# Patient Record
Sex: FEMALE | Race: WHITE | NOT HISPANIC OR LATINO | Employment: OTHER | URBAN - METROPOLITAN AREA
[De-identification: names, ages, dates, MRNs, and addresses within clinical notes are randomized per-mention and may not be internally consistent; named-entity substitution may affect disease eponyms.]

---

## 2017-01-03 ENCOUNTER — TRANSFERRED RECORDS (OUTPATIENT)
Dept: HEALTH INFORMATION MANAGEMENT | Facility: CLINIC | Age: 67
End: 2017-01-03

## 2017-01-03 LAB
ALBUMIN SERPL-MCNC: 4 G/DL
ALP SERPL-CCNC: 170 U/L
ALT SERPL-CCNC: 41 U/L
ANA DIRECT: NEGATIVE
ANION GAP SERPL CALCULATED.3IONS-SCNC: NORMAL MMOL/L
AST SERPL-CCNC: 43 U/L
BASOPHILS # BLD AUTO: 0 10*3/UL
BASOPHILS NFR BLD AUTO: 1 %
BILIRUB SERPL-MCNC: <0.2 MG/DL
BUN SERPL-MCNC: 9 MG/DL
CALCIUM SERPL-MCNC: 9.5 MG/DL
CCP ANTIBODIES: 11 U/ML
CHLORIDE SERPLBLD-SCNC: 92 MMOL/L
CO2 SERPL-SCNC: 27 MMOL/L
CREAT SERPL-MCNC: 0.63 MG/DL
CRP SERPL-MCNC: 27.3 MG/L
EOSINOPHIL # BLD AUTO: 0.2 10*3/UL
EOSINOPHIL NFR BLD AUTO: 3 %
ERYTHROCYTE [DISTWIDTH] IN BLOOD BY AUTOMATED COUNT: 13.3 %
GFR SERPL CREATININE-BSD FRML MDRD: 93 ML/MIN/1.73M2
GLUCOSE SERPL-MCNC: 87 MG/DL (ref 70–99)
HCT VFR BLD AUTO: 34.3 %
HEMOGLOBIN: 10.7 G/DL (ref 11.7–15.7)
LYMPHOCYTES # BLD AUTO: 1.4 10*3/UL
LYMPHOCYTES NFR BLD AUTO: 18 %
Lab: 100
MCH RBC QN AUTO: 27.4 PG
MCHC RBC AUTO-ENTMCNC: 31.2 G/DL
MCV RBC AUTO: 88 FL
MONOCYTES # BLD AUTO: 0.7 10*3/UL
MONOCYTES NFR BLD AUTO: 9 %
NEUTROPHILS # BLD AUTO: 5.3 10*3/UL
NEUTROPHILS NFR BLD AUTO: 69 %
PLATELET COUNT - QUEST: 514 10^9/L (ref 150–450)
POTASSIUM SERPL-SCNC: 4.4 MMOL/L
PROT SERPL-MCNC: 6.8 G/DL
RA LATEX TURBID.: 10.5 IU/ML
RBC # BLD AUTO: 3.91 10^12/L
SODIUM SERPL-SCNC: 135 MMOL/L
WBC # BLD AUTO: 7.6 10^9/L

## 2017-01-05 ENCOUNTER — ONCOLOGY VISIT (OUTPATIENT)
Dept: ONCOLOGY | Facility: CLINIC | Age: 67
End: 2017-01-05
Attending: INTERNAL MEDICINE
Payer: COMMERCIAL

## 2017-01-05 VITALS
RESPIRATION RATE: 16 BRPM | BODY MASS INDEX: 19.87 KG/M2 | HEIGHT: 67 IN | HEART RATE: 82 BPM | OXYGEN SATURATION: 99 % | TEMPERATURE: 95.8 F | SYSTOLIC BLOOD PRESSURE: 110 MMHG | DIASTOLIC BLOOD PRESSURE: 69 MMHG | WEIGHT: 126.6 LBS

## 2017-01-05 DIAGNOSIS — C82.99 NODULAR LYMPHOMA OF EXTRANODAL AND/OR SOLID ORGAN SITE (H): Primary | ICD-10-CM

## 2017-01-05 DIAGNOSIS — M87.00 AVASCULAR NECROSIS OF BONE (H): ICD-10-CM

## 2017-01-05 DIAGNOSIS — M25.552 HIP PAIN, LEFT: ICD-10-CM

## 2017-01-05 DIAGNOSIS — M17.0 OSTEOARTHRITIS OF BOTH KNEES, UNSPECIFIED OSTEOARTHRITIS TYPE: ICD-10-CM

## 2017-01-05 PROCEDURE — 99212 OFFICE O/P EST SF 10 MIN: CPT | Mod: ZF

## 2017-01-05 PROCEDURE — 99214 OFFICE O/P EST MOD 30 MIN: CPT | Mod: GC | Performed by: INTERNAL MEDICINE

## 2017-01-05 RX ORDER — OMEPRAZOLE 20 MG/1
20 TABLET, DELAYED RELEASE ORAL DAILY
COMMUNITY
End: 2017-07-06

## 2017-01-05 RX ORDER — PREDNISONE 5 MG/1
30 TABLET ORAL DAILY
COMMUNITY
End: 2017-07-06

## 2017-01-05 ASSESSMENT — PAIN SCALES - GENERAL: PAINLEVEL: MODERATE PAIN (4)

## 2017-01-05 NOTE — NURSING NOTE
"Lilly Babcock is a 66 year old female who presents for:  Chief Complaint   Patient presents with     Oncology Clinic Visit     NHL        Initial Vitals:  /69 mmHg  Pulse 82  Temp(Src) 95.8  F (35.4  C) (Tympanic)  Resp 16  Ht 1.712 m (5' 7.4\")  Wt 57.425 kg (126 lb 9.6 oz)  BMI 19.59 kg/m2  SpO2 99%  Breastfeeding? No Estimated body mass index is 19.59 kg/(m^2) as calculated from the following:    Height as of this encounter: 1.712 m (5' 7.4\").    Weight as of this encounter: 57.425 kg (126 lb 9.6 oz).. Body surface area is 1.65 meters squared. BP completed using cuff size: regular  Moderate Pain (4) No LMP recorded. Patient is postmenopausal. Allergies and medications reviewed.     Medications: Medication refills not needed today.  Pharmacy name entered into TopTenREVIEWS:    [facility] #[id number] - [place], [place] - [address] [facility] - [place], [place] - [address] [facility]Y #[id number] - [place], [place] - [address]    Comments:     7 minutes for nursing intake (face to face time)   Yamile Bolton CMA          "

## 2017-01-05 NOTE — Clinical Note
1/5/2017       RE: Lilly Babcock  [address]  [place] [address]     Dear Colleague,    Thank you for referring your patient, Lilly Babcock, to the [facility]. Please see a copy of my visit note below.    No notes on file    Again, thank you for allowing me to participate in the care of your patient.      Sincerely,    Jean-Claude Blanton MD

## 2017-01-05 NOTE — Clinical Note
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 Biopsy was grade 2-3A and stage YOUSUF. Initial w/u with involvement of multiple skeletal sites and bone marrow. Over time, she was followed without requiring treatment, but with evidence of waxing and waning disease on serial scans.  The only lymphoma treatment she received was for pain in the left hip with evidence of progressive skeletal disease at that site on radiographic evaluation.  She received a total of 4140 cGy in 20 fractions to the left hip/proximal femur between 08/20/2012 and 09/20/2012 at [facility].  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 In July 2016 developed nausea of unknown etiology, workup negative for CNS involvement with MR and CSF analysis. PET scan on 8/18/16 with indication of progressive skeletal disease.      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 She previously developed avascular necrosis and for that she underwent a left total hip arthroplasty on 10/19/16. She is in for an interval follow up because she was recently started on steroids for possible PMR and wanted to know if this would affect her lymphoma follow up or treatment plan.    She states that during December she developed worsening pain, stiffness and weakness in her hips, legs, and knees. She was to the point that she was needing to use a walker and was seen by Dr. Godoy, Rheumatologist, earlier this week and he opted for a trial of prednisone as she may have polymyalgia rheumatica.    She started 30 mg of prednisone daily and notes that she has had a remarkable improvement in her symptoms to the point that she is able to get around without any  "major issues in the past day.  She has noticed some insomnia with the steroid but is otherwise tolerating this well.     She denies any fever, night sweats, weight loss.     ROS: complete 10-pt ROS reviewed and negative unless noted in HPI    Meds:  Current Outpatient Prescriptions   Medication     predniSONE (DELTASONE) 5 MG tablet     NAPROXEN SODIUM PO     omeprazole (PRILOSEC OTC) 20 MG tablet     mometasone (ELOCON) 0.1 % ointment     fluticasone-salmeterol (ADVAIR DISKUS) 250-50 MCG/DOSE diskus inhaler     valACYclovir (VALTREX) 1000 mg tablet     levalbuterol (XOPENEX HFA) 45 MCG/ACT Inhaler     ipratropium (ATROVENT) 0.06 % spray     acetaminophen (TYLENOL) 500 MG tablet     clobetasol (TEMOVATE) 0.05 % ointment     levalbuterol (XOPENEX) 1.25 MG/3ML nebulizer solution     lamoTRIgine (LAMICTAL) 25 MG TBDP     methylPREDNISolone (MEDROL) 32 MG tablet     fexofenadine (ALLEGRA) 180 MG tablet     albuterol (PROAIR HFA) 108 (90 BASE) MCG/ACT inhaler     triamcinolone (KENALOG) 0.1 % cream     Cholecalciferol (VITAMIN D) 1000 UNIT capsule     No current facility-administered medications for this visit.       Allergies: IV contrast, Diatrizoate, Liquid adhesives.    Physical Examination:    /69 mmHg  Pulse 82  Temp(Src) 95.8  F (35.4  C) (Tympanic)  Resp 16  Ht 1.712 m (5' 7.4\")  Wt 57.425 kg (126 lb 9.6 oz)  BMI 19.59 kg/m2  SpO2 99%  Breastfeeding? No  Gen: alert, NAD, thin   HEENT: PERRL, sclera anicteric, MMM, no oral lesions  Lymph: she has as pea sized lymph node in the left posterior cervical chain. No other cervical, supraclavicular, axillary, or inguinal adenopathy  CV: RRR without murmurs  Lungs: CTAB  Abd: soft, nt/nd, normal BS, non-palpable liver and spleen, no masses  MSK: no LE edema  Neuro: moving all extremities equally  Skin: left shin ecchymosis   Psych: normal affect    No new labs/imaging here - she reports ESR of 47 this week.    Impression and Plan    Stage YOUSUF follicular " lymphoma: Dr. 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 Over the years her disease has waxed and waned, mainly in skeletal locations, and she has not yet required systemic treatment. She had some weight loss around that time but it seems that it was related to some depression due to death in the family/friends. Because of the nausea and vomiting, an evaluation for CNS involvement with MRI brain and CSF analysis was done and was negative. Currently, the nausea has resolved. The back and hip pains are likely attributed to avascular necrosis and arthritis. She underwent a left total hip arthroplasty on 10/19/16 and she is still recovering.  In the fall of 2016 lymphoma directed therapy, probably with rituximab alone, was contemplated but since  her weight loss plateaued, her nausea and vomiting has stopped, and some of her pains are due to osteoarthritis and avascular necrosis it was decided to continue monitoring.    She comes in today after being started on prednisone for possible PMR.  She is feeling symptomatically much better.  She will continue to manage the prednisone with Dr. Gould. We are pleased with the improvement in her symptoms and favor having her on the lowest effective dose.  We will continue with her planned follow up in March. At that time we will evaluate need for additional imaging or treatment - accepting that prednisone, which is helping, may be affecting the lymphoma.    The patient was seen and staffed with Dr. Harvinder Manzano MD  Heme/Onc Fellow    Oncology Attending    Patient seen and examined with the Oncology Fellow, Dr. Manzano. Agree with his findings, assessment and plan.    Jean-Claude Blanton MD  Professor of Medicine  Oncology  [facility]  Office: [phone]  Clinic Fax: [phone]      CC:    MD Anant Banda MD Edward Cheng, MD Erskine Caperton, MD Bruce A. Peterson, MD

## 2017-01-05 NOTE — PROGRESS NOTES
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 Biopsy was grade 2-3A and stage YOUSUF. Initial w/u with involvement of multiple skeletal sites and bone marrow. Over time, she was followed without requiring treatment, but with evidence of waxing and waning disease on serial scans.  The only lymphoma treatment she received was for pain in the left hip with evidence of progressive skeletal disease at that site on radiographic evaluation.  She received a total of 4140 cGy in 20 fractions to the left hip/proximal femur between 08/20/2012 and 09/20/2012 at [facility].  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 In July 2016 developed nausea of unknown etiology, workup negative for CNS involvement with MR and CSF analysis. PET scan on 8/18/16 with indication of progressive skeletal disease.      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 She previously developed avascular necrosis and for that she underwent a left total hip arthroplasty on 10/19/16. She is in for an interval follow up because she was recently started on steroids for possible PMR and wanted to know if this would affect her lymphoma follow up or treatment plan.    She states that during December she developed worsening pain, stiffness and weakness in her hips, legs, and knees. She was to the point that she was needing to use a walker and was seen by Dr. Godoy, Rheumatologist, earlier this week and he opted for a trial of prednisone as she may have polymyalgia rheumatica.    She started 30 mg of prednisone daily and notes that she has had a remarkable improvement in her symptoms to the point that she is able to get around without any major issues in the past day.  She has noticed some insomnia with the steroid but  "is otherwise tolerating this well.     She denies any fever, night sweats, weight loss.     ROS: complete 10-pt ROS reviewed and negative unless noted in HPI    Meds:  Current Outpatient Prescriptions   Medication     predniSONE (DELTASONE) 5 MG tablet     NAPROXEN SODIUM PO     omeprazole (PRILOSEC OTC) 20 MG tablet     mometasone (ELOCON) 0.1 % ointment     fluticasone-salmeterol (ADVAIR DISKUS) 250-50 MCG/DOSE diskus inhaler     valACYclovir (VALTREX) 1000 mg tablet     levalbuterol (XOPENEX HFA) 45 MCG/ACT Inhaler     ipratropium (ATROVENT) 0.06 % spray     acetaminophen (TYLENOL) 500 MG tablet     clobetasol (TEMOVATE) 0.05 % ointment     levalbuterol (XOPENEX) 1.25 MG/3ML nebulizer solution     lamoTRIgine (LAMICTAL) 25 MG TBDP     methylPREDNISolone (MEDROL) 32 MG tablet     fexofenadine (ALLEGRA) 180 MG tablet     albuterol (PROAIR HFA) 108 (90 BASE) MCG/ACT inhaler     triamcinolone (KENALOG) 0.1 % cream     Cholecalciferol (VITAMIN D) 1000 UNIT capsule     No current facility-administered medications for this visit.       Allergies: IV contrast, Diatrizoate, Liquid adhesives.    Physical Examination:    /69 mmHg  Pulse 82  Temp(Src) 95.8  F (35.4  C) (Tympanic)  Resp 16  Ht 1.712 m (5' 7.4\")  Wt 57.425 kg (126 lb 9.6 oz)  BMI 19.59 kg/m2  SpO2 99%  Breastfeeding? No  Gen: alert, NAD, thin   HEENT: PERRL, sclera anicteric, MMM, no oral lesions  Lymph: she has as pea sized lymph node in the left posterior cervical chain. No other cervical, supraclavicular, axillary, or inguinal adenopathy  CV: RRR without murmurs  Lungs: CTAB  Abd: soft, nt/nd, normal BS, non-palpable liver and spleen, no masses  MSK: no LE edema  Neuro: moving all extremities equally  Skin: left shin ecchymosis   Psych: normal affect    No new labs/imaging here - she reports ESR of 47 this week.    Impression and Plan    Stage YOUSUF follicular lymphoma: Dr. 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 " Over the years her disease has waxed and waned, mainly in skeletal locations, and she has not yet required systemic treatment. She had some weight loss around that time but it seems that it was related to some depression due to death in the family/friends. Because of the nausea and vomiting, an evaluation for CNS involvement with MRI brain and CSF analysis was done and was negative. Currently, the nausea has resolved. The back and hip pains are likely attributed to avascular necrosis and arthritis. She underwent a left total hip arthroplasty on 10/19/16 and she is still recovering.  In the fall of 2016 lymphoma directed therapy, probably with rituximab alone, was contemplated but since  her weight loss plateaued, her nausea and vomiting has stopped, and some of her pains are due to osteoarthritis and avascular necrosis it was decided to continue monitoring.    She comes in today after being started on prednisone for possible PMR.  She is feeling symptomatically much better.  She will continue to manage the prednisone with Dr. Gould. We are pleased with the improvement in her symptoms and favor having her on the lowest effective dose.  We will continue with her planned follow up in March. At that time we will evaluate need for additional imaging or treatment - accepting that prednisone, which is helping, may be affecting the lymphoma.    The patient was seen and staffed with Dr. Harvinder Manzano MD  Heme/Onc Fellow    Oncology Attending    Patient seen and examined with the Oncology Fellow, Dr. Manzano. Agree with his findings, assessment and plan.    Jean-Claude Blanton MD  Professor of Medicine  Oncology  [facility]  Office: [phone]  Clinic Fax: [phone]      CC:    MD Anant Banda MD Edward Cheng, MD Erskine Caperton, MD

## 2017-01-06 ENCOUNTER — DOCUMENTATION ONLY (OUTPATIENT)
Dept: ORTHOPEDICS | Facility: CLINIC | Age: 67
End: 2017-01-06

## 2017-01-11 ENCOUNTER — MEDICAL CORRESPONDENCE (OUTPATIENT)
Dept: HEALTH INFORMATION MANAGEMENT | Facility: CLINIC | Age: 67
End: 2017-01-11

## 2017-03-09 ENCOUNTER — ONCOLOGY VISIT (OUTPATIENT)
Dept: ONCOLOGY | Facility: CLINIC | Age: 67
End: 2017-03-09
Attending: INTERNAL MEDICINE
Payer: COMMERCIAL

## 2017-03-09 ENCOUNTER — APPOINTMENT (OUTPATIENT)
Dept: LAB | Facility: CLINIC | Age: 67
End: 2017-03-09
Attending: INTERNAL MEDICINE
Payer: COMMERCIAL

## 2017-03-09 VITALS
BODY MASS INDEX: 20.17 KG/M2 | SYSTOLIC BLOOD PRESSURE: 103 MMHG | WEIGHT: 130.3 LBS | TEMPERATURE: 97.9 F | HEART RATE: 89 BPM | DIASTOLIC BLOOD PRESSURE: 55 MMHG | OXYGEN SATURATION: 96 %

## 2017-03-09 DIAGNOSIS — C82.90 FOLLICULAR NON-HODGKIN'S LYMPHOMA (H): ICD-10-CM

## 2017-03-09 DIAGNOSIS — C82.99 NODULAR LYMPHOMA OF EXTRANODAL AND/OR SOLID ORGAN SITE (H): Primary | ICD-10-CM

## 2017-03-09 LAB
ALBUMIN SERPL-MCNC: 3.6 G/DL (ref 3.4–5)
ALP SERPL-CCNC: 84 U/L (ref 40–150)
ALT SERPL W P-5'-P-CCNC: 30 U/L (ref 0–50)
ANION GAP SERPL CALCULATED.3IONS-SCNC: 9 MMOL/L (ref 3–14)
AST SERPL W P-5'-P-CCNC: 26 U/L (ref 0–45)
BASOPHILS # BLD AUTO: 0 10E9/L (ref 0–0.2)
BASOPHILS NFR BLD AUTO: 0.4 %
BILIRUB SERPL-MCNC: 0.4 MG/DL (ref 0.2–1.3)
BUN SERPL-MCNC: 10 MG/DL (ref 7–30)
CALCIUM SERPL-MCNC: 9.3 MG/DL (ref 8.5–10.1)
CHLORIDE SERPL-SCNC: 101 MMOL/L (ref 94–109)
CO2 SERPL-SCNC: 28 MMOL/L (ref 20–32)
CREAT SERPL-MCNC: 0.69 MG/DL (ref 0.52–1.04)
DIFFERENTIAL METHOD BLD: ABNORMAL
EOSINOPHIL # BLD AUTO: 0 10E9/L (ref 0–0.7)
EOSINOPHIL NFR BLD AUTO: 0.2 %
ERYTHROCYTE [DISTWIDTH] IN BLOOD BY AUTOMATED COUNT: 16.8 % (ref 10–15)
GFR SERPL CREATININE-BSD FRML MDRD: 85 ML/MIN/1.7M2
GLUCOSE SERPL-MCNC: 151 MG/DL (ref 70–99)
HCT VFR BLD AUTO: 37.2 % (ref 35–47)
HGB BLD-MCNC: 12 G/DL (ref 11.7–15.7)
IMM GRANULOCYTES # BLD: 0.1 10E9/L (ref 0–0.4)
IMM GRANULOCYTES NFR BLD: 0.6 %
LDH SERPL L TO P-CCNC: 216 U/L (ref 81–234)
LYMPHOCYTES # BLD AUTO: 0.8 10E9/L (ref 0.8–5.3)
LYMPHOCYTES NFR BLD AUTO: 9.3 %
MCH RBC QN AUTO: 29.1 PG (ref 26.5–33)
MCHC RBC AUTO-ENTMCNC: 32.3 G/DL (ref 31.5–36.5)
MCV RBC AUTO: 90 FL (ref 78–100)
MONOCYTES # BLD AUTO: 0.4 10E9/L (ref 0–1.3)
MONOCYTES NFR BLD AUTO: 3.9 %
NEUTROPHILS # BLD AUTO: 7.7 10E9/L (ref 1.6–8.3)
NEUTROPHILS NFR BLD AUTO: 85.6 %
NRBC # BLD AUTO: 0 10*3/UL
NRBC BLD AUTO-RTO: 0 /100
PLATELET # BLD AUTO: 307 10E9/L (ref 150–450)
POTASSIUM SERPL-SCNC: 4 MMOL/L (ref 3.4–5.3)
PROT SERPL-MCNC: 6.9 G/DL (ref 6.8–8.8)
RBC # BLD AUTO: 4.13 10E12/L (ref 3.8–5.2)
SODIUM SERPL-SCNC: 138 MMOL/L (ref 133–144)
WBC # BLD AUTO: 9 10E9/L (ref 4–11)

## 2017-03-09 PROCEDURE — 36415 COLL VENOUS BLD VENIPUNCTURE: CPT

## 2017-03-09 PROCEDURE — 99212 OFFICE O/P EST SF 10 MIN: CPT

## 2017-03-09 PROCEDURE — 83615 LACTATE (LD) (LDH) ENZYME: CPT | Performed by: HOSPITALIST

## 2017-03-09 PROCEDURE — 80053 COMPREHEN METABOLIC PANEL: CPT | Performed by: HOSPITALIST

## 2017-03-09 PROCEDURE — 85025 COMPLETE CBC W/AUTO DIFF WBC: CPT | Performed by: HOSPITALIST

## 2017-03-09 PROCEDURE — 99214 OFFICE O/P EST MOD 30 MIN: CPT | Mod: GC | Performed by: INTERNAL MEDICINE

## 2017-03-09 NOTE — MR AVS SNAPSHOT
After Visit Summary   3/9/2017    Lilly Babcock    MRN: [id number]           Patient Information     Date Of Birth          1950        Visit Information        Provider Department      3/9/2017 2:00 PM Jean-Claude Blanton MD [facility]        Today's Diagnoses     Nodular lymphoma of extranodal and/or solid organ site (H)    -  1    Follicular non-Hodgkin's lymphoma (H)           Follow-ups after your visit        Follow-up notes from your care team     Return in about 3 months (around 6/9/2017) for Physical Exam.      Who to contact     If you have questions or need follow up information about today's clinic visit or your schedule please contact [facility] directly at [phone].  Normal or non-critical lab and imaging results will be communicated to you by MindMixerhart, letter or phone within 4 business days after the clinic has received the results. If you do not hear from us within 7 days, please contact the clinic through MindMixerhart or phone. If you have a critical or abnormal lab result, we will notify you by phone as soon as possible.  Submit refill requests through [employer]1 or call your pharmacy and they will forward the refill request to us. Please allow 3 business days for your refill to be completed.          Additional Information About Your Visit        MyChart Information     [employer] gives you secure access to your electronic health record. If you see a primary care provider, you can also send messages to your care team and make appointments. If you have questions, please call your primary care clinic.  If you do not have a primary care provider, please call [phone] and they will assist you.        Care EveryWhere ID     This is your Care EveryWhere ID. This could be used by other organizations to access your [facility] medical records  [id number]        Your Vitals Were     Pulse Temperature Pulse Oximetry BMI (Body Mass Index)          89  97.9  F (36.6  C) (Oral) 96% 20.17 kg/m2         Blood Pressure from Last 3 Encounters:   03/09/17 103/55   01/05/17 110/69   12/07/16 104/58    Weight from Last 3 Encounters:   03/09/17 59.1 kg (130 lb 4.8 oz)   01/05/17 57.4 kg (126 lb 9.6 oz)   12/07/16 55 kg (121 lb 4.8 oz)              We Performed the Following     CBC with platelets differential     Comprehensive metabolic panel     Lactate Dehydrogenase        Primary Care Provider Office Phone # Fax #    Wade LANDEROS MD [place] [phone] [address] [address]        Thank you!     Thank you for choosing [facility]  for your care. Our goal is always to provide you with excellent care. Hearing back from our patients is one way we can continue to improve our services. Please take a few minutes to complete the written survey that you may receive in the mail after your visit with us. Thank you!             Your Updated Medication List - Protect others around you: Learn how to safely use, store and throw away your medicines at www.disposemymeds.org.          This list is accurate as of: 3/9/17 11:59 PM.  Always use your most recent med list.                   Brand Name Dispense Instructions for use    acetaminophen 500 MG tablet    TYLENOL     Take 500-1,000 mg by mouth       ADVAIR DISKUS 250-50 MCG/DOSE diskus inhaler   Generic drug:  fluticasone-salmeterol      Reported on 3/9/2017       albuterol 108 (90 BASE) MCG/ACT Inhaler   Generic drug:  albuterol      Inhale 2 puffs into the lungs every 4 hours as needed       ALLEGRA 180 MG tablet   Generic drug:  fexofenadine      Take  by mouth daily.       clobetasol 0.05 % ointment    TEMOVATE     Reported on 3/9/2017       ipratropium 0.06 % spray    ATROVENT     Spray 2 sprays in nostril       lamoTRIgine 25 MG Tbdp ODT tab    LaMICtal     100 mg daily       levalbuterol 1.25 MG/3ML neb solution    XOPENEX     Take 1 ampule by nebulization as  needed       methylPREDNISolone 32 MG tablet    MEDROL    2 tablet    Take one tablet (32mg) by mouth 12 hours prior to scheduled CT scan.  Repeat above dose 2 hours prior to CT scan       mometasone 0.1 % ointment    ELOCON     Reported on 3/9/2017       NAPROXEN SODIUM PO      Take 220 mg by mouth 2 times daily (with meals) Reported on 3/9/2017       omeprazole 20 MG tablet    priLOSEC OTC     Take 20 mg by mouth daily       predniSONE 5 MG tablet    DELTASONE     Take 30 mg by mouth daily       triamcinolone 0.1 % cream    KENALOG     Apply 0.5 inches topically 2 times daily Reported on 3/9/2017       valACYclovir 1000 mg tablet    VALTREX     One tab bid for 3 days prn outbreaks  Indications: PN:       vitamin D 1000 UNITS capsule      Take 1 capsule by mouth daily.       XOPENEX HFA 45 MCG/ACT Inhaler   Generic drug:  levalbuterol      Inhale 1-2 puffs into the lungs

## 2017-03-09 NOTE — PROGRESS NOTES
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 Biopsy was grade 2-3A and stage YOUSUF. Initial w/u with involvement of multiple skeletal sites and bone marrow. Over time, she was followed without requiring treatment, but with evidence of waxing and waning disease on serial scans.  The only lymphoma treatment she received was for pain in the left hip with evidence of progressive skeletal disease at that site on radiographic evaluation.  She received a total of 4140 cGy in 20 fractions to the left hip/proximal femur between 08/20/2012 and 09/20/2012 at [facility].  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 In July 2016 developed nausea of unknown etiology, workup negative for CNS involvement with MR and CSF analysis. PET scan on 8/18/16 with indication of progressive skeletal disease.  She previously developed avascular necrosis and for that she underwent a left total hip arthroplasty on 10/19/16.    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  She is taking prednisone 15 mg for PMR, and has had improvement in the PMR symptoms. She has stiffness and pain related to this. Her worst pain is in the right hip and right knee.She is yet to do further follow up with orthopedics.    Denies chest pain, SOB, cough, phlegmn, nausea, vomiting, abdominal pain, diarrhoea, constipation, fatigue, loss of weight, loss of appetite, urinary problems, headache, dizziness, blurry vision, fevers, chills, night sweats.     Her sister was recently diagnosed with liver cirrhosis and also noted to homozygous for alpha 1 antitrypsin.  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    ROS: complete 10-pt ROS reviewed and negative unless noted in HPI    Meds:  Current Outpatient  Prescriptions   Medication     predniSONE (DELTASONE) 5 MG tablet     omeprazole (PRILOSEC OTC) 20 MG tablet     valACYclovir (VALTREX) 1000 mg tablet     levalbuterol (XOPENEX HFA) 45 MCG/ACT Inhaler     ipratropium (ATROVENT) 0.06 % spray     acetaminophen (TYLENOL) 500 MG tablet     levalbuterol (XOPENEX) 1.25 MG/3ML nebulizer solution     lamoTRIgine (LAMICTAL) 25 MG TBDP     methylPREDNISolone (MEDROL) 32 MG tablet     fexofenadine (ALLEGRA) 180 MG tablet     albuterol (PROAIR HFA) 108 (90 BASE) MCG/ACT inhaler     Cholecalciferol (VITAMIN D) 1000 UNIT capsule     NAPROXEN SODIUM PO     mometasone (ELOCON) 0.1 % ointment     fluticasone-salmeterol (ADVAIR DISKUS) 250-50 MCG/DOSE diskus inhaler     clobetasol (TEMOVATE) 0.05 % ointment     triamcinolone (KENALOG) 0.1 % cream     No current facility-administered medications for this visit.        Allergies: IV contrast, Diatrizoate, Liquid adhesives.    Physical Examination:    /55  Pulse 89  Temp 97.9  F (36.6  C) (Oral)  Wt 59.1 kg (130 lb 4.8 oz)  SpO2 96%  BMI 20.17 kg/m2  Gen: alert, NAD, thin   HEENT: PERRL, sclera anicteric, MMM, no oral lesions  Lymph: No cervical, supraclavicular, axillary, or inguinal adenopathy  CV: RRR without murmurs  Lungs: CTAB  Abd: soft, nt/nd, normal BS, non-palpable liver and spleen, no masses  MSK: no LE edema  Neuro: moving all extremities equally  Skin: dry  Psych: normal affect    Labs:Hb: 12; WBC:9.0  Creatinine 0.69, LDH: 216.      Impression and Plan    Stage YOUSUF follicular lymphoma: Dr. 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 Over the years her disease has waxed and waned, mainly in skeletal locations, and she has not yet required systemic treatment. She had some weight loss around that time but it seems that it was related to some depression due to death in the family/friends. Because of the nausea and vomiting, an evaluation for CNS involvement with MRI brain and CSF analysis was  done and was negative. Currently, the nausea remains resolved. The back and hip pains are likely attributed to avascular necrosis and arthritis. She underwent a left total hip arthroplasty on 10/19/16 and she is still recovering.  In the fall of 2016 lymphoma directed therapy, probably with rituximab alone, was contemplated but since  her weight loss plateaued, her nausea and vomiting has stopped, and some of her pains are due to osteoarthritis and avascular necrosis it was decided to continue monitoring.    She has no new complaints today.She is feeling symptomatically much better after being on prednisone for PMR.  She will continue to manage the prednisone with Dr. Gould. Advised follow up with orthopedics for hip pain. We will continue observation now. Follow up in 3-4 months with blood work; CBc with diff, CMP, LDH.    The patient was seen and staffed with Dr. Blanton.    PAOLA Sahu, MS.  Hematology/Oncology Fellow  [facility]  Pager [phone]    Oncology Attending    Patient seen and examined with the Oncology Fellow, Dr. Bennett. Agree with her findings, assessment and plan.    Jean-Claude Blanton MD  Professor of Medicine  Oncology  [facility]  Office: [phone]  Clinic Fax: [phone]      CC:    MD Wade Banda MD

## 2017-03-09 NOTE — NURSING NOTE
"Lilly Babcock is a 66 year old female who presents for:  Chief Complaint   Patient presents with     Blood Draw     vs/labs by [facility]        Initial Vitals:  /55  Pulse 89  Temp 97.9  F (36.6  C) (Oral)  Wt 59.1 kg (130 lb 4.8 oz)  SpO2 96%  BMI 20.17 kg/m2 Estimated body mass index is 20.17 kg/(m^2) as calculated from the following:    Height as of 1/5/17: 1.712 m (5' 7.4\").    Weight as of this encounter: 59.1 kg (130 lb 4.8 oz).. Body surface area is 1.68 meters squared. BP completed using cuff size: NA (Not Taken)  Data Unavailable No LMP recorded. Patient is postmenopausal. Allergies and medications reviewed.     Medications: Medication refills not needed today.  Pharmacy name entered into [employer]:    [facility] #[id number] - [place], [place] - [address] [facility] - [place], [place] - [address] [facility]ARMACY #[id number] - [place], [place] - [address]    Comments:     7 minutes for nursing intake (face to face time)   Leila Andrade MA        "

## 2017-03-09 NOTE — NURSING NOTE
Chief Complaint   Patient presents with     Blood Draw     vs/labs by cma   See doc flowsheets for details.  VENKATA Saavedra, NAMITA

## 2017-03-09 NOTE — LETTER
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 Biopsy was grade 2-3A and stage YOUSUF. Initial w/u with involvement of multiple skeletal sites and bone marrow. Over time, she was followed without requiring treatment, but with evidence of waxing and waning disease on serial scans.  The only lymphoma treatment she received was for pain in the left hip with evidence of progressive skeletal disease at that site on radiographic evaluation.  She received a total of 4140 cGy in 20 fractions to the left hip/proximal femur between 08/20/2012 and 09/20/2012 at [facility].  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 In July 2016 developed nausea of unknown etiology, workup negative for CNS involvement with MR and CSF analysis. PET scan on 8/18/16 with indication of progressive skeletal disease.  She previously developed avascular necrosis and for that she underwent a left total hip arthroplasty on 10/19/16.    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  She is taking prednisone 15 mg for PMR, and has had improvement in the PMR symptoms. She has stiffness and pain related to this. Her worst pain is in the right hip and right knee.She is yet to do further follow up with orthopedics.    Denies chest pain, SOB, cough, phlegmn, nausea, vomiting, abdominal pain, diarrhoea, constipation, fatigue, loss of weight, loss of appetite, urinary problems, headache, dizziness, blurry vision, fevers, chills, night sweats.     Her sister was recently diagnosed with liver cirrhosis and also noted to homozygous for alpha 1 antitrypsin.  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    ROS: complete 10-pt ROS  reviewed and negative unless noted in HPI    Meds:  Current Outpatient Prescriptions   Medication     predniSONE (DELTASONE) 5 MG tablet     omeprazole (PRILOSEC OTC) 20 MG tablet     valACYclovir (VALTREX) 1000 mg tablet     levalbuterol (XOPENEX HFA) 45 MCG/ACT Inhaler     ipratropium (ATROVENT) 0.06 % spray     acetaminophen (TYLENOL) 500 MG tablet     levalbuterol (XOPENEX) 1.25 MG/3ML nebulizer solution     lamoTRIgine (LAMICTAL) 25 MG TBDP     methylPREDNISolone (MEDROL) 32 MG tablet     fexofenadine (ALLEGRA) 180 MG tablet     albuterol (PROAIR HFA) 108 (90 BASE) MCG/ACT inhaler     Cholecalciferol (VITAMIN D) 1000 UNIT capsule     NAPROXEN SODIUM PO     mometasone (ELOCON) 0.1 % ointment     fluticasone-salmeterol (ADVAIR DISKUS) 250-50 MCG/DOSE diskus inhaler     clobetasol (TEMOVATE) 0.05 % ointment     triamcinolone (KENALOG) 0.1 % cream     No current facility-administered medications for this visit.        Allergies: IV contrast, Diatrizoate, Liquid adhesives.    Physical Examination:    /55  Pulse 89  Temp 97.9  F (36.6  C) (Oral)  Wt 59.1 kg (130 lb 4.8 oz)  SpO2 96%  BMI 20.17 kg/m2  Gen: alert, NAD, thin   HEENT: PERRL, sclera anicteric, MMM, no oral lesions  Lymph: No cervical, supraclavicular, axillary, or inguinal adenopathy  CV: RRR without murmurs  Lungs: CTAB  Abd: soft, nt/nd, normal BS, non-palpable liver and spleen, no masses  MSK: no LE edema  Neuro: moving all extremities equally  Skin: dry  Psych: normal affect    Labs:Hb: 12; WBC:9.0  Creatinine 0.69, LDH: 216.      Impression and Plan    Stage YOUSUF follicular lymphoma: Dr. 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 Over the years her disease has waxed and waned, mainly in skeletal locations, and she has not yet required systemic treatment. She had some weight loss around that time but it seems that it was related to some depression due to death in the family/friends. Because of the nausea and vomiting,  an evaluation for CNS involvement with MRI brain and CSF analysis was done and was negative. Currently, the nausea remains resolved. The back and hip pains are likely attributed to avascular necrosis and arthritis. She underwent a left total hip arthroplasty on 10/19/16 and she is still recovering.  In the fall of 2016 lymphoma directed therapy, probably with rituximab alone, was contemplated but since  her weight loss plateaued, her nausea and vomiting has stopped, and some of her pains are due to osteoarthritis and avascular necrosis it was decided to continue monitoring.    She has no new complaints today.She is feeling symptomatically much better after being on prednisone for PMR.  She will continue to manage the prednisone with Dr. Gould. Advised follow up with orthopedics for hip pain. We will continue observation now. Follow up in 3-4 months with blood work; CBc with diff, CMP, LDH.    The patient was seen and staffed with Dr. Blanton.    PAOLA Sahu, MS.  Hematology/Oncology Fellow  [facility]  Pager [phone]    Oncology Attending    Patient seen and examined with the Oncology Fellow, Dr. Bennett. Agree with her findings, assessment and plan.    Jean-Claude Blanton MD  Professor of Medicine  Oncology  [facility]  Office: [phone]  Clinic Fax: [phone]      CC:    MD Wade Banda MD

## 2017-07-06 ENCOUNTER — APPOINTMENT (OUTPATIENT)
Dept: LAB | Facility: CLINIC | Age: 67
End: 2017-07-06
Attending: INTERNAL MEDICINE
Payer: COMMERCIAL

## 2017-07-06 ENCOUNTER — ONCOLOGY VISIT (OUTPATIENT)
Dept: ONCOLOGY | Facility: CLINIC | Age: 67
End: 2017-07-06
Attending: INTERNAL MEDICINE
Payer: COMMERCIAL

## 2017-07-06 VITALS
HEART RATE: 89 BPM | BODY MASS INDEX: 20.55 KG/M2 | TEMPERATURE: 98.4 F | SYSTOLIC BLOOD PRESSURE: 104 MMHG | WEIGHT: 132.8 LBS | OXYGEN SATURATION: 100 % | RESPIRATION RATE: 16 BRPM | DIASTOLIC BLOOD PRESSURE: 64 MMHG

## 2017-07-06 DIAGNOSIS — C82.99 NODULAR LYMPHOMA OF EXTRANODAL AND/OR SOLID ORGAN SITE (H): ICD-10-CM

## 2017-07-06 DIAGNOSIS — C82.90 FOLLICULAR NON-HODGKIN'S LYMPHOMA (H): ICD-10-CM

## 2017-07-06 LAB
ALBUMIN SERPL-MCNC: 3.6 G/DL (ref 3.4–5)
ALP SERPL-CCNC: 85 U/L (ref 40–150)
ALT SERPL W P-5'-P-CCNC: 28 U/L (ref 0–50)
ANION GAP SERPL CALCULATED.3IONS-SCNC: 5 MMOL/L (ref 3–14)
AST SERPL W P-5'-P-CCNC: 26 U/L (ref 0–45)
BASOPHILS # BLD AUTO: 0 10E9/L (ref 0–0.2)
BASOPHILS NFR BLD AUTO: 0.4 %
BILIRUB SERPL-MCNC: 0.2 MG/DL (ref 0.2–1.3)
BUN SERPL-MCNC: 12 MG/DL (ref 7–30)
CALCIUM SERPL-MCNC: 9.2 MG/DL (ref 8.5–10.1)
CHLORIDE SERPL-SCNC: 101 MMOL/L (ref 94–109)
CO2 SERPL-SCNC: 31 MMOL/L (ref 20–32)
CREAT SERPL-MCNC: 0.79 MG/DL (ref 0.52–1.04)
DIFFERENTIAL METHOD BLD: ABNORMAL
EOSINOPHIL # BLD AUTO: 0.2 10E9/L (ref 0–0.7)
EOSINOPHIL NFR BLD AUTO: 3.1 %
ERYTHROCYTE [DISTWIDTH] IN BLOOD BY AUTOMATED COUNT: 13.9 % (ref 10–15)
GFR SERPL CREATININE-BSD FRML MDRD: 72 ML/MIN/1.7M2
GLUCOSE SERPL-MCNC: 103 MG/DL (ref 70–99)
HCT VFR BLD AUTO: 36.8 % (ref 35–47)
HGB BLD-MCNC: 11.2 G/DL (ref 11.7–15.7)
IMM GRANULOCYTES # BLD: 0 10E9/L (ref 0–0.4)
IMM GRANULOCYTES NFR BLD: 0.4 %
LDH SERPL L TO P-CCNC: 223 U/L (ref 81–234)
LYMPHOCYTES # BLD AUTO: 1.8 10E9/L (ref 0.8–5.3)
LYMPHOCYTES NFR BLD AUTO: 23.1 %
MCH RBC QN AUTO: 28.1 PG (ref 26.5–33)
MCHC RBC AUTO-ENTMCNC: 30.4 G/DL (ref 31.5–36.5)
MCV RBC AUTO: 93 FL (ref 78–100)
MONOCYTES # BLD AUTO: 0.7 10E9/L (ref 0–1.3)
MONOCYTES NFR BLD AUTO: 8.7 %
NEUTROPHILS # BLD AUTO: 5.1 10E9/L (ref 1.6–8.3)
NEUTROPHILS NFR BLD AUTO: 64.3 %
NRBC # BLD AUTO: 0 10*3/UL
NRBC BLD AUTO-RTO: 0 /100
PLATELET # BLD AUTO: 310 10E9/L (ref 150–450)
POTASSIUM SERPL-SCNC: 4.1 MMOL/L (ref 3.4–5.3)
PROT SERPL-MCNC: 6.8 G/DL (ref 6.8–8.8)
RBC # BLD AUTO: 3.98 10E12/L (ref 3.8–5.2)
SODIUM SERPL-SCNC: 136 MMOL/L (ref 133–144)
WBC # BLD AUTO: 7.9 10E9/L (ref 4–11)

## 2017-07-06 PROCEDURE — 83615 LACTATE (LD) (LDH) ENZYME: CPT | Performed by: INTERNAL MEDICINE

## 2017-07-06 PROCEDURE — 36415 COLL VENOUS BLD VENIPUNCTURE: CPT | Performed by: INTERNAL MEDICINE

## 2017-07-06 PROCEDURE — 80053 COMPREHEN METABOLIC PANEL: CPT | Performed by: INTERNAL MEDICINE

## 2017-07-06 PROCEDURE — 99213 OFFICE O/P EST LOW 20 MIN: CPT | Mod: ZP | Performed by: INTERNAL MEDICINE

## 2017-07-06 PROCEDURE — 99212 OFFICE O/P EST SF 10 MIN: CPT | Mod: ZF

## 2017-07-06 PROCEDURE — 85025 COMPLETE CBC W/AUTO DIFF WBC: CPT | Performed by: INTERNAL MEDICINE

## 2017-07-06 RX ORDER — MAGNESIUM 200 MG
TABLET ORAL
COMMUNITY
Start: 2017-05-04 | End: 2019-10-03

## 2017-07-06 RX ORDER — OXYCODONE HYDROCHLORIDE 5 MG/1
5-10 TABLET ORAL
COMMUNITY
Start: 2017-05-12 | End: 2017-07-06

## 2017-07-06 RX ORDER — AMOXICILLIN 250 MG
1-4 CAPSULE ORAL
COMMUNITY
Start: 2017-05-10 | End: 2017-07-06

## 2017-07-06 RX ORDER — LAMOTRIGINE 25 MG/1
200 TABLET, ORALLY DISINTEGRATING ORAL DAILY
Qty: 30 TABLET | Refills: 0 | COMMUNITY
Start: 2017-07-06 | End: 2019-10-03

## 2017-07-06 RX ORDER — OMEPRAZOLE 10 MG/1
20 CAPSULE, DELAYED RELEASE ORAL
COMMUNITY
Start: 2017-05-08 | End: 2019-10-03

## 2017-07-06 RX ORDER — PREDNISONE 5 MG/1
7 TABLET ORAL DAILY
Refills: 0 | COMMUNITY
Start: 2017-07-06 | End: 2019-10-03

## 2017-07-06 ASSESSMENT — PAIN SCALES - GENERAL: PAINLEVEL: NO PAIN (1)

## 2017-07-06 NOTE — MR AVS SNAPSHOT
After Visit Summary   7/6/2017    Lilly Babcock    MRN: [id number]           Patient Information     Date Of Birth          1950        Visit Information        Provider Department      7/6/2017 2:30 PM Jean-Claude Blanton MD [facility]        Today's Diagnoses     Nodular lymphoma of extranodal and/or solid organ site (H)        Follicular non-Hodgkin's lymphoma (H)           Follow-ups after your visit        Follow-up notes from your care team     Return in about 4 months (around 11/6/2017) for Physical Exam, Lab Work.      Who to contact     If you have questions or need follow up information about today's clinic visit or your schedule please contact [facility] directly at [phone].  Normal or non-critical lab and imaging results will be communicated to you by MyChart, letter or phone within 4 business days after the clinic has received the results. If you do not hear from us within 7 days, please contact the clinic through [employer] or phone. If you have a critical or abnormal lab result, we will notify you by phone as soon as possible.  Submit refill requests through [employer] or call your pharmacy and they will forward the refill request to us. Please allow 3 business days for your refill to be completed.          Additional Information About Your Visit        MyChart Information     [employer] gives you secure access to your electronic health record. If you see a primary care provider, you can also send messages to your care team and make appointments. If you have questions, please call your primary care clinic.  If you do not have a primary care provider, please call [phone] and they will assist you.        Care EveryWhere ID     This is your Care EveryWhere ID. This could be used by other organizations to access your [facility] medical records  [id number]        Your Vitals Were     Pulse Temperature Respirations Pulse Oximetry BMI (Body Mass  Index)       89 98.4  F (36.9  C) (Oral) 16 100% 20.55 kg/m2        Blood Pressure from Last 3 Encounters:   07/06/17 104/64   03/09/17 103/55   01/05/17 110/69    Weight from Last 3 Encounters:   07/06/17 60.2 kg (132 lb 12.8 oz)   03/09/17 59.1 kg (130 lb 4.8 oz)   01/05/17 57.4 kg (126 lb 9.6 oz)              We Performed the Following     CBC with platelets differential     Comprehensive metabolic panel     Lactate Dehydrogenase          Today's Medication Changes          These changes are accurate as of: 7/6/17 11:59 PM.  If you have any questions, ask your nurse or doctor.               These medicines have changed or have updated prescriptions.        Dose/Directions    lamoTRIgine 25 MG Tbdp ODT tab   Commonly known as:  LaMICtal   This may have changed:    - how much to take  - how to take this   Changed by:  Jean-Claude Blanton MD        Dose:  150 mg   Take 6 tablets (150 mg) by mouth daily   Quantity:  30 tablet   Refills:  0       predniSONE 5 MG tablet   Commonly known as:  DELTASONE   This may have changed:  how much to take   Changed by:  Jean-Claude Blanton MD        Dose:  14 mg   Take 3 tablets (15 mg) by mouth daily   Refills:  0                Primary Care Provider Office Phone # Fax #    Wade LANDEROS MD Bryanna [phone] [address] [place] [id number]        Equal Access to Services     [facility] AH: Hadii aad ku hadasho Soomaali, waaxda luqadaha, qaybta kaalmada adeegyada, waxay edgardo kaufman . So [facility] [phone].    ATENCIÓN: Si habla español, tiene a hernandez disposición servicios gratuitos de asistencia lingüística. Llame al [phone].    We comply with applicable federal civil rights laws and [place] laws. We do not discriminate on the basis of race, color, national origin, age, disability sex, sexual orientation or gender identity.            Thank you!     Thank you for choosing [facility]  for your care. Our  goal is always to provide you with excellent care. Hearing back from our patients is one way we can continue to improve our services. Please take a few minutes to complete the written survey that you may receive in the mail after your visit with us. Thank you!             Your Updated Medication List - Protect others around you: Learn how to safely use, store and throw away your medicines at www.disposemymeds.org.          This list is accurate as of: 7/6/17 11:59 PM.  Always use your most recent med list.                   Brand Name Dispense Instructions for use Diagnosis    acetaminophen 500 MG tablet    TYLENOL     Take 500-1,000 mg by mouth        ALLEGRA 180 MG tablet   Generic drug:  fexofenadine      Take  by mouth daily.    Nodular lymphoma, unspecified site, extranodal and solid organ sites       clobetasol 0.05 % ointment    TEMOVATE     Reported on 3/9/2017        cyanocobalamin 1000 MCG Subl sublingual tablet           ipratropium 0.06 % spray    ATROVENT     Spray 2 sprays in nostril        lamoTRIgine 25 MG Tbdp ODT tab    LaMICtal    30 tablet    Take 6 tablets (150 mg) by mouth daily        methylPREDNISolone 32 MG tablet    MEDROL    2 tablet    Take one tablet (32mg) by mouth 12 hours prior to scheduled CT scan.  Repeat above dose 2 hours prior to CT scan    Contrast media allergy       mometasone 0.1 % ointment    ELOCON     Reported on 3/9/2017        omeprazole 10 MG CR capsule    priLOSEC     Take 20 mg by mouth        predniSONE 5 MG tablet    DELTASONE     Take 3 tablets (15 mg) by mouth daily        triamcinolone 0.1 % cream    KENALOG     Apply 0.5 inches topically 2 times daily Reported on 3/9/2017        valACYclovir 1000 mg tablet    VALTREX     One tab bid for 3 days prn outbreaks  Indications: PN:        vitamin D 1000 UNITS capsule      Take 1 capsule by mouth daily.    Nodular lymphoma of lymph nodes of multiple sites (H)       Walker Auto Glides Misc      Rolling Walker for home  use.        XOPENEX HFA 45 MCG/ACT Inhaler   Generic drug:  levalbuterol      Inhale 1-2 puffs into the lungs

## 2017-07-06 NOTE — LETTER
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 Biopsy was grade 2-3A and stage YOUSUF. Initial w/u with involvement of multiple skeletal sites and bone marrow. Over time, she was followed without requiring treatment, but with evidence of waxing and waning disease on serial scans.  The only lymphoma treatment she received was for pain in the left hip with evidence of progressive skeletal disease at that site on radiographic evaluation.  She received a total of 4140 cGy in 20 fractions to the left hip/proximal femur between 08/20/2012 and 09/20/2012 at [facility].  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 In July 2016 developed nausea of unknown etiology, workup negative for CNS involvement with MR and CSF analysis. PET scan on 8/18/16 with indication of progressive skeletal disease.  She previously developed avascular necrosis and for that she underwent a left total hip arthroplasty on 10/19/16.    Subjective:  Sadiq is here for follow-up. She has now also had a right hip replacement on May 10 and recovered well. She complains of mild right hip pain and knee pain. States her her PMR symptoms have improved and her CRP decreased to 0.9 from 27. She is tapering the prednisone slowly.    Denies fever, chills, chest pain, SOB, cough, nausea, vomiting, abdominal pain, diarrhoea, constipation, fatigue, loss of appetite, urinary problems, headache, dizziness, blurry vision, or night sweats.    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    ROS: Complete 10-pt ROS reviewed and negative unless noted in HPI    Meds:  Current Outpatient Prescriptions   Medication      cyanocobalamin 1000 MCG SUBL sublingual tablet     omeprazole (PRILOSEC) 10 MG CR capsule     Misc. Devices (WALKER AUTO GLIDES) MISC     predniSONE (DELTASONE) 5 MG tablet     mometasone (ELOCON) 0.1 % ointment     valACYclovir (VALTREX) 1000 mg tablet     levalbuterol (XOPENEX HFA) 45 MCG/ACT Inhaler     ipratropium (ATROVENT) 0.06 % spray     acetaminophen (TYLENOL) 500 MG tablet     clobetasol (TEMOVATE) 0.05 % ointment     lamoTRIgine (LAMICTAL) 25 MG TBDP     methylPREDNISolone (MEDROL) 32 MG tablet     fexofenadine (ALLEGRA) 180 MG tablet     triamcinolone (KENALOG) 0.1 % cream     Cholecalciferol (VITAMIN D) 1000 UNIT capsule     No current facility-administered medications for this visit.      Allergies: IV contrast, Diatrizoate, Liquid adhesives.    Physical Examination:    /64 (BP Location: Right arm, Patient Position: Chair, Cuff Size: Adult Regular)  Pulse 89  Temp 98.4  F (36.9  C) (Oral)  Resp 16  Wt 60.2 kg (132 lb 12.8 oz)  SpO2 100%  BMI 20.55 kg/m2  Gen: alert, NAD, thin   HEENT: PERRL, sclera anicteric, MMM, no oral lesions  Lymph: No cervical, supraclavicular, axillary, or inguinal adenopathy  CV: RRR without murmurs  Lungs: CTAB  Abd: soft, nt/nd, normal BS, non-palpable liver and spleen, no masses  MSK: no LE edema, no spinal tenderness  Neuro: moving all extremities equally  Skin: dry  Psych: normal affect    Labs:Hb: 11.2; WBC: 7900 (normal differential)  Creatinine 0.79, Liver enzymes normal with LDH: 233.    Impression and Plan:    Stage YOUSUF follicular lymphoma: Over the years her disease has waxed and waned, mainly in skeletal locations, and she has not yet required systemic treatment. Dr. 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 She had some weight loss around that time but it seems that it was related to some depression due to death in the family/friends. Because of the nausea and vomiting, an evaluation for CNS involvement with MRI brain and  CSF analysis was done and was negative. Nausea remains resolved. She underwent a left and right total hip arthroplasty on 10/19/2016 and 5/10/2017, respectively.  In the fall of 2016 lymphoma directed therapy, probably with rituximab alone, was contemplated. However since her weight loss plateaued, her nausea and vomiting stopped, and some of her pains were due to osteoarthritis and avascular necrosis, it was decided to continue monitoring.    She has no new complaints today. We will continue observation now. Her last PET scan was in August 2016. Since she's still taking prednisone 14mg daily (tapering slowly), we will hold off on repeating PET scan now. We will follow up in 3-4 months with labs.    Patient seen and examined with Dr. Blanton.  Anila Flower, PGY-4  Hem/Onc Fellow    Oncology Attending    Patient seen and examined with the Oncology Fellow, Dr. Han. Agree with his findings, assessment and plan.    Jean-Claude Blanton MD  Professor of Medicine  Oncology  [facility]  Office: [phone]  Clinic Fax: [phone]      CC:     MD Anant Banda MD Edward Cheng, MD Erskine Caperton, MD Bruce A. Peterson, MD

## 2017-07-06 NOTE — NURSING NOTE
"Oncology Rooming Note    July 6, 2017 2:54 PM   Lilly Babcock is a 67 year old female who presents for:    Chief Complaint   Patient presents with     Oncology Clinic Visit     Patient is seeing provider relating to 3 mo f/u     Blood Draw     labs drawn by vpt by rn.  vs taken.     Initial Vitals: /64 (BP Location: Right arm, Patient Position: Chair, Cuff Size: Adult Regular)  Pulse 89  Temp 98.4  F (36.9  C) (Oral)  Resp 16  Wt 60.2 kg (132 lb 12.8 oz)  SpO2 100%  BMI 20.55 kg/m2 Estimated body mass index is 20.55 kg/(m^2) as calculated from the following:    Height as of 1/5/17: 1.712 m (5' 7.4\").    Weight as of this encounter: 60.2 kg (132 lb 12.8 oz). Body surface area is 1.69 meters squared.  No Pain (1) Comment: Data Unavailable   No LMP recorded. Patient is postmenopausal.  Allergies reviewed: Yes  Medications reviewed: Yes    Medications: Medication refills not needed today.  Pharmacy name entered into [employer]:    [facility] #[id number] - [place], [place] - [address] - [place], [place] - [address] [facility]Y #[id number] - [place] [facility] [address]    Clinical concerns: Patient complained of right hip and knee pain 1/10. Vitals taken in lab.     6 minutes for nursing intake (face to face time)     Ronda Arce LPN            "

## 2017-07-06 NOTE — NURSING NOTE
Chief Complaint   Patient presents with     Oncology Clinic Visit     Patient is seeing provider relating to 3 mo f/u     Blood Draw     labs drawn by vpt by rn.  vs taken.     Labs drawn by vpt by rn.  vs taken.  Pt checked in to next appointment.  Ksenia Voss RN

## 2017-07-06 NOTE — PROGRESS NOTES
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 Biopsy was grade 2-3A and stage YOUSUF. Initial w/u with involvement of multiple skeletal sites and bone marrow. Over time, she was followed without requiring treatment, but with evidence of waxing and waning disease on serial scans.  The only lymphoma treatment she received was for pain in the left hip with evidence of progressive skeletal disease at that site on radiographic evaluation.  She received a total of 4140 cGy in 20 fractions to the left hip/proximal femur between 08/20/2012 and 09/20/2012 at [facility].  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 In July 2016 developed nausea of unknown etiology, workup negative for CNS involvement with MR and CSF analysis. PET scan on 8/18/16 with indication of progressive skeletal disease.  She previously developed avascular necrosis and for that she underwent a left total hip arthroplasty on 10/19/16.    Subjective:  Sadiq is here for follow-up. She has now also had a right hip replacement on May 10 and recovered well. She complains of mild right hip pain and knee pain. States her her PMR symptoms have improved and her CRP decreased to 0.9 from 27. She is tapering the prednisone slowly.    Denies fever, chills, chest pain, SOB, cough, nausea, vomiting, abdominal pain, diarrhoea, constipation, fatigue, loss of appetite, urinary problems, headache, dizziness, blurry vision, or night sweats.    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    ROS: Complete 10-pt ROS reviewed and negative unless noted in HPI    Meds:  Current Outpatient Prescriptions   Medication     cyanocobalamin 1000 MCG SUBL sublingual tablet     omeprazole (PRILOSEC) 10  MG CR capsule     Misc. Devices (WALKER AUTO GLIDES) MISC     predniSONE (DELTASONE) 5 MG tablet     mometasone (ELOCON) 0.1 % ointment     valACYclovir (VALTREX) 1000 mg tablet     levalbuterol (XOPENEX HFA) 45 MCG/ACT Inhaler     ipratropium (ATROVENT) 0.06 % spray     acetaminophen (TYLENOL) 500 MG tablet     clobetasol (TEMOVATE) 0.05 % ointment     lamoTRIgine (LAMICTAL) 25 MG TBDP     methylPREDNISolone (MEDROL) 32 MG tablet     fexofenadine (ALLEGRA) 180 MG tablet     triamcinolone (KENALOG) 0.1 % cream     Cholecalciferol (VITAMIN D) 1000 UNIT capsule     No current facility-administered medications for this visit.      Allergies: IV contrast, Diatrizoate, Liquid adhesives.    Physical Examination:    /64 (BP Location: Right arm, Patient Position: Chair, Cuff Size: Adult Regular)  Pulse 89  Temp 98.4  F (36.9  C) (Oral)  Resp 16  Wt 60.2 kg (132 lb 12.8 oz)  SpO2 100%  BMI 20.55 kg/m2  Gen: alert, NAD, thin   HEENT: PERRL, sclera anicteric, MMM, no oral lesions  Lymph: No cervical, supraclavicular, axillary, or inguinal adenopathy  CV: RRR without murmurs  Lungs: CTAB  Abd: soft, nt/nd, normal BS, non-palpable liver and spleen, no masses  MSK: no LE edema, no spinal tenderness  Neuro: moving all extremities equally  Skin: dry  Psych: normal affect    Labs:Hb: 11.2; WBC: 7900 (normal differential)  Creatinine 0.79, Liver enzymes normal with LDH: 233.    Impression and Plan:    Stage YOUSUF follicular lymphoma: Over the years her disease has waxed and waned, mainly in skeletal locations, and she has not yet required systemic treatment. Dr. 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 She had some weight loss around that time but it seems that it was related to some depression due to death in the family/friends. Because of the nausea and vomiting, an evaluation for CNS involvement with MRI brain and CSF analysis was done and was negative. Nausea remains resolved. She underwent  a left and right total hip arthroplasty on 10/19/2016 and 5/10/2017, respectively.  In the fall of 2016 lymphoma directed therapy, probably with rituximab alone, was contemplated. However since her weight loss plateaued, her nausea and vomiting stopped, and some of her pains were due to osteoarthritis and avascular necrosis, it was decided to continue monitoring.    She has no new complaints today. We will continue observation now. Her last PET scan was in August 2016. Since she's still taking prednisone 14mg daily (tapering slowly), we will hold off on repeating PET scan now. We will follow up in 3-4 months with labs.    Patient seen and examined with Dr. Blanton.  Anila Folwer, PGY-4  Hem/Onc Fellow    Oncology Attending    Patient seen and examined with the Oncology Fellow, Dr. Han. Agree with his findings, assessment and plan.    Jean-Claude Blanton MD  Professor of Medicine  Oncology  [facility]  Office: [phone]  Clinic Fax: [phone]      CC:     MD Anant Banda MD Edward Cheng, MD Erskine Caperton, MD

## 2017-11-16 ENCOUNTER — APPOINTMENT (OUTPATIENT)
Dept: LAB | Facility: CLINIC | Age: 67
End: 2017-11-16
Attending: INTERNAL MEDICINE
Payer: COMMERCIAL

## 2017-11-16 ENCOUNTER — ONCOLOGY VISIT (OUTPATIENT)
Dept: ONCOLOGY | Facility: CLINIC | Age: 67
End: 2017-11-16
Attending: INTERNAL MEDICINE
Payer: COMMERCIAL

## 2017-11-16 VITALS
BODY MASS INDEX: 21.79 KG/M2 | HEART RATE: 100 BPM | OXYGEN SATURATION: 98 % | SYSTOLIC BLOOD PRESSURE: 121 MMHG | TEMPERATURE: 98 F | WEIGHT: 140.8 LBS | RESPIRATION RATE: 16 BRPM | DIASTOLIC BLOOD PRESSURE: 76 MMHG

## 2017-11-16 DIAGNOSIS — C82.90 FOLLICULAR NON-HODGKIN'S LYMPHOMA (H): ICD-10-CM

## 2017-11-16 LAB
ALBUMIN SERPL-MCNC: 3.8 G/DL (ref 3.4–5)
ALP SERPL-CCNC: 73 U/L (ref 40–150)
ALT SERPL W P-5'-P-CCNC: 32 U/L (ref 0–50)
ANION GAP SERPL CALCULATED.3IONS-SCNC: 6 MMOL/L (ref 3–14)
AST SERPL W P-5'-P-CCNC: 26 U/L (ref 0–45)
BASOPHILS # BLD AUTO: 0 10E9/L (ref 0–0.2)
BASOPHILS NFR BLD AUTO: 0.3 %
BILIRUB SERPL-MCNC: 0.3 MG/DL (ref 0.2–1.3)
BUN SERPL-MCNC: 12 MG/DL (ref 7–30)
CALCIUM SERPL-MCNC: 9.3 MG/DL (ref 8.5–10.1)
CHLORIDE SERPL-SCNC: 101 MMOL/L (ref 94–109)
CO2 SERPL-SCNC: 29 MMOL/L (ref 20–32)
CREAT SERPL-MCNC: 0.87 MG/DL (ref 0.52–1.04)
DIFFERENTIAL METHOD BLD: ABNORMAL
EOSINOPHIL # BLD AUTO: 0 10E9/L (ref 0–0.7)
EOSINOPHIL NFR BLD AUTO: 0.2 %
ERYTHROCYTE [DISTWIDTH] IN BLOOD BY AUTOMATED COUNT: 15.3 % (ref 10–15)
GFR SERPL CREATININE-BSD FRML MDRD: 65 ML/MIN/1.7M2
GLUCOSE SERPL-MCNC: 90 MG/DL (ref 70–99)
HCT VFR BLD AUTO: 38.1 % (ref 35–47)
HGB BLD-MCNC: 11.9 G/DL (ref 11.7–15.7)
IMM GRANULOCYTES # BLD: 0.1 10E9/L (ref 0–0.4)
IMM GRANULOCYTES NFR BLD: 0.8 %
LDH SERPL L TO P-CCNC: 235 U/L (ref 81–234)
LYMPHOCYTES # BLD AUTO: 0.9 10E9/L (ref 0.8–5.3)
LYMPHOCYTES NFR BLD AUTO: 9.2 %
MCH RBC QN AUTO: 28.3 PG (ref 26.5–33)
MCHC RBC AUTO-ENTMCNC: 31.2 G/DL (ref 31.5–36.5)
MCV RBC AUTO: 91 FL (ref 78–100)
MONOCYTES # BLD AUTO: 0.4 10E9/L (ref 0–1.3)
MONOCYTES NFR BLD AUTO: 3.6 %
NEUTROPHILS # BLD AUTO: 8.4 10E9/L (ref 1.6–8.3)
NEUTROPHILS NFR BLD AUTO: 85.9 %
NRBC # BLD AUTO: 0 10*3/UL
NRBC BLD AUTO-RTO: 0 /100
PLATELET # BLD AUTO: 289 10E9/L (ref 150–450)
POTASSIUM SERPL-SCNC: 4.3 MMOL/L (ref 3.4–5.3)
PROT SERPL-MCNC: 7 G/DL (ref 6.8–8.8)
RBC # BLD AUTO: 4.2 10E12/L (ref 3.8–5.2)
SODIUM SERPL-SCNC: 136 MMOL/L (ref 133–144)
WBC # BLD AUTO: 9.8 10E9/L (ref 4–11)

## 2017-11-16 PROCEDURE — 99213 OFFICE O/P EST LOW 20 MIN: CPT | Mod: ZP | Performed by: INTERNAL MEDICINE

## 2017-11-16 PROCEDURE — 36415 COLL VENOUS BLD VENIPUNCTURE: CPT

## 2017-11-16 PROCEDURE — 83615 LACTATE (LD) (LDH) ENZYME: CPT | Performed by: STUDENT IN AN ORGANIZED HEALTH CARE EDUCATION/TRAINING PROGRAM

## 2017-11-16 PROCEDURE — 99213 OFFICE O/P EST LOW 20 MIN: CPT | Mod: ZF

## 2017-11-16 PROCEDURE — 85025 COMPLETE CBC W/AUTO DIFF WBC: CPT | Performed by: STUDENT IN AN ORGANIZED HEALTH CARE EDUCATION/TRAINING PROGRAM

## 2017-11-16 PROCEDURE — 80053 COMPREHEN METABOLIC PANEL: CPT | Performed by: STUDENT IN AN ORGANIZED HEALTH CARE EDUCATION/TRAINING PROGRAM

## 2017-11-16 ASSESSMENT — PAIN SCALES - GENERAL: PAINLEVEL: NO PAIN (0)

## 2017-11-16 NOTE — MR AVS SNAPSHOT
After Visit Summary   11/16/2017    Lilly Babcock    MRN: [id number]           Patient Information     Date Of Birth          1950        Visit Information        Provider Department      11/16/2017 2:00 PM Jean-Claude Blanton MD [facility]        Today's Diagnoses     Follicular non-Hodgkin's lymphoma (H)           Follow-ups after your visit        Follow-up notes from your care team     Return in about 4 months (around 3/16/2018) for Physical Exam, Lab Work.      Who to contact     If you have questions or need follow up information about today's clinic visit or your schedule please contact [facility] directly at [phone].  Normal or non-critical lab and imaging results will be communicated to you by MyChart, letter or phone within 4 business days after the clinic has received the results. If you do not hear from us within 7 days, please contact the clinic through [employer] or phone. If you have a critical or abnormal lab result, we will notify you by phone as soon as possible.  Submit refill requests through [employer] or call your pharmacy and they will forward the refill request to us. Please allow 3 business days for your refill to be completed.          Additional Information About Your Visit        MyChart Information     [employer] gives you secure access to your electronic health record. If you see a primary care provider, you can also send messages to your care team and make appointments. If you have questions, please call your primary care clinic.  If you do not have a primary care provider, please call [phone] and they will assist you.        Care EveryWhere ID     This is your Care EveryWhere ID. This could be used by other organizations to access your [facility] medical records  [id number]        Your Vitals Were     Pulse Temperature Respirations Pulse Oximetry BMI (Body Mass Index)       100 98  F (36.7  C) (Oral) 16 98% 21.79 kg/m2         Blood Pressure from Last 3 Encounters:   11/16/17 121/76   07/06/17 104/64   03/09/17 103/55    Weight from Last 3 Encounters:   11/16/17 63.9 kg (140 lb 12.8 oz)   07/06/17 60.2 kg (132 lb 12.8 oz)   03/09/17 59.1 kg (130 lb 4.8 oz)              We Performed the Following     *CBC with platelets differential     Comprehensive metabolic panel     Lactate Dehydrogenase        Primary Care Provider Office Phone # Fax #    Wade LANDEROS MD Bryanna [phone] [id number]  [facility] [address]        Equal Access to Services     [facility]: Hadii aad ku hadasho Soida, waaxda luqadaha, qaybta kaalmada adedickson, dustin kaufman . So [facility] [phone].    ATENCIÓN: Si habla español, tiene a hernandez disposición servicios gratuitos de asistencia lingüística. Ll[facility] [phone].    We comply with applicable federal civil rights laws and [place] laws. We do not discriminate on the basis of race, color, national origin, age, disability, sex, sexual orientation, or gender identity.            Thank you!     Thank you for choosing [facility]  for your care. Our goal is always to provide you with excellent care. Hearing back from our patients is one way we can continue to improve our services. Please take a few minutes to complete the written survey that you may receive in the mail after your visit with us. Thank you!             Your Updated Medication List - Protect others around you: Learn how to safely use, store and throw away your medicines at www.disposemymeds.org.          This list is accurate as of: 11/16/17 11:59 PM.  Always use your most recent med list.                   Brand Name Dispense Instructions for use Diagnosis    acetaminophen 500 MG tablet    TYLENOL     Take 500-1,000 mg by mouth        ALLEGRA 180 MG tablet   Generic drug:  fexofenadine      Take  by mouth daily.    Nodular lymphoma, unspecified site, extranodal and  solid organ sites       clobetasol 0.05 % ointment    TEMOVATE     Reported on 3/9/2017        cyanocobalamin 1000 MCG Subl sublingual tablet           ipratropium 0.06 % spray    ATROVENT     Spray 2 sprays in nostril        lamoTRIgine 25 MG Tbdp ODT tab    LaMICtal    30 tablet    Take 200 mg by mouth daily        methylPREDNISolone 32 MG tablet    MEDROL    2 tablet    Take one tablet (32mg) by mouth 12 hours prior to scheduled CT scan.  Repeat above dose 2 hours prior to CT scan    Contrast media allergy       mometasone 0.1 % ointment    ELOCON     Reported on 3/9/2017        omeprazole 10 MG CR capsule    priLOSEC     Take 20 mg by mouth        predniSONE 5 MG tablet    DELTASONE     Take 3 tablets (15 mg) by mouth daily        triamcinolone 0.1 % cream    KENALOG     Apply 0.5 inches topically 2 times daily Reported on 3/9/2017        valACYclovir 1000 mg tablet    VALTREX     One tab bid for 3 days prn outbreaks  Indications: PN:        vitamin D 1000 UNITS capsule      Take 1 capsule by mouth daily.    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        XOPENEX HFA 45 MCG/ACT Inhaler   Generic drug:  levalbuterol      Inhale 1-2 puffs into the lungs

## 2017-11-16 NOTE — PROGRESS NOTES
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 Biopsy was grade 2-3A and stage YOUSUF. Initial w/u with involvement of multiple skeletal sites and bone marrow. Over time, she was followed without requiring treatment, but with evidence of waxing and waning disease on serial scans.  The only lymphoma treatment she received was for pain in the left hip with evidence of progressive skeletal disease at that site on radiographic evaluation.  She received a total of 4140 cGy in 20 fractions to the left hip/proximal femur between 08/20/2012 and 09/20/2012 at [facility].  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 In July 2016 developed nausea of unknown etiology, workup negative for CNS involvement with MR and CSF analysis. PET scan on 8/18/16 with indication of progressive skeletal disease.      She previously developed avascular necrosis and underwent a left total hip arthroplasty on 10/19/16. 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 This resulted in major improvement in the PMR symptoms and her CRP decreased substantially.     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  Since that visit, she indicates that she has been doing very well.  Most recent CRP was 0.7 and ESR 10 at her outside clinic.  She remains on prednisone, currently at a daily dose of 13.5, and continues to very slowly taper.  She enjoys the relief from symptoms and improvement in overall sense of well-being that the medication has provided.       Relative to the lymphoma, she has had no fevers, night sweats or weight loss.  She is not aware of any adenopathy or other symptoms that she would attribute to the lymphoma.  She will be receiving the influenza vaccine at work.         REVIEW OF SYSTEMS:  A 10-point review of systems is otherwise negative.      She has benefited from the hip replacement and is quite active, including a return to hiking.        MEDICATIONS:   1.  Cyanocobalamin sublingual.   2.  Omeprazole.   3.  Lamictal.    4.  Prednisone 13.5 mg.    5.  Mometasone ointment.     6.  Valacyclovir p.r.n.   7.  Levalbuterol inhaler p.r.n.    8.  Atrovent spray p.r.n.   9.  Acetaminophen p.r.n.    10.  Clobetasol ointment.    11.  Fexofenadine.    12.  Triamcinolone cream.   13.  Vitamin D.      ALLERGIES:  IV CONTRAST, DIATRIZOATE LIQUID, LOXAGLATE, VORTIOXETINE.       PHYSICAL EXAMINATION:   VITAL SIGNS:  Weight 63.9 kg (compared to 60.2 kg in July), blood pressure 121/76, pulse , respirations 16, temperature 98.0.  O2 saturation 98% on room air.   HEENT:  Anicteric.  Oropharynx - no masses.  Mucosa - moist, without lesions.    NECK:  No cervical/supraclavicular nodes.   CHEST:  Clear to auscultation.   AXILLAE:  No nodes.   HEART:  Regular rhythm without murmur or gallop.   ABDOMEN:  Soft.  Bowel sounds present.  No detectable organomegaly or masses.  No inguinal nodes.   EXTREMITIES:  No lower extremity edema.      LABORATORY:     Hemoglobin 11.9 grams percent with 9,800 WBCs (86% neutrophils, 9% lymphocytes) and 289,000 platelets.   Electrolytes, calcium, creatinine (0.7) - normal.    Liver enzymes - normal.  Serum LDH is borderline elevated at 235 (normal < 234).      ASSESSMENT AND PLAN:  Stage IV follicular lymphoma - The patient is now over 9 years from diagnosis with limited intervention and no current clinical suggestion of disease progression.  Her most recent imaging for systemic lymphoma was a PET/CT scan in 08/2016.  The scan was positive, but none of the sites have been subsequently symptomatic.  At present, she is doing well clinically.      We will continue to monitor approximately every 4 months by clinical examination and obtain laboratory studies upon  return.       Jean-Claude Blanton MD  Professor of Medicine  Oncology  [facility]  Office: [phone]  Clinic Fax: [phone]         cc:    MD Anant Banda MD Edward Cheng, MD Erskine Caperton, MD

## 2017-11-16 NOTE — NURSING NOTE
Chief Complaint   Patient presents with     Blood Draw     Labs drawn from venipuncture by RN. Vs taken and pt checked in for appt     Labs collected from venipuncture by RN. Vitals taken. Checked in for appointment(s).    Franci Babb RN

## 2017-11-16 NOTE — NURSING NOTE
"Oncology Rooming Note    November 16, 2017 1:51 PM   Lilly Babcock is a 67 year old female who presents for:    Chief Complaint   Patient presents with     Blood Draw     Labs drawn from venipuncture by RN. Vs taken and pt checked in for appt     Oncology Clinic Visit     Return visit related to NHL     Initial Vitals: /76 (BP Location: Right arm, Patient Position: Sitting, Cuff Size: Adult Regular)  Pulse 100  Temp 98  F (36.7  C) (Oral)  Resp 16  Wt 63.9 kg (140 lb 12.8 oz)  SpO2 98%  BMI 21.79 kg/m2 Estimated body mass index is 21.79 kg/(m^2) as calculated from the following:    Height as of 1/5/17: 1.712 m (5' 7.4\").    Weight as of this encounter: 63.9 kg (140 lb 12.8 oz). Body surface area is 1.74 meters squared.  No Pain (0) Comment: Data Unavailable   No LMP recorded. Patient is postmenopausal.  Allergies reviewed: Yes  Medications reviewed: Yes    Medications: Medication refills not needed today.  Pharmacy name entered into [employer]:    [id number] - [place], [place] - [address] - [place], [place] - [address]/[id number] - [place] [place] [address]    Clinical concerns: No new concerns. Provider was notified.    10 minutes for nursing intake (face to face time)     Janny Brush LPN            "

## 2017-11-16 NOTE — LETTER
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 Biopsy was grade 2-3A and stage YOUSUF. Initial w/u with involvement of multiple skeletal sites and bone marrow. Over time, she was followed without requiring treatment, but with evidence of waxing and waning disease on serial scans.  The only lymphoma treatment she received was for pain in the left hip with evidence of progressive skeletal disease at that site on radiographic evaluation.  She received a total of 4140 cGy in 20 fractions to the left hip/proximal femur between 08/20/2012 and 09/20/2012 at [facility].  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 In July 2016 developed nausea of unknown etiology, workup negative for CNS involvement with MR and CSF analysis. PET scan on 8/18/16 with indication of progressive skeletal disease.      She previously developed avascular necrosis and underwent a left total hip arthroplasty on 10/19/16. 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 This resulted in major improvement in the PMR symptoms and her CRP decreased substantially.     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  Since that visit, she indicates that she has been doing very well.  Most recent CRP was 0.7 and ESR 10 at her outside clinic.  She remains on prednisone, currently at a daily dose of 13.5, and continues to very slowly taper.  She enjoys the relief from symptoms and improvement in overall sense of well-being that the medication has provided.       Relative to the lymphoma, she has had no fevers, night sweats or weight loss.  She is not aware of any adenopathy or other symptoms that she would  attribute to the lymphoma.  She will be receiving the influenza vaccine at work.        REVIEW OF SYSTEMS:  A 10-point review of systems is otherwise negative.      She has benefited from the hip replacement and is quite active, including a return to hiking.        MEDICATIONS:   1.  Cyanocobalamin sublingual.   2.  Omeprazole.   3.  Lamictal.    4.  Prednisone 13.5 mg.    5.  Mometasone ointment.     6.  Valacyclovir p.r.n.   7.  Levalbuterol inhaler p.r.n.    8.  Atrovent spray p.r.n.   9.  Acetaminophen p.r.n.    10.  Clobetasol ointment.    11.  Fexofenadine.    12.  Triamcinolone cream.   13.  Vitamin D.      ALLERGIES:  IV CONTRAST, DIATRIZOATE LIQUID, LOXAGLATE, VORTIOXETINE.       PHYSICAL EXAMINATION:   VITAL SIGNS:  Weight 63.9 kg (compared to 60.2 kg in July), blood pressure 121/76, pulse , respirations 16, temperature 98.0.  O2 saturation 98% on room air.   HEENT:  Anicteric.  Oropharynx - no masses.  Mucosa - moist, without lesions.    NECK:  No cervical/supraclavicular nodes.   CHEST:  Clear to auscultation.   AXILLAE:  No nodes.   HEART:  Regular rhythm without murmur or gallop.   ABDOMEN:  Soft.  Bowel sounds present.  No detectable organomegaly or masses.  No inguinal nodes.   EXTREMITIES:  No lower extremity edema.      LABORATORY:     Hemoglobin 11.9 grams percent with 9,800 WBCs (86% neutrophils, 9% lymphocytes) and 289,000 platelets.   Electrolytes, calcium, creatinine (0.7) - normal.    Liver enzymes - normal.  Serum LDH is borderline elevated at 235 (normal < 234).      ASSESSMENT AND PLAN:  Stage IV follicular lymphoma - The patient is now over 9 years from diagnosis with limited intervention and no current clinical suggestion of disease progression.  Her most recent imaging for systemic lymphoma was a PET/CT scan in 08/2016.  The scan was positive, but none of the sites have been subsequently symptomatic.  At present, she is doing well clinically.      We will continue to monitor  approximately every 4 months by clinical examination and obtain laboratory studies upon return.       Jean-Claude Blanton MD  Professor of Medicine  Oncology  [facility]  Office: [phone]  Clinic Fax: [phone]         cc:    MD Anant Banda MD Edward Cheng, MD Erskine Caperton, MD Bruce A. Peterson, MD

## 2018-03-15 ENCOUNTER — ONCOLOGY VISIT (OUTPATIENT)
Dept: ONCOLOGY | Facility: CLINIC | Age: 68
End: 2018-03-15
Attending: INTERNAL MEDICINE
Payer: COMMERCIAL

## 2018-03-15 ENCOUNTER — APPOINTMENT (OUTPATIENT)
Dept: LAB | Facility: CLINIC | Age: 68
End: 2018-03-15
Attending: INTERNAL MEDICINE
Payer: COMMERCIAL

## 2018-03-15 VITALS
HEART RATE: 77 BPM | SYSTOLIC BLOOD PRESSURE: 134 MMHG | WEIGHT: 140.9 LBS | BODY MASS INDEX: 21.81 KG/M2 | TEMPERATURE: 98 F | DIASTOLIC BLOOD PRESSURE: 69 MMHG | OXYGEN SATURATION: 99 %

## 2018-03-15 DIAGNOSIS — C82.90 FOLLICULAR NON-HODGKIN'S LYMPHOMA (H): ICD-10-CM

## 2018-03-15 LAB
ALBUMIN SERPL-MCNC: 3.5 G/DL (ref 3.4–5)
ALP SERPL-CCNC: 76 U/L (ref 40–150)
ALT SERPL W P-5'-P-CCNC: 29 U/L (ref 0–50)
ANION GAP SERPL CALCULATED.3IONS-SCNC: 6 MMOL/L (ref 3–14)
AST SERPL W P-5'-P-CCNC: 29 U/L (ref 0–45)
BASOPHILS # BLD AUTO: 0 10E9/L (ref 0–0.2)
BASOPHILS NFR BLD AUTO: 0.5 %
BILIRUB SERPL-MCNC: 0.3 MG/DL (ref 0.2–1.3)
BUN SERPL-MCNC: 16 MG/DL (ref 7–30)
CALCIUM SERPL-MCNC: 9.2 MG/DL (ref 8.5–10.1)
CHLORIDE SERPL-SCNC: 103 MMOL/L (ref 94–109)
CO2 SERPL-SCNC: 28 MMOL/L (ref 20–32)
CREAT SERPL-MCNC: 0.7 MG/DL (ref 0.52–1.04)
CRP SERPL-MCNC: <2.9 MG/L (ref 0–8)
DIFFERENTIAL METHOD BLD: ABNORMAL
EOSINOPHIL # BLD AUTO: 0 10E9/L (ref 0–0.7)
EOSINOPHIL NFR BLD AUTO: 0.2 %
ERYTHROCYTE [DISTWIDTH] IN BLOOD BY AUTOMATED COUNT: 15.2 % (ref 10–15)
GFR SERPL CREATININE-BSD FRML MDRD: 83 ML/MIN/1.7M2
GLUCOSE SERPL-MCNC: 97 MG/DL (ref 70–99)
HCT VFR BLD AUTO: 38.4 % (ref 35–47)
HGB BLD-MCNC: 12.5 G/DL (ref 11.7–15.7)
IMM GRANULOCYTES # BLD: 0 10E9/L (ref 0–0.4)
IMM GRANULOCYTES NFR BLD: 0.4 %
LDH SERPL L TO P-CCNC: 226 U/L (ref 81–234)
LYMPHOCYTES # BLD AUTO: 0.8 10E9/L (ref 0.8–5.3)
LYMPHOCYTES NFR BLD AUTO: 9.7 %
MCH RBC QN AUTO: 29.3 PG (ref 26.5–33)
MCHC RBC AUTO-ENTMCNC: 32.6 G/DL (ref 31.5–36.5)
MCV RBC AUTO: 90 FL (ref 78–100)
MONOCYTES # BLD AUTO: 0.4 10E9/L (ref 0–1.3)
MONOCYTES NFR BLD AUTO: 5.3 %
NEUTROPHILS # BLD AUTO: 6.9 10E9/L (ref 1.6–8.3)
NEUTROPHILS NFR BLD AUTO: 83.9 %
NRBC # BLD AUTO: 0 10*3/UL
NRBC BLD AUTO-RTO: 0 /100
PLATELET # BLD AUTO: 255 10E9/L (ref 150–450)
POTASSIUM SERPL-SCNC: 4.2 MMOL/L (ref 3.4–5.3)
PROT SERPL-MCNC: 6.9 G/DL (ref 6.8–8.8)
RBC # BLD AUTO: 4.26 10E12/L (ref 3.8–5.2)
SODIUM SERPL-SCNC: 137 MMOL/L (ref 133–144)
URATE SERPL-MCNC: 5.4 MG/DL (ref 2.6–6)
WBC # BLD AUTO: 8.2 10E9/L (ref 4–11)

## 2018-03-15 PROCEDURE — 83615 LACTATE (LD) (LDH) ENZYME: CPT | Performed by: INTERNAL MEDICINE

## 2018-03-15 PROCEDURE — 86140 C-REACTIVE PROTEIN: CPT | Performed by: INTERNAL MEDICINE

## 2018-03-15 PROCEDURE — 84550 ASSAY OF BLOOD/URIC ACID: CPT | Performed by: INTERNAL MEDICINE

## 2018-03-15 PROCEDURE — G0463 HOSPITAL OUTPT CLINIC VISIT: HCPCS | Mod: ZF

## 2018-03-15 PROCEDURE — 36415 COLL VENOUS BLD VENIPUNCTURE: CPT

## 2018-03-15 PROCEDURE — 80053 COMPREHEN METABOLIC PANEL: CPT | Performed by: INTERNAL MEDICINE

## 2018-03-15 PROCEDURE — 85025 COMPLETE CBC W/AUTO DIFF WBC: CPT | Performed by: INTERNAL MEDICINE

## 2018-03-15 PROCEDURE — 99213 OFFICE O/P EST LOW 20 MIN: CPT | Mod: ZP | Performed by: INTERNAL MEDICINE

## 2018-03-15 ASSESSMENT — PAIN SCALES - GENERAL: PAINLEVEL: MODERATE PAIN (5)

## 2018-03-15 NOTE — MR AVS SNAPSHOT
After Visit Summary   3/15/2018    Lilly Babcock    MRN: [id number]           Patient Information     Date Of Birth          1950        Visit Information        Provider Department      3/15/2018 2:00 PM Jean-Claude Blanton MD [facility]        Today's Diagnoses     Follicular non-Hodgkin's lymphoma (H)           Follow-ups after your visit        Follow-up notes from your care team     Return in about 4 months (around 7/15/2018) for Physical Exam, Lab Work.      Your next 10 appointments already scheduled     Jul 12, 2018  1:30 PM CDT   [facility] Lab Draw with  [facility] LAB DRAW   [facility] Lab Draw ([facility])    [address] [place] [address]  [address]  [facility] [address] [phone]            Jul 12, 2018  2:00 PM CDT   (Arrive by 1:45 PM)   Return Visit with Jean-Claude Blanton MD   [facility] ([facility])    [address] [place] [address]  [address]  [facility] [address] [phone]              Who to contact     If you have questions or need follow up information about today's clinic visit or your schedule please contact [facility] directly at [phone].  Normal or non-critical lab and imaging results will be communicated to you by Pombaihart, letter or phone within 4 business days after the clinic has received the results. If you do not hear from us within 7 days, please contact the clinic through Pombaihart or phone. If you have a critical or abnormal lab result, we will notify you by phone as soon as possible.  Submit refill requests through Comprimato or call your pharmacy and they will forward the refill request to us. Please allow 3 business days for your refill to be completed.          Additional Information About Your Visit        Pombaihart Information     Comprimato gives you secure access to your electronic health record. If you see a primary care provider, you can also send  messages to your care team and make appointments. If you have questions, please call your primary care clinic.  If you do not have a primary care provider, please call [phone] and they will assist you.        Care EveryWhere ID     This is your Care EveryWhere ID. This could be used by other organizations to access your [facility] medical records  [id number]        Your Vitals Were     Pulse Temperature Pulse Oximetry BMI (Body Mass Index)          77 98  F (36.7  C) (Oral) 99% 21.81 kg/m2         Blood Pressure from Last 3 Encounters:   03/15/18 134/69   11/16/17 121/76   07/06/17 104/64    Weight from Last 3 Encounters:   03/15/18 63.9 kg (140 lb 14.4 oz)   11/16/17 63.9 kg (140 lb 12.8 oz)   07/06/17 60.2 kg (132 lb 12.8 oz)              We Performed the Following     *CBC with platelets differential     Comprehensive metabolic panel     CRP inflammation     Lactate Dehydrogenase     Uric acid        Primary Care Provider Office Phone # Fax #    Wade LANDEROS MD Bryanna [phone] [address] [place] [id number]        Equal Access to Services     [facility] : Hadii aad ku hadasho Soomaali, waaxda luqadaha, qaybta kaalmada adeegyada, dustin cordonin haymarcusn linus kaufman . So [facility] [phone].    ATENCIÓN: Si habla español, tiene a hernandez disposición servicios gratuitos de asistencia lingüística. Llame al [phone].    We comply with applicable federal civil rights laws and [place] laws. We do not discriminate on the basis of race, color, national origin, age, disability, sex, sexual orientation, or gender identity.            Thank you!     Thank you for choosing [facility]  for your care. Our goal is always to provide you with excellent care. Hearing back from our patients is one way we can continue to improve our services. Please take a few minutes to complete the written survey that you may receive in the mail after your visit with us. Thank  you!             Your Updated Medication List - Protect others around you: Learn how to safely use, store and throw away your medicines at www.disposemymeds.org.          This list is accurate as of 3/15/18 11:59 PM.  Always use your most recent med list.                   Brand Name Dispense Instructions for use Diagnosis    acetaminophen 500 MG tablet    TYLENOL     Take 500-1,000 mg by mouth        ALLEGRA 180 MG tablet   Generic drug:  fexofenadine      Take  by mouth daily.    Nodular lymphoma, unspecified site, extranodal and solid organ sites       clobetasol 0.05 % ointment    TEMOVATE     Reported on 3/9/2017        cyanocobalamin 1000 MCG Subl sublingual tablet           ipratropium 0.06 % spray    ATROVENT     Spray 2 sprays in nostril        lamoTRIgine 25 MG Tbdp ODT tab    LaMICtal    30 tablet    Take 200 mg by mouth daily        methylPREDNISolone 32 MG tablet    MEDROL    2 tablet    Take one tablet (32mg) by mouth 12 hours prior to scheduled CT scan.  Repeat above dose 2 hours prior to CT scan    Contrast media allergy       mometasone 0.1 % ointment    ELOCON     Reported on 3/9/2017        omeprazole 10 MG CR capsule    priLOSEC     Take 20 mg by mouth        predniSONE 5 MG tablet    DELTASONE     Take 3 tablets (15 mg) by mouth daily        triamcinolone 0.1 % cream    KENALOG     Apply 0.5 inches topically 2 times daily Reported on 3/9/2017        valACYclovir 1000 mg tablet    VALTREX     One tab bid for 3 days prn outbreaks  Indications: PN:        vitamin D 1000 UNITS capsule      Take 1 capsule by mouth daily.    Nodular lymphoma of lymph nodes of multiple sites (H)       Walker Auto Glides Misc      Rolling Walker for home use.        XOPENEX HFA 45 MCG/ACT Inhaler   Generic drug:  levalbuterol      Inhale 1-2 puffs into the lungs

## 2018-03-15 NOTE — PROGRESS NOTES
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 Biopsy was grade 2-3A and stage YOUSUF. Initial w/u with involvement of multiple skeletal sites and bone marrow. Over time, she was followed without requiring treatment, but with evidence of waxing and waning disease on serial scans.  The only lymphoma treatment she received was for pain in the left hip with evidence of progressive skeletal disease at that site on radiographic evaluation.  She received a total of 4140 cGy in 20 fractions to the left hip/proximal femur between 08/20/2012 and 09/20/2012 at [facility].  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 In July 2016 developed nausea of unknown etiology, workup negative for CNS involvement with MR and CSF analysis. PET scan on 8/18/16 with indication of progressive skeletal disease.       She previously developed avascular necrosis and underwent a left total hip arthroplasty on 10/19/16.  Sadiq also has now had a right hip replacement on 05/10/2017.     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 This resulted in major improvement in the PMR symptoms and her CRP decreased substantially - she has been slowly tapering the drug.     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  Today, she is not feeling as well as she has in the past.  She acknowledges that she is under considerable stress at home as well as professionally, which may be playing a role.      Her major concerns/complaints are those of fatigue and generalized aches and pains from the \"waist down.\"  She feels that this is likely her polymyalgia rheumatica.  Symptoms wax and wane.  In addition, she has tapered her prednisone now to 11.5 mg daily, but is not certain she should stay at this dose.  These symptoms compromise her quality of " life.      Otherwise, no known infections.  No fevers.  Weight is stable.      REVIEW OF SYSTEMS:  A review of systems is otherwise negative.      MEDICATIONS:   1.  Cyanocobalamin.   2.  Omeprazole.   3.  Lamictal.   4.  Prednisone 11.5 mg daily.   5.  Valacyclovir p.r.n.   6.  Levalbuterol inhaler.   7.  Atrovent spray.   8.  Acetaminophen p.r.n.   9.  Fexofenadine.   10.  Vitamin D.   11.  Elocon ointment.   12.  Clobetasol ointment.   13.  Triamcinolone cream.      ALLERGIES:  IV contrast, diatrizoate, Ioxaglate, vortioxetine, aspirin, liquid adhesives.      PHYSICAL EXAMINATION:   VITAL SIGNS:  Weight 63.9 kg (stable), blood pressure 134/69, pulse 77 and regular, respirations 18, temperature 98.0.  O2 saturation 99% on room air.   HEENT:  Anicteric.  No conjunctival injection.  Oropharynx with no masses.  Mucosa moist and without lesion.   NECK:  No cervical or supraclavicular nodes.   CHEST:  Clear to auscultation.   AXILLAE:  Negative.   HEART:  Regular rhythm.  No murmur or gallop.   ABDOMEN:  Soft.  No detectable tenderness.  The liver is at the right costal margin.  Spleen is not palpable or percussible.  No masses.  Bowel sounds active.  No inguinal/femoral nodes.   EXTREMITIES:  No lower extremity edema.  No epitrochlear nodes.      LABORATORY DATA:     Hemoglobin 12.5 grams percent with 8200 WBCs (84% neutrophils, 10% lymphocytes) and 255,000 platelets.   Electrolytes, calcium, creatinine (0.70) -- normal.   Liver enzymes, including serum LDH -- normal.  CRP <2.9.     Uric acid 5.4.      ASSESSMENT AND PLAN:  Stage IV follicular lymphoma -- Dr. Babcock is approaching 10 years from the time of diagnosis with only limited intervention.  No clinical findings today suggestive of disease progression.  However, her lower body and extremity symptoms are problematic.  It is not clear they are related to the lymphoma.      The patient's last CT scan was in 08/2016.  Consider obtaining CT scan upon return in  approximately 4 months.     Jean-Claude Blanton MD  Professor of Medicine  Oncology  [facility]  Office: [phone]  Clinic Fax: [phone]       cc:   MD Anant Banda MD Edward Cheng, MD Erskine Caperton, MD

## 2018-03-15 NOTE — LETTER
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 Biopsy was grade 2-3A and stage YOUSUF. Initial w/u with involvement of multiple skeletal sites and bone marrow. Over time, she was followed without requiring treatment, but with evidence of waxing and waning disease on serial scans.  The only lymphoma treatment she received was for pain in the left hip with evidence of progressive skeletal disease at that site on radiographic evaluation.  She received a total of 4140 cGy in 20 fractions to the left hip/proximal femur between 08/20/2012 and 09/20/2012 at [facility] [place].  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 In July 2016 developed nausea of unknown etiology, workup negative for CNS involvement with MR and CSF analysis. PET scan on 8/18/16 with indication of progressive skeletal disease.       She previously developed avascular necrosis and underwent a left total hip arthroplasty on 10/19/16. 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 This resulted in major improvement in the PMR symptoms and her CRP decreased substantially - she has been slowly tapering the drug.     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  Today, she is not feeling as well as she has in the past.  She acknowledges that she is under considerable stress at home as well as professionally, which may be playing a role.      Her major concerns/complaints are those of fatigue and generalized aches and pains from the \"waist down.\"  She feels that this is likely her polymyalgia rheumatica.  Symptoms wax and wane.  In addition, she has tapered her prednisone now to 11.5 mg daily, but is not " certain she should stay at this dose.  These symptoms compromise her quality of life.      Otherwise, no known infections.  No fevers.  Weight is stable.      REVIEW OF SYSTEMS:  A review of systems is otherwise negative.      MEDICATIONS:   1.  Cyanocobalamin.   2.  Omeprazole.   3.  Lamictal.   4.  Prednisone 11.5 mg daily.   5.  Valacyclovir p.r.n.   6.  Levalbuterol inhaler.   7.  Atrovent spray.   8.  Acetaminophen p.r.n.   9.  Fexofenadine.   10.  Vitamin D.   11.  Elocon ointment.   12.  Clobetasol ointment.   13.  Triamcinolone cream.      ALLERGIES:  IV contrast, diatrizoate, Ioxaglate, vortioxetine, aspirin, liquid adhesives.      PHYSICAL EXAMINATION:   VITAL SIGNS:  Weight 63.9 kg (stable), blood pressure 134/69, pulse 77 and regular, respirations 18, temperature 98.0.  O2 saturation 99% on room air.   HEENT:  Anicteric.  No conjunctival injection.  Oropharynx with no masses.  Mucosa moist and without lesion.   NECK:  No cervical or supraclavicular nodes.   CHEST:  Clear to auscultation.   AXILLAE:  Negative.   HEART:  Regular rhythm.  No murmur or gallop.   ABDOMEN:  Soft.  No detectable tenderness.  The liver is at the right costal margin.  Spleen is not palpable or percussible.  No masses.  Bowel sounds active.  No inguinal/femoral nodes.   EXTREMITIES:  No lower extremity edema.  No epitrochlear nodes.      LABORATORY DATA:     Hemoglobin 12.5 grams percent with 8200 WBCs (84% neutrophils, 10% lymphocytes) and 255,000 platelets.   Electrolytes, calcium, creatinine (0.70) -- normal.   Liver enzymes, including serum LDH -- normal.  CRP <2.9.     Uric acid 5.4.      ASSESSMENT AND PLAN:  Stage IV follicular lymphoma -- Dr. Babcock is approaching 10 years from the time of diagnosis with only limited intervention.  No clinical findings today suggestive of disease progression.  However, her lower body and extremity symptoms are problematic.  It is not clear they are related to the lymphoma.      The  patient's last CT scan was in 08/2016.  Consider obtaining CT scan upon return in approximately 4 months.     Jean-Claude Blanton MD  Professor of Medicine  Oncology  [facility]  Office: [phone]  Clinic Fax: [phone]       cc:   MD Anant Banda MD Edward Cheng, MD Erskine Caperton, MD Bruce A. Peterson, MD

## 2018-03-15 NOTE — NURSING NOTE
Chief Complaint   Patient presents with     Blood Draw     labs drawn via venipuncture     /69 (BP Location: Right arm, Patient Position: Chair, Cuff Size: Adult Regular)  Pulse 77  Temp 98  F (36.7  C) (Oral)  Wt 63.9 kg (140 lb 14.4 oz)  SpO2 99%  BMI 21.81 kg/m2    Vitals taken.  Labs collected and sent from right antecubital venipuncture. Pt tolerated well. Pt checked in for next appointment.    Yvette Díaz RN

## 2018-03-15 NOTE — NURSING NOTE
"Oncology Rooming Note    March 15, 2018 2:04 PM   Lilly Babcock is a 67 year old female who presents for:    Chief Complaint   Patient presents with     Blood Draw     labs drawn via venipuncture     Oncology Clinic Visit     Return: Nodular lymphoma of extranodal and/or solid organ site     Initial Vitals: /69 (BP Location: Right arm, Patient Position: Chair, Cuff Size: Adult Regular)  Pulse 77  Temp 98  F (36.7  C) (Oral)  Wt 63.9 kg (140 lb 14.4 oz)  SpO2 99%  BMI 21.81 kg/m2 Estimated body mass index is 21.81 kg/(m^2) as calculated from the following:    Height as of 1/5/17: 1.712 m (5' 7.4\").    Weight as of this encounter: 63.9 kg (140 lb 14.4 oz). Body surface area is 1.74 meters squared.  Moderate Pain (5) Comment: Data Unavailable   No LMP recorded. Patient is postmenopausal.  Allergies reviewed: Yes  Medications reviewed: Yes    Medications: Medication refills not needed today.  Pharmacy name entered into [facility]:    [id number] - [place], [place] - [address] [facility] - [place], [place] - [id number] - [place], [place] - [address]    Clinical concerns: No New Concerns    5 minutes for nursing intake (face to face time)     MIKE Espinoza      "

## 2018-07-12 ENCOUNTER — ONCOLOGY VISIT (OUTPATIENT)
Dept: ONCOLOGY | Facility: CLINIC | Age: 68
End: 2018-07-12
Attending: INTERNAL MEDICINE
Payer: COMMERCIAL

## 2018-07-12 ENCOUNTER — APPOINTMENT (OUTPATIENT)
Dept: LAB | Facility: CLINIC | Age: 68
End: 2018-07-12
Attending: INTERNAL MEDICINE
Payer: COMMERCIAL

## 2018-07-12 VITALS
DIASTOLIC BLOOD PRESSURE: 72 MMHG | BODY MASS INDEX: 22.05 KG/M2 | OXYGEN SATURATION: 99 % | SYSTOLIC BLOOD PRESSURE: 131 MMHG | WEIGHT: 140.5 LBS | HEIGHT: 67 IN | RESPIRATION RATE: 16 BRPM | TEMPERATURE: 98.2 F | HEART RATE: 84 BPM

## 2018-07-12 DIAGNOSIS — C79.51 SECONDARY MALIGNANT NEOPLASM OF BONE AND BONE MARROW (H): Primary | ICD-10-CM

## 2018-07-12 DIAGNOSIS — C82.90 FOLLICULAR NON-HODGKIN'S LYMPHOMA (H): ICD-10-CM

## 2018-07-12 DIAGNOSIS — C79.52 SECONDARY MALIGNANT NEOPLASM OF BONE AND BONE MARROW (H): Primary | ICD-10-CM

## 2018-07-12 LAB
ALBUMIN SERPL-MCNC: 3.7 G/DL (ref 3.4–5)
ALP SERPL-CCNC: 66 U/L (ref 40–150)
ALT SERPL W P-5'-P-CCNC: 31 U/L (ref 0–50)
ANION GAP SERPL CALCULATED.3IONS-SCNC: 6 MMOL/L (ref 3–14)
AST SERPL W P-5'-P-CCNC: 29 U/L (ref 0–45)
BASOPHILS # BLD AUTO: 0 10E9/L (ref 0–0.2)
BASOPHILS NFR BLD AUTO: 0.3 %
BILIRUB SERPL-MCNC: 0.3 MG/DL (ref 0.2–1.3)
BUN SERPL-MCNC: 10 MG/DL (ref 7–30)
CALCIUM SERPL-MCNC: 9.4 MG/DL (ref 8.5–10.1)
CHLORIDE SERPL-SCNC: 103 MMOL/L (ref 94–109)
CO2 SERPL-SCNC: 27 MMOL/L (ref 20–32)
CREAT SERPL-MCNC: 0.74 MG/DL (ref 0.52–1.04)
CRP SERPL-MCNC: <2.9 MG/L (ref 0–8)
DIFFERENTIAL METHOD BLD: NORMAL
EOSINOPHIL # BLD AUTO: 0.1 10E9/L (ref 0–0.7)
EOSINOPHIL NFR BLD AUTO: 1 %
ERYTHROCYTE [DISTWIDTH] IN BLOOD BY AUTOMATED COUNT: 14.5 % (ref 10–15)
GFR SERPL CREATININE-BSD FRML MDRD: 78 ML/MIN/1.7M2
GLUCOSE SERPL-MCNC: 80 MG/DL (ref 70–99)
HCT VFR BLD AUTO: 38.6 % (ref 35–47)
HGB BLD-MCNC: 12.4 G/DL (ref 11.7–15.7)
IMM GRANULOCYTES # BLD: 0 10E9/L (ref 0–0.4)
IMM GRANULOCYTES NFR BLD: 0.3 %
LDH SERPL L TO P-CCNC: 235 U/L (ref 81–234)
LYMPHOCYTES # BLD AUTO: 0.9 10E9/L (ref 0.8–5.3)
LYMPHOCYTES NFR BLD AUTO: 15 %
MCH RBC QN AUTO: 29.9 PG (ref 26.5–33)
MCHC RBC AUTO-ENTMCNC: 32.1 G/DL (ref 31.5–36.5)
MCV RBC AUTO: 93 FL (ref 78–100)
MONOCYTES # BLD AUTO: 0.4 10E9/L (ref 0–1.3)
MONOCYTES NFR BLD AUTO: 6.8 %
NEUTROPHILS # BLD AUTO: 4.7 10E9/L (ref 1.6–8.3)
NEUTROPHILS NFR BLD AUTO: 76.6 %
NRBC # BLD AUTO: 0 10*3/UL
NRBC BLD AUTO-RTO: 0 /100
PLATELET # BLD AUTO: 263 10E9/L (ref 150–450)
POTASSIUM SERPL-SCNC: 4.2 MMOL/L (ref 3.4–5.3)
PROT SERPL-MCNC: 6.8 G/DL (ref 6.8–8.8)
RBC # BLD AUTO: 4.15 10E12/L (ref 3.8–5.2)
SODIUM SERPL-SCNC: 137 MMOL/L (ref 133–144)
WBC # BLD AUTO: 6.2 10E9/L (ref 4–11)

## 2018-07-12 PROCEDURE — 83615 LACTATE (LD) (LDH) ENZYME: CPT | Performed by: INTERNAL MEDICINE

## 2018-07-12 PROCEDURE — 85025 COMPLETE CBC W/AUTO DIFF WBC: CPT | Performed by: INTERNAL MEDICINE

## 2018-07-12 PROCEDURE — 86140 C-REACTIVE PROTEIN: CPT | Performed by: INTERNAL MEDICINE

## 2018-07-12 PROCEDURE — 82232 ASSAY OF BETA-2 PROTEIN: CPT | Performed by: INTERNAL MEDICINE

## 2018-07-12 PROCEDURE — 80053 COMPREHEN METABOLIC PANEL: CPT | Performed by: INTERNAL MEDICINE

## 2018-07-12 PROCEDURE — 00000402 ZZHCL STATISTIC TOTAL PROTEIN: Performed by: INTERNAL MEDICINE

## 2018-07-12 PROCEDURE — 99214 OFFICE O/P EST MOD 30 MIN: CPT | Mod: ZP | Performed by: INTERNAL MEDICINE

## 2018-07-12 PROCEDURE — 36415 COLL VENOUS BLD VENIPUNCTURE: CPT

## 2018-07-12 PROCEDURE — 84165 PROTEIN E-PHORESIS SERUM: CPT | Performed by: INTERNAL MEDICINE

## 2018-07-12 PROCEDURE — G0463 HOSPITAL OUTPT CLINIC VISIT: HCPCS | Mod: ZF

## 2018-07-12 RX ORDER — ESCITALOPRAM OXALATE 20 MG/1
20 TABLET ORAL DAILY
Refills: 5 | COMMUNITY
Start: 2018-06-16 | End: 2022-01-01

## 2018-07-12 RX ORDER — KETOCONAZOLE 20 MG/ML
SHAMPOO TOPICAL DAILY PRN
COMMUNITY
Start: 2018-06-30

## 2018-07-12 RX ORDER — AMOXICILLIN 500 MG/1
CAPSULE ORAL
Refills: 3 | COMMUNITY
Start: 2018-06-11 | End: 2022-01-01

## 2018-07-12 ASSESSMENT — PAIN SCALES - GENERAL: PAINLEVEL: MILD PAIN (2)

## 2018-07-12 NOTE — LETTER
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 Biopsy was grade 2-3A and stage YOUSUF. Initial w/u with involvement of multiple skeletal sites and bone marrow. Over time, she was followed without requiring treatment, but with evidence of waxing and waning disease on serial scans.  The only lymphoma treatment she received was for pain in the left hip with evidence of progressive skeletal disease at that site on radiographic evaluation.  She received a total of 4140 cGy in 20 fractions to the left hip/proximal femur between 08/20/2012 and 09/20/2012 at [facility].  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 In July 2016 developed nausea of unknown etiology, workup negative for CNS involvement with MR and CSF analysis. PET scan on 8/18/16 with indication of progressive skeletal disease.        She previously developed avascular necrosis and underwent a left total hip arthroplasty on 10/19/16. Magalie Babcock also has now had a right hip replacement on 05/10/2017.       Sadiq was diagnosed with PMR in 01/2017 and placed on prednisone. This resulted in major improvement in the PMR symptoms and her CRP decreased substantially - she has been slowly tapering the drug.      When seen in clinic on 11/16/2017 she was not feeling as well as she had in the past.  She  reported being under considerable stress at home as well as professionally.  Her major concerns/complaints were those of fatigue and generalized aches and pains from the \"waist down.\"  She felt this was likely her polymyalgia rheumatica.        INTERVAL HISTORY:  I last saw Dr. Babcock on 03/15/2018.  She returns at this time with new concerns.        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  It has been treated by her dermatologist with dapsone. Tacrolimus topical is just to be started.  The lesion has increased to about 6 cm in size and is uncomfortable.  Dr. Babcock provides a picture, but the lesion itself remains bandaged today.      The patient remains under considerable stress, both at home and work.  She has fatigue, but otherwise, her complaints are those of multiple musculoskeletal aches and pains.  She has reduced her dose of prednisone for the polymyalgia rheumatica to 8 mg daily.  She is slowly trying to cut down.  However, with her recurrent musculoskeletal pain, she is also reminded of some of the discomfort she has had with her lymphoma in the past.      No interval infections.  No fevers, night sweats or weight loss.      REVIEW OF SYSTEMS:  Otherwise negative.      MEDICATIONS:   1.  Acetaminophen.   2.  Vitamin D.   3.  Clobetasol ointment.     4.  Fexofenadine.     5.  Atrovent spray.   6.  Levalbuterol inhaler.   7.  Prednisone 8 mg daily.   8.  Valacyclovir p.r.n.   9.  Cyanocobalamin.     10.  Escitalopram.     11.  Lamotrigine.   12.  Omeprazole.    13.  Various topical agents.      Also, patient indicates the tacrolimus ointment is to be started and she has just picked up the prescription.      ALLERGIES:  IV contrast, diatrizoate, loxaglate, aspirin, liquid adhesive.        PHYSICAL EXAMINATION:   VITAL SIGNS:  Weight 63.7 kg (stable), blood pressure 131/72, pulse 84 and regular, respirations 16, temperature 98.2.  O2 saturation 99% on room air.   HEENT:  No icterus or conjunctival injection.  Oropharynx no masses.  Mucosa moist.  No plaque or ulceration.   NECK:  No cervical/supraclavicular nodes.   CHEST:  Clear to auscultation.   AXILLAE:  No nodes.   HEART:  Regular rhythm without murmur.   ABDOMEN:  Soft and nontender.  Bowel sounds present.  No organomegaly or mass.  No  inguinal/femoral nodes.   EXTREMITIES:  No significant lower extremity edema.   MUSCULOSKELETAL:  Some generalized tenderness.      LABORATORY DATA:     Hemoglobin 12.4 grams percent with 6200 WBCs (normal differential) and 263,000 platelets.   Electrolytes, calcium, creatinine (0.74) normal.   Liver enzymes normal.  Serum LDH elevated at 235 (normal <234).   CRP 2.9.   Glucose 80.     Serum protein electrophoresis:  Albumin 4.2, gamma fraction 0.6 (borderline low), no monoclonal peak.      ASSESSMENT AND PLAN:  Stage IV follicular lymphoma.  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  The status of her lymphoma is unclear in that much of it has been skeletal involving multiple sites, which are difficult to follow with plain xrays, CTs or even MRIs.  In view of her ongoing musculoskeletal complaints and the difficulty in distinguishing lymphomatous involvement from PMR or other causes, it is reasonable to repeat a PET/CT scan. Her last PET scan was on 08/18/2016, 2 years ago.  At that time, there were areas of new and enlarging foci of skeletal FDG avidity, some of which were associated with sclerotic bone lesions.  Thus, repeat PET/CT scan is pertinent for assessing the status of her lymphoma overall in this complex clinical situation.     Plan PET/CT scan.  Determine follow-up pending results.    Jean-Claude Blanton MD  Professor of Medicine  Oncology  [facility]  Office: [phone]  Clinic Fax: [phone]       cc:   MD Anant Banda MD Edward Cheng, MD Erskine Caperton, MD Bruce A. Peterson, MD

## 2018-07-12 NOTE — NURSING NOTE
Chief Complaint   Patient presents with     Blood Draw     Labs drawn from vpt by RN. Vs taken and pt checked in for appt     Labs collected from venipuncture by RN. Vitals taken. Checked in for appointment(s).    Franci Babb RN

## 2018-07-12 NOTE — MR AVS SNAPSHOT
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      Who to contact     If you have questions or need follow up information about today's clinic visit or your schedule please contact [facility] directly at [phone].  Normal or non-critical lab and imaging results will be communicated to you by MyChart, letter or phone within 4 business days after the clinic has received the results. If you do not hear from us within 7 days, please contact the clinic through [employer] or phone. If you have a critical or abnormal lab result, we will notify you by phone as soon as possible.  Submit refill requests through [employer] or call your pharmacy and they will forward the refill request to us. Please allow 3 business days for your refill to be completed.          Additional Information About Your Visit        MyChart Information     BetterYou gives you secure access to your electronic health record. If you see a primary care provider, you can also send messages to your care team and make appointments. If you have questions, please call your primary care clinic.  If you do not have a primary care provider, please call [phone] and they will assist you.        Care EveryWhere ID     This is your Care EveryWhere ID. This could be used by other organizations to access your [facility] medical records  [id number]        Your Vitals Were     Pulse Temperature Respirations Height Pulse Oximetry BMI (Body Mass Index)    84 98.2  F (36.8  C)  "(Oral) 16 1.712 m (5' 7.4\") 99% 21.74 kg/m2       Blood Pressure from Last 3 Encounters:   07/12/18 131/72   03/15/18 134/69   11/16/17 121/76    Weight from Last 3 Encounters:   07/12/18 63.7 kg (140 lb 8 oz)   03/15/18 63.9 kg (140 lb 14.4 oz)   11/16/17 63.9 kg (140 lb 12.8 oz)              We Performed the Following     *CBC with platelets differential     Beta 2 microglobulin     Comprehensive metabolic panel     CRP inflammation     Lactate Dehydrogenase     Protein electrophoresis        Primary Care Provider Office Phone # Fax #    Quincy M MD Bryanna [phone] [address] [place] [address]        Equal Access to Services     DERECK WILLETT : Hadii aad ku hadasho Soida, waaxda luqadaha, qaybta kaalmada adeegyada, dustin kaufman . So [facility] [phone].    ATENCIÓN: Si habla español, tiene a hernandez disposición servicios gratuitos de asistencia lingüística. Llame al [phone].    We comply with applicable federal civil rights laws and [place] laws. We do not discriminate on the basis of race, color, national origin, age, disability, sex, sexual orientation, or gender identity.            Thank you!     Thank you for choosing [facility]  for your care. Our goal is always to provide you with excellent care. Hearing back from our patients is one way we can continue to improve our services. Please take a few minutes to complete the written survey that you may receive in the mail after your visit with us. Thank you!             Your Updated Medication List - Protect others around you: Learn how to safely use, store and throw away your medicines at www.disposemymeds.org.          This list is accurate as of 7/12/18 11:59 PM.  Always use your most recent med list.                   Brand Name Dispense Instructions for use Diagnosis    acetaminophen 500 MG tablet    TYLENOL     Take 500-1,000 mg by mouth        ALLEGRA 180 MG " tablet   Generic drug:  fexofenadine      Take  by mouth daily.    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  Repeat above dose 2 hours prior to CT scan    Contrast media allergy       mometasone 0.1 % ointment    ELOCON     Reported on 3/9/2017        omeprazole 10 MG CR capsule    priLOSEC     Take 20 mg by mouth        predniSONE 5 MG tablet    DELTASONE     Take 8 mg by mouth daily        triamcinolone 0.1 % cream    KENALOG     Apply 0.5 inches topically 2 times daily Reported on 3/9/2017        valACYclovir 1000 mg tablet    VALTREX     One tab bid for 3 days prn outbreaks  Indications: PN:        vitamin D 1000 units capsule      Take 1 capsule by mouth daily.    Nodular lymphoma of lymph nodes of multiple sites (H)       Walker Auto Glides Misc      Rolling Walker for home use.        XOPENEX HFA 45 MCG/ACT Inhaler   Generic drug:  levalbuterol      Inhale 1-2 puffs into the lungs

## 2018-07-12 NOTE — PROGRESS NOTES
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 Biopsy was grade 2-3A and stage YOUSUF. Initial w/u with involvement of multiple skeletal sites and bone marrow. Over time, she was followed without requiring treatment, but with evidence of waxing and waning disease on serial scans.  The only lymphoma treatment she received was for pain in the left hip with evidence of progressive skeletal disease at that site on radiographic evaluation.  She received a total of 4140 cGy in 20 fractions to the left hip/proximal femur between 08/20/2012 and 09/20/2012 at [facility].  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 In July 2016 developed nausea of unknown etiology, workup negative for CNS involvement with MR and CSF analysis. PET scan on 8/18/16 with indication of progressive skeletal disease.        She previously developed avascular necrosis and underwent a left total hip arthroplasty on 10/19/16. 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       Sadiq was diagnosed with PMR in 01/2017 and placed on prednisone. This resulted in major improvement in the PMR symptoms and her CRP decreased substantially - she has been slowly tapering the drug.      When seen in clinic on 11/16/2017 she was not feeling as well as she had in the past.  She reported being under considerable stress at home as well as professionally.  Her major concerns/complaints were those of fatigue and generalized aches and pains from the \"waist down.\"  She felt this was likely her polymyalgia rheumatica.        INTERVAL HISTORY:  I last saw Dr. Babcock on 03/15/2018.  She returns at this time with new concerns.        On 06/11, she had a squamous cell carcinoma removed from her left lower leg.  Thereafter, she developed an ulcerative lesion that was finally diagnosed as " pyoderma gangrenosum.  It has been treated by her dermatologist with dapsone. Tacrolimus topical is just to be started.  The lesion has increased to about 6 cm in size and is uncomfortable.  Dr. Babcock provides a picture, but the lesion itself remains bandaged today.      The patient remains under considerable stress, both at home and work.  She has fatigue, but otherwise, her complaints are those of multiple musculoskeletal aches and pains.  She has reduced her dose of prednisone for the polymyalgia rheumatica to 8 mg daily.  She is slowly trying to cut down.  However, with her recurrent musculoskeletal pain, she is also reminded of some of the discomfort she has had with her lymphoma in the past.      No interval infections.  No fevers, night sweats or weight loss.      REVIEW OF SYSTEMS:  Otherwise negative.      MEDICATIONS:   1.  Acetaminophen.   2.  Vitamin D.   3.  Clobetasol ointment.     4.  Fexofenadine.     5.  Atrovent spray.   6.  Levalbuterol inhaler.   7.  Prednisone 8 mg daily.   8.  Valacyclovir p.r.n.   9.  Cyanocobalamin.     10.  Escitalopram.     11.  Lamotrigine.   12.  Omeprazole.    13.  Various topical agents.      Also, patient indicates the tacrolimus ointment is to be started and she has just picked up the prescription.      ALLERGIES:  IV contrast, diatrizoate, loxaglate, aspirin, liquid adhesive.        PHYSICAL EXAMINATION:   VITAL SIGNS:  Weight 63.7 kg (stable), blood pressure 131/72, pulse 84 and regular, respirations 16, temperature 98.2.  O2 saturation 99% on room air.   HEENT:  No icterus or conjunctival injection.  Oropharynx no masses.  Mucosa moist.  No plaque or ulceration.   NECK:  No cervical/supraclavicular nodes.   CHEST:  Clear to auscultation.   AXILLAE:  No nodes.   HEART:  Regular rhythm without murmur.   ABDOMEN:  Soft and nontender.  Bowel sounds present.  No organomegaly or mass.  No inguinal/femoral nodes.   EXTREMITIES:  No significant lower extremity edema.    MUSCULOSKELETAL:  Some generalized tenderness.      LABORATORY DATA:     Hemoglobin 12.4 grams percent with 6200 WBCs (normal differential) and 263,000 platelets.   Electrolytes, calcium, creatinine (0.74) normal.   Liver enzymes normal.  Serum LDH elevated at 235 (normal <234).   CRP 2.9.   Glucose 80.     Serum protein electrophoresis:  Albumin 4.2, gamma fraction 0.6 (borderline low), no monoclonal peak.      ASSESSMENT AND PLAN:  Stage IV follicular lymphoma.  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  The status of her lymphoma is unclear in that much of it has been skeletal involving multiple sites, which are difficult to follow with plain xrays, CTs or even MRIs.  In view of her ongoing musculoskeletal complaints and the difficulty in distinguishing lymphomatous involvement from PMR or other causes, it is reasonable to repeat a PET/CT scan. Her last PET scan was on 08/18/2016, 2 years ago.  At that time, there were areas of new and enlarging foci of skeletal FDG avidity, some of which were associated with sclerotic bone lesions.  Thus, repeat PET/CT scan is pertinent for assessing the status of her lymphoma overall in this complex clinical situation.     Plan PET/CT scan.  Determine follow-up pending results.    Jean-Claude Blanton MD  Professor of Medicine  Oncology  [facility]  Office: [phone]  Clinic Fax: [phone]       cc:   MD Anant Banda MD Edward Cheng, MD Erskine Caperton, MD

## 2018-07-12 NOTE — NURSING NOTE
"Oncology Rooming Note    July 12, 2018 12:36 PM   Lilly Babcock is a 68 year old female who presents for:    Chief Complaint   Patient presents with     Blood Draw     Labs drawn from vpt by RN. Vs taken and pt checked in for appt     RECHECK     ONC NHL 4 mo f/up     Initial Vitals: /72 (BP Location: Right arm, Cuff Size: Adult Regular)  Pulse 84  Temp 98.2  F (36.8  C) (Oral)  Resp 16  Ht 1.712 m (5' 7.4\")  Wt 63.7 kg (140 lb 8 oz)  SpO2 99%  BMI 21.74 kg/m2 Estimated body mass index is 21.74 kg/(m^2) as calculated from the following:    Height as of this encounter: 1.712 m (5' 7.4\").    Weight as of this encounter: 63.7 kg (140 lb 8 oz). Body surface area is 1.74 meters squared.  Mild Pain (2) Comment: Data Unavailable   No LMP recorded. Patient is postmenopausal.  Allergies reviewed: Yes  Medications reviewed: Yes    Medications: Medication refills not needed today.  Pharmacy name entered into [employer]:    [facility] #[id number] - [place], [place] - [address] - [place], [place] - [address] [facility]Y #[id number] - [place], [place] - [address]    Clinical concerns: wound on left side .    6 minutes for nursing intake (face to face time)     Charissa NAMITA Guzmán              "

## 2018-07-13 LAB
ALBUMIN SERPL ELPH-MCNC: 4.2 G/DL (ref 3.7–5.1)
ALPHA1 GLOB SERPL ELPH-MCNC: 0.2 G/DL (ref 0.2–0.4)
ALPHA2 GLOB SERPL ELPH-MCNC: 0.7 G/DL (ref 0.5–0.9)
B-GLOBULIN SERPL ELPH-MCNC: 0.7 G/DL (ref 0.6–1)
B2 MICROGLOB SERPL-MCNC: 1.5 MG/L
GAMMA GLOB SERPL ELPH-MCNC: 0.6 G/DL (ref 0.7–1.6)
M PROTEIN SERPL ELPH-MCNC: 0 G/DL
PROT PATTERN SERPL ELPH-IMP: ABNORMAL

## 2018-07-16 DIAGNOSIS — C83.50: Primary | ICD-10-CM

## 2018-07-16 RX ORDER — METHYLPREDNISOLONE 32 MG/1
32 TABLET ORAL DAILY
Qty: 2 TABLET | Refills: 0 | Status: SHIPPED | OUTPATIENT
Start: 2018-07-16 | End: 2018-07-18

## 2018-07-30 DIAGNOSIS — Z91.041 CONTRAST MEDIA ALLERGY: ICD-10-CM

## 2018-07-30 RX ORDER — METHYLPREDNISOLONE 32 MG/1
TABLET ORAL
Qty: 2 TABLET | Refills: 1 | Status: SHIPPED | OUTPATIENT
Start: 2018-07-30 | End: 2022-01-01

## 2018-08-04 ENCOUNTER — HOSPITAL ENCOUNTER (OUTPATIENT)
Dept: PET IMAGING | Facility: CLINIC | Age: 68
End: 2018-08-04
Attending: INTERNAL MEDICINE
Payer: COMMERCIAL

## 2018-08-04 ENCOUNTER — HOSPITAL ENCOUNTER (OUTPATIENT)
Dept: PET IMAGING | Facility: CLINIC | Age: 68
Discharge: HOME | End: 2018-08-04
Attending: INTERNAL MEDICINE | Admitting: INTERNAL MEDICINE
Payer: COMMERCIAL

## 2018-08-04 ENCOUNTER — TRANSFERRED RECORDS (OUTPATIENT)
Dept: HEALTH INFORMATION MANAGEMENT | Facility: CLINIC | Age: 68
End: 2018-08-04

## 2018-08-04 DIAGNOSIS — C79.52 SECONDARY MALIGNANT NEOPLASM OF BONE AND BONE MARROW (H): ICD-10-CM

## 2018-08-04 DIAGNOSIS — C79.51 SECONDARY MALIGNANT NEOPLASM OF BONE AND BONE MARROW (H): ICD-10-CM

## 2018-08-04 DIAGNOSIS — C82.90 FOLLICULAR NON-HODGKIN'S LYMPHOMA (H): ICD-10-CM

## 2018-08-04 LAB — GLUCOSE BLDC GLUCOMTR-MCNC: 150 MG/DL (ref 70–99)

## 2018-08-04 PROCEDURE — 25000128 H RX IP 250 OP 636: Performed by: INTERNAL MEDICINE

## 2018-08-04 PROCEDURE — 34300033 ZZH RX 343: Performed by: INTERNAL MEDICINE

## 2018-08-04 PROCEDURE — 74177 CT ABD & PELVIS W/CONTRAST: CPT

## 2018-08-04 PROCEDURE — 70491 CT SOFT TISSUE NECK W/DYE: CPT

## 2018-08-04 PROCEDURE — A9552 F18 FDG: HCPCS | Performed by: INTERNAL MEDICINE

## 2018-08-04 PROCEDURE — 82962 GLUCOSE BLOOD TEST: CPT

## 2018-08-04 RX ORDER — IOPAMIDOL 755 MG/ML
1-135 INJECTION, SOLUTION INTRAVASCULAR ONCE
Status: COMPLETED | OUTPATIENT
Start: 2018-08-04 | End: 2018-08-04

## 2018-08-04 RX ADMIN — FLUDEOXYGLUCOSE F-18 10.16 MCI.: 500 INJECTION, SOLUTION INTRAVENOUS at 09:00

## 2018-08-04 RX ADMIN — IOPAMIDOL 86 ML: 755 INJECTION, SOLUTION INTRAVENOUS at 08:59

## 2018-12-13 ENCOUNTER — ONCOLOGY VISIT (OUTPATIENT)
Dept: ONCOLOGY | Facility: CLINIC | Age: 68
End: 2018-12-13
Attending: INTERNAL MEDICINE
Payer: COMMERCIAL

## 2018-12-13 ENCOUNTER — APPOINTMENT (OUTPATIENT)
Dept: LAB | Facility: CLINIC | Age: 68
End: 2018-12-13
Attending: INTERNAL MEDICINE
Payer: COMMERCIAL

## 2018-12-13 VITALS
BODY MASS INDEX: 22.11 KG/M2 | TEMPERATURE: 98.7 F | HEIGHT: 67 IN | DIASTOLIC BLOOD PRESSURE: 54 MMHG | OXYGEN SATURATION: 97 % | SYSTOLIC BLOOD PRESSURE: 105 MMHG | WEIGHT: 140.9 LBS | HEART RATE: 81 BPM | RESPIRATION RATE: 16 BRPM

## 2018-12-13 DIAGNOSIS — C82.90 FOLLICULAR NON-HODGKIN'S LYMPHOMA (H): Primary | ICD-10-CM

## 2018-12-13 LAB
ALBUMIN SERPL-MCNC: 3.7 G/DL (ref 3.4–5)
ALP SERPL-CCNC: 69 U/L (ref 40–150)
ALT SERPL W P-5'-P-CCNC: 28 U/L (ref 0–50)
ANION GAP SERPL CALCULATED.3IONS-SCNC: 7 MMOL/L (ref 3–14)
AST SERPL W P-5'-P-CCNC: 28 U/L (ref 0–45)
BASOPHILS # BLD AUTO: 0 10E9/L (ref 0–0.2)
BASOPHILS NFR BLD AUTO: 0.5 %
BILIRUB SERPL-MCNC: 0.2 MG/DL (ref 0.2–1.3)
BUN SERPL-MCNC: 19 MG/DL (ref 7–30)
CALCIUM SERPL-MCNC: 9.4 MG/DL (ref 8.5–10.1)
CHLORIDE SERPL-SCNC: 103 MMOL/L (ref 94–109)
CO2 SERPL-SCNC: 29 MMOL/L (ref 20–32)
CREAT SERPL-MCNC: 0.76 MG/DL (ref 0.52–1.04)
DIFFERENTIAL METHOD BLD: NORMAL
EOSINOPHIL # BLD AUTO: 0 10E9/L (ref 0–0.7)
EOSINOPHIL NFR BLD AUTO: 0.3 %
ERYTHROCYTE [DISTWIDTH] IN BLOOD BY AUTOMATED COUNT: 13.7 % (ref 10–15)
GFR SERPL CREATININE-BSD FRML MDRD: 75 ML/MIN/1.7M2
GLUCOSE SERPL-MCNC: 83 MG/DL (ref 70–99)
HCT VFR BLD AUTO: 38.9 % (ref 35–47)
HGB BLD-MCNC: 12.8 G/DL (ref 11.7–15.7)
IMM GRANULOCYTES # BLD: 0 10E9/L (ref 0–0.4)
IMM GRANULOCYTES NFR BLD: 0.3 %
LDH SERPL L TO P-CCNC: 229 U/L (ref 81–234)
LYMPHOCYTES # BLD AUTO: 1.4 10E9/L (ref 0.8–5.3)
LYMPHOCYTES NFR BLD AUTO: 19.6 %
MCH RBC QN AUTO: 31 PG (ref 26.5–33)
MCHC RBC AUTO-ENTMCNC: 32.9 G/DL (ref 31.5–36.5)
MCV RBC AUTO: 94 FL (ref 78–100)
MONOCYTES # BLD AUTO: 0.6 10E9/L (ref 0–1.3)
MONOCYTES NFR BLD AUTO: 8.8 %
NEUTROPHILS # BLD AUTO: 5.2 10E9/L (ref 1.6–8.3)
NEUTROPHILS NFR BLD AUTO: 70.5 %
NRBC # BLD AUTO: 0 10*3/UL
NRBC BLD AUTO-RTO: 0 /100
PLATELET # BLD AUTO: 227 10E9/L (ref 150–450)
POTASSIUM SERPL-SCNC: 4.3 MMOL/L (ref 3.4–5.3)
PROT SERPL-MCNC: 6.5 G/DL (ref 6.8–8.8)
RBC # BLD AUTO: 4.13 10E12/L (ref 3.8–5.2)
SODIUM SERPL-SCNC: 139 MMOL/L (ref 133–144)
URATE SERPL-MCNC: 5.4 MG/DL (ref 2.6–6)
WBC # BLD AUTO: 7.3 10E9/L (ref 4–11)

## 2018-12-13 PROCEDURE — G0463 HOSPITAL OUTPT CLINIC VISIT: HCPCS | Mod: ZF

## 2018-12-13 PROCEDURE — 83615 LACTATE (LD) (LDH) ENZYME: CPT | Performed by: INTERNAL MEDICINE

## 2018-12-13 PROCEDURE — 85025 COMPLETE CBC W/AUTO DIFF WBC: CPT | Performed by: INTERNAL MEDICINE

## 2018-12-13 PROCEDURE — 84550 ASSAY OF BLOOD/URIC ACID: CPT | Performed by: INTERNAL MEDICINE

## 2018-12-13 PROCEDURE — 99214 OFFICE O/P EST MOD 30 MIN: CPT | Mod: ZP | Performed by: INTERNAL MEDICINE

## 2018-12-13 PROCEDURE — 36415 COLL VENOUS BLD VENIPUNCTURE: CPT

## 2018-12-13 PROCEDURE — 80053 COMPREHEN METABOLIC PANEL: CPT | Performed by: INTERNAL MEDICINE

## 2018-12-13 PROCEDURE — 84165 PROTEIN E-PHORESIS SERUM: CPT | Performed by: INTERNAL MEDICINE

## 2018-12-13 PROCEDURE — 00000402 ZZHCL STATISTIC TOTAL PROTEIN: Performed by: INTERNAL MEDICINE

## 2018-12-13 RX ORDER — VALACYCLOVIR HYDROCHLORIDE 500 MG/1
TABLET, FILM COATED ORAL
COMMUNITY
Start: 2018-11-17 | End: 2022-01-01 | Stop reason: ALTCHOICE

## 2018-12-13 RX ORDER — ALBUTEROL SULFATE 90 UG/1
2 AEROSOL, METERED RESPIRATORY (INHALATION) EVERY 4 HOURS PRN
COMMUNITY
Start: 2018-05-21

## 2018-12-13 RX ORDER — ALENDRONATE SODIUM 35 MG/1
TABLET ORAL
Refills: 98 | COMMUNITY
Start: 2018-12-11 | End: 2019-10-03

## 2018-12-13 ASSESSMENT — PAIN SCALES - GENERAL: PAINLEVEL: MILD PAIN (2)

## 2018-12-13 ASSESSMENT — MIFFLIN-ST. JEOR: SCORE: 1203.13

## 2018-12-13 NOTE — NURSING NOTE
Chief Complaint   Patient presents with     Blood Draw     No lab orders so vs taken by rn.       See above.  Pt checked in for provider appt.    Winter Ambriz RN

## 2018-12-13 NOTE — LETTER
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 Initial w/u identified involvement of multiple skeletal sites and bone marrow. She was initially followed without requiring treatment, but with evidence of waxing and waning disease on serial scans.  The only specific treatment she received for her lymphoma was for pain in the left hip with evidence of progressive skeletal disease on radiographic evaluation.  She received a total of 4140 cGy in 20 fractions to the left hip/proximal femur between 08/20/2012 and 09/20/2012 at [facility].  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 In July 2016 developed nausea of unknown etiology, workup negative for CNS involvement with MR and CSF analysis. PET scan on 8/18/16 with indication of \"progressive\" skeletal disease, but overall her disease has been variable.        Underwent a left total hip arthroplasty on 10/19/16 for the avascular necrosis and a right hip replacement on 05/10/2017 for degenerative disease.      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 This resulted in major improvement in her symptoms and her CRP decreased substantially - she has been very slowly tapering the drug and still remains on 7.5 - 8 mg.         On 06/11/2018, she had a squamous cell carcinoma removed from her left lower leg. Thereafter, she developed an ulcerative lesion that was diagnosed as pyoderma gangrenosum.  It  was treated with  Dapsone and topical tacrolimus topical.  The lesion was about 6 cm in size.      INTERVAL HISTORY:   Sadiq was last in clinic on 07/12/2018.   After that visit she had her most recent PET/CT scan on 08/04/2018, " which was compared to that of 08/2016.  Radiology commented on the waxing and waning course of the numerous hypermetabolic skeletal lesions.  Several lesions had diminished or disappeared and there were several new lesions along the spine, pelvis and left distal femur.  Some of these were associated with underlying sclerotic bone changes. There  were a few small hypermetabolic lymph nodes in both axillae and the right iliac chain.     The patient continues to have some pain over the greater trochanteric regions of both hips with associated tenderness. Also, osteoarthritis of right knee.  Other skeletal aches and pains are present, but none severely limiting.  No significant interval infections.  No fevers, night sweats or weight loss.  Generally doing well, but continues under considerable stress and thinking about cutting back on work.     Pyoderma granulosum has healed.      REVIEW OF SYSTEMS:  10-point review of systems is otherwise negative.  Polymyalgia rheumatica continues to be treated and she is cutting back very slowly on her dose, currently on approximately 7.5 mg daily.     MEDICATIONS:   1.  Albuterol inhaler.   2.  Vitamin D.   3.  Escitalopram.   4.  Fexofenadine.   5.  Acetaminophen p.r.n.   6.  Alendronate.     7.  Clobetasol ointment.   8.  Cyanocobalamin sublingual.     9.  Atrovent spray.   10.  Lamictal.   11.  Albuterol inhaler.   12.  Mometasone ointment.   13.  Omeprazole.   14.  Prednisone 7.5 mg daily.   15.  Triamcinolone cream.   16.  Valacyclovir p.r.n.      ALLERGIES:  IV contrast, diatrizoate, vortioxetine.      PHYSICAL EXAMINATION:     VITAL SIGNS:  Weight 63.9 kg (stable), blood pressure 105/54, pulse 81 and regular, respirations 16, temperature 98.7, O2 saturation 97% on room air.   HEENT:  Anicteric.  Oropharynx no masses.  Mucosa moist without lesion.   NECK:  No cervical or supraclavicular adenopathy.   CHEST:  Clear to auscultation.   AXILLAE:  Negative.   HEART:  Regular  rhythm.  No murmur or gallop.   ABDOMEN:  Soft.  Bowel sounds active.  No tenderness.  No organomegaly or mass.  No inguinal nodes.   EXTREMITIES:  No lower extremity edema.  No epitrochlear nodes.      LABORATORY:     Hemoglobin 12.8 grams percent with 7300 WBCs (70% neutrophils, 20% lymphocytes) and 227,000 platelets.   Electrolytes, calcium, creatinine (0.76):  Normal.   Liver enzymes, including serum LDH:  Normal.   Uric acid:  5.4.     Serum protein electrophoresis:  Albumin 4.2, gamma fraction 0.6 (borderline low), no monoclonal peak.      ASSESSMENT AND PLAN:  Stage YOUSUF follicular lymphoma.  The patient is now approaching 10-1/2 years following diagnosis.  Her lymphoma is not an apparent problem at present.  It has been very difficult to gauge the status of the lymphoma given the waxing and waning skeletal findings and limited adenopathy.  However, none of current skeletal sites of discomfort can be related directly to lymphomatous involvement as delineated by the PET scan.  Other explanations for the musculoskeletal pains have been confirmed in the past.     Polymalgia rheumatica - continues on very slow prednisone taper.    Pyoderma granulosum - resolved.     Return in 3-months with laboratory studies.  Appropriate interval for next imaging to be determined by clinical events.    Jean-Claude Blanton MD  Professor of Medicine  Oncology  [facility]  Office: [phone]  Clinic Fax: [phone]  .        cc:   MD Wade Banda MD Robert Tuttle, MD Bruce A. Peterson, MD

## 2018-12-13 NOTE — NURSING NOTE
"Oncology Rooming Note    December 13, 2018 2:44 PM   Lilly Babcock is a 68 year old female who presents for:    Chief Complaint   Patient presents with     Blood Draw     No lab orders so vs taken by rn.       Oncology Clinic Visit     F/U NHL     Initial Vitals: /54 (BP Location: Right arm, Patient Position: Sitting, Cuff Size: Adult Regular)   Pulse 81   Temp 98.7  F (37.1  C) (Oral)   Resp 16   Ht 1.712 m (5' 7.4\")   Wt 63.9 kg (140 lb 14.4 oz)   SpO2 97%   BMI 21.81 kg/m   Estimated body mass index is 21.81 kg/m  as calculated from the following:    Height as of this encounter: 1.712 m (5' 7.4\").    Weight as of this encounter: 63.9 kg (140 lb 14.4 oz). Body surface area is 1.74 meters squared.  Mild Pain (2) Comment: Data Unavailable   No LMP recorded. Patient is postmenopausal.  Allergies reviewed: Yes  Medications reviewed: Yes    Medications: Medication refills not needed today.  Pharmacy name entered into Red[employer]:    [facility] #[id number] - [place], [place] - [address] - [place], [place] - [address] [facility]Y #[id number] - [place], [place] - [address]    Clinical concerns: Yes, Patient complains of bilateral hip pain. Dr Blanton was notified.    10 minutes for nursing intake (face to face time)     KRISTIN GARCIA LPN            "

## 2018-12-13 NOTE — PROGRESS NOTES
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 Initial w/u identified involvement of multiple skeletal sites and bone marrow. She was initially followed without requiring treatment, but with evidence of waxing and waning disease on serial scans.  The only specific treatment she received for her lymphoma was for pain in the left hip with evidence of progressive skeletal disease on radiographic evaluation.  She received a total of 4140 cGy in 20 fractions to the left hip/proximal femur between 08/20/2012 and 09/20/2012 at [facility].  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 In July 2016 developed nausea of unknown etiology, workup negative for CNS involvement with MR and CSF analysis. PET scan on 8/18/16 with indication of \"progressive\" skeletal disease, but overall her disease has been variable.        Underwent a left total hip arthroplasty on 10/19/16 for the avascular necrosis and a right hip replacement on 05/10/2017 for degenerative disease.      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 This resulted in major improvement in her symptoms and her CRP decreased substantially - she has been very slowly tapering the drug and still remains on 7.5 - 8 mg.         On 06/11/2018, she had a squamous cell carcinoma removed from her left lower leg. Thereafter, she developed an ulcerative lesion that was diagnosed as pyoderma gangrenosum.  It was treated with Dapsone and topical tacrolimus topical.  The lesion was about 6 cm in size.      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   After that visit she had her most recent PET/CT scan on 08/04/2018, which was compared to that of 08/2016.  Radiology commented on the waxing and waning " course of the numerous hypermetabolic skeletal lesions.  Several lesions had diminished or disappeared and there were several new lesions along the spine, pelvis and left distal femur.  Some of these were associated with underlying sclerotic bone changes. There  were a few small hypermetabolic lymph nodes in both axillae and the right iliac chain.     The patient continues to have some pain over the greater trochanteric regions of both hips with associated tenderness. Also, osteoarthritis of right knee.  Other skeletal aches and pains are present, but none severely limiting.  No significant interval infections.  No fevers, night sweats or weight loss.  Generally doing well, but continues under considerable stress and thinking about cutting back on work.     Pyoderma granulosum has healed.      REVIEW OF SYSTEMS:  10-point review of systems is otherwise negative.  Polymyalgia rheumatica continues to be treated and she is cutting back very slowly on her dose, currently on approximately 7.5 mg daily.     MEDICATIONS:   1.  Albuterol inhaler.   2.  Vitamin D.   3.  Escitalopram.   4.  Fexofenadine.   5.  Acetaminophen p.r.n.   6.  Alendronate.     7.  Clobetasol ointment.   8.  Cyanocobalamin sublingual.     9.  Atrovent spray.   10.  Lamictal.   11.  Albuterol inhaler.   12.  Mometasone ointment.   13.  Omeprazole.   14.  Prednisone 7.5 mg daily.   15.  Triamcinolone cream.   16.  Valacyclovir p.r.n.      ALLERGIES:  IV contrast, diatrizoate, vortioxetine.      PHYSICAL EXAMINATION:     VITAL SIGNS:  Weight 63.9 kg (stable), blood pressure 105/54, pulse 81 and regular, respirations 16, temperature 98.7, O2 saturation 97% on room air.   HEENT:  Anicteric.  Oropharynx no masses.  Mucosa moist without lesion.   NECK:  No cervical or supraclavicular adenopathy.   CHEST:  Clear to auscultation.   AXILLAE:  Negative.   HEART:  Regular rhythm.  No murmur or gallop.   ABDOMEN:  Soft.  Bowel sounds active.  No tenderness.  No  organomegaly or mass.  No inguinal nodes.   EXTREMITIES:  No lower extremity edema.  No epitrochlear nodes.      LABORATORY:     Hemoglobin 12.8 grams percent with 7300 WBCs (70% neutrophils, 20% lymphocytes) and 227,000 platelets.   Electrolytes, calcium, creatinine (0.76):  Normal.   Liver enzymes, including serum LDH:  Normal.   Uric acid:  5.4.     Serum protein electrophoresis:  Albumin 4.2, gamma fraction 0.6 (borderline low), no monoclonal peak.      ASSESSMENT AND PLAN:  Stage YOUSUF follicular lymphoma.  The patient is now approaching 10-1/2 years following diagnosis.  Her lymphoma is not an apparent problem at present.  It has been very difficult to gauge the status of the lymphoma given the waxing and waning skeletal findings and limited adenopathy.  However, none of current skeletal sites of discomfort can be related directly to lymphomatous involvement as delineated by the PET scan.  Other explanations for the musculoskeletal pains have been confirmed in the past.     Polymalgia rheumatica - continues on very slow prednisone taper.    Pyoderma granulosum - resolved.     Return in 3-months with laboratory studies.  Appropriate interval for next imaging to be determined by clinical events.    Jean-Claude Blanton MD  Professor of Medicine  Oncology  [facility]  Office: [phone]  Clinic Fax: [phone]  .        cc:   MD Wade Banda MD Robert Tuttle, MD

## 2018-12-14 LAB
ALBUMIN SERPL ELPH-MCNC: 4.2 G/DL (ref 3.7–5.1)
ALPHA1 GLOB SERPL ELPH-MCNC: 0.2 G/DL (ref 0.2–0.4)
ALPHA2 GLOB SERPL ELPH-MCNC: 0.7 G/DL (ref 0.5–0.9)
B-GLOBULIN SERPL ELPH-MCNC: 0.7 G/DL (ref 0.6–1)
GAMMA GLOB SERPL ELPH-MCNC: 0.6 G/DL (ref 0.7–1.6)
M PROTEIN SERPL ELPH-MCNC: 0 G/DL
PROT PATTERN SERPL ELPH-IMP: ABNORMAL

## 2019-02-04 ENCOUNTER — TRANSFERRED RECORDS (OUTPATIENT)
Dept: HEALTH INFORMATION MANAGEMENT | Facility: CLINIC | Age: 69
End: 2019-02-04

## 2019-03-07 ENCOUNTER — ONCOLOGY VISIT (OUTPATIENT)
Dept: ONCOLOGY | Facility: CLINIC | Age: 69
End: 2019-03-07
Attending: INTERNAL MEDICINE
Payer: COMMERCIAL

## 2019-03-07 VITALS
HEART RATE: 68 BPM | OXYGEN SATURATION: 99 % | TEMPERATURE: 97.9 F | SYSTOLIC BLOOD PRESSURE: 107 MMHG | BODY MASS INDEX: 22.05 KG/M2 | WEIGHT: 142.5 LBS | DIASTOLIC BLOOD PRESSURE: 51 MMHG

## 2019-03-07 DIAGNOSIS — C82.99 NODULAR LYMPHOMA OF EXTRANODAL AND/OR SOLID ORGAN SITE (H): Primary | ICD-10-CM

## 2019-03-07 DIAGNOSIS — C82.90 FOLLICULAR NON-HODGKIN'S LYMPHOMA (H): ICD-10-CM

## 2019-03-07 LAB
ALBUMIN SERPL-MCNC: 3.6 G/DL (ref 3.4–5)
ALP SERPL-CCNC: 69 U/L (ref 40–150)
ALT SERPL W P-5'-P-CCNC: 25 U/L (ref 0–50)
ANION GAP SERPL CALCULATED.3IONS-SCNC: 4 MMOL/L (ref 3–14)
AST SERPL W P-5'-P-CCNC: 26 U/L (ref 0–45)
BASOPHILS # BLD AUTO: 0.1 10E9/L (ref 0–0.2)
BASOPHILS NFR BLD AUTO: 0.7 %
BILIRUB SERPL-MCNC: 0.5 MG/DL (ref 0.2–1.3)
BUN SERPL-MCNC: 14 MG/DL (ref 7–30)
CALCIUM SERPL-MCNC: 8.8 MG/DL (ref 8.5–10.1)
CHLORIDE SERPL-SCNC: 100 MMOL/L (ref 94–109)
CO2 SERPL-SCNC: 29 MMOL/L (ref 20–32)
CREAT SERPL-MCNC: 0.72 MG/DL (ref 0.52–1.04)
DIFFERENTIAL METHOD BLD: NORMAL
EOSINOPHIL # BLD AUTO: 0.4 10E9/L (ref 0–0.7)
EOSINOPHIL NFR BLD AUTO: 4.7 %
ERYTHROCYTE [DISTWIDTH] IN BLOOD BY AUTOMATED COUNT: 13.2 % (ref 10–15)
GFR SERPL CREATININE-BSD FRML MDRD: 85 ML/MIN/{1.73_M2}
GLUCOSE SERPL-MCNC: 87 MG/DL (ref 70–99)
HCT VFR BLD AUTO: 39.2 % (ref 35–47)
HGB BLD-MCNC: 12.8 G/DL (ref 11.7–15.7)
IMM GRANULOCYTES # BLD: 0 10E9/L (ref 0–0.4)
IMM GRANULOCYTES NFR BLD: 0.3 %
LDH SERPL L TO P-CCNC: 206 U/L (ref 81–234)
LYMPHOCYTES # BLD AUTO: 1.1 10E9/L (ref 0.8–5.3)
LYMPHOCYTES NFR BLD AUTO: 14.9 %
MCH RBC QN AUTO: 31.5 PG (ref 26.5–33)
MCHC RBC AUTO-ENTMCNC: 32.7 G/DL (ref 31.5–36.5)
MCV RBC AUTO: 97 FL (ref 78–100)
MONOCYTES # BLD AUTO: 0.6 10E9/L (ref 0–1.3)
MONOCYTES NFR BLD AUTO: 8.4 %
NEUTROPHILS # BLD AUTO: 5.4 10E9/L (ref 1.6–8.3)
NEUTROPHILS NFR BLD AUTO: 71 %
NRBC # BLD AUTO: 0 10*3/UL
NRBC BLD AUTO-RTO: 0 /100
PLATELET # BLD AUTO: 229 10E9/L (ref 150–450)
POTASSIUM SERPL-SCNC: 3.9 MMOL/L (ref 3.4–5.3)
PROT SERPL-MCNC: 6.9 G/DL (ref 6.8–8.8)
RBC # BLD AUTO: 4.06 10E12/L (ref 3.8–5.2)
SODIUM SERPL-SCNC: 134 MMOL/L (ref 133–144)
WBC # BLD AUTO: 7.6 10E9/L (ref 4–11)

## 2019-03-07 PROCEDURE — 85025 COMPLETE CBC W/AUTO DIFF WBC: CPT | Performed by: INTERNAL MEDICINE

## 2019-03-07 PROCEDURE — G0463 HOSPITAL OUTPT CLINIC VISIT: HCPCS

## 2019-03-07 PROCEDURE — 80053 COMPREHEN METABOLIC PANEL: CPT | Performed by: INTERNAL MEDICINE

## 2019-03-07 PROCEDURE — 36415 COLL VENOUS BLD VENIPUNCTURE: CPT

## 2019-03-07 PROCEDURE — 83615 LACTATE (LD) (LDH) ENZYME: CPT | Performed by: INTERNAL MEDICINE

## 2019-03-07 PROCEDURE — 99214 OFFICE O/P EST MOD 30 MIN: CPT | Mod: ZP | Performed by: INTERNAL MEDICINE

## 2019-03-07 ASSESSMENT — PAIN SCALES - GENERAL: PAINLEVEL: NO PAIN (0)

## 2019-03-07 NOTE — LETTER
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 Initial w/u identified involvement of multiple skeletal sites and the bone marrow. She was initially followed without requiring treatment, but with evidence of waxing and waning disease on serial scans.  The only treatment specifically directed at the lymphoma she received was for pain in the left hip with evidence of progressive skeletal disease on radiographic evaluation.  She received a total of 4140 cGy in 20 fractions to the left hip/proximal femur between 08/20/2012 and 09/20/2012 at [facility].  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     In July 2016 developed nausea of unknown etiology, workup negative for CNS involvement with MR and CSF analysis. PET scan on 8/18/16 suggested \"progressive\" skeletal disease, but overall her disease had been variable.        Left total hip arthroplasty on 10/19/16 for the avascular necrosis and a right hip replacement on 05/10/2017 for degenerative disease.      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 This resulted in major improvement in her symptoms and her CRP decreased substantially - she has been trying tov very slowly taper off.         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 Thereafter, she developed an ulcerative lesion that was diagnosed as pyoderma gangrenosum.  It was treated with Dapsone and topical tacrolimus topical.  The lesion, which was about 6 cm in size, responded.      Her most recent PET/CT scan was on 08/04/2018 and was compared to that of 08/2016.  Radiology commented on the waxing and waning course " of the numerous hypermetabolic skeletal lesions.  Several lesions had diminished or disappeared and there were several new lesions along the spine, pelvis and left distal femur.  Some of these were associated with underlying sclerotic bone changes. There  were a few small hypermetabolic lymph nodes in both axillae and the right iliac chain.      INTERVAL HISTORY:  I last saw Dr. Babcock on 12/13/2018.  She states that she has done very well since that visit and returns today for a scheduled follow-up.  No major concerns regarding the lymphoma.  She has no new areas of discomfort or severe issues with pain.  In general, she is doing well, but regrets she cannot ski. Major difficulty is with depression.  She is scheduled to start transcranial magnetic stimulation on 04/01, and is hopeful that this will work well for her.      Otherwise, polymyalgia appears to be under good control as does pain in general.  No interval infections.  No identifiable adenopathy or other suggestions to Dr. Babcock of progressive lymphoma.      The patient is considering cutting back on her clinical load.      REVIEW OF SYSTEMS:  Otherwise negative.      MEDICATIONS:   1.  Acetaminophen.   2.  Albuterol inhaler.   3.  Alendronate  4.  Amoxicillin (prior to dental procedures).   5.  Vitamin D.   6.  Clobetasol ointment.   7.  Cyanocobalamin sublingual.   8.  Escitalopram.    9.  Fexofenadine.     10.  Atrovent nasal spray.   11.  Ketoconazole shampoo.   12.  Lamictal.   13.  Levalbuterol inhaler.   14.  Mometasone ointment.   15.  Omeprazole p.r.n.   16.  Prednisone 7 mg daily.   17.  Triamcinolone cream.   18.  Valacyclovir p.r.n. for herpes outbreak.      ALLERGIES:  IV contrast, loxalate, vortioxetine, aspirin, liquid adhesive.      PHYSICAL EXAMINATION:     VITAL SIGNS:  Weight 64.6 kg (stable), blood pressure 107/51, pulse 68 and regular, respirations 16-18, temperature 97.9.  O2 saturation:  99% on room air.   HEENT:  No icterus or  conjunctival injection.  Oropharynx:  No masses.  No erythema.  Mucosa:  Moist without plaque or ulceration.   NECK:  No discernible cervical or supraclavicular nodes.     CHEST:  Clear to auscultation.     AXILLAE:  No nodes.   HEART:  Regular rhythm.  No murmur.   ABDOMEN:  Soft.  No detectable organomegaly.  No masses.  No tenderness.  Bowel sounds present.  No inguinal/femoral nodes.   EXTREMITIES:  No epitrochlear nodes.   MUSCULOSKELETAL: No tenderness to light precussion over spine, hips.     LABORATORY:     Hemoglobin 12.8 g% with 7600 WBCs (71% neutrophils, 15% lymphocytes) and 229,000 platelets.   Electrolytes, calcium, creatinine (0.72):  Normal.   Liver enzymes, including serum LDH:  Normal.      ASSESSMENT AND PLAN:  Stage IV follicular lymphoma:  The patient is now nearly 11 years from diagnosis and 6 1/2 years from irradiation to left hip.  Although there has been radiologic evidence for waxing and waning disease, especially skeletal, she does not appear to be symptomatic at present with none of the known skeletal sites causing direct discomfort.      Shingrix and bone health have been discussed with primary physician.     Return to clinic in 4 months with laboratory studies.  New imaging to be determined by new clinical findings as appropriate. PET scanning has been most informative.    Dr. Babcock has been aware of my planned departure from the [facility] this spring. I will remain her Oncologist through the end of March 2019. We discussed plans for the subsequent transition of her oncology care to another provider.    Jean-Claude Blanton MD  Professor of Medicine  Oncology  [facility]  Office: [phone]  Clinic Fax: [phone]     cc:   MD Wade Banda MD Robert Tuttle, MD                .    Jean-Claude Blanton MD

## 2019-03-07 NOTE — PROGRESS NOTES
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 Initial w/u identified involvement of multiple skeletal sites and the bone marrow. She was initially followed without requiring treatment, but with evidence of waxing and waning disease on serial scans.  The only treatment specifically directed at the lymphoma she received was for pain in the left hip with evidence of progressive skeletal disease on radiographic evaluation.  She received a total of 4140 cGy in 20 fractions to the left hip/proximal femur between 08/20/2012 and 09/20/2012 at [facility].  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     In July 2016 developed nausea of unknown etiology, workup negative for CNS involvement with MR and CSF analysis. PET scan on 8/18/16 suggested \"progressive\" skeletal disease, but overall her disease had been variable.        Left total hip arthroplasty on 10/19/16 for the avascular necrosis and a right hip replacement on 05/10/2017 for degenerative disease.       Sadiq was diagnosed with polymyalgia rheumatica in 01/2017 and placed on prednisone. This resulted in major improvement in her symptoms and her CRP decreased substantially - she has been trying tov very slowly taper off.         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 Thereafter, she developed an ulcerative lesion that was diagnosed as pyoderma gangrenosum.  It was treated with Dapsone and topical tacrolimus topical.  The lesion, which was about 6 cm in size, responded.      Her most recent PET/CT scan was on 08/04/2018 and was compared to that of 08/2016.  Radiology commented on the waxing and waning course of the numerous hypermetabolic skeletal lesions.  Several lesions had " diminished or disappeared and there were several new lesions along the spine, pelvis and left distal femur.  Some of these were associated with underlying sclerotic bone changes. There  were a few small hypermetabolic lymph nodes in both axillae and the right iliac chain.      INTERVAL HISTORY:  I last saw Dr. Babcock on 12/13/2018.  She states that she has done very well since that visit and returns today for a scheduled follow-up.  No major concerns regarding the lymphoma.  She has no new areas of discomfort or severe issues with pain.  In general, she is doing well, but regrets she cannot ski. Major difficulty is with depression.  She is scheduled to start transcranial magnetic stimulation on 04/01, and is hopeful that this will work well for her.      Otherwise, polymyalgia appears to be under good control as does pain in general.  No interval infections.  No identifiable adenopathy or other suggestions to Dr. Babcock of progressive lymphoma.      The patient is considering cutting back on her clinical load.      REVIEW OF SYSTEMS:  Otherwise negative.      MEDICATIONS:   1.  Acetaminophen.   2.  Albuterol inhaler.   3.  Alendronate  4.  Amoxicillin (prior to dental procedures).   5.  Vitamin D.   6.  Clobetasol ointment.   7.  Cyanocobalamin sublingual.   8.  Escitalopram.    9.  Fexofenadine.     10.  Atrovent nasal spray.   11.  Ketoconazole shampoo.   12.  Lamictal.   13.  Levalbuterol inhaler.   14.  Mometasone ointment.   15.  Omeprazole p.r.n.   16.  Prednisone 7 mg daily.   17.  Triamcinolone cream.   18.  Valacyclovir p.r.n. for herpes outbreak.      ALLERGIES:  IV contrast, loxalate, vortioxetine, aspirin, liquid adhesive.      PHYSICAL EXAMINATION:     VITAL SIGNS:  Weight 64.6 kg (stable), blood pressure 107/51, pulse 68 and regular, respirations 16-18, temperature 97.9.  O2 saturation:  99% on room air.   HEENT:  No icterus or conjunctival injection.  Oropharynx:  No masses.  No erythema.  Mucosa:   Moist without plaque or ulceration.   NECK:  No discernible cervical or supraclavicular nodes.     CHEST:  Clear to auscultation.     AXILLAE:  No nodes.   HEART:  Regular rhythm.  No murmur.   ABDOMEN:  Soft.  No detectable organomegaly.  No masses.  No tenderness.  Bowel sounds present.  No inguinal/femoral nodes.   EXTREMITIES:  No epitrochlear nodes.   MUSCULOSKELETAL: No tenderness to light precussion over spine, hips.     LABORATORY:     Hemoglobin 12.8 g% with 7600 WBCs (71% neutrophils, 15% lymphocytes) and 229,000 platelets.   Electrolytes, calcium, creatinine (0.72):  Normal.   Liver enzymes, including serum LDH:  Normal.      ASSESSMENT AND PLAN:  Stage IV follicular lymphoma:  The patient is now nearly 11 years from diagnosis and 6 1/2 years from irradiation to left hip.  Although there has been radiologic evidence for waxing and waning disease, especially skeletal, she does not appear to be symptomatic at present with none of the known skeletal sites causing direct discomfort.      Shingrix and bone health have been discussed with primary physician.     Return to clinic in 4 months with laboratory studies.  New imaging to be determined by new clinical findings as appropriate. PET scanning has been most informative.    Dr. Babcock has been aware of my planned departure from the [facility] this spring. I will remain her Oncologist through the end of March 2019. We discussed plans for the subsequent transition of her oncology care to another provider.    Jean-Claude Blanton MD  Professor of Medicine  Oncology  [facility]  Office: [phone]  Clinic Fax: [phone]     cc:   MD Wade Banda MD Robert Tuttle, MD

## 2019-03-07 NOTE — NURSING NOTE
"Oncology Rooming Note    March 7, 2019 10:26 AM   Lilly Babcock is a 68 year old female who presents for:    Chief Complaint   Patient presents with     Blood Draw     vitals and blood drawn by LPN. Pt checked into appt.      Oncology Clinic Visit     NHL , Labs      Initial Vitals: /51   Pulse 68   Temp 97.9  F (36.6  C) (Oral)   Wt 64.6 kg (142 lb 8 oz)   SpO2 99%   BMI 22.05 kg/m   Estimated body mass index is 22.05 kg/m  as calculated from the following:    Height as of 12/13/18: 1.712 m (5' 7.4\").    Weight as of this encounter: 64.6 kg (142 lb 8 oz). Body surface area is 1.75 meters squared.  No Pain (0) Comment: Data Unavailable   No LMP recorded. Patient is postmenopausal.  Allergies reviewed: Yes  Medications reviewed: Yes    Medications: Medication refills not needed today.  Pharmacy name entered into [employer]:    [id number] - [place], [place] - [address] [facility] - [place], [place] - [address] [id number] - [place], [place] - [address]    Clinical concerns: no concerns  Harvinder was notified.      Selene Siddiqui MA              "

## 2019-04-29 ENCOUNTER — OFFICE VISIT (OUTPATIENT)
Dept: PSYCHIATRY | Facility: CLINIC | Age: 69
End: 2019-04-29
Payer: MEDICARE

## 2019-04-29 VITALS
HEART RATE: 83 BPM | WEIGHT: 137.6 LBS | DIASTOLIC BLOOD PRESSURE: 53 MMHG | SYSTOLIC BLOOD PRESSURE: 106 MMHG | BODY MASS INDEX: 21.29 KG/M2

## 2019-04-29 DIAGNOSIS — Z53.9 ERRONEOUS ENCOUNTER--DISREGARD: Primary | ICD-10-CM

## 2019-04-29 RX ORDER — LITHIUM CARBONATE 300 MG/1
300 CAPSULE ORAL 2 TIMES DAILY
COMMUNITY
Start: 2019-04-18 | End: 2019-10-03

## 2019-04-29 ASSESSMENT — PAIN SCALES - GENERAL: PAINLEVEL: MILD PAIN (3)

## 2019-04-29 ASSESSMENT — PATIENT HEALTH QUESTIONNAIRE - PHQ9: SUM OF ALL RESPONSES TO PHQ QUESTIONS 1-9: 14

## 2019-04-29 NOTE — PROGRESS NOTES
"  Treatment Resistant Depression Evaluation                                     Lilly Babcock is a 69 year old female who prefers the name Joyce and pronoun she, her, hers, herself.  Therapist: Salena Roa, PhD  PCP: Wade Gould  Other Providers: Dr. Reji Landry MD  Referred by Dr. Reji Landry for evaluation of possible bipolar disorder and depression.     History was provided by patient who was a fair historian.     Chief Complaint                                                                                                        \"I'd like to be less depressed.\"     History of Present Illness                                                                                4, 4   Psych critical item history includes mutiple psychotropic trials and possible childhood trauma.     Both the patient and her present psychiatrist Dr. Landry question whether or not she has bipolar II. The only possible hypomanic episode she can think of occurred four years ago when she decided to rearrange her garage during a period of feeling well for about a week. She was somewhat inspired by a contractor that was doing work on her house at the time and she thought to herself \"I could do that\". She was diagnosed bipolar II by Dr. Landry but previously did not carry this diagnosis, predominantly having depressive episodes throughout her life.     Patient describes herself as \"very depressed.\" She is not able to get her work done or pay her bills. She describes \"piles of papers\" that have gone unattended. She nearly had her wages garnished for a $90 parking ticket she neglected to pay. She doesn't process her mail when it comes in. She feels she is getting a lot more mail than she used to. She has a box specifically for \"return to sender\" that she has not addressed. She feels like while she has been depressed for years, this impairment in concentration developed over the past 6 months to 1 year. She feels unmotivated, finds " it difficult to get up to make her food for example. She doesn't walk the dog like she should.     Patient reports she still usually agrees to go out when invited by friends and family. She will once in a while initiate activity herself. She enjoys rock concerts and is a member of the [employer]. She states that while she does these activities, they don't sustain her. 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 It has been years since she felt well. She did experience a period of wellness while taking lamotrigine 2 years ago but experienced gait/stability side effects when dose was increased from 100mg to 200mg. It was stopped due to this side effect rather than discontinued. She has never had GeneSight testing.    Patient sees a therapist that has been encouraging her to retire. She acknowledges that one of the stressors impacting her right now is that even though she is working 50%, she has to work a lot more than she ever did in the past. She has used a light box in the past until it broke; she feels there may be some element of SAD. There is no history of worsening/development of symptoms associated with menstrual symptoms or menopause. For recreation she reads the paper, takes naps, and watches Saturday Night Live. She does Pilates every week as she had for the past ten years.    Recent Symptoms:   Depression:  depressed mood, anhedonia, low energy, poor concentration /memory and overwhelmed  Elevated:  none  Psychosis:  none  Anxiety:  nervous/overwhelmed  Panic Attack:  none  Trauma Related:  none       Recent Substance Use:  Alcohol- yes, social drinker, no CD treatment history      Substance Use History     Social alcohol drinker as above, no CD treatment     Psychiatric History     Suicidal ideation- None   Suicide Attempt: None   Most Recent- N/A  SIB- None   Yuli- Possible hypomania in past    Psychosis- None    Violence/Aggression- None  "  Psych Hosp- None   ECT- None   Eating Disorder- None   Outpatient Programs [ DBT, Day Treatment, Eating Disorder Tx etc]- None        Psychiatric Medication Trials     Based on both chart review and patient interview:    Fluoxetine: First tried 1985, felt better for 1-2 years  Sertraline: helpful for depression, but felt \"flat\"  Venlafaxine: Did not like effect, was on it for a brief period  Paxil: Not effective  Citalopram \"Helpful\", used for a few years  Wellbutrin: \"Made me feel crummy\"  Lexapro: Helpful, 20mg no better than 10mg  Lithium: Recently started, notices fine tremor that doesn't impact her in a meaningful way as well as some nausea  Lamotrigine: Experienced period of wellness approximately 2 years ago  Ritalin: \"Made me drink less diet coke\"    No history of MAO Inhibitor, Trileptal     Social/ Family History               [per patient report]                                                 1ea, 1ea     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 , has two adopted adult children, 24 year old girl and 27 year old son. Her father was \"very difficult\" with her. She has a house in [place] where she lives alone.    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)     ACP (advance care planning)     Major depressive disorder, recurrent episode, moderate (H)     Occupational problem     Counseling for parent-child problem     Dysphagia     Knee osteoarthritis     Long-term (current) use of anticoagulants [Z79.01]     Aftercare following joint replacement [Z47.1]     Abnormal magnetic resonance imaging of cervical spine     Actinic keratosis     Duplay's periarthritis syndrome     Atopic " rhinitis     Exercise-induced bronchospasm     Attention deficit disorder     Avascular necrosis of bone of hip (H)     Avascular necrosis of bone (H)     Arthralgia of hip     Sensorineural hearing loss (SNHL) of both ears     Atherosclerosis of coronary artery     Cellulitis of lower extremity     Other specified health status     Dyspareunia (CODE)     Unspecified dyspareunia     Dysthymia     Follicular non-Hodgkin's lymphoma (H)     History of total hip replacement     Menopausal symptom     Hyperlipidemia     Impingement syndrome of shoulder region     Shoulder pain     Uterine leiomyoma     Long term current use of anticoagulant therapy     Diffuse non-Hodgkin's lymphoblastic lymphoma (H)     Migraine     Migraine with aura     Mild intermittent asthma     Mixed anxiety depressive disorder     Other acquired deformity of head     Osteoarthritis of hip     History of malignant neoplasm of skin     Encounter for general adult medical examination without abnormal findings     Seasonal affective disorder (H)     Trochanteric bursitis of left hip     Disorder of shoulder     Trochanteric bursitis     Synovial cyst of popliteal space       Past Surgical History:   Procedure Laterality Date     ARTHROPLASTY HIP Left 10/18/2016    Procedure: ARTHROPLASTY HIP;  Surgeon: Chepe Peterson MD;  Location: [facility]     BIOPSY BONE CLAVICLE  3/1/2012    Procedure:BIOPSY BONE CLAVICLE; Biopsy Left Clavicle, Bilateral Hip Injections; Surgeon:JENNY BELTRAN; Location:[facility] OR     BREAST BIOPSY, RT/LT      left     DILATION AND CURETTAGE       HAND SURGERY      right flexor tendon laceration repair     INJECT STEROID (LOCATION)  3/1/2012    Procedure:INJECT STEROID (LOCATION); Surgeon:JENNY BELTRAN; Location:[facility] OR     LAPAROSCOPY DIAGNOSTIC (GYN)           Medical Review of Systems                                                                                                    2, 10     A comprehensive review of  systems was performed and is negative other than noted in the HPI.     Allergy   Diagnostic x-ray materials; Diatrizoate; Dye [contrast dye]; Ioxaglate; Vortioxetine; Aspirin; and Liquid adhesive   Current Medications     Current Outpatient Medications   Medication Sig Dispense Refill     acetaminophen (TYLENOL) 500 MG tablet Take 500-1,000 mg by mouth       albuterol (PROAIR HFA/PROVENTIL HFA/VENTOLIN HFA) 108 (90 Base) MCG/ACT inhaler Inhale 2 puffs into the lungs       alendronate (FOSAMAX) 35 MG tablet TAKE 1 TABLET BY MOUTH WEEKLY  98     amoxicillin (AMOXIL) 500 MG capsule TAKE 4 CAPS BY MOUTH ONE HOUR PRIOR TO DENTAL PROCEDURES/CLEANING  3     Cholecalciferol (VITAMIN D) 1000 UNIT capsule Take 1 capsule by mouth daily.       clobetasol (TEMOVATE) 0.05 % ointment Reported on 3/9/2017       cyanocobalamin 1000 MCG SUBL sublingual tablet        escitalopram (LEXAPRO) 20 MG tablet Take 20 mg by mouth daily  5     fexofenadine (ALLEGRA) 180 MG tablet Take  by mouth daily.       ipratropium (ATROVENT) 0.06 % spray Spray 2 sprays in nostril       ketoconazole (NIZORAL) 2 % shampoo        lamoTRIgine (LAMICTAL) 25 MG TBDP ODT tab Take 200 mg by mouth daily  30 tablet 0     levalbuterol (XOPENEX HFA) 45 MCG/ACT Inhaler Inhale 1-2 puffs into the lungs       methylPREDNISolone (MEDROL) 32 MG tablet Take one tablet (32mg) by mouth 12 hours prior to scheduled CT scan.  Repeat above dose 2 hours prior to CT scan (Patient not taking: Reported on 12/13/2018) 2 tablet 1     Misc. Devices (WALKER AUTO GLIDES) MISC Rolling Walker for home use.       mometasone (ELOCON) 0.1 % ointment Reported on 3/9/2017       omeprazole (PRILOSEC) 10 MG CR capsule Take 20 mg by mouth       predniSONE (DELTASONE) 5 MG tablet Take 7 mg by mouth daily .  0     triamcinolone (KENALOG) 0.1 % cream Apply 0.5 inches topically 2 times daily Reported on 3/9/2017       valACYclovir (VALTREX) 1000 mg tablet One tab bid for 3 days prn outbreaks   Indications: PN:       valACYclovir (VALTREX) 500 MG tablet         Vitals                                                                                                                        3, 3     /53   Pulse 83   Wt 62.4 kg (137 lb 9.6 oz)   Breastfeeding? No   BMI 21.29 kg/m        Mental Status Exam                                                                                   9, 14 cog gs     Alertness: alert  and oriented  Appearance: well groomed  Behavior/Demeanor: cooperative, pleasant and calm, with good  eye contact   Speech: normal and regular rate and rhythm  Language: intact and no problems  Psychomotor: normal or unremarkable  Mood: depressed  Affect: full range; was not congruent to mood; was not congruent to content  Thought Process/Associations: unremarkable  Thought Content:  Reports none;  Denies suicidal ideation, violent ideation and obsessions   Perception:  Reports none;  Denies auditory hallucinations and visual hallucinations  Insight: good  Judgment: good  Cognition: (6) does  appear grossly intact; formal cognitive testing was not done  Gait and Station: unremarkable     Labs and Data     PHQ9 Today:  14  PHQ-9 SCORE 6/16/2016 4/29/2019   PHQ-9 Total Score MyChart 0 -   PHQ-9 Total Score - 14   Some encounter information is confidential and restricted. Go to Review Flowsheets activity to see all data.     Recent Labs   Lab Test 03/07/19  1014 12/13/18  1535 07/12/18  1222   CR 0.72 0.76 0.74   GFRESTIMATED 85 75 78     Recent Labs   Lab Test 03/07/19  1014 12/13/18  1535   AST 26 28   ALT 25 28   ALKPHOS 69 69       Diagnosis and Assessment                                                                             m2, h3     Bipolar II Disorder    This is a 69 year old woman with history of bipolar II disorder who presents for evaluation of treatment resistant depression. Psychosocial stressors include increased work load at internal medicine clinic, ever-growing  backlog of unattended mail/bills, mild social withdrawal. No significant substance use history, self injurious behavior, or specific trauma. Mental status exam is notable for a rather pleasant woman who did not appear to have significant affective blunting or gross cognitive deficits. Symptomatically, her prominent concern appears to be related to concentration/attention with specific regard to her mail and bills, though it is interesting that she continues to work as a physician without clear impairment there. She has also had multiple neuropsychological evaluations that show no pathologic process impacting her cognition. While she reports loss of interest and motivation, she is still exercising regularly, accepting invitations from friends for social engagements. Given her family history of a sister with Bipolar I disorder and her own response to lamotrigine in the past as well as her report of an apparent hypomanic episode, bipolar II diagnosis appears to fit. At present her depression appears to be more mild in severity than it has been previously. She would likely benefit from behavioral activation therapy in addition to current medication regimen, which includes lithium.    Plan                                                                                                                     m2, h3     1)  Therapy- Referral for Behavioral Activation Therapy  Medications- Continue current medications  Labs- Lithium Level, BMP, TSH    CRISIS NUMBERS:   Provided routinely in AVS.    Treatment Risk Statement:  The patient understands the risks, benefits, adverse effects and alternatives. Agrees to treatment with the capacity to do so. No medical contraindications to treatment. Agrees to call clinic for any problems. The patient understands to call 911 or go to the nearest ED if life threatening or urgent symptoms occur.      PROVIDER:      I served as scribe for attending physician Dr. Abundio Ware.    Davin  MD Mukund PGY-2

## 2019-05-02 ENCOUNTER — TELEPHONE (OUTPATIENT)
Dept: PSYCHIATRY | Facility: CLINIC | Age: 69
End: 2019-05-02

## 2019-05-02 NOTE — TELEPHONE ENCOUNTER
-Writer called and let Lilly know that note from visit is not signed, so writer will follow up with Dr. Ware to have labs entered.      -Let her know that writer will follow up her once labs are entered.

## 2019-05-02 NOTE — TELEPHONE ENCOUNTER
----- Message from Sahnnan Ulrich sent at 5/2/2019  9:55 AM CDT -----  Regarding: soco  Caller: Lilly     Relationship to Patient: pt    Call Back Number: [phone]    Reason for Call: Pt states that Dr. Ware wanted her to get labs done as soon as she started a new med. Could you enter the orders?

## 2019-05-14 NOTE — TELEPHONE ENCOUNTER
-Writer called to update Lilly that she is still waiting to hear from Dr. Ware on lab orders.  She thanked writer for calling.

## 2019-05-21 NOTE — TELEPHONE ENCOUNTER
-Writer called and spoke with Lilly.  Passed along Dr. Ware's instructions.  She states she will look into TMS cost for her.  Transferred to her scheduling to make MTM appointment.

## 2019-05-21 NOTE — TELEPHONE ENCOUNTER
-Writer spoke with Dr. Ware regarding patient.  Per Dr. Waer patient is interested in TMS therefore will need to make an appointment with the pharmacologist.  Dr. Ware reports that he will review past records and enter labs for patient, states that she could just have these done when she comes in for the pharmacologist appointment.  States that the labs are not emergent.

## 2019-05-21 NOTE — TELEPHONE ENCOUNTER
-Writer called Lilly to discuss Dr. Ware's recommendations.  She reported that she was at work and asked that writer call back during the lunch hour.

## 2019-06-12 ENCOUNTER — OFFICE VISIT (OUTPATIENT)
Dept: PSYCHIATRY | Facility: CLINIC | Age: 69
End: 2019-06-12
Payer: MEDICARE

## 2019-06-12 VITALS
DIASTOLIC BLOOD PRESSURE: 70 MMHG | BODY MASS INDEX: 20.92 KG/M2 | WEIGHT: 135.2 LBS | HEART RATE: 71 BPM | SYSTOLIC BLOOD PRESSURE: 117 MMHG

## 2019-06-12 DIAGNOSIS — F33.1 MAJOR DEPRESSIVE DISORDER, RECURRENT EPISODE, MODERATE (H): Primary | ICD-10-CM

## 2019-06-12 ASSESSMENT — PAIN SCALES - GENERAL: PAINLEVEL: SEVERE PAIN (6)

## 2019-06-13 NOTE — PROGRESS NOTES
Service Date: 2019       PHYSICIAN TRD PROGRAM MEDICATION THERAPY MANAGEMENT       DICTATION IDENTIFIER:     NAME: Lilly Babcock   : 1950   DATE: 2019      Identifying Information and Introduction:    Joyce is a 69-year-old woman seen in clinic today for an  Physician TRD MT consultation.    She lives in [place], [place].    The patient is a new adult to the  Physician TRD program.    The patient's psychiatrist is Dr. Abundio Ware.        Chief Complaint:  Depression.          Reason for Consultation:  Rating medication trials for antidepressant failure and assessment of antidepressant drugs and their history.       Informants include the patient and her sister.        Time spent was 1 hour without the patient and 1 hour with the patient.      MEDICATION INFORMATION:   1.  Current Antidepressant Medications:      The patient is only on escitalopram/Lexapro 20 mg a.m.        Lithium 300 mg b.i.d. was discontinued on 2019.  The patient had a level of 0.9.  The patient ended up in the hospital because she was encephalopathic, which was shown on the EEG.  Also, the patient had an MRI to rule out stroke.        2.  Concomitant Medications:   a.  Alendronate/Fosamax 35 mg once a week, which was currently also discontinued.   b.  Fexofenadine 180 mg daily.     c.  Prednisone 6 mg per day.   d.  Naprosyn 500 mg at bedtime.  Since the EEG and hospitalization, the patient has some neck pain.   e.  Flexeril 5 mg at bedtime.   f.  Valacyclovir 1000 mg b.i.d. for 3 days p.r.n. whenever she has a herpes outbreak.     g.  Albuterol inhaler.      For more, see chart.        3.  Herbal Remedies:     a.  Vitamin D 2000 units per day.     b.  Cyanocobalamin 1000 mcg sublingual.  The patient currently is not taking.     c.  Vitamin B1.        4.  Currently Reported Side Effects:  Yes. According to patient , she was admitted on 19 due to slurred speech, and r/o TIA and was  diagnosed as being  encephalopathic in the hospital; therefore, lithium was discontinued.  (The patient was also drinking 3-4 beers a day at the same time.)        5.  Allergies:     a.  Vortioxetine, not a real allergy.  The patient was gagging and vomiting.     b.  Aspirin.   c.  Contrast dye   d.  Liquid adhesive.   e.  Ioxaglate.   f.  Diatrizoate.      PAST MEDICAL HISTORY   1.  Dysthymia for several years , developed into depression.     2.  History of lymphoma, chronic, now stable.   3.  Multiple orthopedic surgeries.      See chart for more.      DEPRESSION HISTORY:       1.  First time the patient experienced mood issues was in 1981 during her first divorce.  During that time, she was given Valium for approximately 2 weeks.        2.  Last episode started over the last 6 months to a year.  She experienced impairment in concentration.  The patient had mood issues since 2007 after her last divorce, where she was left with 2 ADHD kids, cancer, not a lot of money, and the circumstances caused her to be depressed.      SOCIAL and ENVIRONMENTAL ASPECTS:    Since her admit to the hospital for rule out TIA on 05/28/19, the patient's sister from [place] came, and patient  is currently not working.  Her sister stays until Tuesday, therefore her mood today is much better.  The patient also stopped consuming any beer or any alcohol and appears very frail today, but very coherent.  Otherwise, the patient lives alone.  Her adopted daughter lives in [facility], and her adopted son lives in [place].        PHARMACOTHERAPY INDICATORS:   A.  Pharmaceutical Aspects:  The patient has Medicare A-B since yesterday and still has her employer insurance.  Prescription drug co-payment depends on the drug.  Dr. Landry would always give her a lot of samples when he had them available.  Otherwise, cost of obtaining prescribed medication does not interfere with compliance as long as the expenses are not horrendous.        B.  The patient's pharmacy is [facility].   "Her psychiatrist is Dr. Landry, who calls in her medications.      C.  Compliance assessment shows that the patient is independent.  She is a fair historian.  She does use a pillbox, and she fills the pillbox for 2 weeks.  She misses medication rarely, but forgets often the evening dose because mostly she takes everything in the morning.  If her sister is not here, her daughter, Franci Arce, might be available to help her.      B.  Pharmacokinetic Aspects.     1.  Habits and Chemical Use   a.  Alcohol:  Yes.  Until 2 weeks ago, the patient would drink 2-3 beers a day until she was admitted on 05/28/2019 for aphasia and lithium was discontinued.    b.  Tobacco:  Never.   c.  Caffeine:  Yes.  The patient drinks 2 cups of coffee in the morning, and she will have 12 ounce Diet Cokes 4-5 a day, so she has probably approximately 600 mg of caffeine daily.   d)  Recreational drugs:  Never.      RATING OF ANTIDEPRESSANT DRUGS:     1.  Brintillex:  Max dose 20 mg per day.  Was used between 07/2015-11/2016.  Side effects were gagging and vomiting.  Would rate it a 4.       2.  Lexapro:  Max dose 20 mg per day from 11/2017 to present.  According to the patient, no change, if she was on 10 or 20 mg per day.  Would rate it a 4.       3.  Fluoxetine:  Max dose 40 mg per day.  Felt better for 1-2 years and was used on and off several times over the years.  It was started in 1987 and used frequent times.  Would rate it a 4.       4.  Sertraline:  Max dose 100 mg per day was helpful for depression, but the patient felt \"flat\" on it.  Was used for more than a year.  Would rate it a 3.     5.  Venlafaxine was used only for a short period, and the patient did not like how she was feeling.  She said, \"I felt flat.\"     6.  Paxil:  Max dose 30 mg per day.  No change.  Was used for maximum 6 months.  Would rate it a 4.     7.  Celexa:  Max dose 40 mg per day.  Was used for a few years.  Was helpful, but when the patient got older, it was " "changed to Lexapro in the hope to be able to decrease the dose.  Would rate it a 4.       8.  Wellbutrin was tried several times in the past.  It was chosen because her psychiatrist thought she might have some ADD.  The patient felt confused on it, and it made her feel crummy.  It appears her max dose might have been 300 mg per day.  Would rate it a 3.        AUGMENTATION THERAPY:       1.  Abilify:  04/2015-08/2015, 5 mg a day.  The patient was unable to tolerate it.  She was even more depressed.       2.  Latuda:  08/2015-03/2016, 40 mg a day.  The patient had confusion with it.       3.  Rexulti:  11/2018-02/2019, 2 mg per day.  Not helpful.     4.  Lamotrigine:  04/2016-12/2017.  Was actually helpful until the patient got to 150-200 mg per day.  On a higher dose, she got cognitive dulling, hand tremor and gait issues.  It does not appear that they decreased the lamotrigine down again to 100 mg per day, but discontinued it completely.       5.  Lithium was started 02/04/2019 and increased to 300 b.i.d.  According to the patient, she forgot sometimes the evening dose.  On 05/28/2019, she developed slurred speech, nausea and was brought to the hospital to [facility] to rule out a TIA.  The patient had an MRI and EEG, which showed she was encephalopathic, and lithium was discontinued.  The patient was also asked to stop her 3-4 beers, and she is off her beer, too.      BENZODIAZEPINES:  The patient used lorazepam in 2016, 0.5 mg p.r.n., and she used diazepam in early 1980s during her first divorce.        STIMULANTS:       1.  Ritalin:  According to the patient, \"It made me drink less Diet Coke.\"  Otherwise, it did not do anything for her.     2.  Strattera:  2018, 20 mg.  Was not helpful.        The patient tried gabapentin for pain after her orthopedic surgeries.      Patient might be interested in behavioral activation therapy with Estrella Blanton ,PhD.     The patient will contact Dr. Ware for further recommendation " for this patient.      Thank you for the opportunity to participate in the care of this patient.      Humera Pierre, Evin   Pharmaceutical Care Coordinator         HUMERA PIERRE, EVIN             D: 2019   T: 2019   MT: holly      Name:     RUBY ANG   MRN:      [id number]        Account:      [id number]   :      1950           Service Date: 2019      Document: [id number]

## 2019-07-10 ENCOUNTER — TELEPHONE (OUTPATIENT)
Dept: PSYCHIATRY | Facility: CLINIC | Age: 69
End: 2019-07-10

## 2019-07-10 NOTE — TELEPHONE ENCOUNTER
[facility] Call Center    Phone Message    May a detailed message be left on voicemail: yes    Reason for Call: Patient is wanting to know who is Dr. Ware referring her to see for Cognitive Behavorial Psychotherapy.     Action Taken: Other: Lilly Aguilar

## 2019-07-11 NOTE — TELEPHONE ENCOUNTER
-Writer returned call to patient.  Received voicemail.  Left message asking for a return call.  When patient calls back she can schedule an appointment with Estrella Blanton for BAT

## 2019-07-18 ENCOUNTER — OFFICE VISIT (OUTPATIENT)
Dept: PSYCHIATRY | Facility: CLINIC | Age: 69
End: 2019-07-18
Payer: MEDICARE

## 2019-07-18 DIAGNOSIS — F33.1 MAJOR DEPRESSIVE DISORDER, RECURRENT EPISODE, MODERATE (H): Primary | ICD-10-CM
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
## 2019-07-26 ENCOUNTER — OFFICE VISIT (OUTPATIENT)
Dept: PSYCHIATRY | Facility: CLINIC | Age: 69
End: 2019-07-26
Payer: MEDICARE

## 2019-07-26 DIAGNOSIS — F33.2 SEVERE EPISODE OF RECURRENT MAJOR DEPRESSIVE DISORDER, WITHOUT PSYCHOTIC FEATURES (H): Primary | ICD-10-CM

## 2019-07-26 NOTE — PROGRESS NOTES
"Progress Note  Behavioral Activation Psychotherapy           [facility]  [address], [address]  [place] [address]                       Name: Lilly Babcock  : 1950  MRN: [id number]  Age: 69 year old  Date: 2019  Duration: 45 minutes (start = 1:15 PM--patient arrived late; end = 2:00 PM)      Psychotherapy Session Content  Reviewed behavioral activation homework which she completed successfully including social activities, time with her dog, and completing her documentation at work. She also resumed patient care which has gone smoothly and bought a new house which reduces financial burden. We reviewed rationale and content of behavioral activation. Discussed behavioral activation plans for the upcoming month including work, social activities, and travel.    Current Symptoms  Current symptoms include intermittent self-criticism, guilt, concentration impairment, anxiety, sleep disturbance, and ruminative thoughts. Dr. Babcock describes her mood as \"excited\" and denies suicidal ideation.      Mental Status Exam  Appearance: appropriate  Attitude: cooperative  Behavior: normal  Eye Contact: normal  Speech: normal  Orientation: oriented  Mood: euthymic  Affect: mood congruient  Thought Process: clear  Suicidal Ideation: denies  Psychosis: not present      Current Diagnosis  Major depression, recurrent, severe, without psychotic features, in partial remission (F33.2)   R/O PTSD      Treatment Plan  Continue behavioral activation sessions. Follow-up sessions in August.     Estrella Blanton, Ph.D., L.P.    Department of Psychiatry      "

## 2019-08-23 ENCOUNTER — OFFICE VISIT (OUTPATIENT)
Dept: PSYCHIATRY | Facility: CLINIC | Age: 69
End: 2019-08-23
Payer: MEDICARE

## 2019-08-23 DIAGNOSIS — F33.1 MAJOR DEPRESSIVE DISORDER, RECURRENT EPISODE, MODERATE (H): Primary | ICD-10-CM

## 2019-08-30 ENCOUNTER — OFFICE VISIT (OUTPATIENT)
Dept: PSYCHIATRY | Facility: CLINIC | Age: 69
End: 2019-08-30
Payer: MEDICARE

## 2019-08-30 DIAGNOSIS — F33.1 MAJOR DEPRESSIVE DISORDER, RECURRENT EPISODE, MODERATE (H): Primary | ICD-10-CM

## 2019-09-05 NOTE — PROGRESS NOTES
"Progress Note  Behavioral Activation/ CBT Psychotherapy      Name: Lilly Babcock  : 1950  MRN: [id number]  Age: 69 year old  Date: 2019  Duration: 55 minutes (start = 1:05 PM; end = 2:00 PM)      Psychotherapy Session Content  Reviewed behavioral activation and her continued increase in activities and social engagements. Focused on challenges at work and the request that she resume her leave given some errors that she made in documentation. She has an assessment with an occupational psychologist through her employer in September. Dr. Babcock reports feeling \"relieved\" to be on leave from work although struggles with questions about her identity in the context of [facility]. We focused on behavioral and emotion-regulation strategies including planned activities.    Current Symptoms  Current symptoms include intermittent self-criticism, guilt, concentration impairment, anxiety, sleep disturbance, and ruminative thoughts .Denies suicidal ideation.      Mental Status Exam  Appearance: appropriate  Attitude: cooperative  Behavior: normal  Eye Contact: normal  Speech: normal  Orientation: oriented  Mood: anxious  Affect: mood congruient  Thought Process: clear  Suicidal Ideation: denies  Psychosis: not present      Current Diagnosis  Major depression, recurrent, severe, without psychotic features, in partial remission (F33.2)   R/O PTSD      Treatment Plan  Continue CBT/behavioral activation sessions. Follow-up session in two weeks.     Estrella Blanton, Ph.D., L.P.    Department of Psychiatry      "

## 2019-09-13 ENCOUNTER — OFFICE VISIT (OUTPATIENT)
Dept: PSYCHIATRY | Facility: CLINIC | Age: 69
End: 2019-09-13
Payer: MEDICARE

## 2019-09-13 DIAGNOSIS — F33.1 MAJOR DEPRESSIVE DISORDER, RECURRENT EPISODE, MODERATE (H): Primary | ICD-10-CM

## 2019-09-20 ENCOUNTER — BEH TREATMENT PLAN (OUTPATIENT)
Dept: PSYCHIATRY | Facility: CLINIC | Age: 69
End: 2019-09-20

## 2019-09-20 ENCOUNTER — OFFICE VISIT (OUTPATIENT)
Dept: PSYCHIATRY | Facility: CLINIC | Age: 69
End: 2019-09-20
Payer: MEDICARE

## 2019-09-20 DIAGNOSIS — F33.1 MAJOR DEPRESSIVE DISORDER, RECURRENT EPISODE, MODERATE (H): Primary | ICD-10-CM

## 2019-09-20 NOTE — PROGRESS NOTES
Progress Note  Behavioral Activation/ CBT Psychotherapy      Name: Lilly Babcock  : 1950  MRN: [id number]  Age: 69 year old  Date: 2019  Duration: 55 minutes (start = 1:05 PM; end = 2:00 PM)      Psychotherapy Session Content  Reviewed behavioral activation and her continued engagement in daily activities and social interactions. Focused on her recent cognitive testing in the context of her occupational assessment, which was normal. Discussed her decision to pursue skilled nursing as well as emotions, grief, and identity. Discussed precipitants of depression including feeling overwhelmed by household tasks and distinguishing between rest/self-care and behavioral avoidance. Reviewed CBT strategies to address avoidance as well as facilitating interpersonal support in completing tasks related to her upcoming move. Discussed parenting challenges and potential impact on depression.    Current Symptoms  Current symptoms include intermittent dysphoria, low energy, low motivation, self-criticism, guilt, concentration impairment, anxiety, sleep disturbance, and ruminative thoughts .Denies suicidal ideation.      Mental Status Exam  Appearance: appropriate  Attitude: cooperative  Behavior: normal  Eye Contact: normal  Speech: normal  Orientation: oriented  Mood: anxious  Affect: mood congruient  Thought Process: clear  Suicidal Ideation: denies  Psychosis: not present      Current Diagnosis  Major depression, recurrent, severe, without psychotic features, in partial remission (F33.2)   R/O PTSD      Treatment Plan  Continue CBT/behavioral activation sessions.      Estrella Blanton, Ph.D., L.P.    Department of Psychiatry

## 2019-09-20 NOTE — PROGRESS NOTES
Progress Note  Behavioral Activation/ CBT Psychotherapy      Name: Lilly Babcock  : 1950  MRN: [id number]  Age: 69 year old  Date: 2019  Duration: 55 minutes (start = 1:05 PM; end = 2:00 PM)      Psychotherapy Session Content  Reviewed behavioral activation and her continued success in daily social activities and social engagements. Discussed ambivalence about continuing work as well as upcoming occupational assessment. Reviewed psychiatric history and developmental origins of depression, including childhood and adolescent trauma. Discussed CBT strategies for avoidance behaviors, particularly household tasks.    Current Symptoms  Current symptoms include intermittent dysphoria, self-criticism, guilt, concentration impairment, anxiety, sleep disturbance, and ruminative thoughts. Denies suicidal ideation.      Mental Status Exam  Appearance: appropriate  Attitude: cooperative  Behavior: normal  Eye Contact: normal  Speech: normal  Orientation: oriented  Mood: anxious  Affect: mood congruient  Thought Process: clear  Suicidal Ideation: denies  Psychosis: not present      Current Diagnosis  Major depression, recurrent, severe, without psychotic features, in partial remission (F33.2)   R/O PTSD      Treatment Plan  Continue CBT/behavioral activation sessions.      Estrella Blanton, Ph.D., L.P.    Department of Psychiatry

## 2019-09-20 NOTE — PROGRESS NOTES
OUTPATIENT TREATMENT PLAN SUMMARY    Date of Treatment Plan: September 20, 2019 90-Day Review Date: December 20, 2019  Date of Initial Service: 2019       1. DSM-5 Diagnosis (include numeric code)  Major depression, recurrent, moderate, 296.32  2. Current symptoms and circumstances that substantiate the diagnosis:  Fatigue, low motivation, avoidance of household tasks, low energy, dysphoric, concentration impairment,     3. How symptoms and/or behaviors are affecting level of function:   Completion of household tasks    4. Risk Assessment:  Suicide:  Assessed Level of Immediate Risk: None  Ideation: No  Plan:  No  Means: No  Intent: No    Homicide/Violence:  Assessed Level of Immediate Risk: None  Ideation: No  Plan: No  Means: No  Intent: No    If on a medication, please include name and dosage:    Current Outpatient Medications   Medication     acetaminophen (TYLENOL) 500 MG tablet     albuterol (PROAIR HFA/PROVENTIL HFA/VENTOLIN HFA) 108 (90 Base) MCG/ACT inhaler     alendronate (FOSAMAX) 35 MG tablet     amoxicillin (AMOXIL) 500 MG capsule     Cholecalciferol (VITAMIN D) 1000 UNIT capsule     clobetasol (TEMOVATE) 0.05 % ointment     cyanocobalamin 1000 MCG SUBL sublingual tablet     escitalopram (LEXAPRO) 20 MG tablet     fexofenadine (ALLEGRA) 180 MG tablet     ipratropium (ATROVENT) 0.06 % spray     ketoconazole (NIZORAL) 2 % shampoo     lamoTRIgine (LAMICTAL) 25 MG TBDP ODT tab     levalbuterol (XOPENEX HFA) 45 MCG/ACT Inhaler     lithium 300 MG capsule     methylPREDNISolone (MEDROL) 32 MG tablet     Misc. Devices (WALKER [employer] GLIDES) MISC     mometasone (ELOCON) 0.1 % ointment     omeprazole (PRILOSEC) 10 MG CR capsule     predniSONE (DELTASONE) 5 MG tablet     triamcinolone (KENALOG) 0.1 % cream     valACYclovir (VALTREX) 1000 mg tablet     valACYclovir (VALTREX) 500 MG tablet     No current facility-administered medications for this visit.          Symptom/Problem Measurable Goals Interventions Gains  Made   1.MDD 1. 75% or more decrease in frequency and severity of depressive symptoms, as measured by BDI. 1.CBT 1. Activity level improved   2.Alcohol 2. Relapse prevention 2.CBT, harm reduction 2. Infrequent drinking but wants to prevent recurrence of evening drinking pattern             5. Frequency of Sessions: Biweekly    6. Discharge and Aftercare Goals: Remission from depression    7. Expected duration of treatment:  6 months    8. Participants in therapy plan (family, friends, support network): Self      See scanned document for Acknowledgement of Current Treatment Plan      Regulatory Guidelines for Updating Treatment Plan  [place] Medical Assistance: Reviewed & signed at least every 90days  Medicare:  Update per policy

## 2019-09-26 NOTE — PROGRESS NOTES
Progress Note  Behavioral Activation/ CBT Psychotherapy      Name: Lilly Babcock  : 1950  MRN: [id number]  Age: 69 year old  Date: 2019  Duration: 55 minutes (start = 1:05 PM; end = 2:00 PM)      Psychotherapy Session Content  Reviewed behavioral activation and continued engagement in daily activities and social interactions. Discussed strategies for addressing avoidance behaviors, particularly challenges with household responsibilities with her upcoming move, and incremental goals as well as interpersonal support. Discussed her decision to retire and her announcement at work as well as the transition in schedule and identity. Reviewed precipitants of depression as well as CBT and emotion regulation strategies. Focused on alcohol use and strategies to prevent excessive consumption.    Current Symptoms  Current symptoms include intermittent dysphoria, low energy, low motivation, behavioral avoidance, self-criticism, guilt, concentration impairment, anxiety, sleep disturbance, and ruminative thoughts. Denies suicidal ideation.      Mental Status Exam  Appearance: appropriate  Attitude: cooperative  Behavior: normal  Eye Contact: normal  Speech: normal  Orientation: oriented  Mood: anxious  Affect: mood congruient  Thought Process: clear  Suicidal Ideation: denies  Psychosis: not present      Current Diagnosis  Major depression, recurrent, severe, without psychotic features, in partial remission (F33.2)   R/O PTSD      Treatment Plan  Continue CBT/behavioral activation sessions.      Estrella Blanton, Ph.D., L.P.    Department of Psychiatry

## 2019-10-03 ENCOUNTER — APPOINTMENT (OUTPATIENT)
Dept: LAB | Facility: CLINIC | Age: 69
End: 2019-10-03
Attending: INTERNAL MEDICINE
Payer: MEDICARE

## 2019-10-03 ENCOUNTER — OFFICE VISIT (OUTPATIENT)
Dept: TRANSPLANT | Facility: CLINIC | Age: 69
End: 2019-10-03
Attending: INTERNAL MEDICINE
Payer: MEDICARE

## 2019-10-03 VITALS
DIASTOLIC BLOOD PRESSURE: 64 MMHG | SYSTOLIC BLOOD PRESSURE: 118 MMHG | OXYGEN SATURATION: 98 % | BODY MASS INDEX: 20.3 KG/M2 | TEMPERATURE: 98.3 F | WEIGHT: 131.2 LBS | HEART RATE: 73 BPM | RESPIRATION RATE: 16 BRPM

## 2019-10-03 DIAGNOSIS — C82.99 NODULAR LYMPHOMA OF EXTRANODAL AND/OR SOLID ORGAN SITE (H): ICD-10-CM

## 2019-10-03 DIAGNOSIS — M35.3 POLYMYALGIA RHEUMATICA (H): Primary | ICD-10-CM

## 2019-10-03 PROBLEM — R26.9 GAIT DISTURBANCE: Status: ACTIVE | Noted: 2019-05-28

## 2019-10-03 PROBLEM — M67.90 DISORDER OF TENDON: Status: ACTIVE | Noted: 2019-02-14

## 2019-10-03 PROBLEM — H26.9 CATARACT OF BOTH EYES: Status: ACTIVE | Noted: 2017-07-17

## 2019-10-03 PROBLEM — R60.0 LOWER EXTREMITY EDEMA: Status: ACTIVE | Noted: 2017-10-10

## 2019-10-03 PROBLEM — N94.10 DYSPAREUNIA, FEMALE: Status: ACTIVE | Noted: 2019-10-03

## 2019-10-03 PROBLEM — E53.8 VITAMIN B12 DEFICIENCY: Status: ACTIVE | Noted: 2017-01-30

## 2019-10-03 PROBLEM — Z92.3 HISTORY OF RADIATION THERAPY: Status: ACTIVE | Noted: 2019-02-06

## 2019-10-03 PROBLEM — F34.1 DYSTHYMIC DISORDER: Status: ACTIVE | Noted: 2019-10-03

## 2019-10-03 PROBLEM — I25.10 CORONARY ATHEROSCLEROSIS: Status: ACTIVE | Noted: 2019-05-28

## 2019-10-03 PROBLEM — Z96.641 PRESENCE OF RIGHT ARTIFICIAL HIP JOINT: Status: ACTIVE | Noted: 2017-05-10

## 2019-10-03 PROBLEM — Z85.828 HISTORY OF SQUAMOUS CELL CARCINOMA OF SKIN: Status: ACTIVE | Noted: 2017-07-17

## 2019-10-03 PROBLEM — R47.01 EXPRESSIVE APHASIA: Status: ACTIVE | Noted: 2019-05-28

## 2019-10-03 PROBLEM — S61.219A LACERATION OF FINGER: Status: ACTIVE | Noted: 2017-07-17

## 2019-10-03 PROBLEM — H53.9 VISUAL DISTURBANCE: Status: ACTIVE | Noted: 2019-05-28

## 2019-10-03 LAB
ALBUMIN SERPL-MCNC: 3.8 G/DL (ref 3.4–5)
ALP SERPL-CCNC: 64 U/L (ref 40–150)
ALT SERPL W P-5'-P-CCNC: 27 U/L (ref 0–50)
ANION GAP SERPL CALCULATED.3IONS-SCNC: 5 MMOL/L (ref 3–14)
AST SERPL W P-5'-P-CCNC: 27 U/L (ref 0–45)
BASOPHILS # BLD AUTO: 0 10E9/L (ref 0–0.2)
BASOPHILS NFR BLD AUTO: 0.4 %
BILIRUB SERPL-MCNC: 0.2 MG/DL (ref 0.2–1.3)
BUN SERPL-MCNC: 12 MG/DL (ref 7–30)
CALCIUM SERPL-MCNC: 9.1 MG/DL (ref 8.5–10.1)
CHLORIDE SERPL-SCNC: 102 MMOL/L (ref 94–109)
CO2 SERPL-SCNC: 28 MMOL/L (ref 20–32)
CREAT SERPL-MCNC: 0.67 MG/DL (ref 0.52–1.04)
DIFFERENTIAL METHOD BLD: NORMAL
EOSINOPHIL # BLD AUTO: 0 10E9/L (ref 0–0.7)
EOSINOPHIL NFR BLD AUTO: 0.2 %
ERYTHROCYTE [DISTWIDTH] IN BLOOD BY AUTOMATED COUNT: 13.9 % (ref 10–15)
GFR SERPL CREATININE-BSD FRML MDRD: 89 ML/MIN/{1.73_M2}
GLUCOSE SERPL-MCNC: 100 MG/DL (ref 70–99)
HCT VFR BLD AUTO: 37.4 % (ref 35–47)
HGB BLD-MCNC: 12.3 G/DL (ref 11.7–15.7)
IMM GRANULOCYTES # BLD: 0 10E9/L (ref 0–0.4)
IMM GRANULOCYTES NFR BLD: 0.4 %
LDH SERPL L TO P-CCNC: 240 U/L (ref 81–234)
LYMPHOCYTES # BLD AUTO: 1.2 10E9/L (ref 0.8–5.3)
LYMPHOCYTES NFR BLD AUTO: 14.4 %
MCH RBC QN AUTO: 30.9 PG (ref 26.5–33)
MCHC RBC AUTO-ENTMCNC: 32.9 G/DL (ref 31.5–36.5)
MCV RBC AUTO: 94 FL (ref 78–100)
MONOCYTES # BLD AUTO: 0.5 10E9/L (ref 0–1.3)
MONOCYTES NFR BLD AUTO: 6.2 %
NEUTROPHILS # BLD AUTO: 6.4 10E9/L (ref 1.6–8.3)
NEUTROPHILS NFR BLD AUTO: 78.4 %
NRBC # BLD AUTO: 0 10*3/UL
NRBC BLD AUTO-RTO: 0 /100
PLATELET # BLD AUTO: 231 10E9/L (ref 150–450)
POTASSIUM SERPL-SCNC: 4.4 MMOL/L (ref 3.4–5.3)
PROT SERPL-MCNC: 6.8 G/DL (ref 6.8–8.8)
RBC # BLD AUTO: 3.98 10E12/L (ref 3.8–5.2)
SODIUM SERPL-SCNC: 135 MMOL/L (ref 133–144)
WBC # BLD AUTO: 8.2 10E9/L (ref 4–11)

## 2019-10-03 PROCEDURE — 85025 COMPLETE CBC W/AUTO DIFF WBC: CPT | Performed by: INTERNAL MEDICINE

## 2019-10-03 PROCEDURE — 83615 LACTATE (LD) (LDH) ENZYME: CPT | Performed by: INTERNAL MEDICINE

## 2019-10-03 PROCEDURE — 80053 COMPREHEN METABOLIC PANEL: CPT | Performed by: INTERNAL MEDICINE

## 2019-10-03 PROCEDURE — G0463 HOSPITAL OUTPT CLINIC VISIT: HCPCS | Mod: ZF

## 2019-10-03 PROCEDURE — 36415 COLL VENOUS BLD VENIPUNCTURE: CPT

## 2019-10-03 RX ORDER — PREDNISONE 1 MG/1
7 TABLET ORAL DAILY
Status: ON HOLD | COMMUNITY
Start: 2019-10-03 | End: 2022-01-01

## 2019-10-03 ASSESSMENT — PAIN SCALES - GENERAL: PAINLEVEL: NO PAIN (0)

## 2019-10-03 NOTE — LETTER
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 Initial w/u identified involvement of multiple skeletal sites and the bone marrow. She was initially followed without requiring treatment, but with evidence of waxing and waning disease on serial scans.  The only treatment specifically directed at the lymphoma she received was for pain in the left hip with evidence of progressive skeletal disease on radiographic evaluation.  She received a total of 4140 cGy in 20 fractions to the left hip/proximal femur between 08/20/2012 and 09/20/2012 at [facility].  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      In July 2016 developed nausea of unknown etiology, workup negative for CNS involvement with MR and CSF analysis. PET scan on 8/18/16 suggested \"progressive\" skeletal disease, but overall her disease had been variable.        Left total hip arthroplasty on 10/19/16 for the avascular necrosis and a right hip replacement on 05/10/2017 for degenerative disease.      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 This resulted in major improvement in her symptoms and her CRP decreased substantially - she has been trying tov very slowly taper off.         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 Thereafter, she developed an ulcerative " lesion that was diagnosed as pyoderma gangrenosum.  It was treated with Dapsone and topical tacrolimus topical.  The lesion, which was about 6 cm in size, responded.      Her most recent PET/CT scan was on 08/04/2018 and was compared to that of 08/2016.  Radiology commented on the waxing and waning course of the numerous hypermetabolic skeletal lesions.  Several lesions had diminished or disappeared and there were several new lesions along the spine, pelvis and left distal femur.  Some of these were associated with underlying sclerotic bone changes. There  were a few small hypermetabolic lymph nodes in both axillae and the right iliac chain.     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 Blanton for a stage Adrianna follicular lymphoma presenting in 2008 treated only with radiation to her left hip and proximal femur and observation otherwise.  In the last 6 months since she was seen here she has had a variety of therapies for her chronic depression, recognized to be probably lithium toxic and discontinued that therapy.  She continues on Lexapro.  Decision to end her medical practice has been either the cause of or coincidental to substantial improvements in her mood and additionally she is moving her home, downsizing to a smaller house over the next week.  She has had no recent fever chills rash or new areas of bone discomfort.  She has chronic low back pain and very easy bruising.  Her polymyalgia rheumatica she is controlling with very slow tapering of prednisone, going to 6 mg daily tomorrow and its not caused other systemic symptoms and no new areas of pain or bone discomfort and no systemic symptoms.  She has had no night sweats fevers or chills.  Her weight was higher 6 months ago and she is down 5 kg over the last year but she is been working at controlling her weight.  She has no specific food intolerance but tries  to avoid meat and dairy products.    She has had bone density studies that were acceptable and continues on calcium vitamin D supplementation.  She has had no skin rash other than some various hyperpigmented patches which are chronic but has had some modest injuries in preparation for removed where she dropped things on her legs etc. but has had no bone fractures or other specific problems.  She has no headaches or visual change and though she thinks she has occasional word finding problems she said no other specific neurologic symptomatology.  The rest of her review of systems is unrevealing.    Her exam showed acceptable vital signs and her weight was down a bit over the last 6 to 12 months as noted above.  She has no cervical supraclavicular axillary or inguinal lymphadenopathy.  Her oropharynx is clear without mucosal changes.  Her lungs are clear as well.  Her heart tones are regular there is no gallop rhythm or murmur.  She has no carotid bruits.  There is no abdominal tenderness masses or hepatosplenomegaly.  She has no peripheral edema.  She has a large laceration perhaps 2 inches long on her right shin where she dropped something on it but it is crusted and healing well.  A limited neurologic exam showed intact cranial nerves and no tremor.    Laboratory testing showed good blood counts and chemistries.  Her LDH is tiny bit above normal but needs been in the same range for the last 5 years.  Review of outside notes showed normal ESR and CRP recently and no other notable abnormalities.    She has clinically no signs of recurrent or progressive lymphoma and the waxing waning skeletal lesion number of years have not been associated with focal or specific bony symptoms and therefore have not required intervention.  There is no indication for further imaging or interventions relative to her lymphoma at this point and will continue to follow her at perhaps 6-month intervals with exam and laboratory testing.  She  knows to call in the interim if additional issues arise.    Cornell Malagon MD     Professor of Medicine    Results for RUBY ANG (MRN [id number]) as of 10/3/2019 17:12   Ref. Range 3/7/2019 10:14 10/3/2019 16:10   Sodium Latest Ref Range: 133 - 144 mmol/L 134 135   Potassium Latest Ref Range: 3.4 - 5.3 mmol/L 3.9 4.4   Chloride Latest Ref Range: 94 - 109 mmol/L 100 102   Carbon Dioxide Latest Ref Range: 20 - 32 mmol/L 29 28   Urea Nitrogen Latest Ref Range: 7 - 30 mg/dL 14 12   Creatinine Latest Ref Range: 0.52 - 1.04 mg/dL 0.72 0.67   GFR Estimate Latest Ref Range: >60 mL/min/1.73_m2 85 89   GFR Estimate If Black Latest Ref Range: >60 mL/min/1.73_m2 >90 >90   Calcium Latest Ref Range: 8.5 - 10.1 mg/dL 8.8 9.1   Anion Gap Latest Ref Range: 3 - 14 mmol/L 4 5   Albumin Latest Ref Range: 3.4 - 5.0 g/dL 3.6 3.8   Protein Total Latest Ref Range: 6.8 - 8.8 g/dL 6.9 6.8   Bilirubin Total Latest Ref Range: 0.2 - 1.3 mg/dL 0.5 0.2   Alkaline Phosphatase Latest Ref Range: 40 - 150 U/L 69 64   ALT Latest Ref Range: 0 - 50 U/L 25 27   AST Latest Ref Range: 0 - 45 U/L 26 27   Lactate Dehydrogenase Latest Ref Range: 81 - 234 U/L 206 240 (H)   Glucose Latest Ref Range: 70 - 99 mg/dL 87 100 (H)   WBC Latest Ref Range: 4.0 - 11.0 10e9/L 7.6 8.2   Hemoglobin Latest Ref Range: 11.7 - 15.7 g/dL 12.8 12.3   Hematocrit Latest Ref Range: 35.0 - 47.0 % 39.2 37.4   Platelet Count Latest Ref Range: 150 - 450 10e9/L 229 231   RBC Count Latest Ref Range: 3.8 - 5.2 10e12/L 4.06 3.98   MCV Latest Ref Range: 78 - 100 fl 97 94   MCH Latest Ref Range: 26.5 - 33.0 pg 31.5 30.9   MCHC Latest Ref Range: 31.5 - 36.5 g/dL 32.7 32.9   RDW Latest Ref Range: 10.0 - 15.0 % 13.2 13.9   Diff Method Unknown Automated Method Automated Method   % Neutrophils Latest Units: % 71.0 78.4   % Lymphocytes Latest Units: % 14.9 14.4   % Monocytes Latest Units: % 8.4 6.2   % Eosinophils Latest Units: % 4.7 0.2   % Basophils Latest Units: % 0.7 0.4   % Immature  Granulocytes Latest Units: % 0.3 0.4   Nucleated RBCs Latest Ref Range: 0 /100 0 0   Absolute Neutrophil Latest Ref Range: 1.6 - 8.3 10e9/L 5.4 6.4   Absolute Lymphocytes Latest Ref Range: 0.8 - 5.3 10e9/L 1.1 1.2   Absolute Monocytes Latest Ref Range: 0.0 - 1.3 10e9/L 0.6 0.5   Absolute Eosinophils Latest Ref Range: 0.0 - 0.7 10e9/L 0.4 0.0   Absolute Basophils Latest Ref Range: 0.0 - 0.2 10e9/L 0.1 0.0   Abs Immature Granulocytes Latest Ref Range: 0 - 0.4 10e9/L 0.0 0.0   Absolute Nucleated RBC Unknown 0.0 0.0       Cornell Malagon MD

## 2019-10-03 NOTE — NURSING NOTE
"Oncology Rooming Note    October 3, 2019 4:28 PM   Lilly Babcock is a 69 year old female who presents for:    Chief Complaint   Patient presents with     Blood Draw     Labs drawn via  by RN in lab. VS taken.      Oncology Clinic Visit     Return; Nodular lymphoma of extranodal and/or solid organ site (H)      Initial Vitals: /64   Pulse 73   Temp 98.3  F (36.8  C) (Oral)   Resp 16   Wt 59.5 kg (131 lb 3.2 oz)   SpO2 98%   BMI 20.30 kg/m   Estimated body mass index is 20.3 kg/m  as calculated from the following:    Height as of 12/13/18: 1.712 m (5' 7.4\").    Weight as of this encounter: 59.5 kg (131 lb 3.2 oz). Body surface area is 1.68 meters squared.  No Pain (0) Comment: Data Unavailable   No LMP recorded. Patient is postmenopausal.  Allergies reviewed: Yes  Medications reviewed: Yes    Medications: Medication refills not needed today.  Pharmacy name entered into [facility]:    [id number] - [place], [place] - [address] [facility] - [place], [place] - [address] [facility]Y #[id number] - [place], [place] - [address]    Clinical concerns:  Question      Kemi Flores CMA              "

## 2019-10-03 NOTE — NURSING NOTE
Chief Complaint   Patient presents with     Blood Draw     Labs drawn via  by RN in lab. VS taken.      Labs drawn via venipuncture. Vital signs taken. Checked into next appointment.   Kelsy Bajwa RN

## 2019-10-03 NOTE — PROGRESS NOTES
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 Initial w/u identified involvement of multiple skeletal sites and the bone marrow. She was initially followed without requiring treatment, but with evidence of waxing and waning disease on serial scans.  The only treatment specifically directed at the lymphoma she received was for pain in the left hip with evidence of progressive skeletal disease on radiographic evaluation.  She received a total of 4140 cGy in 20 fractions to the left hip/proximal femur between 08/20/2012 and 09/20/2012 at [facility].  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      In July 2016 developed nausea of unknown etiology, workup negative for CNS involvement with MR and CSF analysis. PET scan on 8/18/16 suggested \"progressive\" skeletal disease, but overall her disease had been variable.        Left total hip arthroplasty on 10/19/16 for the avascular necrosis and a right hip replacement on 05/10/2017 for degenerative disease.      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 This resulted in major improvement in her symptoms and her CRP decreased substantially - she has been trying tov very slowly taper off.         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 Thereafter, she developed an ulcerative lesion that was diagnosed as pyoderma gangrenosum.  It was treated with Dapsone and " topical tacrolimus topical.  The lesion, which was about 6 cm in size, responded.      Her most recent PET/CT scan was on 08/04/2018 and was compared to that of 08/2016.  Radiology commented on the waxing and waning course of the numerous hypermetabolic skeletal lesions.  Several lesions had diminished or disappeared and there were several new lesions along the spine, pelvis and left distal femur.  Some of these were associated with underlying sclerotic bone changes. There  were a few small hypermetabolic lymph nodes in both axillae and the right iliac chain.     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 Blanton for a stage Adrianna follicular lymphoma presenting in 2008 treated only with radiation to her left hip and proximal femur and observation otherwise.  In the last 6 months since she was seen here she has had a variety of therapies for her chronic depression, recognized to be probably lithium toxic and discontinued that therapy.  She continues on Lexapro.  Decision to end her medical practice has been either the cause of or coincidental to substantial improvements in her mood and additionally she is moving her home, downsizing to a smaller house over the next week.  She has had no recent fever chills rash or new areas of bone discomfort.  She has chronic low back pain and very easy bruising.  Her polymyalgia rheumatica she is controlling with very slow tapering of prednisone, going to 6 mg daily tomorrow and its not caused other systemic symptoms and no new areas of pain or bone discomfort and no systemic symptoms.  She has had no night sweats fevers or chills.  Her weight was higher 6 months ago and she is down 5 kg over the last year but she is been working at controlling her weight.  She has no specific food intolerance but tries to avoid meat and dairy products.    She has had bone density studies that were  acceptable and continues on calcium vitamin D supplementation.  She has had no skin rash other than some various hyperpigmented patches which are chronic but has had some modest injuries in preparation for removed where she dropped things on her legs etc. but has had no bone fractures or other specific problems.  She has no headaches or visual change and though she thinks she has occasional word finding problems she said no other specific neurologic symptomatology.  The rest of her review of systems is unrevealing.    Her exam showed acceptable vital signs and her weight was down a bit over the last 6 to 12 months as noted above.  She has no cervical supraclavicular axillary or inguinal lymphadenopathy.  Her oropharynx is clear without mucosal changes.  Her lungs are clear as well.  Her heart tones are regular there is no gallop rhythm or murmur.  She has no carotid bruits.  There is no abdominal tenderness masses or hepatosplenomegaly.  She has no peripheral edema.  She has a large laceration perhaps 2 inches long on her right shin where she dropped something on it but it is crusted and healing well.  A limited neurologic exam showed intact cranial nerves and no tremor.    Laboratory testing showed good blood counts and chemistries.  Her LDH is tiny bit above normal but needs been in the same range for the last 5 years.  Review of outside notes showed normal ESR and CRP recently and no other notable abnormalities.    She has clinically no signs of recurrent or progressive lymphoma and the waxing waning skeletal lesion number of years have not been associated with focal or specific bony symptoms and therefore have not required intervention.  There is no indication for further imaging or interventions relative to her lymphoma at this point and will continue to follow her at perhaps 6-month intervals with exam and laboratory testing.  She knows to call in the interim if additional issues arise.    Cornell Malagon MD      Professor of Medicine    Results for RUBY ANG (MRN [id number]) as of 10/3/2019 17:12   Ref. Range 3/7/2019 10:14 10/3/2019 16:10   Sodium Latest Ref Range: 133 - 144 mmol/L 134 135   Potassium Latest Ref Range: 3.4 - 5.3 mmol/L 3.9 4.4   Chloride Latest Ref Range: 94 - 109 mmol/L 100 102   Carbon Dioxide Latest Ref Range: 20 - 32 mmol/L 29 28   Urea Nitrogen Latest Ref Range: 7 - 30 mg/dL 14 12   Creatinine Latest Ref Range: 0.52 - 1.04 mg/dL 0.72 0.67   GFR Estimate Latest Ref Range: >60 mL/min/1.73_m2 85 89   GFR Estimate If Black Latest Ref Range: >60 mL/min/1.73_m2 >90 >90   Calcium Latest Ref Range: 8.5 - 10.1 mg/dL 8.8 9.1   Anion Gap Latest Ref Range: 3 - 14 mmol/L 4 5   Albumin Latest Ref Range: 3.4 - 5.0 g/dL 3.6 3.8   Protein Total Latest Ref Range: 6.8 - 8.8 g/dL 6.9 6.8   Bilirubin Total Latest Ref Range: 0.2 - 1.3 mg/dL 0.5 0.2   Alkaline Phosphatase Latest Ref Range: 40 - 150 U/L 69 64   ALT Latest Ref Range: 0 - 50 U/L 25 27   AST Latest Ref Range: 0 - 45 U/L 26 27   Lactate Dehydrogenase Latest Ref Range: 81 - 234 U/L 206 240 (H)   Glucose Latest Ref Range: 70 - 99 mg/dL 87 100 (H)   WBC Latest Ref Range: 4.0 - 11.0 10e9/L 7.6 8.2   Hemoglobin Latest Ref Range: 11.7 - 15.7 g/dL 12.8 12.3   Hematocrit Latest Ref Range: 35.0 - 47.0 % 39.2 37.4   Platelet Count Latest Ref Range: 150 - 450 10e9/L 229 231   RBC Count Latest Ref Range: 3.8 - 5.2 10e12/L 4.06 3.98   MCV Latest Ref Range: 78 - 100 fl 97 94   MCH Latest Ref Range: 26.5 - 33.0 pg 31.5 30.9   MCHC Latest Ref Range: 31.5 - 36.5 g/dL 32.7 32.9   RDW Latest Ref Range: 10.0 - 15.0 % 13.2 13.9   Diff Method Unknown Automated Method Automated Method   % Neutrophils Latest Units: % 71.0 78.4   % Lymphocytes Latest Units: % 14.9 14.4   % Monocytes Latest Units: % 8.4 6.2   % Eosinophils Latest Units: % 4.7 0.2   % Basophils Latest Units: % 0.7 0.4   % Immature Granulocytes Latest Units: % 0.3 0.4   Nucleated RBCs Latest Ref Range: 0 /100 0 0    Absolute Neutrophil Latest Ref Range: 1.6 - 8.3 10e9/L 5.4 6.4   Absolute Lymphocytes Latest Ref Range: 0.8 - 5.3 10e9/L 1.1 1.2   Absolute Monocytes Latest Ref Range: 0.0 - 1.3 10e9/L 0.6 0.5   Absolute Eosinophils Latest Ref Range: 0.0 - 0.7 10e9/L 0.4 0.0   Absolute Basophils Latest Ref Range: 0.0 - 0.2 10e9/L 0.1 0.0   Abs Immature Granulocytes Latest Ref Range: 0 - 0.4 10e9/L 0.0 0.0   Absolute Nucleated RBC Unknown 0.0 0.0

## 2019-10-18 ENCOUNTER — TELEPHONE (OUTPATIENT)
Dept: PSYCHIATRY | Facility: CLINIC | Age: 69
End: 2019-10-18

## 2019-11-09 ENCOUNTER — HEALTH MAINTENANCE LETTER (OUTPATIENT)
Age: 69
End: 2019-11-09

## 2019-11-15 ENCOUNTER — OFFICE VISIT (OUTPATIENT)
Dept: PSYCHIATRY | Facility: CLINIC | Age: 69
End: 2019-11-15
Payer: MEDICARE

## 2019-11-15 DIAGNOSIS — F33.1 MAJOR DEPRESSIVE DISORDER, RECURRENT EPISODE, MODERATE (H): Primary | ICD-10-CM

## 2019-11-21 NOTE — PROGRESS NOTES
Progress Note  Behavioral Activation/ CBT Psychotherapy      Name: Lilly Babcock  : 1950  MRN: [id number]  Age: 69 year old  Date: November 15, 2019  Duration: 45 minutes (start = 1:05 PM; end = 1:50 PM)      Psychotherapy Session Content  Reviewed behavioral activation and continued engagement in daily activities and social interactions. Discussed strategies for addressing avoidance behaviors, particularly challenges related to her recent move (unpacking boxes) and the effectiveness of interpersonal support. Reviewed daily schedule and the potential value of morning leisure time, as well as strategies to transition to household tasks. Discussed feelings of relief resulting from snf. Focused on parenting challenges with her son, as well as the emotional impact on her depression and anxiety. Reviewed precipitants of depression as well as CBT and emotion regulation strategies.     Current Symptoms  Current symptoms include intermittent dysphoria, low energy, low motivation, behavioral avoidance, self-criticism, guilt, concentration impairment, anxiety, sleep disturbance, and ruminative thoughts. Denies suicidal ideation.      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      Estrella Blanton, Ph.D., L.P.    Department of Psychiatry

## 2020-01-03 ENCOUNTER — OFFICE VISIT (OUTPATIENT)
Dept: PSYCHIATRY | Facility: CLINIC | Age: 70
End: 2020-01-03
Payer: MEDICARE

## 2020-01-03 DIAGNOSIS — F32.4 MAJOR DEPRESSION IN PARTIAL REMISSION (H): Primary | ICD-10-CM

## 2020-01-09 NOTE — PROGRESS NOTES
Progress Note  Behavioral Activation/ CBT Psychotherapy      Name: Lilly Babcock  : 1950  MRN: [id number]  Age: 69 year old  Date: January 3, 2020  Duration: 55 minutes (start = 1:05 PM; end = 2:00 PM)      Psychotherapy Session Content  Reviewed events since our previous appointment, including her sister's medical emergency and recent liver transplant, which resulted in Ms. Babcock's temporary relocation to [place] to care for her sister. She plans to return there on Monday to provide ongoing post-surgical care for her sister. Discussed positive impact of collaborating with her sisters in the context of this crisis. Reviewed parenting concerns. Focused on progress and improvements in anxiety and depression as well as behavioral activation/self-care strategies she plans to utilize during her stay in [place].    Current Symptoms  Current symptoms include intermittent dysphoria, self-criticism, guilt, concentration impairment, anxiety, sleep disturbance, and ruminative thoughts. Denies suicidal ideation. Ms. Babcock reports that her anxiety and depression have improved.      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     Estrella Blanton, Ph.D., L.P.    Department of Psychiatry

## 2020-01-31 ENCOUNTER — OFFICE VISIT (OUTPATIENT)
Dept: PSYCHIATRY | Facility: CLINIC | Age: 70
End: 2020-01-31
Payer: MEDICARE

## 2020-01-31 DIAGNOSIS — F33.41 RECURRENT MAJOR DEPRESSIVE DISORDER, IN PARTIAL REMISSION (H): Primary | ICD-10-CM

## 2020-02-21 ENCOUNTER — OFFICE VISIT (OUTPATIENT)
Dept: PSYCHIATRY | Facility: CLINIC | Age: 70
End: 2020-02-21
Payer: MEDICARE

## 2020-02-21 DIAGNOSIS — F33.41 RECURRENT MAJOR DEPRESSIVE DISORDER, IN PARTIAL REMISSION (H): Primary | ICD-10-CM

## 2020-02-23 ENCOUNTER — HEALTH MAINTENANCE LETTER (OUTPATIENT)
Age: 70
End: 2020-02-23

## 2020-02-27 NOTE — PROGRESS NOTES
Progress Note  Behavioral Activation/ CBT Psychotherapy      Name: Lilly Bacbock  : 1950  MRN: [id number]  Age: 69 year old  Date: 2020  Duration: 50 minutes (start = 12:10 PM--patient arrived late; end = 1:00 PM)      Psychotherapy Session Content  Discussed family conflicts during her continued residence in [place] caring for her sister, particularly the pattern of her sister and niece excluding her daughter. Focused on assertive communication strategies and setting consequences (including the threat of leaving) in response to these behaviors, as well as developmental origins and challenges in assertiveness given developmental history/trauma. Focused on emotion regulation strategies as well as in vivo exposure. Reviewed current symptoms of depression as well as relapse prevention strategies. Focused on progress and improvements in anxiety and depression as well as behavioral activation strategies (e.g., daily walks) that she plans to utilize during her stay in [place].    Current Symptoms  Current symptoms include intermittent dysphoria, self-criticism, guilt, concentration impairment, anxiety, sleep disturbance, and ruminative thoughts. Denies suicidal ideation.       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     Estrella Blanton, Ph.D., L.P.    Department of Psychiatry

## 2020-04-03 ENCOUNTER — VIRTUAL VISIT (OUTPATIENT)
Dept: PSYCHIATRY | Facility: CLINIC | Age: 70
End: 2020-04-03
Payer: MEDICARE

## 2020-04-03 DIAGNOSIS — F33.41 RECURRENT MAJOR DEPRESSIVE DISORDER, IN PARTIAL REMISSION (H): Primary | ICD-10-CM

## 2020-04-10 NOTE — PROGRESS NOTES
"Progress Note  Behavioral Activation/ CBT Psychotherapy      Name: Lilly Babcock  : 1950  MRN: [id number]  Age: 69 year old  Date: April 3, 2020  Duration: 50 minutes (start = 11:10 AM--technology-related delay; end = 12:00 PM)    Lilly Babcock is a 70 year old female who is being evaluated via a billable video visit.      The patient has been notified of following:     \"This video visit will be conducted via a call between you and your physician/provider. We have found that certain health care needs can be provided without the need for an in-person physical exam.  This service lets us provide the care you need with a video conversation.  If a prescription is necessary we can send it directly to your pharmacy.  If lab work is needed we can place an order for that and you can then stop by our lab to have the test done at a later time.    Video visits are billed at different rates depending on your insurance coverage.  Please reach out to your insurance provider with any questions.    If during the course of the call the physician/provider feels a video visit is not appropriate, you will not be charged for this service.\"    Patient has given verbal consent for Video visit? Yes    How would you like to obtain your AVS? [facility]    Patient would like the video invitation sent by: Other e-mail: .    Will anyone else be joining your video visit? No        Video-Visit Details    Type of service:  Video Visit    Video Start Time: 11:10 AM  Video End Time: 12:00 PM    Originating Location (pt. Location): Home    Distant Location (provider location):  [facility] PSYCHIAT[facility]     Platform used for Video Visit: Maykel Blanton, PhD      DUE TO THE COVID-19 PANDEMIC, THIS SESSION WAS CONDUCTED BY [employer]. THE PATIENT PROVIDED VERBAL CONSENT TO CONDUCT A TELEHEALTH THERAPY SESSION.      Psychotherapy Session Content  Discussed the impact of the pandemic on her depression. She remains in [place] caring for her " sister post-transplant and plans to remain in [place] but eventually return to [place]. Focused on family conflicts related to quarantine safety as well as challenges and successes with assertive communication. Reviewed behavioral activation strategies that have been helpful including walks, reading, interactive with pets, and social interactions (including Zoom book group discussion).  Discussed parenting concerns. Reviewed current symptoms of depression as well as CBT relapse prevention strategies.     Current Symptoms  Current symptoms include intermittent dysphoria, self-criticism, guilt, concentration impairment, anxiety, and ruminative thoughts. Denies suicidal ideation.       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     Estrella Blanton, Ph.D., L.P.    Department of Psychiatry

## 2020-04-23 ENCOUNTER — VIRTUAL VISIT (OUTPATIENT)
Dept: TRANSPLANT | Facility: CLINIC | Age: 70
End: 2020-04-23
Attending: INTERNAL MEDICINE
Payer: MEDICARE

## 2020-04-23 DIAGNOSIS — M35.3 POLYMYALGIA RHEUMATICA (H): ICD-10-CM

## 2020-04-23 DIAGNOSIS — C82.99 NODULAR LYMPHOMA OF EXTRANODAL AND/OR SOLID ORGAN SITE (H): Primary | ICD-10-CM

## 2020-04-23 RX ORDER — VILAZODONE HYDROCHLORIDE 20 MG/1
20 TABLET ORAL DAILY
COMMUNITY
Start: 2020-03-23

## 2020-04-23 RX ORDER — ESCITALOPRAM OXALATE 10 MG/1
TABLET ORAL
COMMUNITY
Start: 2020-04-18 | End: 2022-01-01

## 2020-04-23 NOTE — PROGRESS NOTES
"Lilly Babcock is a 69 year old female who is being evaluated via a billable video visit.      The patient has been notified of following:     \"This video visit will be conducted via a call between you and your physician/provider. We have found that certain health care needs can be provided without the need for an in-person physical exam.  This service lets us provide the care you need with a video conversation.  If a prescription is necessary we can send it directly to your pharmacy.  If lab work is needed we can place an order for that and you can then stop by our lab to have the test done at a later time.    Video visits are billed at different rates depending on your insurance coverage.  Please reach out to your insurance provider with any questions.    If during the course of the call the physician/provider feels a video visit is not appropriate, you will not be charged for this service.\"    Patient has given verbal consent for Video visit? Yes    How would you like to obtain your AVS? [facility]    Patient would like the video invitation sent by: Send to e-mail at: [email or web address]    Will anyone else be joining your video visit? No        Video-Visit Details    Type of service:  Video Visit    Video visit 25 minutes total time  Originating Location (pt. 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  She had a mild respiratory infection in March, tested and COVID  negative and she recovered uneventfully.  She has been active and energetic walking 7-10,000 steps per day.  For the last 3 to 4 months she has had some soreness at the left ischial tuberosity.  An orthopedic evaluation showed no abnormalities and thought that it was a tendinosis.  I reviewed her imaging from October 2018 here " at the [facility] and there were no hypermetabolic lesions in that site, the left ischium.    She has had no GI  respiratory or ENT symptomatology.  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    She is in [place] because her sister who has alpha-1 antitrypsin deficiency homozygous received an urgent liver transplant there and is recovering well.  She is staying with her sister.  She is being cautious about exposure to other individuals; both for her own and her sister's health.    She has not had recentother new symptoms and had labs done in early April out in [place] which were all unrevealing.  Her electrolytes calcium albumin and liver functions were normal.  A CBC was normal as well.  Her hepatitis C antibody was negative; CRP was negative and she was tested and found to be alpha-1 antitrypsin heterozygous.    Overall her general health is good.  Her weight has been steady at 128 pounds, blood pressure 94/72, heart rate 85 as she did her own vitals and those are similar to her last visit here 6 months ago.  She thinks she has no notable lymphadenopathy.    We concluded that there are no new issues in her general health, no evidence of recurrence or reactivation of her non-Hodgkin's lymphoma and no interventions or further work-up are needed at this time.    She knows to call if issues arise but otherwise, if she is back in [place], I will see her in 6 months time    Cornell Malagon MD    Professor of medicine

## 2020-06-22 ENCOUNTER — VIRTUAL VISIT (OUTPATIENT)
Dept: PSYCHIATRY | Facility: CLINIC | Age: 70
End: 2020-06-22
Payer: MEDICARE

## 2020-06-22 DIAGNOSIS — F33.41 RECURRENT MAJOR DEPRESSIVE DISORDER, IN PARTIAL REMISSION (H): Primary | ICD-10-CM

## 2020-06-25 NOTE — PROGRESS NOTES
"Progress Note  Behavioral Activation/ CBT Psychotherapy    Video Visit      Name: Lilly Babcock  : 1950  MRN: [id number]  Age: 69 year old  Date: 2020  Duration: 40 minutes (start = 4:10 PM--technology-related delay; end = 4:50 PM)    Lilly Babcock is a 70 year old female who is being evaluated via a billable video visit.      The patient has been notified of following:     \"This video visit will be conducted via a call between you and your physician/provider. We have found that certain health care needs can be provided without the need for an in-person physical exam.  This service lets us provide the care you need with a video conversation.  If a prescription is necessary we can send it directly to your pharmacy.  If lab work is needed we can place an order for that and you can then stop by our lab to have the test done at a later time.    Video visits are billed at different rates depending on your insurance coverage.  Please reach out to your insurance provider with any questions.    If during the course of the call the physician/provider feels a video visit is not appropriate, you will not be charged for this service.\"    Patient has given verbal consent for Video visit? Yes    How would you like to obtain your AVS? [employer]    Patient would like the video invitation sent by: Other e-mail: .    Will anyone else be joining your video visit? No        Video-Visit Details    Type of service:  Video Visit    Video Start Time: 4:10 PM  Video End Time:  4:50 PM    Originating Location (pt. Location): Home    Distant Location (provider location):  [facility]    Platform used for Video Visit: Maykel Blanton, PhD      DUE TO THE COVID-19 PANDEMIC, THIS SESSION WAS CONDUCTED BY [employer]. THE PATIENT PROVIDED VERBAL CONSENT TO CONDUCT A TELEHEALTH THERAPY SESSION.      Psychotherapy Session Content  Discussed the impact of moving back from [place] to [place]. Reports that mood " "has been good and that depression remains in remission although she is \"overwhelmed\" by mail. Reviewed strategies she has used effectively in the past including goal setting and support from others. Discussed behavioral activation including socializing, household projects, walks, and the adoption of her new puppy. Focused on the challenges of parenting, particularly her son, and his willingness now to seek therapy (I provided referral information). Reviewed current symptoms of depression as well as CBT relapse prevention strategies.     Current Symptoms  Current symptoms include intermittent dysphoria, self-criticism, guilt, feeling overwhelmed, concentration impairment, anxiety. Denies suicidal ideation.       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     Estrella Blanton, Ph.D., L.P.    Department of Psychiatry      "

## 2020-09-01 ENCOUNTER — VIRTUAL VISIT (OUTPATIENT)
Dept: PSYCHIATRY | Facility: CLINIC | Age: 70
End: 2020-09-01
Payer: MEDICARE

## 2020-09-01 DIAGNOSIS — F33.1 MAJOR DEPRESSIVE DISORDER, RECURRENT EPISODE, MODERATE (H): Primary | ICD-10-CM

## 2020-09-03 NOTE — PROGRESS NOTES
"Progress Note  Behavioral Activation/ CBT Psychotherapy    Video Visit      Name: Lilly Babcock  : 1950  MRN: [id number]  Age: 70 year old  Date: 2020  Duration: 55 minutes (start = 8:05 AM; end = 9:00 AM)    Lilly Babcock is a 70 year old female who is being evaluated via a billable video visit.      The patient has been notified of following:     \"This video visit will be conducted via a call between you and your physician/provider. We have found that certain health care needs can be provided without the need for an in-person physical exam.  This service lets us provide the care you need with a video conversation.  If a prescription is necessary we can send it directly to your pharmacy.  If lab work is needed we can place an order for that and you can then stop by our lab to have the test done at a later time.    Video visits are billed at different rates depending on your insurance coverage.  Please reach out to your insurance provider with any questions.    If during the course of the call the physician/provider feels a video visit is not appropriate, you will not be charged for this service.\"    Patient has given verbal consent for Video visit? Yes    How would you like to obtain your AVS? [facility]    Patient would like the video invitation sent by: Other e-mail: .    Will anyone else be joining your video visit? No        Video-Visit Details    Type of service:  Video Visit    Video Start Time: 8:05 AM  Video End Time:  9:00 AM    Originating Location (pt. Location): Home    Distant Location (provider location):  Other (home)    Platform used for Video Visit: [email or web address]      DUE TO THE COVID-19 PANDEMIC, THIS SESSION WAS CONDUCTED BY TELEVIDEO. THE PATIENT PROVIDED VERBAL CONSENT TO CONDUCT A TELEHEALTH THERAPY SESSION.      Psychotherapy Session Content  Discussed increased symptoms of depression, including dysphoria several hours a day, trouble \"getting going\" in the morning, and " "challenges \"with mail piling up.\" Reviewed potential precipitants including change of season and the possibility of using a mood light. Focused on behavioral activation strategies including activity scheduling, breaking down tasks into small steps, and getting support/help from her . Discussed continued progress in behavioral activation including participating in her book group, socializing, and caring for/walking her puppy. Discussed parenting concerns and continued improvement in her son's health.    Current Symptoms  Current symptoms include dysphoria, self-criticism, guilt, feeling overwhelmed, avoidance behavior, low energy, low motivation, concentration impairment, anxiety. Denies suicidal ideation.       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     Estrella Blanton, Ph.D., L.P.    Department of Psychiatry    "

## 2020-10-01 ENCOUNTER — VIRTUAL VISIT (OUTPATIENT)
Dept: PSYCHIATRY | Facility: CLINIC | Age: 70
End: 2020-10-01
Payer: MEDICARE

## 2020-10-01 DIAGNOSIS — F33.41 RECURRENT MAJOR DEPRESSIVE DISORDER, IN PARTIAL REMISSION (H): Primary | ICD-10-CM

## 2020-10-01 NOTE — PROGRESS NOTES
"Progress Note  Behavioral Activation/ CBT Psychotherapy    Video Visit      Name: Lilly Babcock  : 1950  MRN: [id number]  Age: 70 year old  Date: 2020  Duration: 40 minutes (start = 11:15 AM--patient arrived late then we had technology problems; end = 11:55 AM)    Lilly Babcock is a 70 year old female who is being evaluated via a billable video visit.      The patient has been notified of following:     \"This video visit will be conducted via a call between you and your physician/provider. We have found that certain health care needs can be provided without the need for an in-person physical exam.  This service lets us provide the care you need with a video conversation.  If a prescription is necessary we can send it directly to your pharmacy.  If lab work is needed we can place an order for that and you can then stop by our lab to have the test done at a later time.    Video visits are billed at different rates depending on your insurance coverage.  Please reach out to your insurance provider with any questions.    If during the course of the call the physician/provider feels a video visit is not appropriate, you will not be charged for this service.\"    Patient has given verbal consent for Video visit? Yes    How would you like to obtain your AVS? [facility]    Patient would like the video invitation sent by: Other e-mail: .    Will anyone else be joining your video visit? No        Video-Visit Details    Type of service:  Video Visit    Video Start Time: 11:15 AM  Video End Time:  11:55 AM    Originating Location (pt. Location): Home    Distant Location (provider location):  Other (home)    Platform used for Video Visit: WebEvents      DUE TO THE COVID-19 PANDEMIC, THIS SESSION WAS CONDUCTED BY [employer]. THE PATIENT PROVIDED VERBAL CONSENT TO CONDUCT A TELEHEALTH THERAPY SESSION.      Psychotherapy Session Content  Discussed improvement in sleep as a result of giving up alcohol for the past week " "(watched a JOYCE talk on the link between sleep loss and dementia; stopped drinking alcohol to improve sleep and reduce dementia risk). Reviewed symptoms of depression and is generally doing well except of \"procrastinating.\" Focused on strategies to prevent \"mail piling up,\" especially related to indecisiveness about responding to ludin solicitations. Agreed to recycle instead of piling and devised a sorting system. Reviewed positive reinforcement associated with mail receipt and potential strategies to increase \"rewards\" (including letters from friends) rather than seeking rewards through less important mail. Discussed hiring an organizer, which she has found helpful in the past. Reviewed behavioral activation strategies that she has sustained including book groups, socializing, and caring for/walking her puppy and their importance for relapse prevention, especially during the pandemic. Discussed psychiatry referrals.    Current Symptoms  Current symptoms include self-criticism, guilt, inconsistent motivation, avoidance behavior, concentration impairment, anxiety. Denies suicidal ideation.       Mental Status Exam  Appearance: appropriate  Attitude: engaged  Behavior: normal  Eye Contact: normal  Speech: normal  Orientation: oriented  Mood: euthymic  Affect: mood congruient  Thought Process: clear  Suicidal Ideation: denies  Psychosis: not present      Current Diagnosis  Major depression, recurrent, severe, without psychotic features, in partial remission (F33.2)   R/O PTSD      Treatment Plan  Continue CBT/behavioral activation (monthly sessions).     Estrella Blanton, Ph.D., L.P.      "

## 2020-11-20 ENCOUNTER — VIRTUAL VISIT (OUTPATIENT)
Dept: PSYCHIATRY | Facility: CLINIC | Age: 70
End: 2020-11-20
Payer: MEDICARE

## 2020-11-20 DIAGNOSIS — F33.41 RECURRENT MAJOR DEPRESSIVE DISORDER, IN PARTIAL REMISSION (H): Primary | ICD-10-CM

## 2020-11-20 NOTE — PROGRESS NOTES
"Progress Note  Behavioral Activation/ CBT Psychotherapy    Video Visit      Name: Lilly Babcock  : 1950  MRN: [id number]  Age: 70 year old  Date: 2020  Duration: 45 minutes (start = 12:15 PM--patient arrived late because of technology problems; end = 1:00 PM)    Lilly Babcock is a 70 year old female who is being evaluated via a billable video visit.      The patient has been notified of following:     \"This video visit will be conducted via a call between you and your physician/provider. We have found that certain health care needs can be provided without the need for an in-person physical exam.  This service lets us provide the care you need with a video conversation.  If a prescription is necessary we can send it directly to your pharmacy.  If lab work is needed we can place an order for that and you can then stop by our lab to have the test done at a later time.    Video visits are billed at different rates depending on your insurance coverage.  Please reach out to your insurance provider with any questions.    If during the course of the call the physician/provider feels a video visit is not appropriate, you will not be charged for this service.\"    Patient has given verbal consent for Video visit? Yes    How would you like to obtain your AVS? [facility]    Patient would like the video invitation sent by: Other e-mail: .    Will anyone else be joining your video visit? No      Video-Visit Details    Type of service:  Video Visit    Video Start Time: 12:15 AM  Video End Time:  1:00 PM    Originating Location (pt. Location): Home    Distant Location (provider location):  Other (home)    Platform used for Video Visit: EyesBot      DUE TO THE COVID-19 PANDEMIC, THIS SESSION WAS CONDUCTED BY [employer]. THE PATIENT PROVIDED VERBAL CONSENT TO CONDUCT A TELEHEALTH THERAPY SESSION.      Psychotherapy Session Content  Discussed continued improvements in symptoms of depression including mood, sleep, " and motivation. Reviewed improvement and continued challenges in going through her mail, including successes in recycling some pieces immediately. Reviewed behavioral activation in the context of relapse prevention and the importance of activities linked with values, which she identified as relationships and social justice. Discussed strategies to increase weekend outings with friends. Discussed behavioral activation strategies that she has sustained including book groups, socializing, and caring for/walking her puppy and their importance for relapse prevention, especially during the pandemic. Reviewed suicidality and safety. Discussed upcoming holiday plans and agreed to meet again in January.    Current Symptoms  Current symptoms include self-criticism, guilt, inconsistent motivation, avoidance behavior, concentration impairment, anxiety. Denies suicidal ideation.       Mental Status Exam  Appearance: appropriate  Attitude: engaged  Behavior: normal  Eye Contact: normal  Speech: normal  Orientation: oriented  Mood: euthymic  Affect: mood congruient  Thought Process: clear  Suicidal Ideation: denies  Psychosis: not present      Current Diagnosis  Major depression, recurrent, severe, without psychotic features, in partial remission (F33.2)   R/O PTSD      Treatment Plan  Continue CBT/behavioral activation (monthly sessions).     Estrella Blanton, Ph.D., L.P.

## 2021-01-01 ENCOUNTER — HEALTH MAINTENANCE LETTER (OUTPATIENT)
Age: 71
End: 2021-01-01

## 2021-01-01 ENCOUNTER — ONCOLOGY VISIT (OUTPATIENT)
Dept: ONCOLOGY | Facility: CLINIC | Age: 71
End: 2021-01-01
Attending: INTERNAL MEDICINE
Payer: MEDICARE

## 2021-01-01 VITALS
DIASTOLIC BLOOD PRESSURE: 72 MMHG | WEIGHT: 133.9 LBS | TEMPERATURE: 98.2 F | OXYGEN SATURATION: 97 % | BODY MASS INDEX: 20.72 KG/M2 | RESPIRATION RATE: 16 BRPM | HEART RATE: 96 BPM | SYSTOLIC BLOOD PRESSURE: 117 MMHG

## 2021-01-01 DIAGNOSIS — C82.90 FOLLICULAR NON-HODGKIN'S LYMPHOMA (H): Primary | ICD-10-CM

## 2021-01-01 DIAGNOSIS — C82.29: ICD-10-CM

## 2021-01-01 LAB
ALBUMIN SERPL ELPH-MCNC: 4.5 G/DL (ref 3.7–5.1)
ALBUMIN SERPL-MCNC: 3.8 G/DL (ref 3.4–5)
ALP SERPL-CCNC: 65 U/L (ref 40–150)
ALPHA1 GLOB SERPL ELPH-MCNC: 0.3 G/DL (ref 0.2–0.4)
ALPHA2 GLOB SERPL ELPH-MCNC: 0.7 G/DL (ref 0.5–0.9)
ALT SERPL W P-5'-P-CCNC: 30 U/L (ref 0–50)
ANION GAP SERPL CALCULATED.3IONS-SCNC: 9 MMOL/L (ref 3–14)
AST SERPL W P-5'-P-CCNC: 30 U/L (ref 0–45)
B-GLOBULIN SERPL ELPH-MCNC: 0.6 G/DL (ref 0.6–1)
BASOPHILS # BLD AUTO: 0 10E3/UL (ref 0–0.2)
BASOPHILS NFR BLD AUTO: 0 %
BILIRUB SERPL-MCNC: 0.3 MG/DL (ref 0.2–1.3)
BUN SERPL-MCNC: 14 MG/DL (ref 7–30)
CALCIUM SERPL-MCNC: 9.2 MG/DL (ref 8.5–10.1)
CHLORIDE BLD-SCNC: 103 MMOL/L (ref 94–109)
CO2 SERPL-SCNC: 30 MMOL/L (ref 20–32)
CREAT SERPL-MCNC: 0.8 MG/DL (ref 0.52–1.04)
CRP SERPL-MCNC: <2.9 MG/L (ref 0–8)
EOSINOPHIL # BLD AUTO: 0 10E3/UL (ref 0–0.7)
EOSINOPHIL NFR BLD AUTO: 0 %
ERYTHROCYTE [DISTWIDTH] IN BLOOD BY AUTOMATED COUNT: 13.3 % (ref 10–15)
ERYTHROCYTE [SEDIMENTATION RATE] IN BLOOD BY WESTERGREN METHOD: 7 MM/HR (ref 0–30)
GAMMA GLOB SERPL ELPH-MCNC: 0.5 G/DL (ref 0.7–1.6)
GFR SERPL CREATININE-BSD FRML MDRD: 78 ML/MIN/1.73M2
GLUCOSE BLD-MCNC: 98 MG/DL (ref 70–99)
HCT VFR BLD AUTO: 40.4 % (ref 35–47)
HGB BLD-MCNC: 13.3 G/DL (ref 11.7–15.7)
IGA SERPL-MCNC: 99 MG/DL (ref 84–499)
IGG SERPL-MCNC: 578 MG/DL (ref 610–1616)
IGM SERPL-MCNC: 49 MG/DL (ref 35–242)
IMM GRANULOCYTES # BLD: 0 10E3/UL
IMM GRANULOCYTES NFR BLD: 0 %
KAPPA LC FREE SER-MCNC: 111.53 MG/DL (ref 0.33–1.94)
KAPPA LC FREE/LAMBDA FREE SER NEPH: 117.4 {RATIO} (ref 0.26–1.65)
LAMBDA LC FREE SERPL-MCNC: 0.95 MG/DL (ref 0.57–2.63)
LDH SERPL L TO P-CCNC: 248 U/L (ref 81–234)
LYMPHOCYTES # BLD AUTO: 0.8 10E3/UL (ref 0.8–5.3)
LYMPHOCYTES NFR BLD AUTO: 11 %
M PROTEIN SERPL ELPH-MCNC: 0 G/DL
MCH RBC QN AUTO: 31.1 PG (ref 26.5–33)
MCHC RBC AUTO-ENTMCNC: 32.9 G/DL (ref 31.5–36.5)
MCV RBC AUTO: 94 FL (ref 78–100)
MONOCYTES # BLD AUTO: 0.3 10E3/UL (ref 0–1.3)
MONOCYTES NFR BLD AUTO: 4 %
NEUTROPHILS # BLD AUTO: 6 10E3/UL (ref 1.6–8.3)
NEUTROPHILS NFR BLD AUTO: 85 %
NRBC # BLD AUTO: 0 10E3/UL
NRBC BLD AUTO-RTO: 0 /100
PLATELET # BLD AUTO: 260 10E3/UL (ref 150–450)
POTASSIUM BLD-SCNC: 4.4 MMOL/L (ref 3.4–5.3)
PROT PATTERN SERPL ELPH-IMP: ABNORMAL
PROT SERPL-MCNC: 6.9 G/DL (ref 6.8–8.8)
RBC # BLD AUTO: 4.28 10E6/UL (ref 3.8–5.2)
SODIUM SERPL-SCNC: 142 MMOL/L (ref 133–144)
TOTAL PROTEIN SERUM FOR ELP: 6.6 G/DL (ref 6.8–8.8)
WBC # BLD AUTO: 7.1 10E3/UL (ref 4–11)

## 2021-01-01 PROCEDURE — 85652 RBC SED RATE AUTOMATED: CPT | Performed by: INTERNAL MEDICINE

## 2021-01-01 PROCEDURE — 84165 PROTEIN E-PHORESIS SERUM: CPT | Mod: 26 | Performed by: PATHOLOGY

## 2021-01-01 PROCEDURE — 84165 PROTEIN E-PHORESIS SERUM: CPT | Mod: TC | Performed by: PATHOLOGY

## 2021-01-01 PROCEDURE — 36415 COLL VENOUS BLD VENIPUNCTURE: CPT | Performed by: INTERNAL MEDICINE

## 2021-01-01 PROCEDURE — 250N000011 HC RX IP 250 OP 636: Performed by: INTERNAL MEDICINE

## 2021-01-01 PROCEDURE — 0011A HC ADMIN COVID VAC MODERNA, 1ST DOSE: CPT | Performed by: INTERNAL MEDICINE

## 2021-01-01 PROCEDURE — 91301 HC RX IP 250 OP 636: CPT | Performed by: INTERNAL MEDICINE

## 2021-01-01 PROCEDURE — 83883 ASSAY NEPHELOMETRY NOT SPEC: CPT | Performed by: INTERNAL MEDICINE

## 2021-01-01 PROCEDURE — 84155 ASSAY OF PROTEIN SERUM: CPT | Performed by: INTERNAL MEDICINE

## 2021-01-01 PROCEDURE — 86140 C-REACTIVE PROTEIN: CPT | Performed by: INTERNAL MEDICINE

## 2021-01-01 PROCEDURE — 99215 OFFICE O/P EST HI 40 MIN: CPT | Performed by: INTERNAL MEDICINE

## 2021-01-01 PROCEDURE — 85004 AUTOMATED DIFF WBC COUNT: CPT | Performed by: INTERNAL MEDICINE

## 2021-01-01 PROCEDURE — G0463 HOSPITAL OUTPT CLINIC VISIT: HCPCS

## 2021-01-01 PROCEDURE — 83615 LACTATE (LD) (LDH) ENZYME: CPT | Performed by: INTERNAL MEDICINE

## 2021-01-01 PROCEDURE — 82784 ASSAY IGA/IGD/IGG/IGM EACH: CPT | Performed by: INTERNAL MEDICINE

## 2021-01-01 PROCEDURE — 82040 ASSAY OF SERUM ALBUMIN: CPT | Performed by: INTERNAL MEDICINE

## 2021-01-01 RX ORDER — ACYCLOVIR 400 MG/1
1 TABLET ORAL PRN
COMMUNITY
Start: 2021-01-01

## 2021-01-01 RX ADMIN — CX-024414 0.5 ML: 0.2 INJECTION, SUSPENSION INTRAMUSCULAR at 17:35

## 2021-01-01 ASSESSMENT — PAIN SCALES - GENERAL: PAINLEVEL: MODERATE PAIN (5)

## 2021-04-11 ENCOUNTER — HEALTH MAINTENANCE LETTER (OUTPATIENT)
Age: 71
End: 2021-04-11

## 2021-08-12 ENCOUNTER — PATIENT OUTREACH (OUTPATIENT)
Dept: ONCOLOGY | Facility: CLINIC | Age: 71
End: 2021-08-12

## 2021-08-12 NOTE — PROGRESS NOTES
Called Pt to discuss VM Re: wants to transfer to Dr Rj Gottlieb when he starts in the fall. No answer, no VM. Sent Pt mychart.

## 2021-12-23 NOTE — NURSING NOTE
"Oncology Rooming Note    December 23, 2021 3:52 PM   Lilly Babcock is a 71 year old female who presents for:    Chief Complaint   Patient presents with     Oncology Clinic Visit     NHL      Initial Vitals: /72   Pulse 96   Temp 98.2  F (36.8  C) (Oral)   Resp 16   Wt 60.7 kg (133 lb 14.4 oz)   SpO2 97%   BMI 20.72 kg/m   Estimated body mass index is 20.72 kg/m  as calculated from the following:    Height as of 12/13/18: 1.712 m (5' 7.4\").    Weight as of this encounter: 60.7 kg (133 lb 14.4 oz). Body surface area is 1.7 meters squared.  Moderate Pain (5) Comment: Data Unavailable   No LMP recorded. Patient is postmenopausal.  Allergies reviewed: Yes  Medications reviewed: Yes    Medications: Medication refills not needed today.  Pharmacy name entered into [employer]:    [id number] - [place], [place] - [address] - [place], [place] - [address] [facility]ACY #[id number] - [place], [place] - [address] [facility]Y #[id number] - [place], [place] - [address] [facility]    Clinical concerns: Patient is concerned about her left hip bone pain. She states that it is getting worse. She states that when she bends down to pick something up and comes back up she gets dizzy and short of breathe. This has been happening for about 6 months.     She is also wondering if she can get a Moderna booster. She has received three doses of Pfizer vaccines. She is travelling to [facility] in January so she wants to make sure she is extra covered.     Torri Silva, [facility] December 23, 2021  3:53 PM             "

## 2021-12-23 NOTE — PROGRESS NOTES
[facility] Medical Oncology Note               12/23/2021    Outpatient Progress Note      Assessment:     1. 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 These lymphomas usually are in the more aggressive category and it is pretty amazing that Joyce has really never needed to have systemic therapy for this. She has received focal radiotherapy almost 9 years ago and that has been it for her lymphoma. I suspect that she has had significant treatment for her lymphoma with steroids which have been given to her to treat her polymyalgia rheumatica. But at some point, with this histology, I do expect the need for systemic treatment.  2. I think we may be there now. Joyce presents with significant widespread pain, fatigue, and worsening symptoms in areas of her known lymphoma. The left hip is major concern while she could have issues from her orthopedic hardware, I really think we are at the point where we need to rule out progression of her lymphoma. Therefore we will get labs today to do this as well as a CT/PET scan.  3. Given her lymphoma history as well as other autoimmune disease and chronic steroids, she does meet the criteria of being immunocompromised. I would like to give her another Covid vaccine today. 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 I will order this for her. It certainly will not be harmful and hopefully will help.  4. Once we acquire more data, I will have her come back to clinic and we will discuss the results and where we go from here regarding management.        Plan:     1. Labs today to include a CBC, CMP, LDH, quantitative immunoglobulins, serum protein electrophoresis, and free plasma light chains.  2. CT/PET scan ASAP. That said it will likely take a few weeks to complete.  3. Moderna Covid vaccine today      Refugio HOLM  "MD Chantal, MSc  Associate Professor of Medicine  [employer]  [facility]  [address] [place] [address]  [phone]    __________________________________________________________________    Diagnosis     1. Stage YOUSUF follicular lymphoma, diagnosed at August 2008 after biopsy of a left breast mass (grade 2-3A). Initial w/u identified involvement of multiple skeletal sites and the bone marrow.   2. Polymyalgia rheumatica for which she is on chronic steroids, with current dose being 7 mg daily  3. Osteoarthritis status post bilateral hip replacement (please see below and therapy to date).      History of Present Illness/Therapy to Date:     1. Observation from [id number]  2. Radiotherapy (4140 cGy in 20 fractions to the left hip/proximal femur) 08/20 thru 09/20/2012 at [facility].  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   3. In July 2016 developed nausea of unknown etiology, workup negative for CNS involvement with MR and CSF analysis. PET scan on 8/18/16 suggested \"progressive\" skeletal disease, but overall her disease had been variable.    4. Left total hip arthroplasty on 10/19/16 for the avascular necrosis and a right hip replacement on 05/10/2017 for degenerative disease.  5. 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 This resulted in major improvement in her symptoms and her CRP decreased substantially - she has been trying tov very slowly taper off.      6. 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 Thereafter, she developed an ulcerative lesion that was diagnosed as pyoderma gangrenosum.  It was treated with Dapsone and topical tacrolimus topical.  The lesion, which was about 6 cm in size, responded.   7. Her most recent PET/CT scan was on 08/04/2018 and was compared to that of 08/2016.  Radiology " "commented on the waxing and waning course of the numerous hypermetabolic skeletal lesions.  Several lesions had diminished or disappeared and there were several new lesions along the spine, pelvis and left distal femur.  Some of these were associated with underlying sclerotic bone changes. There  were a few small hypermetabolic lymph nodes in both axillae and the right iliac chain.   8. Last seen by Dr. Cornell Malagon 2019. \" There is no indication for further imaging or interventions relative to her lymphoma at this point and will continue to follow her at perhaps 6-month intervals with exam and laboratory testing.  She knows to call in the interim if additional issues arise.\"          Interval history:     Saw rheumatology 12/1/2021:\"This is the three-month follow-up for the patient. We have been trying to reduce the dose of prednisone by half a mg every few weeks. However, she still takes 7 mg a day. She has been helping her sister in [place] and every time she tries to cut the dose of prednisone, she feels more aching. She has been back to [place] since last night. She hopes to cut down the dose of prednisone further whenever she feels better. I am not certain whether ongoing aching is secondary to PMR or the results of overuse.\" It was suggested to her to have blood tests for sed rate and CRP for an objective evaluation, but these labs were not drawn.    Joyce is seeing me today and is really not feeling well. She has significant increase in ache really everywhere in her body. She is worried about the left proximal femur the most. But she just really feels unwell. She is fatigued. She has significant loose stools that have not improved with multiple evaluations. She is seen a gastroenterologist but has not discussed the results of her recent colonoscopy, that was reportedly negative. She is really worried about a lymphomatous relapse and needing treatment    She is very worried about her compromised immune " system and is requesting a Moderna vaccine today against Covid.    She denies fevers or cough. She does get short of breath with activity however. She has been tested multiple times for Covid and found to be negative.    She denies any adenopathy, but has never had this as part of her lymphoma.    She is requesting a breast exam today because it has been a while since she has had one.        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 She is . She has 2 children. She lives in a house by [place]. She used to love to ski but this has been difficult for her to do since her hip replacements.          Family History:     Family History   Problem Relation Age of Onset     Asthma Sister      Diabetes Father      Depression Sister      Depression Mother      Mental Illness Mother      Mental Illness Sister      Asthma Sister      Asthma Sister      Cancer Mother         breast     Cancer Father         lung ca, was a smoker     Depression Sister      Other Cancer Mother      Other Cancer Father      Other Cancer Sister             Medications:     Current Outpatient Medications   Medication Sig Dispense Refill     acetaminophen (TYLENOL) 500 MG tablet Take 500-1,000 mg by mouth       albuterol (PROAIR HFA/PROVENTIL HFA/VENTOLIN HFA) 108 (90 Base) MCG/ACT inhaler Inhale 2 puffs into the lungs       amoxicillin (AMOXIL) 500 MG capsule TAKE 4 CAPS BY MOUTH ONE HOUR PRIOR TO DENTAL  PROCEDURES/CLEANING  3     Cholecalciferol (VITAMIN D) 1000 UNIT capsule Take 1 capsule by mouth daily.       clobetasol (TEMOVATE) 0.05 % ointment Reported on 3/9/2017       escitalopram (LEXAPRO) 10 MG tablet        escitalopram (LEXAPRO) 20 MG tablet Take 20 mg by mouth daily  5     fexofenadine (ALLEGRA) 180 MG tablet Take  by mouth daily.       ipratropium (ATROVENT) 0.06 % spray Spray 2 sprays in nostril       ketoconazole (NIZORAL) 2 % shampoo        levalbuterol (XOPENEX HFA) 45 MCG/ACT Inhaler Inhale 1-2 puffs into the lungs       methylPREDNISolone (MEDROL) 32 MG tablet Take one tablet (32mg) by mouth 12 hours prior to scheduled CT scan.  Repeat above dose 2 hours prior to CT scan 2 tablet 1     mometasone (ELOCON) 0.1 % ointment Reported on 3/9/2017       predniSONE (DELTASONE) 1 MG tablet Take 6 tablets (6 mg) by mouth daily       triamcinolone (KENALOG) 0.1 % cream Apply 0.5 inches topically 2 times daily Reported on 3/9/2017       valACYclovir (VALTREX) 500 MG tablet        VIIBRYD 20 MG TABS tablet                 Physical Exam:   not currently breastfeeding.    ECOG PS: 1  Constitutional: WDWN female in NAD, pleasant and appropriate. She does appear minimally uncomfortable.  HEENT:  NC/AT, minimal conjunctival pallor.  Skin: No jaundice nor ecchymoses. She has a lot of evidence of prior sun damage but no acutely worsening or concerning lesion  Lungs: CTAB, no w/r/r, nonlabored breathing  Cardiovascular: RRR, S1, S2, no m/r/g  Abdomen: +BS, soft, nontender, nondistended, no organomegaly nor masses  MSK/Extremities: Warm, well perfused. No edema  LN: no cervical, supraclavicular, axillary, nor inguinal lymphadenopathy  Neurologic: alert, answering questions appropriately, moving all extremities spontaneously. CN 2-12 grossly intact.  Psych: appropriate affect  The bilateral breasts are examined. They are glandular for age. There is no discrete abnormality although the exam is minimally heterogeneous.  There is no nipple retraction. The bilateral axillae are negative.  Data:     Recent Labs   Lab Test 10/03/19  1610 03/07/19  1014 12/13/18  1535 07/12/18  1222 03/15/18  1349   WBC 8.2 7.6 7.3 6.2 8.2   NEUTROPHIL 78.4 71.0 70.5 76.6 83.9   HGB 12.3 12.8 12.8 12.4 12.5    229 227 263 255     Recent Labs   Lab Test 10/03/19  1610 03/07/19  1014 12/13/18  1535 07/12/18  1222 03/15/18  1349    134 139 137 137   POTASSIUM 4.4 3.9 4.3 4.2 4.2   CHLORIDE 102 100 103 103 103   CO2 28 29 29 27 28   ANIONGAP 5 4 7 6 6   BUN 12 14 19 10 16   CR 0.67 0.72 0.76 0.74 0.70   FLORENCIA 9.1 8.8 9.4 9.4 9.2     Recent Labs   Lab Test 10/03/19  1610 03/07/19  1014 12/13/18  1535 07/12/18  1222 03/15/18  1349 12/07/16  1109 10/20/16  0742 10/19/16  0624 08/04/16  1354   MAG  --   --   --   --   --   --  1.8 1.6 2.1   PHOS  --   --   --   --   --   --   --   --  3.4   * 206 229   < > 226   < >  --   --  210   URIC  --   --  5.4  --  5.4  --   --   --  4.2    < > = values in this interval not displayed.     Recent Labs   Lab Test 10/03/19  1610 03/07/19  1014 12/13/18  1535   BILITOTAL 0.2 0.5 0.2   ALKPHOS 64 69 69   ALT 27 25 28   AST 27 26 28   ALBUMIN 3.8 3.6 3.7   * 206 229       No results found for this or any previous visit (from the past 24 hour(s)).    Other data           Labs, imaging and treatment plan reviewed with patient. All questions answered.        45 minutes spent on the date of the encounter doing chart review, review of outside records, review of test results, interpretation of tests, patient visit and documentation

## 2021-12-23 NOTE — LETTER
Date:January 5, 2022      Provider requested that no letter be sent. Do not send.       [facility]

## 2021-12-23 NOTE — LETTER
12/23/2021         RE: Lilly Babcock  [address]  [facility] [place] [address]        Dear Colleague,    Thank you for referring your patient, Lilly Babcock, to the [facility]. Please see a copy of my visit note below.      [facility] Medical Oncology Note               12/23/2021    Outpatient Progress Note      Assessment:     1. 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 These lymphomas usually are in the more aggressive category and it is pretty amazing that Joyce has really never needed to have systemic therapy for this. She has received focal radiotherapy almost 9 years ago and that has been it for her lymphoma. I suspect that she has had significant treatment for her lymphoma with steroids which have been given to her to treat her polymyalgia rheumatica. But at some point, with this histology, I do expect the need for systemic treatment.  2. I think we may be there now. Joyce presents with significant widespread pain, fatigue, and worsening symptoms in areas of her known lymphoma. The left hip is major concern while she could have issues from her orthopedic hardware, I really think we are at the point where we need to rule out progression of her lymphoma. Therefore we will get labs today to do this as well as a CT/PET scan.  3. Given her lymphoma history as well as other autoimmune disease and chronic steroids, she does meet the criteria of being immunocompromised. I would like to give her another Covid vaccine today. 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 I will order this for her. It certainly will not be harmful and hopefully will help.  4. Once we acquire more data, I will have her come back to clinic and we will discuss the results and where we go from here regarding management.        Plan:  "    1. Labs today to include a CBC, CMP, LDH, quantitative immunoglobulins, serum protein electrophoresis, and free plasma light chains.  2. CT/PET scan ASAP. That said it will likely take a few weeks to complete.  3. Moderna Covid vaccine today      Refugio Cornejo MD, MSc  Associate Professor of Medicine  [employer]  [facility]  [address] [place] [address]  [phone]    __________________________________________________________________    Diagnosis     1. Stage YOUSUF follicular lymphoma, diagnosed at August 2008 after biopsy of a left breast mass (grade 2-3A). Initial w/u identified involvement of multiple skeletal sites and the bone marrow.   2. Polymyalgia rheumatica for which she is on chronic steroids, with current dose being 7 mg daily  3. Osteoarthritis status post bilateral hip replacement (please see below and therapy to date).      History of Present Illness/Therapy to Date:     1. Observation from 1925-1283  2. Radiotherapy (4140 cGy in 20 fractions to the left hip/proximal femur) 08/20 thru 09/20/2012 at [facility].  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   3. In July 2016 developed nausea of unknown etiology, workup negative for CNS involvement with MR and CSF analysis. PET scan on 8/18/16 suggested \"progressive\" skeletal disease, but overall her disease had been variable.    4. Left total hip arthroplasty on 10/19/16 for the avascular necrosis and a right hip replacement on 05/10/2017 for degenerative disease.  5. 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 This resulted in major improvement in her symptoms and her CRP decreased substantially - she has been trying tov very slowly taper off.      6. 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 Thereafter, she developed an ulcerative " "lesion that was diagnosed as pyoderma gangrenosum.  It was treated with Dapsone and topical tacrolimus topical.  The lesion, which was about 6 cm in size, responded.   7. Her most recent PET/CT scan was on 08/04/2018 and was compared to that of 08/2016.  Radiology commented on the waxing and waning course of the numerous hypermetabolic skeletal lesions.  Several lesions had diminished or disappeared and there were several new lesions along the spine, pelvis and left distal femur.  Some of these were associated with underlying sclerotic bone changes. There  were a few small hypermetabolic lymph nodes in both axillae and the right iliac chain.   8. Last seen by Dr. Cornell Malagon 2019. \" There is no indication for further imaging or interventions relative to her lymphoma at this point and will continue to follow her at perhaps 6-month intervals with exam and laboratory testing.  She knows to call in the interim if additional issues arise.\"          Interval history:     Saw rheumatology 12/1/2021:\"This is the three-month follow-up for the patient. We have been trying to reduce the dose of prednisone by half a mg every few weeks. However, she still takes 7 mg a day. She has been helping her sister in [place] and every time she tries to cut the dose of prednisone, she feels more aching. She has been back to [place] since last night. She hopes to cut down the dose of prednisone further whenever she feels better. I am not certain whether ongoing aching is secondary to PMR or the results of overuse.\" It was suggested to her to have blood tests for sed rate and CRP for an objective evaluation, but these labs were not drawn.    Joyce is seeing me today and is really not feeling well. She has significant increase in ache really everywhere in her body. She is worried about the left proximal femur the most. But she just really feels unwell. She is fatigued. She has significant loose stools that have not improved with " multiple evaluations. She is seen a gastroenterologist but has not discussed the results of her recent colonoscopy, that was reportedly negative. She is really worried about a lymphomatous relapse and needing treatment    She is very worried about her compromised immune system and is requesting a Moderna vaccine today against Covid.    She denies fevers or cough. She does get short of breath with activity however. She has been tested multiple times for Covid and found to be negative.    She denies any adenopathy, but has never had this as part of her lymphoma.    She is requesting a breast exam today because it has been a while since she has had one.        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 She is . She has 2 children. She lives in a house by [place]. She used to love to ski but this has been difficult for her to do since her hip replacements.          Family History:     Family History   Problem Relation Age of Onset     Asthma Sister      Diabetes Father      Depression Sister      Depression Mother      Mental Illness Mother      Mental Illness Sister      Asthma Sister      Asthma Sister      Cancer Mother         breast     Cancer Father         lung ca, was a smoker     Depression Sister      Other Cancer Mother      Other Cancer Father      Other Cancer Sister             Medications:     Current Outpatient Medications   Medication Sig Dispense Refill      acetaminophen (TYLENOL) 500 MG tablet Take 500-1,000 mg by mouth       albuterol (PROAIR HFA/PROVENTIL HFA/VENTOLIN HFA) 108 (90 Base) MCG/ACT inhaler Inhale 2 puffs into the lungs       amoxicillin (AMOXIL) 500 MG capsule TAKE 4 CAPS BY MOUTH ONE HOUR PRIOR TO DENTAL PROCEDURES/CLEANING  3     Cholecalciferol (VITAMIN D) 1000 UNIT capsule Take 1 capsule by mouth daily.       clobetasol (TEMOVATE) 0.05 % ointment Reported on 3/9/2017       escitalopram (LEXAPRO) 10 MG tablet        escitalopram (LEXAPRO) 20 MG tablet Take 20 mg by mouth daily  5     fexofenadine (ALLEGRA) 180 MG tablet Take  by mouth daily.       ipratropium (ATROVENT) 0.06 % spray Spray 2 sprays in nostril       ketoconazole (NIZORAL) 2 % shampoo        levalbuterol (XOPENEX HFA) 45 MCG/ACT Inhaler Inhale 1-2 puffs into the lungs       methylPREDNISolone (MEDROL) 32 MG tablet Take one tablet (32mg) by mouth 12 hours prior to scheduled CT scan.  Repeat above dose 2 hours prior to CT scan 2 tablet 1     mometasone (ELOCON) 0.1 % ointment Reported on 3/9/2017       predniSONE (DELTASONE) 1 MG tablet Take 6 tablets (6 mg) by mouth daily       triamcinolone (KENALOG) 0.1 % cream Apply 0.5 inches topically 2 times daily Reported on 3/9/2017       valACYclovir (VALTREX) 500 MG tablet        VIIBRYD 20 MG TABS tablet                 Physical Exam:   not currently breastfeeding.    ECOG PS: 1  Constitutional: WDWN female in NAD, pleasant and appropriate. She does appear minimally uncomfortable.  HEENT:  NC/AT, minimal conjunctival pallor.  Skin: No jaundice nor ecchymoses. She has a lot of evidence of prior sun damage but no acutely worsening or concerning lesion  Lungs: CTAB, no w/r/r, nonlabored breathing  Cardiovascular: RRR, S1, S2, no m/r/g  Abdomen: +BS, soft, nontender, nondistended, no organomegaly nor masses  MSK/Extremities: Warm, well perfused. No edema  LN: no cervical, supraclavicular, axillary, nor inguinal lymphadenopathy  Neurologic: alert,  answering questions appropriately, moving all extremities spontaneously. CN 2-12 grossly intact.  Psych: appropriate affect  The bilateral breasts are examined. They are glandular for age. There is no discrete abnormality although the exam is minimally heterogeneous. There is no nipple retraction. The bilateral axillae are negative.  Data:     Recent Labs   Lab Test 10/03/19  1610 03/07/19  1014 12/13/18  1535 07/12/18  1222 03/15/18  1349   WBC 8.2 7.6 7.3 6.2 8.2   NEUTROPHIL 78.4 71.0 70.5 76.6 83.9   HGB 12.3 12.8 12.8 12.4 12.5    229 227 263 255     Recent Labs   Lab Test 10/03/19  1610 03/07/19  1014 12/13/18  1535 07/12/18  1222 03/15/18  1349    134 139 137 137   POTASSIUM 4.4 3.9 4.3 4.2 4.2   CHLORIDE 102 100 103 103 103   CO2 28 29 29 27 28   ANIONGAP 5 4 7 6 6   BUN 12 14 19 10 16   CR 0.67 0.72 0.76 0.74 0.70   FLORENCIA 9.1 8.8 9.4 9.4 9.2     Recent Labs   Lab Test 10/03/19  1610 03/07/19  1014 12/13/18  1535 07/12/18  1222 03/15/18  1349 12/07/16  1109 10/20/16  0742 10/19/16  0624 08/04/16  1354   MAG  --   --   --   --   --   --  1.8 1.6 2.1   PHOS  --   --   --   --   --   --   --   --  3.4   * 206 229   < > 226   < >  --   --  210   URIC  --   --  5.4  --  5.4  --   --   --  4.2    < > = values in this interval not displayed.     Recent Labs   Lab Test 10/03/19  1610 03/07/19  1014 12/13/18  1535   BILITOTAL 0.2 0.5 0.2   ALKPHOS 64 69 69   ALT 27 25 28   AST 27 26 28   ALBUMIN 3.8 3.6 3.7   * 206 229       No results found for this or any previous visit (from the past 24 hour(s)).    Other data           Labs, imaging and treatment plan reviewed with patient. All questions answered.        45 minutes spent on the date of the encounter doing chart review, review of outside records, review of test results, interpretation of tests, patient visit and documentation               Again, thank you for allowing me to participate in the care of your patient.         Sincerely,        Refugio Cornejo MD

## 2021-12-23 NOTE — NURSING NOTE
Chief Complaint   Patient presents with     Oncology Clinic Visit     NHL      Labs drawn with vpt by rn.  Pt tolerated well.      Administered Moderna vaccine for pt. Pt tolerated well. Pt was given information sheet and signed consent form.    Orlando Fairbanks RN

## 2022-01-01 ENCOUNTER — TELEPHONE (OUTPATIENT)
Dept: ONCOLOGY | Facility: CLINIC | Age: 72
End: 2022-01-01
Payer: MEDICARE

## 2022-01-01 ENCOUNTER — APPOINTMENT (OUTPATIENT)
Dept: CT IMAGING | Facility: CLINIC | Age: 72
DRG: 025 | End: 2022-01-01
Attending: INTERNAL MEDICINE
Payer: MEDICARE

## 2022-01-01 ENCOUNTER — DOCUMENTATION ONLY (OUTPATIENT)
Dept: NEUROSURGERY | Facility: CLINIC | Age: 72
End: 2022-01-01
Payer: MEDICARE

## 2022-01-01 ENCOUNTER — HOSPITAL ENCOUNTER (OUTPATIENT)
Dept: PET IMAGING | Facility: CLINIC | Age: 72
Setting detail: NUCLEAR MEDICINE
Discharge: HOME | End: 2022-02-08
Attending: INTERNAL MEDICINE | Admitting: INTERNAL MEDICINE
Payer: MEDICARE

## 2022-01-01 ENCOUNTER — OFFICE VISIT (OUTPATIENT)
Dept: PSYCHIATRY | Facility: CLINIC | Age: 72
End: 2022-01-01
Payer: MEDICARE

## 2022-01-01 ENCOUNTER — APPOINTMENT (OUTPATIENT)
Dept: CT IMAGING | Facility: CLINIC | Age: 72
End: 2022-01-01
Attending: INTERNAL MEDICINE
Payer: MEDICARE

## 2022-01-01 ENCOUNTER — APPOINTMENT (OUTPATIENT)
Dept: CT IMAGING | Facility: CLINIC | Age: 72
DRG: 085 | End: 2022-01-01
Attending: STUDENT IN AN ORGANIZED HEALTH CARE EDUCATION/TRAINING PROGRAM
Payer: MEDICARE

## 2022-01-01 ENCOUNTER — APPOINTMENT (OUTPATIENT)
Dept: OCCUPATIONAL THERAPY | Facility: CLINIC | Age: 72
DRG: 025 | End: 2022-01-01
Attending: STUDENT IN AN ORGANIZED HEALTH CARE EDUCATION/TRAINING PROGRAM
Payer: MEDICARE

## 2022-01-01 ENCOUNTER — APPOINTMENT (OUTPATIENT)
Dept: LAB | Facility: CLINIC | Age: 72
End: 2022-01-01
Attending: STUDENT IN AN ORGANIZED HEALTH CARE EDUCATION/TRAINING PROGRAM
Payer: MEDICARE

## 2022-01-01 ENCOUNTER — APPOINTMENT (OUTPATIENT)
Dept: MRI IMAGING | Facility: CLINIC | Age: 72
DRG: 085 | End: 2022-01-01
Payer: MEDICARE

## 2022-01-01 ENCOUNTER — HOSPITAL ENCOUNTER (INPATIENT)
Facility: CLINIC | Age: 72
LOS: 2 days | Discharge: NURSING, REHAB OR LONG-TERM CARE | DRG: 085 | End: 2022-06-09
Attending: EMERGENCY MEDICINE | Admitting: EMERGENCY MEDICINE
Payer: MEDICARE

## 2022-01-01 ENCOUNTER — DOCUMENTATION ONLY (OUTPATIENT)
Dept: ONCOLOGY | Facility: CLINIC | Age: 72
End: 2022-01-01
Payer: MEDICARE

## 2022-01-01 ENCOUNTER — ANESTHESIA (OUTPATIENT)
Dept: SURGERY | Facility: CLINIC | Age: 72
DRG: 025 | End: 2022-01-01
Payer: MEDICARE

## 2022-01-01 ENCOUNTER — HOSPITAL ENCOUNTER (INPATIENT)
Facility: SKILLED NURSING FACILITY | Age: 72
LOS: 7 days | Discharge: ANOTHER HOSPITAL | DRG: 949 | End: 2022-06-16
Attending: INTERNAL MEDICINE | Admitting: INTERNAL MEDICINE
Payer: MEDICARE

## 2022-01-01 ENCOUNTER — LAB (OUTPATIENT)
Dept: LAB | Facility: CLINIC | Age: 72
End: 2022-01-01
Payer: MEDICARE

## 2022-01-01 ENCOUNTER — INFUSION THERAPY VISIT (OUTPATIENT)
Dept: ONCOLOGY | Facility: CLINIC | Age: 72
End: 2022-01-01
Attending: STUDENT IN AN ORGANIZED HEALTH CARE EDUCATION/TRAINING PROGRAM
Payer: MEDICARE

## 2022-01-01 ENCOUNTER — APPOINTMENT (OUTPATIENT)
Dept: GENERAL RADIOLOGY | Facility: CLINIC | Age: 72
DRG: 085 | End: 2022-01-01
Attending: PSYCHIATRY & NEUROLOGY
Payer: MEDICARE

## 2022-01-01 ENCOUNTER — APPOINTMENT (OUTPATIENT)
Dept: SPEECH THERAPY | Facility: SKILLED NURSING FACILITY | Age: 72
DRG: 949 | End: 2022-01-01
Attending: INTERNAL MEDICINE
Payer: MEDICARE

## 2022-01-01 ENCOUNTER — APPOINTMENT (OUTPATIENT)
Dept: CT IMAGING | Facility: CLINIC | Age: 72
DRG: 085 | End: 2022-01-01
Payer: MEDICARE

## 2022-01-01 ENCOUNTER — ONCOLOGY VISIT (OUTPATIENT)
Dept: ONCOLOGY | Facility: CLINIC | Age: 72
End: 2022-01-01
Attending: PHYSICIAN ASSISTANT
Payer: MEDICARE

## 2022-01-01 ENCOUNTER — APPOINTMENT (OUTPATIENT)
Dept: PHYSICAL THERAPY | Facility: CLINIC | Age: 72
DRG: 025 | End: 2022-01-01
Attending: STUDENT IN AN ORGANIZED HEALTH CARE EDUCATION/TRAINING PROGRAM
Payer: MEDICARE

## 2022-01-01 ENCOUNTER — TELEPHONE (OUTPATIENT)
Dept: ONCOLOGY | Facility: CLINIC | Age: 72
End: 2022-01-01

## 2022-01-01 ENCOUNTER — DOCUMENTATION ONLY (OUTPATIENT)
Dept: ONCOLOGY | Facility: CLINIC | Age: 72
End: 2022-01-01

## 2022-01-01 ENCOUNTER — APPOINTMENT (OUTPATIENT)
Dept: CT IMAGING | Facility: CLINIC | Age: 72
DRG: 025 | End: 2022-01-01
Attending: STUDENT IN AN ORGANIZED HEALTH CARE EDUCATION/TRAINING PROGRAM
Payer: MEDICARE

## 2022-01-01 ENCOUNTER — APPOINTMENT (OUTPATIENT)
Dept: OCCUPATIONAL THERAPY | Facility: SKILLED NURSING FACILITY | Age: 72
DRG: 949 | End: 2022-01-01
Attending: INTERNAL MEDICINE
Payer: MEDICARE

## 2022-01-01 ENCOUNTER — TELEPHONE (OUTPATIENT)
Dept: NEUROLOGY | Facility: CLINIC | Age: 72
End: 2022-01-01

## 2022-01-01 ENCOUNTER — APPOINTMENT (OUTPATIENT)
Dept: PHYSICAL THERAPY | Facility: SKILLED NURSING FACILITY | Age: 72
DRG: 949 | End: 2022-01-01
Attending: INTERNAL MEDICINE
Payer: MEDICARE

## 2022-01-01 ENCOUNTER — HOSPITAL ENCOUNTER (OUTPATIENT)
Dept: PET IMAGING | Facility: CLINIC | Age: 72
Setting detail: NUCLEAR MEDICINE
Discharge: HOME | End: 2022-05-24
Attending: PHYSICIAN ASSISTANT | Admitting: PHYSICIAN ASSISTANT
Payer: MEDICARE

## 2022-01-01 ENCOUNTER — APPOINTMENT (OUTPATIENT)
Dept: EDUCATION SERVICES | Facility: CLINIC | Age: 72
DRG: 085 | End: 2022-01-01
Payer: MEDICARE

## 2022-01-01 ENCOUNTER — PATIENT OUTREACH (OUTPATIENT)
Dept: ONCOLOGY | Facility: CLINIC | Age: 72
End: 2022-01-01

## 2022-01-01 ENCOUNTER — APPOINTMENT (OUTPATIENT)
Dept: PHYSICAL THERAPY | Facility: CLINIC | Age: 72
DRG: 085 | End: 2022-01-01
Payer: MEDICARE

## 2022-01-01 ENCOUNTER — MYC MEDICAL ADVICE (OUTPATIENT)
Dept: ONCOLOGY | Facility: CLINIC | Age: 72
End: 2022-01-01
Payer: MEDICARE

## 2022-01-01 ENCOUNTER — PATIENT OUTREACH (OUTPATIENT)
Dept: CARE COORDINATION | Facility: CLINIC | Age: 72
End: 2022-01-01

## 2022-01-01 ENCOUNTER — PATIENT OUTREACH (OUTPATIENT)
Dept: ONCOLOGY | Facility: CLINIC | Age: 72
End: 2022-01-01
Payer: MEDICARE

## 2022-01-01 ENCOUNTER — VIRTUAL VISIT (OUTPATIENT)
Dept: PSYCHIATRY | Facility: CLINIC | Age: 72
End: 2022-01-01
Attending: PSYCHOLOGIST
Payer: MEDICARE

## 2022-01-01 ENCOUNTER — APPOINTMENT (OUTPATIENT)
Dept: LAB | Facility: CLINIC | Age: 72
End: 2022-01-01
Attending: INTERNAL MEDICINE
Payer: MEDICARE

## 2022-01-01 ENCOUNTER — HOSPITAL ENCOUNTER (INPATIENT)
Facility: CLINIC | Age: 72
LOS: 1 days | Discharge: HOME | DRG: 025 | End: 2022-06-16
Attending: EMERGENCY MEDICINE | Admitting: STUDENT IN AN ORGANIZED HEALTH CARE EDUCATION/TRAINING PROGRAM
Payer: MEDICARE

## 2022-01-01 ENCOUNTER — LAB (OUTPATIENT)
Dept: LAB | Facility: CLINIC | Age: 72
End: 2022-01-01
Attending: STUDENT IN AN ORGANIZED HEALTH CARE EDUCATION/TRAINING PROGRAM

## 2022-01-01 ENCOUNTER — APPOINTMENT (OUTPATIENT)
Dept: CT IMAGING | Facility: CLINIC | Age: 72
DRG: 085 | End: 2022-01-01
Attending: EMERGENCY MEDICINE
Payer: MEDICARE

## 2022-01-01 ENCOUNTER — HEALTH MAINTENANCE LETTER (OUTPATIENT)
Age: 72
End: 2022-01-01

## 2022-01-01 ENCOUNTER — TELEPHONE (OUTPATIENT)
Dept: NEUROSURGERY | Facility: CLINIC | Age: 72
End: 2022-01-01

## 2022-01-01 ENCOUNTER — APPOINTMENT (OUTPATIENT)
Dept: GENERAL RADIOLOGY | Facility: CLINIC | Age: 72
DRG: 025 | End: 2022-01-01
Attending: STUDENT IN AN ORGANIZED HEALTH CARE EDUCATION/TRAINING PROGRAM
Payer: MEDICARE

## 2022-01-01 ENCOUNTER — APPOINTMENT (OUTPATIENT)
Dept: PHYSICAL THERAPY | Facility: CLINIC | Age: 72
DRG: 085 | End: 2022-01-01
Attending: NURSE PRACTITIONER
Payer: MEDICARE

## 2022-01-01 ENCOUNTER — APPOINTMENT (OUTPATIENT)
Dept: OCCUPATIONAL THERAPY | Facility: CLINIC | Age: 72
DRG: 085 | End: 2022-01-01
Payer: MEDICARE

## 2022-01-01 ENCOUNTER — TELEPHONE (OUTPATIENT)
Dept: NEUROSURGERY | Facility: CLINIC | Age: 72
End: 2022-01-01
Payer: MEDICARE

## 2022-01-01 ENCOUNTER — ANESTHESIA EVENT (OUTPATIENT)
Dept: SURGERY | Facility: CLINIC | Age: 72
DRG: 025 | End: 2022-01-01
Payer: MEDICARE

## 2022-01-01 ENCOUNTER — ONCOLOGY VISIT (OUTPATIENT)
Dept: ONCOLOGY | Facility: CLINIC | Age: 72
End: 2022-01-01
Attending: INTERNAL MEDICINE
Payer: MEDICARE

## 2022-01-01 ENCOUNTER — HOSPITAL ENCOUNTER (INPATIENT)
Facility: CLINIC | Age: 72
LOS: 7 days | Discharge: HOME | DRG: 025 | End: 2022-06-23
Attending: STUDENT IN AN ORGANIZED HEALTH CARE EDUCATION/TRAINING PROGRAM | Admitting: STUDENT IN AN ORGANIZED HEALTH CARE EDUCATION/TRAINING PROGRAM
Payer: MEDICARE

## 2022-01-01 ENCOUNTER — ANCILLARY PROCEDURE (OUTPATIENT)
Dept: NEUROLOGY | Facility: CLINIC | Age: 72
DRG: 025 | End: 2022-01-01
Attending: PSYCHIATRY & NEUROLOGY
Payer: MEDICARE

## 2022-01-01 ENCOUNTER — APPOINTMENT (OUTPATIENT)
Dept: CARDIOLOGY | Facility: CLINIC | Age: 72
DRG: 085 | End: 2022-01-01
Payer: MEDICARE

## 2022-01-01 ENCOUNTER — VIRTUAL VISIT (OUTPATIENT)
Dept: ONCOLOGY | Facility: CLINIC | Age: 72
End: 2022-01-01
Attending: REGISTERED NURSE
Payer: MEDICARE

## 2022-01-01 ENCOUNTER — APPOINTMENT (OUTPATIENT)
Dept: CT IMAGING | Facility: CLINIC | Age: 72
DRG: 025 | End: 2022-01-01
Attending: EMERGENCY MEDICINE
Payer: MEDICARE

## 2022-01-01 ENCOUNTER — APPOINTMENT (OUTPATIENT)
Dept: CT IMAGING | Facility: CLINIC | Age: 72
DRG: 085 | End: 2022-01-01
Attending: NURSE PRACTITIONER
Payer: MEDICARE

## 2022-01-01 ENCOUNTER — NURSE TRIAGE (OUTPATIENT)
Dept: ONCOLOGY | Facility: CLINIC | Age: 72
End: 2022-01-01
Payer: MEDICARE

## 2022-01-01 ENCOUNTER — HOSPITAL ENCOUNTER (OUTPATIENT)
Facility: CLINIC | Age: 72
Setting detail: OBSERVATION
Discharge: HOME | DRG: 085 | End: 2022-06-05
Attending: EMERGENCY MEDICINE | Admitting: STUDENT IN AN ORGANIZED HEALTH CARE EDUCATION/TRAINING PROGRAM
Payer: MEDICARE

## 2022-01-01 ENCOUNTER — INFUSION THERAPY VISIT (OUTPATIENT)
Dept: ONCOLOGY | Facility: CLINIC | Age: 72
End: 2022-01-01
Payer: MEDICARE

## 2022-01-01 ENCOUNTER — ANCILLARY PROCEDURE (OUTPATIENT)
Dept: GENERAL RADIOLOGY | Facility: CLINIC | Age: 72
End: 2022-01-01
Attending: STUDENT IN AN ORGANIZED HEALTH CARE EDUCATION/TRAINING PROGRAM
Payer: MEDICARE

## 2022-01-01 ENCOUNTER — NURSE TRIAGE (OUTPATIENT)
Dept: NURSING | Facility: CLINIC | Age: 72
End: 2022-01-01
Payer: MEDICARE

## 2022-01-01 VITALS
WEIGHT: 135.2 LBS | SYSTOLIC BLOOD PRESSURE: 116 MMHG | RESPIRATION RATE: 18 BRPM | BODY MASS INDEX: 21.18 KG/M2 | OXYGEN SATURATION: 96 % | TEMPERATURE: 98 F | DIASTOLIC BLOOD PRESSURE: 74 MMHG | HEART RATE: 85 BPM

## 2022-01-01 VITALS
OXYGEN SATURATION: 100 % | SYSTOLIC BLOOD PRESSURE: 99 MMHG | DIASTOLIC BLOOD PRESSURE: 58 MMHG | HEART RATE: 83 BPM | RESPIRATION RATE: 20 BRPM

## 2022-01-01 VITALS
RESPIRATION RATE: 14 BRPM | TEMPERATURE: 97.8 F | OXYGEN SATURATION: 98 % | DIASTOLIC BLOOD PRESSURE: 70 MMHG | SYSTOLIC BLOOD PRESSURE: 127 MMHG | HEART RATE: 78 BPM

## 2022-01-01 VITALS
HEIGHT: 67 IN | SYSTOLIC BLOOD PRESSURE: 108 MMHG | OXYGEN SATURATION: 100 % | DIASTOLIC BLOOD PRESSURE: 58 MMHG | BODY MASS INDEX: 19.78 KG/M2 | TEMPERATURE: 98.1 F | RESPIRATION RATE: 16 BRPM | WEIGHT: 126 LBS | HEART RATE: 75 BPM

## 2022-01-01 VITALS
WEIGHT: 133 LBS | BODY MASS INDEX: 20.58 KG/M2 | OXYGEN SATURATION: 97 % | DIASTOLIC BLOOD PRESSURE: 74 MMHG | HEART RATE: 79 BPM | SYSTOLIC BLOOD PRESSURE: 117 MMHG

## 2022-01-01 VITALS
RESPIRATION RATE: 16 BRPM | BODY MASS INDEX: 18.09 KG/M2 | WEIGHT: 115.52 LBS | SYSTOLIC BLOOD PRESSURE: 122 MMHG | OXYGEN SATURATION: 94 % | DIASTOLIC BLOOD PRESSURE: 54 MMHG | HEART RATE: 112 BPM | TEMPERATURE: 98 F

## 2022-01-01 VITALS
DIASTOLIC BLOOD PRESSURE: 64 MMHG | HEIGHT: 67 IN | OXYGEN SATURATION: 98 % | TEMPERATURE: 97.9 F | HEART RATE: 94 BPM | SYSTOLIC BLOOD PRESSURE: 110 MMHG | RESPIRATION RATE: 15 BRPM | WEIGHT: 125 LBS | BODY MASS INDEX: 19.62 KG/M2

## 2022-01-01 VITALS
WEIGHT: 133.1 LBS | SYSTOLIC BLOOD PRESSURE: 116 MMHG | HEART RATE: 78 BPM | BODY MASS INDEX: 20.85 KG/M2 | TEMPERATURE: 97.8 F | RESPIRATION RATE: 16 BRPM | OXYGEN SATURATION: 98 % | DIASTOLIC BLOOD PRESSURE: 69 MMHG

## 2022-01-01 VITALS
WEIGHT: 131 LBS | RESPIRATION RATE: 14 BRPM | DIASTOLIC BLOOD PRESSURE: 47 MMHG | OXYGEN SATURATION: 98 % | BODY MASS INDEX: 20.52 KG/M2 | TEMPERATURE: 98.2 F | HEART RATE: 73 BPM | SYSTOLIC BLOOD PRESSURE: 92 MMHG

## 2022-01-01 VITALS
OXYGEN SATURATION: 98 % | RESPIRATION RATE: 16 BRPM | DIASTOLIC BLOOD PRESSURE: 64 MMHG | TEMPERATURE: 98 F | SYSTOLIC BLOOD PRESSURE: 105 MMHG | BODY MASS INDEX: 19.75 KG/M2 | HEART RATE: 62 BPM | WEIGHT: 126.1 LBS

## 2022-01-01 VITALS
RESPIRATION RATE: 18 BRPM | DIASTOLIC BLOOD PRESSURE: 79 MMHG | SYSTOLIC BLOOD PRESSURE: 129 MMHG | HEART RATE: 76 BPM | OXYGEN SATURATION: 99 % | WEIGHT: 129.5 LBS | BODY MASS INDEX: 20.28 KG/M2 | TEMPERATURE: 98.1 F

## 2022-01-01 VITALS
SYSTOLIC BLOOD PRESSURE: 100 MMHG | TEMPERATURE: 97.6 F | DIASTOLIC BLOOD PRESSURE: 61 MMHG | OXYGEN SATURATION: 99 % | BODY MASS INDEX: 20.56 KG/M2 | HEART RATE: 74 BPM | WEIGHT: 131.3 LBS | RESPIRATION RATE: 18 BRPM

## 2022-01-01 VITALS
HEART RATE: 81 BPM | SYSTOLIC BLOOD PRESSURE: 102 MMHG | RESPIRATION RATE: 16 BRPM | TEMPERATURE: 97.7 F | OXYGEN SATURATION: 100 % | WEIGHT: 134.7 LBS | BODY MASS INDEX: 20.85 KG/M2 | DIASTOLIC BLOOD PRESSURE: 59 MMHG

## 2022-01-01 VITALS
WEIGHT: 129.6 LBS | DIASTOLIC BLOOD PRESSURE: 56 MMHG | OXYGEN SATURATION: 100 % | HEART RATE: 76 BPM | TEMPERATURE: 97.7 F | SYSTOLIC BLOOD PRESSURE: 101 MMHG | RESPIRATION RATE: 18 BRPM | BODY MASS INDEX: 20.3 KG/M2

## 2022-01-01 VITALS
BODY MASS INDEX: 21.28 KG/M2 | WEIGHT: 135.9 LBS | DIASTOLIC BLOOD PRESSURE: 53 MMHG | OXYGEN SATURATION: 99 % | SYSTOLIC BLOOD PRESSURE: 93 MMHG | TEMPERATURE: 98 F | HEART RATE: 79 BPM

## 2022-01-01 VITALS
DIASTOLIC BLOOD PRESSURE: 57 MMHG | SYSTOLIC BLOOD PRESSURE: 131 MMHG | BODY MASS INDEX: 20.61 KG/M2 | TEMPERATURE: 97.8 F | HEART RATE: 68 BPM | WEIGHT: 133.2 LBS | OXYGEN SATURATION: 100 %

## 2022-01-01 VITALS
TEMPERATURE: 97.8 F | SYSTOLIC BLOOD PRESSURE: 104 MMHG | RESPIRATION RATE: 20 BRPM | OXYGEN SATURATION: 100 % | HEART RATE: 84 BPM | HEIGHT: 67 IN | DIASTOLIC BLOOD PRESSURE: 53 MMHG | BODY MASS INDEX: 19.58 KG/M2

## 2022-01-01 VITALS
OXYGEN SATURATION: 98 % | SYSTOLIC BLOOD PRESSURE: 107 MMHG | BODY MASS INDEX: 21.1 KG/M2 | DIASTOLIC BLOOD PRESSURE: 67 MMHG | HEIGHT: 67 IN | HEART RATE: 83 BPM

## 2022-01-01 DIAGNOSIS — R05.1 ACUTE COUGH: ICD-10-CM

## 2022-01-01 DIAGNOSIS — F33.0 MAJOR DEPRESSIVE DISORDER, RECURRENT EPISODE, MILD (H): Primary | ICD-10-CM

## 2022-01-01 DIAGNOSIS — F33.1 MAJOR DEPRESSIVE DISORDER, RECURRENT EPISODE, MODERATE (H): Primary | ICD-10-CM

## 2022-01-01 DIAGNOSIS — C82.90 FOLLICULAR NON-HODGKIN'S LYMPHOMA (H): Primary | ICD-10-CM

## 2022-01-01 DIAGNOSIS — S06.5XAA SUBDURAL HEMATOMA (H): ICD-10-CM

## 2022-01-01 DIAGNOSIS — C82.99 NODULAR LYMPHOMA OF EXTRANODAL AND/OR SOLID ORGAN SITE (H): Primary | ICD-10-CM

## 2022-01-01 DIAGNOSIS — S06.5XAA SUBDURAL HEMATOMA (H): Primary | ICD-10-CM

## 2022-01-01 DIAGNOSIS — C82.99 NODULAR LYMPHOMA OF EXTRANODAL AND/OR SOLID ORGAN SITE (H): ICD-10-CM

## 2022-01-01 DIAGNOSIS — Z29.89 SEIZURE PROPHYLAXIS: ICD-10-CM

## 2022-01-01 DIAGNOSIS — C82.00 FOLLICULAR LYMPHOMA GRADE I, UNSPECIFIED BODY REGION (H): ICD-10-CM

## 2022-01-01 DIAGNOSIS — S22.31XA CLOSED FRACTURE OF ONE RIB OF RIGHT SIDE, INITIAL ENCOUNTER: ICD-10-CM

## 2022-01-01 DIAGNOSIS — Z20.822 COVID-19 RULED OUT BY LABORATORY TESTING: ICD-10-CM

## 2022-01-01 DIAGNOSIS — C82.90 FOLLICULAR NON-HODGKIN'S LYMPHOMA (H): ICD-10-CM

## 2022-01-01 DIAGNOSIS — F33.41 RECURRENT MAJOR DEPRESSIVE DISORDER, IN PARTIAL REMISSION (H): Primary | ICD-10-CM

## 2022-01-01 DIAGNOSIS — R05.9 COUGH: Primary | ICD-10-CM

## 2022-01-01 DIAGNOSIS — R56.9 SEIZURES (H): ICD-10-CM

## 2022-01-01 DIAGNOSIS — R53.1 WEAKNESS GENERALIZED: ICD-10-CM

## 2022-01-01 DIAGNOSIS — Z11.59 ENCOUNTER FOR SCREENING FOR OTHER VIRAL DISEASES: ICD-10-CM

## 2022-01-01 DIAGNOSIS — C82.29: ICD-10-CM

## 2022-01-01 DIAGNOSIS — C82.00 FOLLICULAR LYMPHOMA GRADE I (H): Primary | ICD-10-CM

## 2022-01-01 DIAGNOSIS — S06.5XAA SDH (SUBDURAL HEMATOMA) (H): ICD-10-CM

## 2022-01-01 DIAGNOSIS — S32.010A COMPRESSION FRACTURE OF L1 LUMBAR VERTEBRA, CLOSED, INITIAL ENCOUNTER (H): ICD-10-CM

## 2022-01-01 DIAGNOSIS — C82.00 FOLLICULAR LYMPHOMA GRADE I, UNSPECIFIED BODY REGION (H): Primary | ICD-10-CM

## 2022-01-01 DIAGNOSIS — G43.109 MIGRAINE WITH AURA AND WITHOUT STATUS MIGRAINOSUS, NOT INTRACTABLE: ICD-10-CM

## 2022-01-01 DIAGNOSIS — C82.99 FOLLICULAR LYMPHOMA OF EXTRANODAL AND SOLID ORGAN SITES (H): Primary | ICD-10-CM

## 2022-01-01 DIAGNOSIS — Z71.89 OTHER SPECIFIED COUNSELING: ICD-10-CM

## 2022-01-01 DIAGNOSIS — Z91.041 CONTRAST MEDIA ALLERGY: ICD-10-CM

## 2022-01-01 DIAGNOSIS — R26.9 GAIT DISTURBANCE: ICD-10-CM

## 2022-01-01 DIAGNOSIS — R05.9 COUGH: ICD-10-CM

## 2022-01-01 DIAGNOSIS — R21 RASH: ICD-10-CM

## 2022-01-01 DIAGNOSIS — I63.9 CEREBROVASCULAR ACCIDENT (CVA), UNSPECIFIED MECHANISM (H): Primary | ICD-10-CM

## 2022-01-01 DIAGNOSIS — M54.12 CERVICAL RADICULAR PAIN: ICD-10-CM

## 2022-01-01 LAB
ABO/RH(D): NORMAL
ABO/RH(D): NORMAL
ALBUMIN SERPL BCG-MCNC: 3.7 G/DL (ref 3.5–5.2)
ALBUMIN SERPL-MCNC: 3.3 G/DL (ref 3.4–5)
ALBUMIN SERPL-MCNC: 3.4 G/DL (ref 3.4–5)
ALBUMIN SERPL-MCNC: 3.5 G/DL (ref 3.4–5)
ALBUMIN SERPL-MCNC: 3.6 G/DL (ref 3.4–5)
ALBUMIN SERPL-MCNC: 3.7 G/DL (ref 3.4–5)
ALBUMIN SERPL-MCNC: 3.9 G/DL (ref 3.4–5)
ALBUMIN UR-MCNC: NEGATIVE MG/DL
ALBUMIN UR-MCNC: NEGATIVE MG/DL
ALP SERPL-CCNC: 102 U/L (ref 35–104)
ALP SERPL-CCNC: 58 U/L (ref 40–150)
ALP SERPL-CCNC: 58 U/L (ref 40–150)
ALP SERPL-CCNC: 63 U/L (ref 40–150)
ALP SERPL-CCNC: 64 U/L (ref 40–150)
ALP SERPL-CCNC: 64 U/L (ref 40–150)
ALP SERPL-CCNC: 65 U/L (ref 40–150)
ALP SERPL-CCNC: 66 U/L (ref 40–150)
ALP SERPL-CCNC: 69 U/L (ref 40–150)
ALP SERPL-CCNC: 70 U/L (ref 40–150)
ALP SERPL-CCNC: 72 U/L (ref 40–150)
ALP SERPL-CCNC: 76 U/L (ref 40–150)
ALP SERPL-CCNC: 79 U/L (ref 40–150)
ALT SERPL W P-5'-P-CCNC: 24 U/L (ref 0–50)
ALT SERPL W P-5'-P-CCNC: 24 U/L (ref 0–50)
ALT SERPL W P-5'-P-CCNC: 25 U/L (ref 10–35)
ALT SERPL W P-5'-P-CCNC: 28 U/L (ref 0–50)
ALT SERPL W P-5'-P-CCNC: 29 U/L (ref 0–50)
ALT SERPL W P-5'-P-CCNC: 32 U/L (ref 0–50)
ALT SERPL W P-5'-P-CCNC: 32 U/L (ref 0–50)
ALT SERPL W P-5'-P-CCNC: 34 U/L (ref 0–50)
ALT SERPL W P-5'-P-CCNC: 35 U/L (ref 0–50)
ALT SERPL W P-5'-P-CCNC: 37 U/L (ref 0–50)
ALT SERPL W P-5'-P-CCNC: 39 U/L (ref 0–50)
ALT SERPL W P-5'-P-CCNC: 40 U/L (ref 0–50)
ALT SERPL W P-5'-P-CCNC: 44 U/L (ref 0–50)
AMMONIA PLAS-SCNC: 30 UMOL/L (ref 11–51)
ANION GAP SERPL CALCULATED.3IONS-SCNC: 13 MMOL/L (ref 7–15)
ANION GAP SERPL CALCULATED.3IONS-SCNC: 3 MMOL/L (ref 3–14)
ANION GAP SERPL CALCULATED.3IONS-SCNC: 4 MMOL/L (ref 3–14)
ANION GAP SERPL CALCULATED.3IONS-SCNC: 5 MMOL/L (ref 3–14)
ANION GAP SERPL CALCULATED.3IONS-SCNC: 6 MMOL/L (ref 3–14)
ANION GAP SERPL CALCULATED.3IONS-SCNC: 7 MMOL/L (ref 3–14)
ANION GAP SERPL CALCULATED.3IONS-SCNC: 7 MMOL/L (ref 3–14)
ANION GAP SERPL CALCULATED.3IONS-SCNC: 8 MMOL/L (ref 3–14)
ANION GAP SERPL CALCULATED.3IONS-SCNC: 8 MMOL/L (ref 7–15)
ANION GAP SERPL CALCULATED.3IONS-SCNC: 9 MMOL/L (ref 3–14)
ANION GAP SERPL CALCULATED.3IONS-SCNC: 9 MMOL/L (ref 3–14)
ANTIBODY SCREEN: NEGATIVE
ANTIBODY SCREEN: NEGATIVE
APPEARANCE UR: CLEAR
APPEARANCE UR: CLEAR
APTT PPP: 26 SECONDS (ref 22–38)
APTT PPP: 26 SECONDS (ref 22–38)
APTT PPP: 27 SECONDS (ref 22–38)
AST SERPL W P-5'-P-CCNC: 17 U/L (ref 0–45)
AST SERPL W P-5'-P-CCNC: 18 U/L (ref 0–45)
AST SERPL W P-5'-P-CCNC: 22 U/L (ref 0–45)
AST SERPL W P-5'-P-CCNC: 24 U/L (ref 0–45)
AST SERPL W P-5'-P-CCNC: 25 U/L (ref 0–45)
AST SERPL W P-5'-P-CCNC: 25 U/L (ref 0–45)
AST SERPL W P-5'-P-CCNC: 27 U/L (ref 10–35)
AST SERPL W P-5'-P-CCNC: 29 U/L (ref 0–45)
AST SERPL W P-5'-P-CCNC: 30 U/L (ref 0–45)
AST SERPL W P-5'-P-CCNC: 30 U/L (ref 0–45)
AST SERPL W P-5'-P-CCNC: 31 U/L (ref 0–45)
AST SERPL W P-5'-P-CCNC: 31 U/L (ref 0–45)
AST SERPL W P-5'-P-CCNC: 32 U/L (ref 0–45)
ATRIAL RATE - MUSE: 78 BPM
ATRIAL RATE - MUSE: 84 BPM
B2 MICROGLOB TUMOR MARKER SER-MCNC: 1.6 MG/L
BASOPHILS # BLD AUTO: 0 10E3/UL (ref 0–0.2)
BASOPHILS # BLD AUTO: 0.1 10E3/UL (ref 0–0.2)
BASOPHILS # BLD AUTO: 0.2 10E3/UL (ref 0–0.2)
BASOPHILS NFR BLD AUTO: 1 %
BASOPHILS NFR BLD AUTO: 2 %
BASOPHILS NFR BLD AUTO: 3 %
BILIRUB DIRECT SERPL-MCNC: <0.2 MG/DL (ref 0–0.3)
BILIRUB SERPL-MCNC: 0.2 MG/DL (ref 0.2–1.3)
BILIRUB SERPL-MCNC: 0.3 MG/DL
BILIRUB SERPL-MCNC: 0.3 MG/DL (ref 0.2–1.3)
BILIRUB SERPL-MCNC: 0.4 MG/DL (ref 0.2–1.3)
BILIRUB SERPL-MCNC: 0.5 MG/DL (ref 0.2–1.3)
BILIRUB UR QL STRIP: NEGATIVE
BILIRUB UR QL STRIP: NEGATIVE
BUN SERPL-MCNC: 10 MG/DL (ref 7–30)
BUN SERPL-MCNC: 10 MG/DL (ref 7–30)
BUN SERPL-MCNC: 11 MG/DL (ref 7–30)
BUN SERPL-MCNC: 12 MG/DL (ref 7–30)
BUN SERPL-MCNC: 13 MG/DL (ref 7–30)
BUN SERPL-MCNC: 13 MG/DL (ref 7–30)
BUN SERPL-MCNC: 14 MG/DL (ref 7–30)
BUN SERPL-MCNC: 15 MG/DL (ref 7–30)
BUN SERPL-MCNC: 18 MG/DL (ref 7–30)
BUN SERPL-MCNC: 7 MG/DL (ref 7–30)
BUN SERPL-MCNC: 7.6 MG/DL (ref 8–23)
BUN SERPL-MCNC: 8 MG/DL (ref 7–30)
BUN SERPL-MCNC: 9 MG/DL (ref 7–30)
BUN SERPL-MCNC: 9.2 MG/DL (ref 8–23)
CALCIUM SERPL-MCNC: 8.8 MG/DL (ref 8.5–10.1)
CALCIUM SERPL-MCNC: 8.9 MG/DL (ref 8.5–10.1)
CALCIUM SERPL-MCNC: 8.9 MG/DL (ref 8.5–10.1)
CALCIUM SERPL-MCNC: 9 MG/DL (ref 8.5–10.1)
CALCIUM SERPL-MCNC: 9.1 MG/DL (ref 8.5–10.1)
CALCIUM SERPL-MCNC: 9.2 MG/DL (ref 8.5–10.1)
CALCIUM SERPL-MCNC: 9.3 MG/DL (ref 8.5–10.1)
CALCIUM SERPL-MCNC: 9.4 MG/DL (ref 8.5–10.1)
CALCIUM SERPL-MCNC: 9.7 MG/DL (ref 8.8–10.2)
CALCIUM SERPL-MCNC: 9.7 MG/DL (ref 8.8–10.2)
CHLORIDE BLD-SCNC: 102 MMOL/L (ref 94–109)
CHLORIDE BLD-SCNC: 103 MMOL/L (ref 94–109)
CHLORIDE BLD-SCNC: 104 MMOL/L (ref 94–109)
CHLORIDE BLD-SCNC: 105 MMOL/L (ref 94–109)
CHLORIDE BLD-SCNC: 105 MMOL/L (ref 94–109)
CHLORIDE BLD-SCNC: 106 MMOL/L (ref 94–109)
CHLORIDE BLD-SCNC: 107 MMOL/L (ref 94–109)
CHLORIDE BLD-SCNC: 108 MMOL/L (ref 94–109)
CHLORIDE BLD-SCNC: 109 MMOL/L (ref 94–109)
CHLORIDE SERPL-SCNC: 97 MMOL/L (ref 98–107)
CHLORIDE SERPL-SCNC: 98 MMOL/L (ref 98–107)
CHOLEST SERPL-MCNC: 219 MG/DL
CO2 SERPL-SCNC: 21 MMOL/L (ref 20–32)
CO2 SERPL-SCNC: 25 MMOL/L (ref 20–32)
CO2 SERPL-SCNC: 26 MMOL/L (ref 20–32)
CO2 SERPL-SCNC: 26 MMOL/L (ref 20–32)
CO2 SERPL-SCNC: 27 MMOL/L (ref 20–32)
CO2 SERPL-SCNC: 27 MMOL/L (ref 20–32)
CO2 SERPL-SCNC: 28 MMOL/L (ref 20–32)
CO2 SERPL-SCNC: 29 MMOL/L (ref 20–32)
CO2 SERPL-SCNC: 30 MMOL/L (ref 20–32)
CO2 SERPL-SCNC: 31 MMOL/L (ref 20–32)
CO2 SERPL-SCNC: 32 MMOL/L (ref 20–32)
CO2 SERPL-SCNC: 32 MMOL/L (ref 20–32)
COLOR UR AUTO: NORMAL
COLOR UR AUTO: NORMAL
CREAT BLD-MCNC: 0.8 MG/DL (ref 0.5–1)
CREAT SERPL-MCNC: 0.49 MG/DL (ref 0.52–1.04)
CREAT SERPL-MCNC: 0.5 MG/DL (ref 0.52–1.04)
CREAT SERPL-MCNC: 0.51 MG/DL (ref 0.51–0.95)
CREAT SERPL-MCNC: 0.52 MG/DL (ref 0.52–1.04)
CREAT SERPL-MCNC: 0.53 MG/DL (ref 0.52–1.04)
CREAT SERPL-MCNC: 0.58 MG/DL (ref 0.51–0.95)
CREAT SERPL-MCNC: 0.59 MG/DL (ref 0.52–1.04)
CREAT SERPL-MCNC: 0.62 MG/DL (ref 0.52–1.04)
CREAT SERPL-MCNC: 0.66 MG/DL (ref 0.52–1.04)
CREAT SERPL-MCNC: 0.66 MG/DL (ref 0.52–1.04)
CREAT SERPL-MCNC: 0.69 MG/DL (ref 0.52–1.04)
CREAT SERPL-MCNC: 0.7 MG/DL (ref 0.52–1.04)
CREAT SERPL-MCNC: 0.73 MG/DL (ref 0.52–1.04)
CREAT SERPL-MCNC: 0.74 MG/DL (ref 0.52–1.04)
CREAT SERPL-MCNC: 0.74 MG/DL (ref 0.52–1.04)
CREAT SERPL-MCNC: 0.75 MG/DL (ref 0.52–1.04)
CREAT SERPL-MCNC: 0.77 MG/DL (ref 0.52–1.04)
CREAT SERPL-MCNC: 0.78 MG/DL (ref 0.52–1.04)
CREAT SERPL-MCNC: 0.79 MG/DL (ref 0.52–1.04)
CREAT SERPL-MCNC: 0.79 MG/DL (ref 0.52–1.04)
CREAT SERPL-MCNC: 0.8 MG/DL (ref 0.52–1.04)
CREAT SERPL-MCNC: 0.8 MG/DL (ref 0.52–1.04)
CREAT SERPL-MCNC: 0.82 MG/DL (ref 0.52–1.04)
CREAT SERPL-MCNC: 0.88 MG/DL (ref 0.52–1.04)
DEPRECATED HCO3 PLAS-SCNC: 23 MMOL/L (ref 22–29)
DEPRECATED HCO3 PLAS-SCNC: 28 MMOL/L (ref 22–29)
DIASTOLIC BLOOD PRESSURE - MUSE: NORMAL MMHG
DIASTOLIC BLOOD PRESSURE - MUSE: NORMAL MMHG
EOSINOPHIL # BLD AUTO: 0 10E3/UL (ref 0–0.7)
EOSINOPHIL # BLD AUTO: 0 10E3/UL (ref 0–0.7)
EOSINOPHIL # BLD AUTO: 0.1 10E3/UL (ref 0–0.7)
EOSINOPHIL # BLD AUTO: 0.3 10E3/UL (ref 0–0.7)
EOSINOPHIL # BLD AUTO: 0.3 10E3/UL (ref 0–0.7)
EOSINOPHIL # BLD AUTO: 0.5 10E3/UL (ref 0–0.7)
EOSINOPHIL # BLD AUTO: 0.6 10E3/UL (ref 0–0.7)
EOSINOPHIL NFR BLD AUTO: 0 %
EOSINOPHIL NFR BLD AUTO: 1 %
EOSINOPHIL NFR BLD AUTO: 12 %
EOSINOPHIL NFR BLD AUTO: 2 %
EOSINOPHIL NFR BLD AUTO: 2 %
EOSINOPHIL NFR BLD AUTO: 3 %
EOSINOPHIL NFR BLD AUTO: 5 %
EOSINOPHIL NFR BLD AUTO: 6 %
EOSINOPHIL NFR BLD AUTO: 9 %
ERYTHROCYTE [DISTWIDTH] IN BLOOD BY AUTOMATED COUNT: 12.1 % (ref 10–15)
ERYTHROCYTE [DISTWIDTH] IN BLOOD BY AUTOMATED COUNT: 12.2 % (ref 10–15)
ERYTHROCYTE [DISTWIDTH] IN BLOOD BY AUTOMATED COUNT: 12.2 % (ref 10–15)
ERYTHROCYTE [DISTWIDTH] IN BLOOD BY AUTOMATED COUNT: 12.6 % (ref 10–15)
ERYTHROCYTE [DISTWIDTH] IN BLOOD BY AUTOMATED COUNT: 13.1 % (ref 10–15)
ERYTHROCYTE [DISTWIDTH] IN BLOOD BY AUTOMATED COUNT: 14.2 % (ref 10–15)
ERYTHROCYTE [DISTWIDTH] IN BLOOD BY AUTOMATED COUNT: 14.3 % (ref 10–15)
ERYTHROCYTE [DISTWIDTH] IN BLOOD BY AUTOMATED COUNT: 14.4 % (ref 10–15)
ERYTHROCYTE [DISTWIDTH] IN BLOOD BY AUTOMATED COUNT: 14.4 % (ref 10–15)
ERYTHROCYTE [DISTWIDTH] IN BLOOD BY AUTOMATED COUNT: 14.5 % (ref 10–15)
ERYTHROCYTE [DISTWIDTH] IN BLOOD BY AUTOMATED COUNT: 14.6 % (ref 10–15)
ERYTHROCYTE [DISTWIDTH] IN BLOOD BY AUTOMATED COUNT: 14.6 % (ref 10–15)
ERYTHROCYTE [DISTWIDTH] IN BLOOD BY AUTOMATED COUNT: 14.8 % (ref 10–15)
ERYTHROCYTE [DISTWIDTH] IN BLOOD BY AUTOMATED COUNT: 15.8 % (ref 10–15)
ERYTHROCYTE [DISTWIDTH] IN BLOOD BY AUTOMATED COUNT: 15.9 % (ref 10–15)
ERYTHROCYTE [DISTWIDTH] IN BLOOD BY AUTOMATED COUNT: 15.9 % (ref 10–15)
ERYTHROCYTE [DISTWIDTH] IN BLOOD BY AUTOMATED COUNT: 16 % (ref 10–15)
ERYTHROCYTE [DISTWIDTH] IN BLOOD BY AUTOMATED COUNT: 16.2 % (ref 10–15)
ERYTHROCYTE [DISTWIDTH] IN BLOOD BY AUTOMATED COUNT: 16.3 % (ref 10–15)
FACT XIII AG ACT/NOR PPP IA: 102 % (ref 75–155)
FLUAV RNA SPEC QL NAA+PROBE: NEGATIVE
FLUBV RNA RESP QL NAA+PROBE: NEGATIVE
GFR SERPL CREATININE-BSD FRML MDRD: 69 ML/MIN/1.73M2
GFR SERPL CREATININE-BSD FRML MDRD: 76 ML/MIN/1.73M2
GFR SERPL CREATININE-BSD FRML MDRD: 77 ML/MIN/1.73M2
GFR SERPL CREATININE-BSD FRML MDRD: 77 ML/MIN/1.73M2
GFR SERPL CREATININE-BSD FRML MDRD: 79 ML/MIN/1.73M2
GFR SERPL CREATININE-BSD FRML MDRD: 79 ML/MIN/1.73M2
GFR SERPL CREATININE-BSD FRML MDRD: 80 ML/MIN/1.73M2
GFR SERPL CREATININE-BSD FRML MDRD: 81 ML/MIN/1.73M2
GFR SERPL CREATININE-BSD FRML MDRD: 84 ML/MIN/1.73M2
GFR SERPL CREATININE-BSD FRML MDRD: 85 ML/MIN/1.73M2
GFR SERPL CREATININE-BSD FRML MDRD: 85 ML/MIN/1.73M2
GFR SERPL CREATININE-BSD FRML MDRD: 87 ML/MIN/1.73M2
GFR SERPL CREATININE-BSD FRML MDRD: >60 ML/MIN/1.73M2
GFR SERPL CREATININE-BSD FRML MDRD: >90 ML/MIN/1.73M2
GLUCOSE BLD-MCNC: 100 MG/DL (ref 70–99)
GLUCOSE BLD-MCNC: 103 MG/DL (ref 70–99)
GLUCOSE BLD-MCNC: 105 MG/DL (ref 70–99)
GLUCOSE BLD-MCNC: 107 MG/DL (ref 70–99)
GLUCOSE BLD-MCNC: 119 MG/DL (ref 70–99)
GLUCOSE BLD-MCNC: 64 MG/DL (ref 70–99)
GLUCOSE BLD-MCNC: 81 MG/DL (ref 70–99)
GLUCOSE BLD-MCNC: 84 MG/DL (ref 70–99)
GLUCOSE BLD-MCNC: 86 MG/DL (ref 70–99)
GLUCOSE BLD-MCNC: 88 MG/DL (ref 70–99)
GLUCOSE BLD-MCNC: 90 MG/DL (ref 70–99)
GLUCOSE BLD-MCNC: 90 MG/DL (ref 70–99)
GLUCOSE BLD-MCNC: 91 MG/DL (ref 70–99)
GLUCOSE BLD-MCNC: 92 MG/DL (ref 70–99)
GLUCOSE BLD-MCNC: 92 MG/DL (ref 70–99)
GLUCOSE BLD-MCNC: 93 MG/DL (ref 70–99)
GLUCOSE BLD-MCNC: 93 MG/DL (ref 70–99)
GLUCOSE BLD-MCNC: 94 MG/DL (ref 70–99)
GLUCOSE BLD-MCNC: 94 MG/DL (ref 70–99)
GLUCOSE BLD-MCNC: 95 MG/DL (ref 70–99)
GLUCOSE BLD-MCNC: 96 MG/DL (ref 70–99)
GLUCOSE BLD-MCNC: 96 MG/DL (ref 70–99)
GLUCOSE BLDC GLUCOMTR-MCNC: 101 MG/DL (ref 70–99)
GLUCOSE BLDC GLUCOMTR-MCNC: 104 MG/DL (ref 70–99)
GLUCOSE BLDC GLUCOMTR-MCNC: 108 MG/DL (ref 70–99)
GLUCOSE BLDC GLUCOMTR-MCNC: 109 MG/DL (ref 70–99)
GLUCOSE BLDC GLUCOMTR-MCNC: 117 MG/DL (ref 70–99)
GLUCOSE BLDC GLUCOMTR-MCNC: 130 MG/DL (ref 70–99)
GLUCOSE BLDC GLUCOMTR-MCNC: 137 MG/DL (ref 70–99)
GLUCOSE BLDC GLUCOMTR-MCNC: 175 MG/DL (ref 70–99)
GLUCOSE BLDC GLUCOMTR-MCNC: 243 MG/DL (ref 70–99)
GLUCOSE BLDC GLUCOMTR-MCNC: 80 MG/DL (ref 70–99)
GLUCOSE BLDC GLUCOMTR-MCNC: 84 MG/DL (ref 70–99)
GLUCOSE BLDC GLUCOMTR-MCNC: 87 MG/DL (ref 70–99)
GLUCOSE BLDC GLUCOMTR-MCNC: 89 MG/DL (ref 70–99)
GLUCOSE BLDC GLUCOMTR-MCNC: 96 MG/DL (ref 70–99)
GLUCOSE BLDC GLUCOMTR-MCNC: 97 MG/DL (ref 70–99)
GLUCOSE SERPL-MCNC: 104 MG/DL (ref 70–99)
GLUCOSE SERPL-MCNC: 98 MG/DL (ref 70–99)
GLUCOSE UR STRIP-MCNC: NEGATIVE MG/DL
GLUCOSE UR STRIP-MCNC: NEGATIVE MG/DL
HBA1C MFR BLD: 5.5 % (ref 0–5.6)
HBV CORE AB SERPL QL IA: NONREACTIVE
HBV CORE AB SERPL QL IA: NONREACTIVE
HBV SURFACE AB SERPL IA-ACNC: 33.23 M[IU]/ML
HBV SURFACE AG SERPL QL IA: NONREACTIVE
HBV SURFACE AG SERPL QL IA: NONREACTIVE
HCT VFR BLD AUTO: 31.5 % (ref 35–47)
HCT VFR BLD AUTO: 33.4 % (ref 35–47)
HCT VFR BLD AUTO: 33.8 % (ref 35–47)
HCT VFR BLD AUTO: 34 % (ref 35–47)
HCT VFR BLD AUTO: 34.1 % (ref 35–47)
HCT VFR BLD AUTO: 34.2 % (ref 35–47)
HCT VFR BLD AUTO: 34.4 % (ref 35–47)
HCT VFR BLD AUTO: 34.7 % (ref 35–47)
HCT VFR BLD AUTO: 34.8 % (ref 35–47)
HCT VFR BLD AUTO: 34.9 % (ref 35–47)
HCT VFR BLD AUTO: 35.1 % (ref 35–47)
HCT VFR BLD AUTO: 35.4 % (ref 35–47)
HCT VFR BLD AUTO: 36 % (ref 35–47)
HCT VFR BLD AUTO: 36 % (ref 35–47)
HCT VFR BLD AUTO: 36.1 % (ref 35–47)
HCT VFR BLD AUTO: 36.4 % (ref 35–47)
HCT VFR BLD AUTO: 36.5 % (ref 35–47)
HCT VFR BLD AUTO: 37.4 % (ref 35–47)
HCT VFR BLD AUTO: 37.4 % (ref 35–47)
HCV AB SERPL QL IA: NONREACTIVE
HDLC SERPL-MCNC: 77 MG/DL
HGB BLD-MCNC: 10.2 G/DL (ref 11.7–15.7)
HGB BLD-MCNC: 10.8 G/DL (ref 11.7–15.7)
HGB BLD-MCNC: 10.9 G/DL (ref 11.7–15.7)
HGB BLD-MCNC: 11 G/DL (ref 11.7–15.7)
HGB BLD-MCNC: 11.1 G/DL (ref 11.7–15.7)
HGB BLD-MCNC: 11.3 G/DL (ref 11.7–15.7)
HGB BLD-MCNC: 11.3 G/DL (ref 11.7–15.7)
HGB BLD-MCNC: 11.4 G/DL (ref 11.7–15.7)
HGB BLD-MCNC: 11.5 G/DL (ref 11.7–15.7)
HGB BLD-MCNC: 11.5 G/DL (ref 11.7–15.7)
HGB BLD-MCNC: 11.6 G/DL (ref 11.7–15.7)
HGB BLD-MCNC: 11.7 G/DL (ref 11.7–15.7)
HGB BLD-MCNC: 11.8 G/DL (ref 11.7–15.7)
HGB BLD-MCNC: 11.9 G/DL (ref 11.7–15.7)
HGB BLD-MCNC: 12 G/DL (ref 11.7–15.7)
HGB BLD-MCNC: 12 G/DL (ref 11.7–15.7)
HGB BLD-MCNC: 12.1 G/DL (ref 11.7–15.7)
HGB BLD-MCNC: 12.3 G/DL (ref 11.7–15.7)
HGB BLD-MCNC: 12.3 G/DL (ref 11.7–15.7)
HGB UR QL STRIP: NEGATIVE
HGB UR QL STRIP: NEGATIVE
HIV 1+2 AB+HIV1 P24 AG SERPL QL IA: NONREACTIVE
HOLD SPECIMEN: NORMAL
IMM GRANULOCYTES # BLD: 0 10E3/UL
IMM GRANULOCYTES # BLD: 0.1 10E3/UL
IMM GRANULOCYTES NFR BLD: 0 %
IMM GRANULOCYTES NFR BLD: 1 %
IMM GRANULOCYTES NFR BLD: 1 %
INR PPP: 0.94 (ref 0.85–1.15)
INR PPP: 0.97 (ref 0.85–1.15)
INR PPP: 1 (ref 0.85–1.15)
INR PPP: 1.01 (ref 0.85–1.15)
INTERPRETATION ECG - MUSE: NORMAL
INTERPRETATION ECG - MUSE: NORMAL
KETONES UR STRIP-MCNC: NEGATIVE MG/DL
KETONES UR STRIP-MCNC: NEGATIVE MG/DL
LACTATE SERPL-SCNC: 0.6 MMOL/L (ref 0.7–2)
LDH SERPL L TO P-CCNC: 220 U/L (ref 81–234)
LDLC SERPL CALC-MCNC: 127 MG/DL
LEUKOCYTE ESTERASE UR QL STRIP: NEGATIVE
LEUKOCYTE ESTERASE UR QL STRIP: NEGATIVE
LVEF ECHO: NORMAL
LYMPHOCYTES # BLD AUTO: 1 10E3/UL (ref 0.8–5.3)
LYMPHOCYTES # BLD AUTO: 1.3 10E3/UL (ref 0.8–5.3)
LYMPHOCYTES # BLD AUTO: 1.5 10E3/UL (ref 0.8–5.3)
LYMPHOCYTES # BLD AUTO: 1.7 10E3/UL (ref 0.8–5.3)
LYMPHOCYTES # BLD AUTO: 1.7 10E3/UL (ref 0.8–5.3)
LYMPHOCYTES # BLD AUTO: 1.9 10E3/UL (ref 0.8–5.3)
LYMPHOCYTES # BLD AUTO: 2 10E3/UL (ref 0.8–5.3)
LYMPHOCYTES # BLD AUTO: 2.2 10E3/UL (ref 0.8–5.3)
LYMPHOCYTES NFR BLD AUTO: 16 %
LYMPHOCYTES NFR BLD AUTO: 24 %
LYMPHOCYTES NFR BLD AUTO: 25 %
LYMPHOCYTES NFR BLD AUTO: 26 %
LYMPHOCYTES NFR BLD AUTO: 28 %
LYMPHOCYTES NFR BLD AUTO: 30 %
LYMPHOCYTES NFR BLD AUTO: 30 %
LYMPHOCYTES NFR BLD AUTO: 31 %
LYMPHOCYTES NFR BLD AUTO: 32 %
LYMPHOCYTES NFR BLD AUTO: 34 %
LYMPHOCYTES NFR BLD AUTO: 42 %
MAGNESIUM SERPL-MCNC: 1.7 MG/DL (ref 1.6–2.3)
MAGNESIUM SERPL-MCNC: 1.8 MG/DL (ref 1.6–2.3)
MAGNESIUM SERPL-MCNC: 1.8 MG/DL (ref 1.7–2.3)
MAGNESIUM SERPL-MCNC: 1.9 MG/DL (ref 1.6–2.3)
MAGNESIUM SERPL-MCNC: 1.9 MG/DL (ref 1.6–2.3)
MAGNESIUM SERPL-MCNC: 1.9 MG/DL (ref 1.7–2.3)
MAGNESIUM SERPL-MCNC: 2 MG/DL (ref 1.6–2.3)
MAGNESIUM SERPL-MCNC: 2.1 MG/DL (ref 1.6–2.3)
MAGNESIUM SERPL-MCNC: 2.1 MG/DL (ref 1.6–2.3)
MCH RBC QN AUTO: 30 PG (ref 26.5–33)
MCH RBC QN AUTO: 30.4 PG (ref 26.5–33)
MCH RBC QN AUTO: 30.4 PG (ref 26.5–33)
MCH RBC QN AUTO: 30.5 PG (ref 26.5–33)
MCH RBC QN AUTO: 30.6 PG (ref 26.5–33)
MCH RBC QN AUTO: 30.6 PG (ref 26.5–33)
MCH RBC QN AUTO: 30.8 PG (ref 26.5–33)
MCH RBC QN AUTO: 30.9 PG (ref 26.5–33)
MCH RBC QN AUTO: 30.9 PG (ref 26.5–33)
MCH RBC QN AUTO: 31 PG (ref 26.5–33)
MCH RBC QN AUTO: 31.1 PG (ref 26.5–33)
MCH RBC QN AUTO: 31.1 PG (ref 26.5–33)
MCH RBC QN AUTO: 31.3 PG (ref 26.5–33)
MCH RBC QN AUTO: 31.4 PG (ref 26.5–33)
MCH RBC QN AUTO: 31.5 PG (ref 26.5–33)
MCH RBC QN AUTO: 31.5 PG (ref 26.5–33)
MCH RBC QN AUTO: 31.7 PG (ref 26.5–33)
MCH RBC QN AUTO: 31.8 PG (ref 26.5–33)
MCH RBC QN AUTO: 31.9 PG (ref 26.5–33)
MCHC RBC AUTO-ENTMCNC: 31.3 G/DL (ref 31.5–36.5)
MCHC RBC AUTO-ENTMCNC: 31.8 G/DL (ref 31.5–36.5)
MCHC RBC AUTO-ENTMCNC: 31.9 G/DL (ref 31.5–36.5)
MCHC RBC AUTO-ENTMCNC: 31.9 G/DL (ref 31.5–36.5)
MCHC RBC AUTO-ENTMCNC: 32.1 G/DL (ref 31.5–36.5)
MCHC RBC AUTO-ENTMCNC: 32.3 G/DL (ref 31.5–36.5)
MCHC RBC AUTO-ENTMCNC: 32.4 G/DL (ref 31.5–36.5)
MCHC RBC AUTO-ENTMCNC: 32.5 G/DL (ref 31.5–36.5)
MCHC RBC AUTO-ENTMCNC: 32.5 G/DL (ref 31.5–36.5)
MCHC RBC AUTO-ENTMCNC: 32.6 G/DL (ref 31.5–36.5)
MCHC RBC AUTO-ENTMCNC: 32.6 G/DL (ref 31.5–36.5)
MCHC RBC AUTO-ENTMCNC: 32.7 G/DL (ref 31.5–36.5)
MCHC RBC AUTO-ENTMCNC: 32.8 G/DL (ref 31.5–36.5)
MCHC RBC AUTO-ENTMCNC: 32.9 G/DL (ref 31.5–36.5)
MCHC RBC AUTO-ENTMCNC: 32.9 G/DL (ref 31.5–36.5)
MCHC RBC AUTO-ENTMCNC: 33 G/DL (ref 31.5–36.5)
MCHC RBC AUTO-ENTMCNC: 33.1 G/DL (ref 31.5–36.5)
MCHC RBC AUTO-ENTMCNC: 33.3 G/DL (ref 31.5–36.5)
MCHC RBC AUTO-ENTMCNC: 33.5 G/DL (ref 31.5–36.5)
MCHC RBC AUTO-ENTMCNC: 33.8 G/DL (ref 31.5–36.5)
MCHC RBC AUTO-ENTMCNC: 34 G/DL (ref 31.5–36.5)
MCV RBC AUTO: 92 FL (ref 78–100)
MCV RBC AUTO: 93 FL (ref 78–100)
MCV RBC AUTO: 94 FL (ref 78–100)
MCV RBC AUTO: 95 FL (ref 78–100)
MCV RBC AUTO: 96 FL (ref 78–100)
MCV RBC AUTO: 97 FL (ref 78–100)
MCV RBC AUTO: 97 FL (ref 78–100)
MCV RBC AUTO: 98 FL (ref 78–100)
MCV RBC AUTO: 98 FL (ref 78–100)
MCV RBC AUTO: 99 FL (ref 78–100)
MONOCYTES # BLD AUTO: 0.4 10E3/UL (ref 0–1.3)
MONOCYTES # BLD AUTO: 0.5 10E3/UL (ref 0–1.3)
MONOCYTES # BLD AUTO: 0.5 10E3/UL (ref 0–1.3)
MONOCYTES # BLD AUTO: 0.6 10E3/UL (ref 0–1.3)
MONOCYTES # BLD AUTO: 1 10E3/UL (ref 0–1.3)
MONOCYTES # BLD AUTO: 1.1 10E3/UL (ref 0–1.3)
MONOCYTES NFR BLD AUTO: 10 %
MONOCYTES NFR BLD AUTO: 10 %
MONOCYTES NFR BLD AUTO: 11 %
MONOCYTES NFR BLD AUTO: 17 %
MONOCYTES NFR BLD AUTO: 19 %
MONOCYTES NFR BLD AUTO: 7 %
MONOCYTES NFR BLD AUTO: 8 %
MONOCYTES NFR BLD AUTO: 9 %
MONOCYTES NFR BLD AUTO: 9 %
NEUTROPHILS # BLD AUTO: 1.9 10E3/UL (ref 1.6–8.3)
NEUTROPHILS # BLD AUTO: 2.2 10E3/UL (ref 1.6–8.3)
NEUTROPHILS # BLD AUTO: 2.4 10E3/UL (ref 1.6–8.3)
NEUTROPHILS # BLD AUTO: 2.4 10E3/UL (ref 1.6–8.3)
NEUTROPHILS # BLD AUTO: 2.5 10E3/UL (ref 1.6–8.3)
NEUTROPHILS # BLD AUTO: 2.9 10E3/UL (ref 1.6–8.3)
NEUTROPHILS # BLD AUTO: 3.1 10E3/UL (ref 1.6–8.3)
NEUTROPHILS # BLD AUTO: 3.2 10E3/UL (ref 1.6–8.3)
NEUTROPHILS # BLD AUTO: 3.9 10E3/UL (ref 1.6–8.3)
NEUTROPHILS # BLD AUTO: 4 10E3/UL (ref 1.6–8.3)
NEUTROPHILS # BLD AUTO: 4.6 10E3/UL (ref 1.6–8.3)
NEUTROPHILS NFR BLD AUTO: 37 %
NEUTROPHILS NFR BLD AUTO: 44 %
NEUTROPHILS NFR BLD AUTO: 49 %
NEUTROPHILS NFR BLD AUTO: 51 %
NEUTROPHILS NFR BLD AUTO: 53 %
NEUTROPHILS NFR BLD AUTO: 53 %
NEUTROPHILS NFR BLD AUTO: 55 %
NEUTROPHILS NFR BLD AUTO: 60 %
NEUTROPHILS NFR BLD AUTO: 61 %
NEUTROPHILS NFR BLD AUTO: 63 %
NEUTROPHILS NFR BLD AUTO: 72 %
NITRATE UR QL: NEGATIVE
NITRATE UR QL: NEGATIVE
NONHDLC SERPL-MCNC: 142 MG/DL
NRBC # BLD AUTO: 0 10E3/UL
NRBC BLD AUTO-RTO: 0 /100
P AXIS - MUSE: -5 DEGREES
P AXIS - MUSE: 47 DEGREES
PA AA BLD-ACNC: 611 ARU
PATH REPORT.COMMENTS IMP SPEC: NORMAL
PATH REPORT.FINAL DX SPEC: NORMAL
PATH REPORT.GROSS SPEC: NORMAL
PATH REPORT.MICROSCOPIC SPEC OTHER STN: NORMAL
PATH REPORT.MICROSCOPIC SPEC OTHER STN: NORMAL
PATH REPORT.RELEVANT HX SPEC: NORMAL
PATH REPORT.RELEVANT HX SPEC: NORMAL
PATH REPORT.SITE OF ORIGIN SPEC: NORMAL
PH UR STRIP: 5.5 [PH] (ref 5–7)
PH UR STRIP: 7 [PH] (ref 5–7)
PHOSPHATE SERPL-MCNC: 3 MG/DL (ref 2.5–4.5)
PHOSPHATE SERPL-MCNC: 3.1 MG/DL (ref 2.5–4.5)
PHOSPHATE SERPL-MCNC: 3.2 MG/DL (ref 2.5–4.5)
PHOSPHATE SERPL-MCNC: 3.4 MG/DL (ref 2.5–4.5)
PHOSPHATE SERPL-MCNC: 3.6 MG/DL (ref 2.5–4.5)
PHOSPHATE SERPL-MCNC: 4.1 MG/DL (ref 2.5–4.5)
PLATELET # BLD AUTO: 177 10E3/UL (ref 150–450)
PLATELET # BLD AUTO: 184 10E3/UL (ref 150–450)
PLATELET # BLD AUTO: 194 10E3/UL (ref 150–450)
PLATELET # BLD AUTO: 198 10E3/UL (ref 150–450)
PLATELET # BLD AUTO: 201 10E3/UL (ref 150–450)
PLATELET # BLD AUTO: 205 10E3/UL (ref 150–450)
PLATELET # BLD AUTO: 210 10E3/UL (ref 150–450)
PLATELET # BLD AUTO: 213 10E3/UL (ref 150–450)
PLATELET # BLD AUTO: 215 10E3/UL (ref 150–450)
PLATELET # BLD AUTO: 219 10E3/UL (ref 150–450)
PLATELET # BLD AUTO: 236 10E3/UL (ref 150–450)
PLATELET # BLD AUTO: 238 10E3/UL (ref 150–450)
PLATELET # BLD AUTO: 243 10E3/UL (ref 150–450)
PLATELET # BLD AUTO: 253 10E3/UL (ref 150–450)
PLATELET # BLD AUTO: 255 10E3/UL (ref 150–450)
PLATELET # BLD AUTO: 255 10E3/UL (ref 150–450)
PLATELET # BLD AUTO: 256 10E3/UL (ref 150–450)
PLATELET # BLD AUTO: 278 10E3/UL (ref 150–450)
PLATELET # BLD AUTO: 284 10E3/UL (ref 150–450)
PLATELET # BLD AUTO: 287 10E3/UL (ref 150–450)
PLATELET # BLD AUTO: 290 10E3/UL (ref 150–450)
PLATELET # BLD AUTO: 295 10E3/UL (ref 150–450)
PLATELET # BLD AUTO: 303 10E3/UL (ref 150–450)
POTASSIUM BLD-SCNC: 3.3 MMOL/L (ref 3.4–5.3)
POTASSIUM BLD-SCNC: 3.4 MMOL/L (ref 3.4–5.3)
POTASSIUM BLD-SCNC: 3.4 MMOL/L (ref 3.4–5.3)
POTASSIUM BLD-SCNC: 3.6 MMOL/L (ref 3.4–5.3)
POTASSIUM BLD-SCNC: 3.7 MMOL/L (ref 3.4–5.3)
POTASSIUM BLD-SCNC: 3.8 MMOL/L (ref 3.4–5.3)
POTASSIUM BLD-SCNC: 3.9 MMOL/L (ref 3.4–5.3)
POTASSIUM BLD-SCNC: 4 MMOL/L (ref 3.4–5.3)
POTASSIUM BLD-SCNC: 4.1 MMOL/L (ref 3.4–5.3)
POTASSIUM BLD-SCNC: 4.2 MMOL/L (ref 3.4–5.3)
POTASSIUM BLD-SCNC: 4.3 MMOL/L (ref 3.4–5.3)
POTASSIUM BLD-SCNC: 5 MMOL/L (ref 3.4–5.3)
POTASSIUM SERPL-SCNC: 4 MMOL/L (ref 3.4–4.5)
POTASSIUM SERPL-SCNC: 4.1 MMOL/L (ref 3.4–4.5)
PR INTERVAL - MUSE: 130 MS
PR INTERVAL - MUSE: 144 MS
PROT SERPL-MCNC: 5.9 G/DL (ref 6.4–8.3)
PROT SERPL-MCNC: 6 G/DL (ref 6.8–8.8)
PROT SERPL-MCNC: 6.2 G/DL (ref 6.8–8.8)
PROT SERPL-MCNC: 6.5 G/DL (ref 6.8–8.8)
PROT SERPL-MCNC: 6.6 G/DL (ref 6.8–8.8)
PROT SERPL-MCNC: 6.8 G/DL (ref 6.8–8.8)
PROT SERPL-MCNC: 6.9 G/DL (ref 6.8–8.8)
QRS DURATION - MUSE: 106 MS
QRS DURATION - MUSE: 96 MS
QT - MUSE: 396 MS
QT - MUSE: 422 MS
QTC - MUSE: 467 MS
QTC - MUSE: 477 MS
R AXIS - MUSE: -25 DEGREES
R AXIS - MUSE: -26 DEGREES
RADIOLOGIST FLAGS: ABNORMAL
RBC # BLD AUTO: 3.28 10E6/UL (ref 3.8–5.2)
RBC # BLD AUTO: 3.5 10E6/UL (ref 3.8–5.2)
RBC # BLD AUTO: 3.54 10E6/UL (ref 3.8–5.2)
RBC # BLD AUTO: 3.57 10E6/UL (ref 3.8–5.2)
RBC # BLD AUTO: 3.59 10E6/UL (ref 3.8–5.2)
RBC # BLD AUTO: 3.59 10E6/UL (ref 3.8–5.2)
RBC # BLD AUTO: 3.6 10E6/UL (ref 3.8–5.2)
RBC # BLD AUTO: 3.63 10E6/UL (ref 3.8–5.2)
RBC # BLD AUTO: 3.63 10E6/UL (ref 3.8–5.2)
RBC # BLD AUTO: 3.64 10E6/UL (ref 3.8–5.2)
RBC # BLD AUTO: 3.67 10E6/UL (ref 3.8–5.2)
RBC # BLD AUTO: 3.68 10E6/UL (ref 3.8–5.2)
RBC # BLD AUTO: 3.71 10E6/UL (ref 3.8–5.2)
RBC # BLD AUTO: 3.73 10E6/UL (ref 3.8–5.2)
RBC # BLD AUTO: 3.81 10E6/UL (ref 3.8–5.2)
RBC # BLD AUTO: 3.82 10E6/UL (ref 3.8–5.2)
RBC # BLD AUTO: 3.83 10E6/UL (ref 3.8–5.2)
RBC # BLD AUTO: 3.87 10E6/UL (ref 3.8–5.2)
RBC # BLD AUTO: 3.89 10E6/UL (ref 3.8–5.2)
RBC # BLD AUTO: 3.96 10E6/UL (ref 3.8–5.2)
RBC # BLD AUTO: 4.05 10E6/UL (ref 3.8–5.2)
RBC URINE: <1 /HPF
RSV RNA SPEC NAA+PROBE: NEGATIVE
SARS-COV-2 RNA RESP QL NAA+PROBE: NEGATIVE
SODIUM SERPL-SCNC: 133 MMOL/L (ref 136–145)
SODIUM SERPL-SCNC: 134 MMOL/L (ref 136–145)
SODIUM SERPL-SCNC: 135 MMOL/L (ref 133–144)
SODIUM SERPL-SCNC: 136 MMOL/L (ref 133–144)
SODIUM SERPL-SCNC: 136 MMOL/L (ref 133–144)
SODIUM SERPL-SCNC: 137 MMOL/L (ref 133–144)
SODIUM SERPL-SCNC: 138 MMOL/L (ref 133–144)
SODIUM SERPL-SCNC: 139 MMOL/L (ref 133–144)
SODIUM SERPL-SCNC: 140 MMOL/L (ref 133–144)
SODIUM SERPL-SCNC: 141 MMOL/L (ref 133–144)
SODIUM SERPL-SCNC: 142 MMOL/L (ref 133–144)
SODIUM SERPL-SCNC: 144 MMOL/L (ref 133–144)
SP GR UR STRIP: 1 (ref 1–1.03)
SP GR UR STRIP: 1.01 (ref 1–1.03)
SPECIMEN EXPIRATION DATE: NORMAL
SPECIMEN EXPIRATION DATE: NORMAL
SYSTOLIC BLOOD PRESSURE - MUSE: NORMAL MMHG
SYSTOLIC BLOOD PRESSURE - MUSE: NORMAL MMHG
T AXIS - MUSE: 65 DEGREES
T AXIS - MUSE: 68 DEGREES
THROMBIN TIME: 15.8 SECONDS (ref 13–19)
TRANSITIONAL EPI: <1 /HPF
TRIGL SERPL-MCNC: 75 MG/DL
TROPONIN I SERPL HS-MCNC: 10 NG/L
TROPONIN I SERPL HS-MCNC: 11 NG/L
TROPONIN I SERPL HS-MCNC: 6 NG/L
TROPONIN I SERPL HS-MCNC: 6 NG/L
URATE SERPL-MCNC: 5.4 MG/DL (ref 2.6–6)
UROBILINOGEN UR STRIP-MCNC: NORMAL MG/DL
UROBILINOGEN UR STRIP-MCNC: NORMAL MG/DL
VENTRICULAR RATE- MUSE: 77 BPM
VENTRICULAR RATE- MUSE: 84 BPM
VIT B12 SERPL-MCNC: 1006 PG/ML (ref 232–1245)
VIT B12 SERPL-MCNC: 781 PG/ML (ref 193–986)
VWF AG ACT/NOR PPP IA: 236 % (ref 50–200)
VWF:AC ACT/NOR PPP IA: 194 % (ref 50–180)
WBC # BLD AUTO: 4.1 10E3/UL (ref 4–11)
WBC # BLD AUTO: 4.4 10E3/UL (ref 4–11)
WBC # BLD AUTO: 4.8 10E3/UL (ref 4–11)
WBC # BLD AUTO: 5 10E3/UL (ref 4–11)
WBC # BLD AUTO: 5.1 10E3/UL (ref 4–11)
WBC # BLD AUTO: 5.2 10E3/UL (ref 4–11)
WBC # BLD AUTO: 5.2 10E3/UL (ref 4–11)
WBC # BLD AUTO: 5.5 10E3/UL (ref 4–11)
WBC # BLD AUTO: 5.6 10E3/UL (ref 4–11)
WBC # BLD AUTO: 5.7 10E3/UL (ref 4–11)
WBC # BLD AUTO: 6.1 10E3/UL (ref 4–11)
WBC # BLD AUTO: 6.3 10E3/UL (ref 4–11)
WBC # BLD AUTO: 6.4 10E3/UL (ref 4–11)
WBC # BLD AUTO: 6.6 10E3/UL (ref 4–11)
WBC # BLD AUTO: 7.2 10E3/UL (ref 4–11)
WBC # BLD AUTO: 7.4 10E3/UL (ref 4–11)
WBC # BLD AUTO: 7.5 10E3/UL (ref 4–11)
WBC # BLD AUTO: 7.9 10E3/UL (ref 4–11)
WBC # BLD AUTO: 8.1 10E3/UL (ref 4–11)
WBC # BLD AUTO: 8.6 10E3/UL (ref 4–11)
WBC # BLD AUTO: 8.8 10E3/UL (ref 4–11)
WBC URINE: 1 /HPF

## 2022-01-01 PROCEDURE — 70496 CT ANGIOGRAPHY HEAD: CPT | Mod: 26 | Performed by: RADIOLOGY

## 2022-01-01 PROCEDURE — 250N000013 HC RX MED GY IP 250 OP 250 PS 637: Performed by: STUDENT IN AN ORGANIZED HEALTH CARE EDUCATION/TRAINING PROGRAM

## 2022-01-01 PROCEDURE — 250N000012 HC RX MED GY IP 250 OP 636 PS 637: Performed by: INTERNAL MEDICINE

## 2022-01-01 PROCEDURE — 70450 CT HEAD/BRAIN W/O DYE: CPT

## 2022-01-01 PROCEDURE — 36415 COLL VENOUS BLD VENIPUNCTURE: CPT | Performed by: NURSE PRACTITIONER

## 2022-01-01 PROCEDURE — 85025 COMPLETE CBC W/AUTO DIFF WBC: CPT | Performed by: PHYSICIAN ASSISTANT

## 2022-01-01 PROCEDURE — 80053 COMPREHEN METABOLIC PANEL: CPT | Performed by: STUDENT IN AN ORGANIZED HEALTH CARE EDUCATION/TRAINING PROGRAM

## 2022-01-01 PROCEDURE — 85004 AUTOMATED DIFF WBC COUNT: CPT | Performed by: STUDENT IN AN ORGANIZED HEALTH CARE EDUCATION/TRAINING PROGRAM

## 2022-01-01 PROCEDURE — 70450 CT HEAD/BRAIN W/O DYE: CPT | Mod: 77

## 2022-01-01 PROCEDURE — 36415 COLL VENOUS BLD VENIPUNCTURE: CPT | Performed by: STUDENT IN AN ORGANIZED HEALTH CARE EDUCATION/TRAINING PROGRAM

## 2022-01-01 PROCEDURE — 96413 CHEMO IV INFUSION 1 HR: CPT

## 2022-01-01 PROCEDURE — 71260 CT THORAX DX C+: CPT | Mod: 26 | Performed by: STUDENT IN AN ORGANIZED HEALTH CARE EDUCATION/TRAINING PROGRAM

## 2022-01-01 PROCEDURE — 82310 ASSAY OF CALCIUM: CPT | Performed by: STUDENT IN AN ORGANIZED HEALTH CARE EDUCATION/TRAINING PROGRAM

## 2022-01-01 PROCEDURE — 250N000013 HC RX MED GY IP 250 OP 250 PS 637: Performed by: PSYCHIATRY & NEUROLOGY

## 2022-01-01 PROCEDURE — 82040 ASSAY OF SERUM ALBUMIN: CPT | Performed by: STUDENT IN AN ORGANIZED HEALTH CARE EDUCATION/TRAINING PROGRAM

## 2022-01-01 PROCEDURE — 82248 BILIRUBIN DIRECT: CPT | Performed by: STUDENT IN AN ORGANIZED HEALTH CARE EDUCATION/TRAINING PROGRAM

## 2022-01-01 PROCEDURE — G0378 HOSPITAL OBSERVATION PER HR: HCPCS

## 2022-01-01 PROCEDURE — 85027 COMPLETE CBC AUTOMATED: CPT | Performed by: STUDENT IN AN ORGANIZED HEALTH CARE EDUCATION/TRAINING PROGRAM

## 2022-01-01 PROCEDURE — 83735 ASSAY OF MAGNESIUM: CPT | Performed by: STUDENT IN AN ORGANIZED HEALTH CARE EDUCATION/TRAINING PROGRAM

## 2022-01-01 PROCEDURE — A9585 GADOBUTROL INJECTION: HCPCS | Performed by: EMERGENCY MEDICINE

## 2022-01-01 PROCEDURE — 97535 SELF CARE MNGMENT TRAINING: CPT | Mod: GO | Performed by: OCCUPATIONAL THERAPIST

## 2022-01-01 PROCEDURE — 250N000013 HC RX MED GY IP 250 OP 250 PS 637: Performed by: INTERNAL MEDICINE

## 2022-01-01 PROCEDURE — 97530 THERAPEUTIC ACTIVITIES: CPT | Mod: GP

## 2022-01-01 PROCEDURE — 80053 COMPREHEN METABOLIC PANEL: CPT | Performed by: NURSE PRACTITIONER

## 2022-01-01 PROCEDURE — 96374 THER/PROPH/DIAG INJ IV PUSH: CPT

## 2022-01-01 PROCEDURE — 99309 SBSQ NF CARE MODERATE MDM 30: CPT | Performed by: INTERNAL MEDICINE

## 2022-01-01 PROCEDURE — C9803 HOPD COVID-19 SPEC COLLECT: HCPCS | Performed by: EMERGENCY MEDICINE

## 2022-01-01 PROCEDURE — 97161 PT EVAL LOW COMPLEX 20 MIN: CPT | Mod: GP

## 2022-01-01 PROCEDURE — 70450 CT HEAD/BRAIN W/O DYE: CPT | Mod: 26 | Performed by: RADIOLOGY

## 2022-01-01 PROCEDURE — 85730 THROMBOPLASTIN TIME PARTIAL: CPT | Performed by: NURSE PRACTITIONER

## 2022-01-01 PROCEDURE — 97167 OT EVAL HIGH COMPLEX 60 MIN: CPT | Mod: GO

## 2022-01-01 PROCEDURE — 93005 ELECTROCARDIOGRAM TRACING: CPT

## 2022-01-01 PROCEDURE — 85670 THROMBIN TIME PLASMA: CPT | Performed by: STUDENT IN AN ORGANIZED HEALTH CARE EDUCATION/TRAINING PROGRAM

## 2022-01-01 PROCEDURE — 99285 EMERGENCY DEPT VISIT HI MDM: CPT | Mod: 25 | Performed by: EMERGENCY MEDICINE

## 2022-01-01 PROCEDURE — 84100 ASSAY OF PHOSPHORUS: CPT | Performed by: STUDENT IN AN ORGANIZED HEALTH CARE EDUCATION/TRAINING PROGRAM

## 2022-01-01 PROCEDURE — G1004 CDSM NDSC: HCPCS

## 2022-01-01 PROCEDURE — 88321 CONSLTJ&REPRT SLD PREP ELSWR: CPT | Performed by: PATHOLOGY

## 2022-01-01 PROCEDURE — 250N000009 HC RX 250: Performed by: INTERNAL MEDICINE

## 2022-01-01 PROCEDURE — 80048 BASIC METABOLIC PNL TOTAL CA: CPT | Performed by: STUDENT IN AN ORGANIZED HEALTH CARE EDUCATION/TRAINING PROGRAM

## 2022-01-01 PROCEDURE — 85025 COMPLETE CBC W/AUTO DIFF WBC: CPT | Performed by: STUDENT IN AN ORGANIZED HEALTH CARE EDUCATION/TRAINING PROGRAM

## 2022-01-01 PROCEDURE — 250N000011 HC RX IP 250 OP 636: Performed by: STUDENT IN AN ORGANIZED HEALTH CARE EDUCATION/TRAINING PROGRAM

## 2022-01-01 PROCEDURE — 36415 COLL VENOUS BLD VENIPUNCTURE: CPT | Performed by: INTERNAL MEDICINE

## 2022-01-01 PROCEDURE — 86706 HEP B SURFACE ANTIBODY: CPT | Performed by: STUDENT IN AN ORGANIZED HEALTH CARE EDUCATION/TRAINING PROGRAM

## 2022-01-01 PROCEDURE — 999N000065 XR LUMBAR SPINE 2-3 VIEWS

## 2022-01-01 PROCEDURE — 82040 ASSAY OF SERUM ALBUMIN: CPT

## 2022-01-01 PROCEDURE — 97110 THERAPEUTIC EXERCISES: CPT | Mod: GO

## 2022-01-01 PROCEDURE — 85730 THROMBOPLASTIN TIME PARTIAL: CPT

## 2022-01-01 PROCEDURE — G1004 CDSM NDSC: HCPCS | Mod: GC | Performed by: STUDENT IN AN ORGANIZED HEALTH CARE EDUCATION/TRAINING PROGRAM

## 2022-01-01 PROCEDURE — 360N000078 HC SURGERY LEVEL 5, PER MIN: Performed by: STUDENT IN AN ORGANIZED HEALTH CARE EDUCATION/TRAINING PROGRAM

## 2022-01-01 PROCEDURE — 99316 NF DSCHRG MGMT 30 MIN+: CPT | Performed by: INTERNAL MEDICINE

## 2022-01-01 PROCEDURE — G0463 HOSPITAL OUTPT CLINIC VISIT: HCPCS

## 2022-01-01 PROCEDURE — 74177 CT ABD & PELVIS W/CONTRAST: CPT | Mod: 26 | Performed by: STUDENT IN AN ORGANIZED HEALTH CARE EDUCATION/TRAINING PROGRAM

## 2022-01-01 PROCEDURE — 71046 X-RAY EXAM CHEST 2 VIEWS: CPT | Mod: 26 | Performed by: RADIOLOGY

## 2022-01-01 PROCEDURE — 120N000002 HC R&B MED SURG/OB UMMC

## 2022-01-01 PROCEDURE — 82232 ASSAY OF BETA-2 PROTEIN: CPT | Performed by: STUDENT IN AN ORGANIZED HEALTH CARE EDUCATION/TRAINING PROGRAM

## 2022-01-01 PROCEDURE — 85014 HEMATOCRIT: CPT | Performed by: INTERNAL MEDICINE

## 2022-01-01 PROCEDURE — 86850 RBC ANTIBODY SCREEN: CPT

## 2022-01-01 PROCEDURE — 99223 1ST HOSP IP/OBS HIGH 75: CPT | Mod: GC | Performed by: NEUROLOGICAL SURGERY

## 2022-01-01 PROCEDURE — 81001 URINALYSIS AUTO W/SCOPE: CPT | Performed by: NURSE PRACTITIONER

## 2022-01-01 PROCEDURE — 80061 LIPID PANEL: CPT | Performed by: STUDENT IN AN ORGANIZED HEALTH CARE EDUCATION/TRAINING PROGRAM

## 2022-01-01 PROCEDURE — 250N000013 HC RX MED GY IP 250 OP 250 PS 637: Performed by: NURSE PRACTITIONER

## 2022-01-01 PROCEDURE — 97112 NEUROMUSCULAR REEDUCATION: CPT | Mod: GP | Performed by: PHYSICAL THERAPIST

## 2022-01-01 PROCEDURE — 70498 CT ANGIOGRAPHY NECK: CPT | Mod: 26 | Performed by: RADIOLOGY

## 2022-01-01 PROCEDURE — 70553 MRI BRAIN STEM W/O & W/DYE: CPT | Mod: 26 | Performed by: RADIOLOGY

## 2022-01-01 PROCEDURE — 97116 GAIT TRAINING THERAPY: CPT | Mod: GP

## 2022-01-01 PROCEDURE — 99214 OFFICE O/P EST MOD 30 MIN: CPT | Performed by: STUDENT IN AN ORGANIZED HEALTH CARE EDUCATION/TRAINING PROGRAM

## 2022-01-01 PROCEDURE — 36415 COLL VENOUS BLD VENIPUNCTURE: CPT | Performed by: EMERGENCY MEDICINE

## 2022-01-01 PROCEDURE — 250N000011 HC RX IP 250 OP 636: Performed by: PHYSICIAN ASSISTANT

## 2022-01-01 PROCEDURE — 36415 COLL VENOUS BLD VENIPUNCTURE: CPT

## 2022-01-01 PROCEDURE — 80053 COMPREHEN METABOLIC PANEL: CPT | Performed by: PATHOLOGY

## 2022-01-01 PROCEDURE — 97112 NEUROMUSCULAR REEDUCATION: CPT | Mod: GP | Performed by: STUDENT IN AN ORGANIZED HEALTH CARE EDUCATION/TRAINING PROGRAM

## 2022-01-01 PROCEDURE — 83615 LACTATE (LD) (LDH) ENZYME: CPT | Performed by: PATHOLOGY

## 2022-01-01 PROCEDURE — 99285 EMERGENCY DEPT VISIT HI MDM: CPT | Performed by: EMERGENCY MEDICINE

## 2022-01-01 PROCEDURE — 120N000009 HC R&B SNF

## 2022-01-01 PROCEDURE — G1004 CDSM NDSC: HCPCS | Mod: PS

## 2022-01-01 PROCEDURE — 72131 CT LUMBAR SPINE W/O DYE: CPT | Mod: 26 | Performed by: RADIOLOGY

## 2022-01-01 PROCEDURE — 999N000128 HC STATISTIC PERIPHERAL IV START W/O US GUIDANCE

## 2022-01-01 PROCEDURE — 96415 CHEMO IV INFUSION ADDL HR: CPT

## 2022-01-01 PROCEDURE — 85025 COMPLETE CBC W/AUTO DIFF WBC: CPT | Performed by: EMERGENCY MEDICINE

## 2022-01-01 PROCEDURE — 71110 X-RAY EXAM RIBS BIL 3 VIEWS: CPT | Mod: 52 | Performed by: RADIOLOGY

## 2022-01-01 PROCEDURE — 258N000003 HC RX IP 258 OP 636: Performed by: NURSE ANESTHETIST, CERTIFIED REGISTERED

## 2022-01-01 PROCEDURE — 250N000009 HC RX 250: Performed by: STUDENT IN AN ORGANIZED HEALTH CARE EDUCATION/TRAINING PROGRAM

## 2022-01-01 PROCEDURE — 36415 COLL VENOUS BLD VENIPUNCTURE: CPT | Mod: GT | Performed by: PHYSICIAN ASSISTANT

## 2022-01-01 PROCEDURE — 85610 PROTHROMBIN TIME: CPT | Performed by: NURSE PRACTITIONER

## 2022-01-01 PROCEDURE — 97750 PHYSICAL PERFORMANCE TEST: CPT | Mod: GP

## 2022-01-01 PROCEDURE — 99222 1ST HOSP IP/OBS MODERATE 55: CPT | Performed by: NURSE PRACTITIONER

## 2022-01-01 PROCEDURE — 93306 TTE W/DOPPLER COMPLETE: CPT

## 2022-01-01 PROCEDURE — 250N000011 HC RX IP 250 OP 636: Performed by: PSYCHIATRY & NEUROLOGY

## 2022-01-01 PROCEDURE — 70498 CT ANGIOGRAPHY NECK: CPT

## 2022-01-01 PROCEDURE — 343N000001 HC RX 343: Performed by: PHYSICIAN ASSISTANT

## 2022-01-01 PROCEDURE — 999N000127 HC STATISTIC PERIPHERAL IV START W US GUIDANCE

## 2022-01-01 PROCEDURE — 97110 THERAPEUTIC EXERCISES: CPT | Mod: GP | Performed by: PHYSICAL THERAPIST

## 2022-01-01 PROCEDURE — 97530 THERAPEUTIC ACTIVITIES: CPT | Mod: GP | Performed by: PHYSICAL THERAPIST

## 2022-01-01 PROCEDURE — 250N000009 HC RX 250: Performed by: NURSE ANESTHETIST, CERTIFIED REGISTERED

## 2022-01-01 PROCEDURE — 258N000003 HC RX IP 258 OP 636: Performed by: STUDENT IN AN ORGANIZED HEALTH CARE EDUCATION/TRAINING PROGRAM

## 2022-01-01 PROCEDURE — 97110 THERAPEUTIC EXERCISES: CPT | Mod: GP

## 2022-01-01 PROCEDURE — 99219 PR INITIAL OBSERVATION CARE,LEVEL II: CPT | Performed by: NURSE PRACTITIONER

## 2022-01-01 PROCEDURE — 96372 THER/PROPH/DIAG INJ SC/IM: CPT | Performed by: PHYSICIAN ASSISTANT

## 2022-01-01 PROCEDURE — 95816 EEG AWAKE AND DROWSY: CPT

## 2022-01-01 PROCEDURE — 72100 X-RAY EXAM L-S SPINE 2/3 VWS: CPT | Mod: 26 | Performed by: RADIOLOGY

## 2022-01-01 PROCEDURE — 82607 VITAMIN B-12: CPT | Performed by: STUDENT IN AN ORGANIZED HEALTH CARE EDUCATION/TRAINING PROGRAM

## 2022-01-01 PROCEDURE — 97112 NEUROMUSCULAR REEDUCATION: CPT | Mod: GP

## 2022-01-01 PROCEDURE — 999N000043 HC STATISTIC CTO2 CONT OXYGEN TECH TIME EA 90 MIN

## 2022-01-01 PROCEDURE — 200N000002 HC R&B ICU UMMC

## 2022-01-01 PROCEDURE — 99306 1ST NF CARE HIGH MDM 50: CPT | Performed by: INTERNAL MEDICINE

## 2022-01-01 PROCEDURE — 250N000011 HC RX IP 250 OP 636: Performed by: NURSE ANESTHETIST, CERTIFIED REGISTERED

## 2022-01-01 PROCEDURE — 85730 THROMBOPLASTIN TIME PARTIAL: CPT | Performed by: EMERGENCY MEDICINE

## 2022-01-01 PROCEDURE — 86901 BLOOD TYPING SEROLOGIC RH(D): CPT | Performed by: EMERGENCY MEDICINE

## 2022-01-01 PROCEDURE — 272N000001 HC OR GENERAL SUPPLY STERILE: Performed by: STUDENT IN AN ORGANIZED HEALTH CARE EDUCATION/TRAINING PROGRAM

## 2022-01-01 PROCEDURE — 78816 PET IMAGE W/CT FULL BODY: CPT | Mod: 26 | Performed by: STUDENT IN AN ORGANIZED HEALTH CARE EDUCATION/TRAINING PROGRAM

## 2022-01-01 PROCEDURE — 99214 OFFICE O/P EST MOD 30 MIN: CPT | Performed by: PHYSICIAN ASSISTANT

## 2022-01-01 PROCEDURE — 258N000003 HC RX IP 258 OP 636: Performed by: PHYSICIAN ASSISTANT

## 2022-01-01 PROCEDURE — 87637 SARSCOV2&INF A&B&RSV AMP PRB: CPT | Performed by: STUDENT IN AN ORGANIZED HEALTH CARE EDUCATION/TRAINING PROGRAM

## 2022-01-01 PROCEDURE — 86704 HEP B CORE ANTIBODY TOTAL: CPT | Performed by: STUDENT IN AN ORGANIZED HEALTH CARE EDUCATION/TRAINING PROGRAM

## 2022-01-01 PROCEDURE — 82040 ASSAY OF SERUM ALBUMIN: CPT | Performed by: EMERGENCY MEDICINE

## 2022-01-01 PROCEDURE — 83605 ASSAY OF LACTIC ACID: CPT | Performed by: PHYSICIAN ASSISTANT

## 2022-01-01 PROCEDURE — 85245 CLOT FACTOR VIII VW RISTOCTN: CPT | Performed by: STUDENT IN AN ORGANIZED HEALTH CARE EDUCATION/TRAINING PROGRAM

## 2022-01-01 PROCEDURE — 70553 MRI BRAIN STEM W/O & W/DYE: CPT | Mod: MG

## 2022-01-01 PROCEDURE — 84132 ASSAY OF SERUM POTASSIUM: CPT | Performed by: STUDENT IN AN ORGANIZED HEALTH CARE EDUCATION/TRAINING PROGRAM

## 2022-01-01 PROCEDURE — L1499 SPINAL ORTHOSIS NOS: HCPCS

## 2022-01-01 PROCEDURE — 99233 SBSQ HOSP IP/OBS HIGH 50: CPT | Mod: GC | Performed by: PSYCHIATRY & NEUROLOGY

## 2022-01-01 PROCEDURE — 250N000012 HC RX MED GY IP 250 OP 636 PS 637: Performed by: STUDENT IN AN ORGANIZED HEALTH CARE EDUCATION/TRAINING PROGRAM

## 2022-01-01 PROCEDURE — 97530 THERAPEUTIC ACTIVITIES: CPT | Mod: GP | Performed by: STUDENT IN AN ORGANIZED HEALTH CARE EDUCATION/TRAINING PROGRAM

## 2022-01-01 PROCEDURE — 97530 THERAPEUTIC ACTIVITIES: CPT | Mod: GO | Performed by: OCCUPATIONAL THERAPIST

## 2022-01-01 PROCEDURE — 97530 THERAPEUTIC ACTIVITIES: CPT | Mod: GO

## 2022-01-01 PROCEDURE — 80053 COMPREHEN METABOLIC PANEL: CPT

## 2022-01-01 PROCEDURE — 99232 SBSQ HOSP IP/OBS MODERATE 35: CPT | Performed by: NURSE PRACTITIONER

## 2022-01-01 PROCEDURE — 87340 HEPATITIS B SURFACE AG IA: CPT | Performed by: STUDENT IN AN ORGANIZED HEALTH CARE EDUCATION/TRAINING PROGRAM

## 2022-01-01 PROCEDURE — 999N000157 HC STATISTIC RCP TIME EA 10 MIN

## 2022-01-01 PROCEDURE — 86803 HEPATITIS C AB TEST: CPT | Performed by: STUDENT IN AN ORGANIZED HEALTH CARE EDUCATION/TRAINING PROGRAM

## 2022-01-01 PROCEDURE — 92523 SPEECH SOUND LANG COMPREHEN: CPT | Mod: GN

## 2022-01-01 PROCEDURE — 81003 URINALYSIS AUTO W/O SCOPE: CPT | Performed by: INTERNAL MEDICINE

## 2022-01-01 PROCEDURE — 99213 OFFICE O/P EST LOW 20 MIN: CPT | Mod: 95 | Performed by: PHYSICIAN ASSISTANT

## 2022-01-01 PROCEDURE — 95816 EEG AWAKE AND DROWSY: CPT | Mod: 26 | Performed by: PSYCHIATRY & NEUROLOGY

## 2022-01-01 PROCEDURE — 99308 SBSQ NF CARE LOW MDM 20: CPT | Performed by: INTERNAL MEDICINE

## 2022-01-01 PROCEDURE — 86901 BLOOD TYPING SEROLOGIC RH(D): CPT

## 2022-01-01 PROCEDURE — 343N000001 HC RX 343: Performed by: INTERNAL MEDICINE

## 2022-01-01 PROCEDURE — 85014 HEMATOCRIT: CPT | Performed by: NURSE PRACTITIONER

## 2022-01-01 PROCEDURE — 85610 PROTHROMBIN TIME: CPT | Performed by: EMERGENCY MEDICINE

## 2022-01-01 PROCEDURE — U0003 INFECTIOUS AGENT DETECTION BY NUCLEIC ACID (DNA OR RNA); SEVERE ACUTE RESPIRATORY SYNDROME CORONAVIRUS 2 (SARS-COV-2) (CORONAVIRUS DISEASE [COVID-19]), AMPLIFIED PROBE TECHNIQUE, MAKING USE OF HIGH THROUGHPUT TECHNOLOGIES AS DESCRIBED BY CMS-2020-01-R: HCPCS | Performed by: NURSE PRACTITIONER

## 2022-01-01 PROCEDURE — 82140 ASSAY OF AMMONIA: CPT | Performed by: STUDENT IN AN ORGANIZED HEALTH CARE EDUCATION/TRAINING PROGRAM

## 2022-01-01 PROCEDURE — 710N000010 HC RECOVERY PHASE 1, LEVEL 2, PER MIN: Performed by: STUDENT IN AN ORGANIZED HEALTH CARE EDUCATION/TRAINING PROGRAM

## 2022-01-01 PROCEDURE — 82310 ASSAY OF CALCIUM: CPT | Performed by: INTERNAL MEDICINE

## 2022-01-01 PROCEDURE — 250N000011 HC RX IP 250 OP 636

## 2022-01-01 PROCEDURE — 99222 1ST HOSP IP/OBS MODERATE 55: CPT | Mod: GC | Performed by: PSYCHIATRY & NEUROLOGY

## 2022-01-01 PROCEDURE — 61154 BURR HOLE W/EVAC&/DRG HMTMA: CPT | Mod: RT | Performed by: STUDENT IN AN ORGANIZED HEALTH CARE EDUCATION/TRAINING PROGRAM

## 2022-01-01 PROCEDURE — 99215 OFFICE O/P EST HI 40 MIN: CPT | Mod: 95 | Performed by: PHYSICIAN ASSISTANT

## 2022-01-01 PROCEDURE — 93306 TTE W/DOPPLER COMPLETE: CPT | Mod: 26 | Performed by: STUDENT IN AN ORGANIZED HEALTH CARE EDUCATION/TRAINING PROGRAM

## 2022-01-01 PROCEDURE — 70496 CT ANGIOGRAPHY HEAD: CPT

## 2022-01-01 PROCEDURE — A9552 F18 FDG: HCPCS | Performed by: INTERNAL MEDICINE

## 2022-01-01 PROCEDURE — 999N000141 HC STATISTIC PRE-PROCEDURE NURSING ASSESSMENT: Performed by: STUDENT IN AN ORGANIZED HEALTH CARE EDUCATION/TRAINING PROGRAM

## 2022-01-01 PROCEDURE — 36415 COLL VENOUS BLD VENIPUNCTURE: CPT | Performed by: REGISTERED NURSE

## 2022-01-01 PROCEDURE — 97116 GAIT TRAINING THERAPY: CPT | Mod: GP | Performed by: PHYSICAL THERAPIST

## 2022-01-01 PROCEDURE — 71250 CT THORAX DX C-: CPT

## 2022-01-01 PROCEDURE — 72125 CT NECK SPINE W/O DYE: CPT

## 2022-01-01 PROCEDURE — U0003 INFECTIOUS AGENT DETECTION BY NUCLEIC ACID (DNA OR RNA); SEVERE ACUTE RESPIRATORY SYNDROME CORONAVIRUS 2 (SARS-COV-2) (CORONAVIRUS DISEASE [COVID-19]), AMPLIFIED PROBE TECHNIQUE, MAKING USE OF HIGH THROUGHPUT TECHNOLOGIES AS DESCRIBED BY CMS-2020-01-R: HCPCS | Performed by: STUDENT IN AN ORGANIZED HEALTH CARE EDUCATION/TRAINING PROGRAM

## 2022-01-01 PROCEDURE — 97161 PT EVAL LOW COMPLEX 20 MIN: CPT | Mod: GP | Performed by: STUDENT IN AN ORGANIZED HEALTH CARE EDUCATION/TRAINING PROGRAM

## 2022-01-01 PROCEDURE — 71046 X-RAY EXAM CHEST 2 VIEWS: CPT

## 2022-01-01 PROCEDURE — 85025 COMPLETE CBC W/AUTO DIFF WBC: CPT | Mod: GT | Performed by: PHYSICIAN ASSISTANT

## 2022-01-01 PROCEDURE — G1004 CDSM NDSC: HCPCS | Performed by: RADIOLOGY

## 2022-01-01 PROCEDURE — U0005 INFEC AGEN DETEC AMPLI PROBE: HCPCS | Performed by: INTERNAL MEDICINE

## 2022-01-01 PROCEDURE — 84484 ASSAY OF TROPONIN QUANT: CPT | Performed by: STUDENT IN AN ORGANIZED HEALTH CARE EDUCATION/TRAINING PROGRAM

## 2022-01-01 PROCEDURE — 99233 SBSQ HOSP IP/OBS HIGH 50: CPT | Performed by: NURSE PRACTITIONER

## 2022-01-01 PROCEDURE — 84550 ASSAY OF BLOOD/URIC ACID: CPT

## 2022-01-01 PROCEDURE — 022N000001 HC SNF RUG CODE OPNP

## 2022-01-01 PROCEDURE — 00943ZZ DRAINAGE OF INTRACRANIAL SUBDURAL SPACE, PERCUTANEOUS APPROACH: ICD-10-PCS | Performed by: STUDENT IN AN ORGANIZED HEALTH CARE EDUCATION/TRAINING PROGRAM

## 2022-01-01 PROCEDURE — 82565 ASSAY OF CREATININE: CPT

## 2022-01-01 PROCEDURE — 80053 COMPREHEN METABOLIC PANEL: CPT | Performed by: PHYSICIAN ASSISTANT

## 2022-01-01 PROCEDURE — 84100 ASSAY OF PHOSPHORUS: CPT | Performed by: PHYSICIAN ASSISTANT

## 2022-01-01 PROCEDURE — 250N000024 HC ISOFLURANE, PER MIN: Performed by: STUDENT IN AN ORGANIZED HEALTH CARE EDUCATION/TRAINING PROGRAM

## 2022-01-01 PROCEDURE — 80053 COMPREHEN METABOLIC PANEL: CPT | Performed by: EMERGENCY MEDICINE

## 2022-01-01 PROCEDURE — 97161 PT EVAL LOW COMPLEX 20 MIN: CPT | Mod: GP | Performed by: PHYSICAL THERAPIST

## 2022-01-01 PROCEDURE — M0220 HC INJECTION TIXAGEVIMAB & CILGAVIMAB (EVUSHELD): HCPCS

## 2022-01-01 PROCEDURE — 99215 OFFICE O/P EST HI 40 MIN: CPT | Performed by: INTERNAL MEDICINE

## 2022-01-01 PROCEDURE — 99305 1ST NF CARE MODERATE MDM 35: CPT | Performed by: PSYCHIATRY & NEUROLOGY

## 2022-01-01 PROCEDURE — 85290 CLOT FACTOR XIII FIBRIN STAB: CPT | Performed by: STUDENT IN AN ORGANIZED HEALTH CARE EDUCATION/TRAINING PROGRAM

## 2022-01-01 PROCEDURE — 999N000111 HC STATISTIC OT IP EVAL DEFER

## 2022-01-01 PROCEDURE — 97164 PT RE-EVAL EST PLAN CARE: CPT | Mod: GP

## 2022-01-01 PROCEDURE — 86850 RBC ANTIBODY SCREEN: CPT | Performed by: EMERGENCY MEDICINE

## 2022-01-01 PROCEDURE — 97165 OT EVAL LOW COMPLEX 30 MIN: CPT | Mod: GO | Performed by: OCCUPATIONAL THERAPIST

## 2022-01-01 PROCEDURE — 72131 CT LUMBAR SPINE W/O DYE: CPT

## 2022-01-01 PROCEDURE — 85576 BLOOD PLATELET AGGREGATION: CPT

## 2022-01-01 PROCEDURE — A9552 F18 FDG: HCPCS | Performed by: PHYSICIAN ASSISTANT

## 2022-01-01 PROCEDURE — 99238 HOSP IP/OBS DSCHRG MGMT 30/<: CPT | Mod: GC | Performed by: PSYCHIATRY & NEUROLOGY

## 2022-01-01 PROCEDURE — 93010 ELECTROCARDIOGRAM REPORT: CPT | Performed by: INTERNAL MEDICINE

## 2022-01-01 PROCEDURE — G1004 CDSM NDSC: HCPCS | Mod: GC | Performed by: RADIOLOGY

## 2022-01-01 PROCEDURE — 90834 PSYTX W PT 45 MINUTES: CPT | Mod: 95 | Performed by: PSYCHOLOGIST

## 2022-01-01 PROCEDURE — 97112 NEUROMUSCULAR REEDUCATION: CPT | Mod: GO | Performed by: OCCUPATIONAL THERAPIST

## 2022-01-01 PROCEDURE — U0003 INFECTIOUS AGENT DETECTION BY NUCLEIC ACID (DNA OR RNA); SEVERE ACUTE RESPIRATORY SYNDROME CORONAVIRUS 2 (SARS-COV-2) (CORONAVIRUS DISEASE [COVID-19]), AMPLIFIED PROBE TECHNIQUE, MAKING USE OF HIGH THROUGHPUT TECHNOLOGIES AS DESCRIBED BY CMS-2020-01-R: HCPCS

## 2022-01-01 PROCEDURE — 72125 CT NECK SPINE W/O DYE: CPT | Mod: 26 | Performed by: RADIOLOGY

## 2022-01-01 PROCEDURE — 370N000017 HC ANESTHESIA TECHNICAL FEE, PER MIN: Performed by: STUDENT IN AN ORGANIZED HEALTH CARE EDUCATION/TRAINING PROGRAM

## 2022-01-01 PROCEDURE — 97535 SELF CARE MNGMENT TRAINING: CPT | Mod: GO

## 2022-01-01 PROCEDURE — 83036 HEMOGLOBIN GLYCOSYLATED A1C: CPT | Performed by: STUDENT IN AN ORGANIZED HEALTH CARE EDUCATION/TRAINING PROGRAM

## 2022-01-01 PROCEDURE — 255N000002 HC RX 255 OP 636: Performed by: EMERGENCY MEDICINE

## 2022-01-01 PROCEDURE — 85025 COMPLETE CBC W/AUTO DIFF WBC: CPT | Performed by: REGISTERED NURSE

## 2022-01-01 PROCEDURE — 99214 OFFICE O/P EST MOD 30 MIN: CPT | Performed by: REGISTERED NURSE

## 2022-01-01 PROCEDURE — 82962 GLUCOSE BLOOD TEST: CPT

## 2022-01-01 PROCEDURE — 87389 HIV-1 AG W/HIV-1&-2 AB AG IA: CPT

## 2022-01-01 PROCEDURE — 36415 COLL VENOUS BLD VENIPUNCTURE: CPT | Performed by: PHYSICIAN ASSISTANT

## 2022-01-01 PROCEDURE — 85610 PROTHROMBIN TIME: CPT

## 2022-01-01 PROCEDURE — 83735 ASSAY OF MAGNESIUM: CPT | Performed by: PHYSICIAN ASSISTANT

## 2022-01-01 PROCEDURE — 99205 OFFICE O/P NEW HI 60 MIN: CPT | Performed by: STUDENT IN AN ORGANIZED HEALTH CARE EDUCATION/TRAINING PROGRAM

## 2022-01-01 PROCEDURE — 80053 COMPREHEN METABOLIC PANEL: CPT | Mod: GT | Performed by: PHYSICIAN ASSISTANT

## 2022-01-01 PROCEDURE — 97165 OT EVAL LOW COMPLEX 30 MIN: CPT | Mod: GO

## 2022-01-01 PROCEDURE — 99223 1ST HOSP IP/OBS HIGH 75: CPT | Mod: GC | Performed by: STUDENT IN AN ORGANIZED HEALTH CARE EDUCATION/TRAINING PROGRAM

## 2022-01-01 PROCEDURE — 85610 PROTHROMBIN TIME: CPT | Performed by: PHYSICIAN ASSISTANT

## 2022-01-01 PROCEDURE — 85246 CLOT FACTOR VIII VW ANTIGEN: CPT | Performed by: STUDENT IN AN ORGANIZED HEALTH CARE EDUCATION/TRAINING PROGRAM

## 2022-01-01 RX ORDER — ALBUTEROL SULFATE 0.83 MG/ML
2.5 SOLUTION RESPIRATORY (INHALATION)
Status: CANCELLED | OUTPATIENT
Start: 2022-01-01

## 2022-01-01 RX ORDER — LIDOCAINE 4 G/G
2 PATCH TOPICAL
Status: DISCONTINUED | OUTPATIENT
Start: 2022-01-01 | End: 2022-01-01 | Stop reason: HOSPADM

## 2022-01-01 RX ORDER — MEPERIDINE HYDROCHLORIDE 25 MG/ML
12.5 INJECTION INTRAMUSCULAR; INTRAVENOUS; SUBCUTANEOUS EVERY 5 MIN PRN
Status: DISCONTINUED | OUTPATIENT
Start: 2022-01-01 | End: 2022-01-01 | Stop reason: HOSPADM

## 2022-01-01 RX ORDER — HEPARIN SODIUM,PORCINE 10 UNIT/ML
5 VIAL (ML) INTRAVENOUS
Status: CANCELLED | OUTPATIENT
Start: 2022-01-01

## 2022-01-01 RX ORDER — EPINEPHRINE 1 MG/ML
0.3 INJECTION, SOLUTION INTRAMUSCULAR; SUBCUTANEOUS EVERY 5 MIN PRN
Status: CANCELLED | OUTPATIENT
Start: 2022-01-01

## 2022-01-01 RX ORDER — MAGNESIUM SULFATE HEPTAHYDRATE 40 MG/ML
2 INJECTION, SOLUTION INTRAVENOUS ONCE
Status: CANCELLED | OUTPATIENT
Start: 2022-01-01 | End: 2022-01-01

## 2022-01-01 RX ORDER — MAGNESIUM OXIDE 400 MG/1
400 TABLET ORAL EVERY 4 HOURS
Status: COMPLETED | OUTPATIENT
Start: 2022-01-01 | End: 2022-01-01

## 2022-01-01 RX ORDER — METHYLPREDNISOLONE 32 MG/1
TABLET ORAL
Qty: 2 TABLET | Refills: 1 | Status: SHIPPED | OUTPATIENT
Start: 2022-01-01 | End: 2022-01-01

## 2022-01-01 RX ORDER — NALOXONE HYDROCHLORIDE 0.4 MG/ML
0.4 INJECTION, SOLUTION INTRAMUSCULAR; INTRAVENOUS; SUBCUTANEOUS
Status: DISCONTINUED | OUTPATIENT
Start: 2022-01-01 | End: 2022-01-01 | Stop reason: HOSPADM

## 2022-01-01 RX ORDER — DIPHENHYDRAMINE HCL 25 MG
50 CAPSULE ORAL ONCE
Status: COMPLETED | OUTPATIENT
Start: 2022-01-01 | End: 2022-01-01

## 2022-01-01 RX ORDER — LORAZEPAM 2 MG/ML
0.5 INJECTION INTRAMUSCULAR EVERY 4 HOURS PRN
Status: CANCELLED | OUTPATIENT
Start: 2022-01-01

## 2022-01-01 RX ORDER — POTASSIUM CHLORIDE 750 MG/1
20 TABLET, EXTENDED RELEASE ORAL ONCE
Status: COMPLETED | OUTPATIENT
Start: 2022-01-01 | End: 2022-01-01

## 2022-01-01 RX ORDER — FREMANEZUMAB-VFRM 225 MG/1.5ML
INJECTION SUBCUTANEOUS
COMMUNITY
Start: 2022-01-01 | End: 2022-01-01

## 2022-01-01 RX ORDER — SODIUM CHLORIDE, SODIUM GLUCONATE, SODIUM ACETATE, POTASSIUM CHLORIDE AND MAGNESIUM CHLORIDE 526; 502; 368; 37; 30 MG/100ML; MG/100ML; MG/100ML; MG/100ML; MG/100ML
50 INJECTION, SOLUTION INTRAVENOUS CONTINUOUS
Status: DISCONTINUED | OUTPATIENT
Start: 2022-01-01 | End: 2022-01-01

## 2022-01-01 RX ORDER — SODIUM CHLORIDE, SODIUM GLUCONATE, SODIUM ACETATE, POTASSIUM CHLORIDE AND MAGNESIUM CHLORIDE 526; 502; 368; 37; 30 MG/100ML; MG/100ML; MG/100ML; MG/100ML; MG/100ML
1000 INJECTION, SOLUTION INTRAVENOUS ONCE
Status: DISCONTINUED | OUTPATIENT
Start: 2022-01-01 | End: 2022-01-01

## 2022-01-01 RX ORDER — SODIUM CHLORIDE, SODIUM LACTATE, POTASSIUM CHLORIDE, CALCIUM CHLORIDE 600; 310; 30; 20 MG/100ML; MG/100ML; MG/100ML; MG/100ML
INJECTION, SOLUTION INTRAVENOUS CONTINUOUS
Status: DISCONTINUED | OUTPATIENT
Start: 2022-01-01 | End: 2022-01-01 | Stop reason: HOSPADM

## 2022-01-01 RX ORDER — NALOXONE HYDROCHLORIDE 0.4 MG/ML
0.2 INJECTION, SOLUTION INTRAMUSCULAR; INTRAVENOUS; SUBCUTANEOUS
Status: CANCELLED | OUTPATIENT
Start: 2022-01-01

## 2022-01-01 RX ORDER — HEPARIN SODIUM (PORCINE) LOCK FLUSH IV SOLN 100 UNIT/ML 100 UNIT/ML
5 SOLUTION INTRAVENOUS
Status: CANCELLED | OUTPATIENT
Start: 2022-01-01

## 2022-01-01 RX ORDER — LEVETIRACETAM 500 MG/1
500 TABLET ORAL 2 TIMES DAILY
Status: DISCONTINUED | OUTPATIENT
Start: 2022-01-01 | End: 2022-01-01

## 2022-01-01 RX ORDER — MEPERIDINE HYDROCHLORIDE 25 MG/ML
25 INJECTION INTRAMUSCULAR; INTRAVENOUS; SUBCUTANEOUS
Status: CANCELLED | OUTPATIENT
Start: 2022-01-01

## 2022-01-01 RX ORDER — PROCHLORPERAZINE 25 MG
12.5 SUPPOSITORY, RECTAL RECTAL EVERY 12 HOURS PRN
Status: DISCONTINUED | OUTPATIENT
Start: 2022-01-01 | End: 2022-01-01 | Stop reason: HOSPADM

## 2022-01-01 RX ORDER — MEPERIDINE HYDROCHLORIDE 25 MG/ML
12.5 INJECTION INTRAMUSCULAR; INTRAVENOUS; SUBCUTANEOUS EVERY 5 MIN PRN
Status: CANCELLED | OUTPATIENT
Start: 2022-01-01

## 2022-01-01 RX ORDER — POLYETHYLENE GLYCOL 3350 17 G/17G
17 POWDER, FOR SOLUTION ORAL DAILY
Status: DISCONTINUED | OUTPATIENT
Start: 2022-01-01 | End: 2022-01-01 | Stop reason: HOSPADM

## 2022-01-01 RX ORDER — HYDRALAZINE HYDROCHLORIDE 20 MG/ML
10-20 INJECTION INTRAMUSCULAR; INTRAVENOUS EVERY 30 MIN PRN
Status: DISCONTINUED | OUTPATIENT
Start: 2022-01-01 | End: 2022-01-01 | Stop reason: HOSPADM

## 2022-01-01 RX ORDER — LEVETIRACETAM 750 MG/1
750 TABLET ORAL 2 TIMES DAILY
Status: ON HOLD
Start: 2022-01-01 | End: 2022-01-01

## 2022-01-01 RX ORDER — ACETAMINOPHEN 325 MG/1
650 TABLET ORAL ONCE
Status: COMPLETED | OUTPATIENT
Start: 2022-01-01 | End: 2022-01-01

## 2022-01-01 RX ORDER — METHYLPREDNISOLONE SODIUM SUCCINATE 125 MG/2ML
125 INJECTION, POWDER, LYOPHILIZED, FOR SOLUTION INTRAMUSCULAR; INTRAVENOUS
Status: CANCELLED | OUTPATIENT
Start: 2022-01-01

## 2022-01-01 RX ORDER — MEPERIDINE HYDROCHLORIDE 25 MG/ML
25 INJECTION INTRAMUSCULAR; INTRAVENOUS; SUBCUTANEOUS EVERY 30 MIN PRN
Status: CANCELLED | OUTPATIENT
Start: 2022-01-01

## 2022-01-01 RX ORDER — FENTANYL CITRATE 50 UG/ML
INJECTION, SOLUTION INTRAMUSCULAR; INTRAVENOUS PRN
Status: DISCONTINUED | OUTPATIENT
Start: 2022-01-01 | End: 2022-01-01

## 2022-01-01 RX ORDER — DIPHENHYDRAMINE HYDROCHLORIDE 50 MG/ML
50 INJECTION INTRAMUSCULAR; INTRAVENOUS
Status: CANCELLED
Start: 2022-01-01

## 2022-01-01 RX ORDER — SODIUM CHLORIDE 9 MG/ML
INJECTION, SOLUTION INTRAVENOUS CONTINUOUS
Status: DISCONTINUED | OUTPATIENT
Start: 2022-01-01 | End: 2022-01-01

## 2022-01-01 RX ORDER — LEVETIRACETAM 750 MG/1
750 TABLET ORAL 2 TIMES DAILY
Status: DISCONTINUED | OUTPATIENT
Start: 2022-01-01 | End: 2022-01-01

## 2022-01-01 RX ORDER — ONDANSETRON 2 MG/ML
INJECTION INTRAMUSCULAR; INTRAVENOUS PRN
Status: DISCONTINUED | OUTPATIENT
Start: 2022-01-01 | End: 2022-01-01

## 2022-01-01 RX ORDER — LENALIDOMIDE 20 MG/1
20 CAPSULE ORAL DAILY
Qty: 21 CAPSULE | Refills: 0 | Status: SHIPPED | OUTPATIENT
Start: 2022-01-01 | End: 2022-01-01

## 2022-01-01 RX ORDER — PROPOFOL 10 MG/ML
INJECTION, EMULSION INTRAVENOUS PRN
Status: DISCONTINUED | OUTPATIENT
Start: 2022-01-01 | End: 2022-01-01

## 2022-01-01 RX ORDER — HYDROMORPHONE HCL IN WATER/PF 6 MG/30 ML
0.4 PATIENT CONTROLLED ANALGESIA SYRINGE INTRAVENOUS
Status: ACTIVE | OUTPATIENT
Start: 2022-01-01 | End: 2022-01-01

## 2022-01-01 RX ORDER — VILAZODONE HYDROCHLORIDE 20 MG/1
20 TABLET ORAL
Status: DISCONTINUED | OUTPATIENT
Start: 2022-01-01 | End: 2022-01-01 | Stop reason: HOSPADM

## 2022-01-01 RX ORDER — CYCLOBENZAPRINE HCL 5 MG
5 TABLET ORAL EVERY 8 HOURS PRN
Status: DISCONTINUED | OUTPATIENT
Start: 2022-01-01 | End: 2022-01-01 | Stop reason: HOSPADM

## 2022-01-01 RX ORDER — LIDOCAINE 40 MG/G
CREAM TOPICAL
Status: DISCONTINUED | OUTPATIENT
Start: 2022-01-01 | End: 2022-01-01 | Stop reason: HOSPADM

## 2022-01-01 RX ORDER — LEVETIRACETAM 500 MG/1
500 TABLET ORAL 2 TIMES DAILY
Status: DISCONTINUED | OUTPATIENT
Start: 2022-01-01 | End: 2022-01-01 | Stop reason: HOSPADM

## 2022-01-01 RX ORDER — ALBUTEROL SULFATE 0.83 MG/ML
2.5 SOLUTION RESPIRATORY (INHALATION) EVERY 4 HOURS PRN
Status: CANCELLED | OUTPATIENT
Start: 2022-01-01

## 2022-01-01 RX ORDER — DIPHENHYDRAMINE HCL 50 MG
50 CAPSULE ORAL ONCE
Status: CANCELLED | OUTPATIENT
Start: 2022-01-01

## 2022-01-01 RX ORDER — ALBUTEROL SULFATE 90 UG/1
1-2 AEROSOL, METERED RESPIRATORY (INHALATION)
Status: CANCELLED
Start: 2022-01-01

## 2022-01-01 RX ORDER — LEVETIRACETAM 500 MG/1
500 TABLET ORAL ONCE
Status: COMPLETED | OUTPATIENT
Start: 2022-01-01 | End: 2022-01-01

## 2022-01-01 RX ORDER — SODIUM CHLORIDE 9 MG/ML
INJECTION, SOLUTION INTRAVENOUS CONTINUOUS
Status: CANCELLED | OUTPATIENT
Start: 2022-01-01

## 2022-01-01 RX ORDER — ACETAMINOPHEN 325 MG/1
650 TABLET ORAL EVERY 4 HOURS PRN
Status: DISCONTINUED | OUTPATIENT
Start: 2022-01-01 | End: 2022-01-01 | Stop reason: HOSPADM

## 2022-01-01 RX ORDER — BISACODYL 10 MG
10 SUPPOSITORY, RECTAL RECTAL DAILY PRN
Status: DISCONTINUED | OUTPATIENT
Start: 2022-01-01 | End: 2022-01-01 | Stop reason: HOSPADM

## 2022-01-01 RX ORDER — OXYCODONE HYDROCHLORIDE 5 MG/1
5 TABLET ORAL EVERY 4 HOURS PRN
Status: DISCONTINUED | OUTPATIENT
Start: 2022-01-01 | End: 2022-01-01

## 2022-01-01 RX ORDER — LABETALOL HYDROCHLORIDE 5 MG/ML
10-40 INJECTION, SOLUTION INTRAVENOUS EVERY 10 MIN PRN
Status: DISCONTINUED | OUTPATIENT
Start: 2022-01-01 | End: 2022-01-01 | Stop reason: HOSPADM

## 2022-01-01 RX ORDER — PROCHLORPERAZINE MALEATE 5 MG
5 TABLET ORAL EVERY 6 HOURS PRN
Status: DISCONTINUED | OUTPATIENT
Start: 2022-01-01 | End: 2022-01-01 | Stop reason: HOSPADM

## 2022-01-01 RX ORDER — ACETAMINOPHEN 325 MG/1
650 TABLET ORAL ONCE
Status: CANCELLED
Start: 2022-01-01

## 2022-01-01 RX ORDER — ALBUTEROL SULFATE 90 UG/1
2 AEROSOL, METERED RESPIRATORY (INHALATION) EVERY 4 HOURS PRN
Status: DISCONTINUED | OUTPATIENT
Start: 2022-01-01 | End: 2022-01-01 | Stop reason: HOSPADM

## 2022-01-01 RX ORDER — LABETALOL HYDROCHLORIDE 5 MG/ML
10 INJECTION, SOLUTION INTRAVENOUS
Status: DISCONTINUED | OUTPATIENT
Start: 2022-01-01 | End: 2022-01-01 | Stop reason: HOSPADM

## 2022-01-01 RX ORDER — MAGNESIUM SULFATE HEPTAHYDRATE 40 MG/ML
2 INJECTION, SOLUTION INTRAVENOUS ONCE
Status: COMPLETED | OUTPATIENT
Start: 2022-01-01 | End: 2022-01-01

## 2022-01-01 RX ORDER — ONDANSETRON 2 MG/ML
4 INJECTION INTRAMUSCULAR; INTRAVENOUS EVERY 6 HOURS PRN
Status: DISCONTINUED | OUTPATIENT
Start: 2022-01-01 | End: 2022-01-01 | Stop reason: HOSPADM

## 2022-01-01 RX ORDER — ONDANSETRON 2 MG/ML
4 INJECTION INTRAMUSCULAR; INTRAVENOUS EVERY 30 MIN PRN
Status: CANCELLED | OUTPATIENT
Start: 2022-01-01

## 2022-01-01 RX ORDER — DIPHENHYDRAMINE HCL 25 MG
50 CAPSULE ORAL ONCE
Status: CANCELLED
Start: 2022-01-01

## 2022-01-01 RX ORDER — METHYLPREDNISOLONE SODIUM SUCCINATE 125 MG/2ML
125 INJECTION, POWDER, LYOPHILIZED, FOR SOLUTION INTRAMUSCULAR; INTRAVENOUS
Status: CANCELLED
Start: 2022-01-01

## 2022-01-01 RX ORDER — LEVETIRACETAM 750 MG/1
750 TABLET ORAL 2 TIMES DAILY
Status: DISCONTINUED | OUTPATIENT
Start: 2022-01-01 | End: 2022-01-01 | Stop reason: HOSPADM

## 2022-01-01 RX ORDER — ALBUTEROL SULFATE 0.83 MG/ML
2.5 SOLUTION RESPIRATORY (INHALATION) EVERY 4 HOURS PRN
Status: DISCONTINUED | OUTPATIENT
Start: 2022-01-01 | End: 2022-01-01 | Stop reason: HOSPADM

## 2022-01-01 RX ORDER — ATORVASTATIN CALCIUM 40 MG/1
40 TABLET, FILM COATED ORAL EVERY EVENING
Status: DISCONTINUED | OUTPATIENT
Start: 2022-01-01 | End: 2022-01-01

## 2022-01-01 RX ORDER — MUPIROCIN 20 MG/G
OINTMENT TOPICAL
COMMUNITY
Start: 2022-01-01 | End: 2022-01-01

## 2022-01-01 RX ORDER — NALOXONE HYDROCHLORIDE 0.4 MG/ML
0.2 INJECTION, SOLUTION INTRAMUSCULAR; INTRAVENOUS; SUBCUTANEOUS
Status: DISCONTINUED | OUTPATIENT
Start: 2022-01-01 | End: 2022-01-01 | Stop reason: HOSPADM

## 2022-01-01 RX ORDER — HYDROMORPHONE HCL IN WATER/PF 6 MG/30 ML
0.2 PATIENT CONTROLLED ANALGESIA SYRINGE INTRAVENOUS EVERY 5 MIN PRN
Status: CANCELLED | OUTPATIENT
Start: 2022-01-01

## 2022-01-01 RX ORDER — PREDNISONE 1 MG/1
7 TABLET ORAL DAILY
Qty: 675 TABLET | Refills: 0 | Status: SHIPPED | OUTPATIENT
Start: 2022-01-01

## 2022-01-01 RX ORDER — LEVETIRACETAM 500 MG/1
500 TABLET ORAL 2 TIMES DAILY
Qty: 14 TABLET | Refills: 0 | Status: ON HOLD | OUTPATIENT
Start: 2022-01-01 | End: 2022-01-01

## 2022-01-01 RX ORDER — SODIUM CHLORIDE, SODIUM LACTATE, POTASSIUM CHLORIDE, CALCIUM CHLORIDE 600; 310; 30; 20 MG/100ML; MG/100ML; MG/100ML; MG/100ML
INJECTION, SOLUTION INTRAVENOUS CONTINUOUS PRN
Status: DISCONTINUED | OUTPATIENT
Start: 2022-01-01 | End: 2022-01-01

## 2022-01-01 RX ORDER — ACETAMINOPHEN 650 MG/1
650 SUPPOSITORY RECTAL EVERY 4 HOURS PRN
Status: DISCONTINUED | OUTPATIENT
Start: 2022-01-01 | End: 2022-01-01 | Stop reason: HOSPADM

## 2022-01-01 RX ORDER — ONDANSETRON 2 MG/ML
4 INJECTION INTRAMUSCULAR; INTRAVENOUS EVERY 30 MIN PRN
Status: DISCONTINUED | OUTPATIENT
Start: 2022-01-01 | End: 2022-01-01 | Stop reason: HOSPADM

## 2022-01-01 RX ORDER — LEVETIRACETAM 500 MG/1
500 TABLET ORAL 2 TIMES DAILY
Qty: 180 TABLET | Refills: 1 | Status: SHIPPED | OUTPATIENT
Start: 2022-01-01 | End: 2022-01-01

## 2022-01-01 RX ORDER — ATORVASTATIN CALCIUM 40 MG/1
40 TABLET, FILM COATED ORAL EVERY EVENING
Qty: 90 TABLET | Refills: 1 | DISCHARGE
Start: 2022-01-01

## 2022-01-01 RX ORDER — OXYCODONE HYDROCHLORIDE 5 MG/1
2.5 TABLET ORAL EVERY 6 HOURS PRN
Qty: 30 TABLET | Refills: 0 | Status: SHIPPED | OUTPATIENT
Start: 2022-01-01

## 2022-01-01 RX ORDER — METHYLPREDNISOLONE SODIUM SUCCINATE 40 MG/ML
40 INJECTION, POWDER, LYOPHILIZED, FOR SOLUTION INTRAMUSCULAR; INTRAVENOUS ONCE
Status: COMPLETED | OUTPATIENT
Start: 2022-01-01 | End: 2022-01-01

## 2022-01-01 RX ORDER — ACETAMINOPHEN 325 MG/1
975 TABLET ORAL EVERY 8 HOURS
Status: DISCONTINUED | OUTPATIENT
Start: 2022-01-01 | End: 2022-01-01 | Stop reason: HOSPADM

## 2022-01-01 RX ORDER — LIDOCAINE 40 MG/G
CREAM TOPICAL
Status: CANCELLED | OUTPATIENT
Start: 2022-01-01

## 2022-01-01 RX ORDER — ATORVASTATIN CALCIUM 40 MG/1
40 TABLET, FILM COATED ORAL EVERY EVENING
Status: DISCONTINUED | OUTPATIENT
Start: 2022-01-01 | End: 2022-01-01 | Stop reason: HOSPADM

## 2022-01-01 RX ORDER — EPINEPHRINE 1 MG/ML
0.3 INJECTION, SOLUTION, CONCENTRATE INTRAVENOUS EVERY 5 MIN PRN
Status: CANCELLED | OUTPATIENT
Start: 2022-01-01

## 2022-01-01 RX ORDER — LIDOCAINE HYDROCHLORIDE 20 MG/ML
INJECTION, SOLUTION INFILTRATION; PERINEURAL PRN
Status: DISCONTINUED | OUTPATIENT
Start: 2022-01-01 | End: 2022-01-01

## 2022-01-01 RX ORDER — OXYCODONE HYDROCHLORIDE 5 MG/1
5 TABLET ORAL EVERY 4 HOURS PRN
Status: CANCELLED | OUTPATIENT
Start: 2022-01-01

## 2022-01-01 RX ORDER — HYDRALAZINE HYDROCHLORIDE 20 MG/ML
2.5-5 INJECTION INTRAMUSCULAR; INTRAVENOUS EVERY 10 MIN PRN
Status: DISCONTINUED | OUTPATIENT
Start: 2022-01-01 | End: 2022-01-01 | Stop reason: HOSPADM

## 2022-01-01 RX ORDER — ACETAMINOPHEN 325 MG/1
650 TABLET ORAL EVERY 4 HOURS PRN
Qty: 60 TABLET | Refills: 0 | Status: SHIPPED | OUTPATIENT
Start: 2022-01-01

## 2022-01-01 RX ORDER — LEVETIRACETAM 500 MG/1
500 TABLET ORAL 2 TIMES DAILY
Qty: 30 TABLET | Refills: 0 | Status: SHIPPED | OUTPATIENT
Start: 2022-01-01

## 2022-01-01 RX ORDER — FENTANYL CITRATE 50 UG/ML
25 INJECTION, SOLUTION INTRAMUSCULAR; INTRAVENOUS EVERY 5 MIN PRN
Status: CANCELLED | OUTPATIENT
Start: 2022-01-01

## 2022-01-01 RX ORDER — ASPIRIN 81 MG/1
81 TABLET, CHEWABLE ORAL DAILY
Qty: 90 TABLET | Refills: 3 | Status: SHIPPED | OUTPATIENT
Start: 2022-01-01 | End: 2022-01-01

## 2022-01-01 RX ORDER — HEPARIN SODIUM 200 [USP'U]/100ML
1 INJECTION, SOLUTION INTRAVENOUS CONTINUOUS PRN
Status: CANCELLED | OUTPATIENT
Start: 2022-01-01

## 2022-01-01 RX ORDER — LABETALOL HYDROCHLORIDE 5 MG/ML
10 INJECTION, SOLUTION INTRAVENOUS
Status: CANCELLED | OUTPATIENT
Start: 2022-01-01

## 2022-01-01 RX ORDER — FENTANYL CITRATE 50 UG/ML
25 INJECTION, SOLUTION INTRAMUSCULAR; INTRAVENOUS EVERY 5 MIN PRN
Status: DISCONTINUED | OUTPATIENT
Start: 2022-01-01 | End: 2022-01-01 | Stop reason: HOSPADM

## 2022-01-01 RX ORDER — CALCIUM CARBONATE 500 MG/1
1000 TABLET, CHEWABLE ORAL 4 TIMES DAILY PRN
Status: DISCONTINUED | OUTPATIENT
Start: 2022-01-01 | End: 2022-01-01 | Stop reason: HOSPADM

## 2022-01-01 RX ORDER — LABETALOL HYDROCHLORIDE 5 MG/ML
20 INJECTION, SOLUTION INTRAVENOUS
Status: DISCONTINUED | OUTPATIENT
Start: 2022-01-01 | End: 2022-01-01 | Stop reason: HOSPADM

## 2022-01-01 RX ORDER — CETIRIZINE HYDROCHLORIDE 10 MG/1
10 TABLET ORAL DAILY PRN
COMMUNITY

## 2022-01-01 RX ORDER — LIDOCAINE HYDROCHLORIDE AND EPINEPHRINE 10; 10 MG/ML; UG/ML
INJECTION, SOLUTION INFILTRATION; PERINEURAL PRN
Status: DISCONTINUED | OUTPATIENT
Start: 2022-01-01 | End: 2022-01-01 | Stop reason: HOSPADM

## 2022-01-01 RX ORDER — SENNOSIDES 8.6 MG
1-2 TABLET ORAL 2 TIMES DAILY PRN
Status: DISCONTINUED | OUTPATIENT
Start: 2022-01-01 | End: 2022-01-01 | Stop reason: HOSPADM

## 2022-01-01 RX ORDER — ACETAMINOPHEN 325 MG/1
975 TABLET ORAL EVERY 8 HOURS
Status: DISCONTINUED | OUTPATIENT
Start: 2022-01-01 | End: 2022-01-01

## 2022-01-01 RX ORDER — CEFAZOLIN SODIUM 500 MG/2.2ML
INJECTION, POWDER, FOR SOLUTION INTRAMUSCULAR; INTRAVENOUS PRN
Status: DISCONTINUED | OUTPATIENT
Start: 2022-01-01 | End: 2022-01-01

## 2022-01-01 RX ORDER — ONDANSETRON 4 MG/1
4 TABLET, ORALLY DISINTEGRATING ORAL EVERY 6 HOURS PRN
Status: DISCONTINUED | OUTPATIENT
Start: 2022-01-01 | End: 2022-01-01 | Stop reason: HOSPADM

## 2022-01-01 RX ORDER — OXYCODONE HYDROCHLORIDE 10 MG/1
10 TABLET ORAL EVERY 4 HOURS PRN
Status: DISCONTINUED | OUTPATIENT
Start: 2022-01-01 | End: 2022-01-01

## 2022-01-01 RX ORDER — IOPAMIDOL 755 MG/ML
75 INJECTION, SOLUTION INTRAVASCULAR ONCE
Status: COMPLETED | OUTPATIENT
Start: 2022-01-01 | End: 2022-01-01

## 2022-01-01 RX ORDER — AMOXICILLIN 250 MG
1 CAPSULE ORAL 2 TIMES DAILY
Status: DISCONTINUED | OUTPATIENT
Start: 2022-01-01 | End: 2022-01-01 | Stop reason: HOSPADM

## 2022-01-01 RX ORDER — GADOBUTROL 604.72 MG/ML
5.5 INJECTION INTRAVENOUS ONCE
Status: COMPLETED | OUTPATIENT
Start: 2022-01-01 | End: 2022-01-01

## 2022-01-01 RX ORDER — OXYCODONE HYDROCHLORIDE 5 MG/1
5 TABLET ORAL EVERY 4 HOURS PRN
Status: DISCONTINUED | OUTPATIENT
Start: 2022-01-01 | End: 2022-01-01 | Stop reason: HOSPADM

## 2022-01-01 RX ORDER — SODIUM CHLORIDE 9 MG/ML
INJECTION, SOLUTION INTRAVENOUS CONTINUOUS
Status: ACTIVE | OUTPATIENT
Start: 2022-01-01 | End: 2022-01-01

## 2022-01-01 RX ORDER — HYDROMORPHONE HYDROCHLORIDE 1 MG/ML
0.2 INJECTION, SOLUTION INTRAMUSCULAR; INTRAVENOUS; SUBCUTANEOUS EVERY 5 MIN PRN
Status: DISCONTINUED | OUTPATIENT
Start: 2022-01-01 | End: 2022-01-01 | Stop reason: HOSPADM

## 2022-01-01 RX ORDER — HYDRALAZINE HYDROCHLORIDE 20 MG/ML
2.5-5 INJECTION INTRAMUSCULAR; INTRAVENOUS EVERY 10 MIN PRN
Status: CANCELLED | OUTPATIENT
Start: 2022-01-01

## 2022-01-01 RX ORDER — HYDRALAZINE HYDROCHLORIDE 20 MG/ML
10 INJECTION INTRAMUSCULAR; INTRAVENOUS
Status: DISCONTINUED | OUTPATIENT
Start: 2022-01-01 | End: 2022-01-01 | Stop reason: HOSPADM

## 2022-01-01 RX ORDER — ASPIRIN 81 MG/1
81 TABLET ORAL DAILY
COMMUNITY
End: 2022-01-01

## 2022-01-01 RX ORDER — POTASSIUM CHLORIDE 750 MG/1
40 TABLET, EXTENDED RELEASE ORAL ONCE
Status: COMPLETED | OUTPATIENT
Start: 2022-01-01 | End: 2022-01-01

## 2022-01-01 RX ORDER — CYCLOBENZAPRINE HCL 5 MG
5 TABLET ORAL EVERY 8 HOURS PRN
Qty: 12 TABLET | Refills: 1 | Status: SHIPPED | OUTPATIENT
Start: 2022-01-01

## 2022-01-01 RX ORDER — LEVETIRACETAM 500 MG/1
500 TABLET ORAL 2 TIMES DAILY
Status: COMPLETED | OUTPATIENT
Start: 2022-01-01 | End: 2022-01-01

## 2022-01-01 RX ORDER — ONDANSETRON 4 MG/1
4 TABLET, ORALLY DISINTEGRATING ORAL EVERY 30 MIN PRN
Status: CANCELLED | OUTPATIENT
Start: 2022-01-01

## 2022-01-01 RX ORDER — ACETAMINOPHEN 325 MG/1
650 TABLET ORAL ONCE
Status: CANCELLED | OUTPATIENT
Start: 2022-01-01

## 2022-01-01 RX ORDER — SODIUM CHLORIDE, SODIUM GLUCONATE, SODIUM ACETATE, POTASSIUM CHLORIDE AND MAGNESIUM CHLORIDE 526; 502; 368; 37; 30 MG/100ML; MG/100ML; MG/100ML; MG/100ML; MG/100ML
1000 INJECTION, SOLUTION INTRAVENOUS ONCE
Status: COMPLETED | OUTPATIENT
Start: 2022-01-01 | End: 2022-01-01

## 2022-01-01 RX ORDER — ACETAMINOPHEN 325 MG/1
650 TABLET ORAL EVERY 4 HOURS PRN
Status: DISCONTINUED | OUTPATIENT
Start: 2022-01-01 | End: 2022-01-01

## 2022-01-01 RX ORDER — HEPARIN SODIUM 5000 [USP'U]/.5ML
5000 INJECTION, SOLUTION INTRAVENOUS; SUBCUTANEOUS EVERY 8 HOURS
Status: DISCONTINUED | OUTPATIENT
Start: 2022-01-01 | End: 2022-01-01

## 2022-01-01 RX ORDER — SODIUM CHLORIDE, SODIUM LACTATE, POTASSIUM CHLORIDE, CALCIUM CHLORIDE 600; 310; 30; 20 MG/100ML; MG/100ML; MG/100ML; MG/100ML
INJECTION, SOLUTION INTRAVENOUS CONTINUOUS
Status: CANCELLED | OUTPATIENT
Start: 2022-01-01

## 2022-01-01 RX ORDER — DIPHENHYDRAMINE HYDROCHLORIDE 50 MG/ML
50 INJECTION INTRAMUSCULAR; INTRAVENOUS ONCE
Status: COMPLETED | OUTPATIENT
Start: 2022-01-01 | End: 2022-01-01

## 2022-01-01 RX ORDER — HYDROMORPHONE HCL IN WATER/PF 6 MG/30 ML
0.2 PATIENT CONTROLLED ANALGESIA SYRINGE INTRAVENOUS
Status: ACTIVE | OUTPATIENT
Start: 2022-01-01 | End: 2022-01-01

## 2022-01-01 RX ORDER — ONDANSETRON 4 MG/1
4 TABLET, ORALLY DISINTEGRATING ORAL EVERY 30 MIN PRN
Status: DISCONTINUED | OUTPATIENT
Start: 2022-01-01 | End: 2022-01-01 | Stop reason: HOSPADM

## 2022-01-01 RX ORDER — LEVETIRACETAM 500 MG/1
1000 TABLET ORAL 2 TIMES DAILY
Status: DISCONTINUED | OUTPATIENT
Start: 2022-01-01 | End: 2022-01-01

## 2022-01-01 RX ADMIN — ATORVASTATIN CALCIUM 40 MG: 40 TABLET, FILM COATED ORAL at 20:32

## 2022-01-01 RX ADMIN — LEVETIRACETAM 750 MG: 750 TABLET, FILM COATED ORAL at 08:07

## 2022-01-01 RX ADMIN — POTASSIUM CHLORIDE 20 MEQ: 750 TABLET, EXTENDED RELEASE ORAL at 08:06

## 2022-01-01 RX ADMIN — ACETAMINOPHEN 650 MG: 325 TABLET ORAL at 10:27

## 2022-01-01 RX ADMIN — PREDNISONE 7 MG: 5 TABLET ORAL at 08:13

## 2022-01-01 RX ADMIN — PREDNISONE 7 MG: 1 TABLET ORAL at 08:31

## 2022-01-01 RX ADMIN — LEVETIRACETAM 500 MG: 500 TABLET, FILM COATED ORAL at 11:31

## 2022-01-01 RX ADMIN — ACETAMINOPHEN 650 MG: 325 TABLET, FILM COATED ORAL at 19:56

## 2022-01-01 RX ADMIN — LEVETIRACETAM 500 MG: 500 TABLET, FILM COATED ORAL at 20:25

## 2022-01-01 RX ADMIN — SENNOSIDES AND DOCUSATE SODIUM 1 TABLET: 8.6; 5 TABLET ORAL at 20:25

## 2022-01-01 RX ADMIN — SODIUM CHLORIDE, SODIUM GLUCONATE, SODIUM ACETATE, POTASSIUM CHLORIDE AND MAGNESIUM CHLORIDE 1000 ML: 526; 502; 368; 37; 30 INJECTION, SOLUTION INTRAVENOUS at 00:29

## 2022-01-01 RX ADMIN — ACETAMINOPHEN 325MG 650 MG: 325 TABLET ORAL at 08:50

## 2022-01-01 RX ADMIN — Medication 400 MG: at 09:56

## 2022-01-01 RX ADMIN — MAGNESIUM SULFATE IN WATER 2 G: 40 INJECTION, SOLUTION INTRAVENOUS at 08:19

## 2022-01-01 RX ADMIN — Medication 5 UNITS: at 10:39

## 2022-01-01 RX ADMIN — ACETAMINOPHEN 650 MG: 325 TABLET, FILM COATED ORAL at 00:13

## 2022-01-01 RX ADMIN — ACETAMINOPHEN 325MG 650 MG: 325 TABLET ORAL at 16:54

## 2022-01-01 RX ADMIN — ACETAMINOPHEN 650 MG: 325 TABLET, FILM COATED ORAL at 12:54

## 2022-01-01 RX ADMIN — DIPHENHYDRAMINE HYDROCHLORIDE 50 MG: 25 CAPSULE ORAL at 07:46

## 2022-01-01 RX ADMIN — CYCLOBENZAPRINE HYDROCHLORIDE 5 MG: 5 TABLET, FILM COATED ORAL at 13:34

## 2022-01-01 RX ADMIN — METHYLPREDNISOLONE 40 MG: 40 INJECTION, POWDER, LYOPHILIZED, FOR SOLUTION INTRAMUSCULAR; INTRAVENOUS at 07:50

## 2022-01-01 RX ADMIN — LEVETIRACETAM 750 MG: 750 TABLET, FILM COATED ORAL at 20:39

## 2022-01-01 RX ADMIN — LEVETIRACETAM 750 MG: 750 TABLET, FILM COATED ORAL at 20:36

## 2022-01-01 RX ADMIN — LEVETIRACETAM 1000 MG: 500 TABLET, FILM COATED ORAL at 08:47

## 2022-01-01 RX ADMIN — Medication: at 08:28

## 2022-01-01 RX ADMIN — SODIUM CHLORIDE: 9 INJECTION, SOLUTION INTRAVENOUS at 23:39

## 2022-01-01 RX ADMIN — Medication 400 MG: at 13:34

## 2022-01-01 RX ADMIN — ACETAMINOPHEN 650 MG: 325 TABLET, FILM COATED ORAL at 13:58

## 2022-01-01 RX ADMIN — OXYCODONE HYDROCHLORIDE 2.5 MG: 5 TABLET ORAL at 20:24

## 2022-01-01 RX ADMIN — SODIUM CHLORIDE, SODIUM GLUCONATE, SODIUM ACETATE, POTASSIUM CHLORIDE AND MAGNESIUM CHLORIDE 50 ML/HR: 526; 502; 368; 37; 30 INJECTION, SOLUTION INTRAVENOUS at 22:29

## 2022-01-01 RX ADMIN — SENNOSIDES AND DOCUSATE SODIUM 1 TABLET: 8.6; 5 TABLET ORAL at 21:11

## 2022-01-01 RX ADMIN — ACETAMINOPHEN 650 MG: 325 TABLET, FILM COATED ORAL at 13:22

## 2022-01-01 RX ADMIN — ACETAMINOPHEN 650 MG: 325 TABLET, FILM COATED ORAL at 17:52

## 2022-01-01 RX ADMIN — LEVETIRACETAM 1000 MG: 500 TABLET, FILM COATED ORAL at 20:45

## 2022-01-01 RX ADMIN — LEVETIRACETAM 500 MG: 500 TABLET, FILM COATED ORAL at 08:00

## 2022-01-01 RX ADMIN — SODIUM CHLORIDE 250 ML: 9 INJECTION, SOLUTION INTRAVENOUS at 09:59

## 2022-01-01 RX ADMIN — ACETAMINOPHEN 650 MG: 325 TABLET, FILM COATED ORAL at 00:28

## 2022-01-01 RX ADMIN — OXYCODONE HYDROCHLORIDE 2.5 MG: 5 TABLET ORAL at 09:10

## 2022-01-01 RX ADMIN — RITUXIMAB-ABBS 600 MG: 10 INJECTION, SOLUTION INTRAVENOUS at 08:30

## 2022-01-01 RX ADMIN — ACETAMINOPHEN 650 MG: 325 TABLET, FILM COATED ORAL at 08:00

## 2022-01-01 RX ADMIN — ACETAMINOPHEN 325MG 650 MG: 325 TABLET ORAL at 08:20

## 2022-01-01 RX ADMIN — CYCLOBENZAPRINE HYDROCHLORIDE 5 MG: 5 TABLET, FILM COATED ORAL at 13:15

## 2022-01-01 RX ADMIN — SENNOSIDES AND DOCUSATE SODIUM 1 TABLET: 8.6; 5 TABLET ORAL at 08:24

## 2022-01-01 RX ADMIN — PHENYLEPHRINE HYDROCHLORIDE 100 MCG: 10 INJECTION INTRAVENOUS at 21:19

## 2022-01-01 RX ADMIN — ONDANSETRON 4 MG: 2 INJECTION INTRAMUSCULAR; INTRAVENOUS at 21:20

## 2022-01-01 RX ADMIN — MAGNESIUM SULFATE HEPTAHYDRATE 2 G: 40 INJECTION, SOLUTION INTRAVENOUS at 06:47

## 2022-01-01 RX ADMIN — DIPHENHYDRAMINE HYDROCHLORIDE 50 MG: 25 CAPSULE ORAL at 07:54

## 2022-01-01 RX ADMIN — ACETAMINOPHEN 650 MG: 325 TABLET, FILM COATED ORAL at 12:26

## 2022-01-01 RX ADMIN — ACETAMINOPHEN 650 MG: 325 TABLET ORAL at 09:19

## 2022-01-01 RX ADMIN — SENNOSIDES AND DOCUSATE SODIUM 1 TABLET: 8.6; 5 TABLET ORAL at 08:31

## 2022-01-01 RX ADMIN — LEVETIRACETAM 500 MG: 500 TABLET, FILM COATED ORAL at 19:39

## 2022-01-01 RX ADMIN — ATORVASTATIN CALCIUM 40 MG: 40 TABLET, FILM COATED ORAL at 19:44

## 2022-01-01 RX ADMIN — SODIUM CHLORIDE 250 ML: 9 INJECTION, SOLUTION INTRAVENOUS at 08:14

## 2022-01-01 RX ADMIN — LEVETIRACETAM 500 MG: 500 TABLET, FILM COATED ORAL at 09:10

## 2022-01-01 RX ADMIN — LEVETIRACETAM 500 MG: 500 TABLET, FILM COATED ORAL at 19:44

## 2022-01-01 RX ADMIN — RITUXIMAB-ABBS 600 MG: 10 INJECTION, SOLUTION INTRAVENOUS at 08:28

## 2022-01-01 RX ADMIN — ATORVASTATIN CALCIUM 40 MG: 40 TABLET, FILM COATED ORAL at 20:36

## 2022-01-01 RX ADMIN — ACETAMINOPHEN 325MG 650 MG: 325 TABLET ORAL at 10:38

## 2022-01-01 RX ADMIN — Medication 20 MG: at 21:06

## 2022-01-01 RX ADMIN — ACETAMINOPHEN 325MG 650 MG: 325 TABLET ORAL at 20:45

## 2022-01-01 RX ADMIN — POTASSIUM CHLORIDE 20 MEQ: 750 TABLET, EXTENDED RELEASE ORAL at 05:41

## 2022-01-01 RX ADMIN — ACETAMINOPHEN 325MG 650 MG: 325 TABLET ORAL at 21:59

## 2022-01-01 RX ADMIN — DIPHENHYDRAMINE HYDROCHLORIDE 50 MG: 50 INJECTION, SOLUTION INTRAMUSCULAR; INTRAVENOUS at 12:49

## 2022-01-01 RX ADMIN — OXYCODONE HYDROCHLORIDE 5 MG: 5 TABLET ORAL at 03:23

## 2022-01-01 RX ADMIN — ATORVASTATIN CALCIUM 40 MG: 40 TABLET, FILM COATED ORAL at 19:36

## 2022-01-01 RX ADMIN — ACETAMINOPHEN 650 MG: 325 TABLET ORAL at 09:58

## 2022-01-01 RX ADMIN — PREDNISONE 7 MG: 5 TABLET ORAL at 08:07

## 2022-01-01 RX ADMIN — SODIUM CHLORIDE 250 ML: 9 INJECTION, SOLUTION INTRAVENOUS at 10:24

## 2022-01-01 RX ADMIN — FLUDEOXYGLUCOSE F-18 10.07 MCI.: 500 INJECTION, SOLUTION INTRAVENOUS at 09:34

## 2022-01-01 RX ADMIN — IOPAMIDOL 75 ML: 755 INJECTION, SOLUTION INTRAVENOUS at 14:20

## 2022-01-01 RX ADMIN — ACETAMINOPHEN 650 MG: 325 TABLET, FILM COATED ORAL at 11:47

## 2022-01-01 RX ADMIN — RITUXIMAB-ABBS 600 MG: 10 INJECTION, SOLUTION INTRAVENOUS at 10:53

## 2022-01-01 RX ADMIN — ATORVASTATIN CALCIUM 40 MG: 40 TABLET, FILM COATED ORAL at 20:53

## 2022-01-01 RX ADMIN — DIPHENHYDRAMINE HYDROCHLORIDE 50 MG: 25 CAPSULE ORAL at 09:58

## 2022-01-01 RX ADMIN — ACETAMINOPHEN 325MG 975 MG: 325 TABLET ORAL at 23:57

## 2022-01-01 RX ADMIN — DIPHENHYDRAMINE HYDROCHLORIDE 50 MG: 25 CAPSULE ORAL at 10:27

## 2022-01-01 RX ADMIN — LEVETIRACETAM 500 MG: 500 TABLET, FILM COATED ORAL at 09:52

## 2022-01-01 RX ADMIN — Medication 400 MG: at 11:33

## 2022-01-01 RX ADMIN — SODIUM CHLORIDE 250 ML: 9 INJECTION, SOLUTION INTRAVENOUS at 10:31

## 2022-01-01 RX ADMIN — SODIUM CHLORIDE, POTASSIUM CHLORIDE, SODIUM LACTATE AND CALCIUM CHLORIDE: 600; 310; 30; 20 INJECTION, SOLUTION INTRAVENOUS at 19:57

## 2022-01-01 RX ADMIN — SODIUM CHLORIDE: 9 INJECTION, SOLUTION INTRAVENOUS at 07:51

## 2022-01-01 RX ADMIN — SODIUM CHLORIDE 250 ML: 9 INJECTION, SOLUTION INTRAVENOUS at 09:29

## 2022-01-01 RX ADMIN — OXYCODONE HYDROCHLORIDE 5 MG: 5 TABLET ORAL at 16:17

## 2022-01-01 RX ADMIN — LEVETIRACETAM 750 MG: 750 TABLET, FILM COATED ORAL at 00:13

## 2022-01-01 RX ADMIN — ACETAMINOPHEN 325MG 650 MG: 325 TABLET ORAL at 08:21

## 2022-01-01 RX ADMIN — ACETAMINOPHEN 650 MG: 325 TABLET, FILM COATED ORAL at 17:58

## 2022-01-01 RX ADMIN — SODIUM CHLORIDE, SODIUM GLUCONATE, SODIUM ACETATE, POTASSIUM CHLORIDE AND MAGNESIUM CHLORIDE 50 ML/HR: 526; 502; 368; 37; 30 INJECTION, SOLUTION INTRAVENOUS at 02:55

## 2022-01-01 RX ADMIN — ACETAMINOPHEN 650 MG: 325 TABLET, FILM COATED ORAL at 19:03

## 2022-01-01 RX ADMIN — ACETAMINOPHEN 325MG 650 MG: 325 TABLET ORAL at 17:00

## 2022-01-01 RX ADMIN — ATORVASTATIN CALCIUM 40 MG: 40 TABLET, FILM COATED ORAL at 20:55

## 2022-01-01 RX ADMIN — SENNOSIDES AND DOCUSATE SODIUM 1 TABLET: 8.6; 5 TABLET ORAL at 08:09

## 2022-01-01 RX ADMIN — ACETAMINOPHEN 650 MG: 325 TABLET ORAL at 10:23

## 2022-01-01 RX ADMIN — POLYETHYLENE GLYCOL 3350 17 G: 17 POWDER, FOR SOLUTION ORAL at 08:24

## 2022-01-01 RX ADMIN — POTASSIUM CHLORIDE 40 MEQ: 750 TABLET, EXTENDED RELEASE ORAL at 06:47

## 2022-01-01 RX ADMIN — RITUXIMAB-ABBS 600 MG: 10 INJECTION, SOLUTION INTRAVENOUS at 10:29

## 2022-01-01 RX ADMIN — POLYETHYLENE GLYCOL 3350 17 G: 17 POWDER, FOR SOLUTION ORAL at 08:19

## 2022-01-01 RX ADMIN — SUGAMMADEX 200 MG: 100 INJECTION, SOLUTION INTRAVENOUS at 21:26

## 2022-01-01 RX ADMIN — ATORVASTATIN CALCIUM 40 MG: 40 TABLET, FILM COATED ORAL at 21:41

## 2022-01-01 RX ADMIN — SODIUM CHLORIDE 250 ML: 9 INJECTION, SOLUTION INTRAVENOUS at 09:49

## 2022-01-01 RX ADMIN — ACETAMINOPHEN 325MG 975 MG: 325 TABLET ORAL at 00:15

## 2022-01-01 RX ADMIN — ACETAMINOPHEN 650 MG: 325 TABLET ORAL at 07:54

## 2022-01-01 RX ADMIN — SENNOSIDES AND DOCUSATE SODIUM 1 TABLET: 8.6; 5 TABLET ORAL at 00:15

## 2022-01-01 RX ADMIN — ACETAMINOPHEN 650 MG: 325 TABLET ORAL at 09:10

## 2022-01-01 RX ADMIN — LIDOCAINE HYDROCHLORIDE 5 ML: 10 INJECTION, SOLUTION EPIDURAL; INFILTRATION; INTRACAUDAL; PERINEURAL at 19:00

## 2022-01-01 RX ADMIN — ACETAMINOPHEN 650 MG: 325 TABLET, FILM COATED ORAL at 04:11

## 2022-01-01 RX ADMIN — LIDOCAINE PATCH 4% 2 PATCH: 40 PATCH TOPICAL at 20:26

## 2022-01-01 RX ADMIN — DIPHENHYDRAMINE HYDROCHLORIDE 50 MG: 25 CAPSULE ORAL at 10:23

## 2022-01-01 RX ADMIN — SENNOSIDES AND DOCUSATE SODIUM 1 TABLET: 8.6; 5 TABLET ORAL at 19:39

## 2022-01-01 RX ADMIN — PREDNISONE 7 MG: 5 TABLET ORAL at 13:45

## 2022-01-01 RX ADMIN — Medication 40 MG: at 20:12

## 2022-01-01 RX ADMIN — PREDNISONE 7 MG: 1 TABLET ORAL at 08:24

## 2022-01-01 RX ADMIN — ACETAMINOPHEN 650 MG: 325 TABLET, FILM COATED ORAL at 07:49

## 2022-01-01 RX ADMIN — FLUDEOXYGLUCOSE F-18 10.01 MCI.: 500 INJECTION, SOLUTION INTRAVENOUS at 09:18

## 2022-01-01 RX ADMIN — ACETAMINOPHEN 650 MG: 325 TABLET, FILM COATED ORAL at 21:29

## 2022-01-01 RX ADMIN — PREDNISONE 7 MG: 1 TABLET ORAL at 08:20

## 2022-01-01 RX ADMIN — CEFAZOLIN 2 MG: 225 INJECTION, POWDER, FOR SOLUTION INTRAMUSCULAR; INTRAVENOUS at 20:16

## 2022-01-01 RX ADMIN — POLYETHYLENE GLYCOL 3350 17 G: 17 POWDER, FOR SOLUTION ORAL at 08:31

## 2022-01-01 RX ADMIN — LEVETIRACETAM 1000 MG: 500 TABLET, FILM COATED ORAL at 20:32

## 2022-01-01 RX ADMIN — LEVETIRACETAM 500 MG: 500 TABLET, FILM COATED ORAL at 07:50

## 2022-01-01 RX ADMIN — DIPHENHYDRAMINE HYDROCHLORIDE 50 MG: 25 CAPSULE ORAL at 09:19

## 2022-01-01 RX ADMIN — ACETAMINOPHEN 325MG 650 MG: 325 TABLET ORAL at 17:51

## 2022-01-01 RX ADMIN — OXYCODONE HYDROCHLORIDE 2.5 MG: 5 TABLET ORAL at 09:57

## 2022-01-01 RX ADMIN — LEVETIRACETAM 1000 MG: 500 TABLET, FILM COATED ORAL at 08:20

## 2022-01-01 RX ADMIN — ATORVASTATIN CALCIUM 40 MG: 40 TABLET, FILM COATED ORAL at 20:39

## 2022-01-01 RX ADMIN — ACETAMINOPHEN 650 MG: 325 TABLET, FILM COATED ORAL at 09:10

## 2022-01-01 RX ADMIN — LEVETIRACETAM 750 MG: 750 TABLET, FILM COATED ORAL at 08:19

## 2022-01-01 RX ADMIN — LEVETIRACETAM 500 MG: 500 TABLET, FILM COATED ORAL at 07:54

## 2022-01-01 RX ADMIN — SODIUM CHLORIDE 250 ML: 9 INJECTION, SOLUTION INTRAVENOUS at 07:54

## 2022-01-01 RX ADMIN — LEVETIRACETAM 500 MG: 500 TABLET, FILM COATED ORAL at 08:43

## 2022-01-01 RX ADMIN — ACETAMINOPHEN 325MG 975 MG: 325 TABLET ORAL at 15:08

## 2022-01-01 RX ADMIN — ACETAMINOPHEN 325MG 650 MG: 325 TABLET ORAL at 14:17

## 2022-01-01 RX ADMIN — PROPOFOL 100 MG: 10 INJECTION, EMULSION INTRAVENOUS at 20:12

## 2022-01-01 RX ADMIN — SENNOSIDES AND DOCUSATE SODIUM 1 TABLET: 8.6; 5 TABLET ORAL at 09:10

## 2022-01-01 RX ADMIN — ATORVASTATIN CALCIUM 40 MG: 40 TABLET, FILM COATED ORAL at 20:25

## 2022-01-01 RX ADMIN — ATORVASTATIN CALCIUM 40 MG: 40 TABLET, FILM COATED ORAL at 21:14

## 2022-01-01 RX ADMIN — ATORVASTATIN CALCIUM 40 MG: 40 TABLET, FILM COATED ORAL at 20:45

## 2022-01-01 RX ADMIN — LIDOCAINE PATCH 4% 2 PATCH: 40 PATCH TOPICAL at 03:38

## 2022-01-01 RX ADMIN — ACETAMINOPHEN 325MG 650 MG: 325 TABLET ORAL at 08:46

## 2022-01-01 RX ADMIN — ACETAMINOPHEN 325MG 650 MG: 325 TABLET ORAL at 11:27

## 2022-01-01 RX ADMIN — ATORVASTATIN CALCIUM 40 MG: 40 TABLET, FILM COATED ORAL at 00:13

## 2022-01-01 RX ADMIN — LEVETIRACETAM 1000 MG: 500 TABLET, FILM COATED ORAL at 21:41

## 2022-01-01 RX ADMIN — OXYCODONE HYDROCHLORIDE 2.5 MG: 5 TABLET ORAL at 14:39

## 2022-01-01 RX ADMIN — LEVETIRACETAM 500 MG: 500 TABLET, FILM COATED ORAL at 20:32

## 2022-01-01 RX ADMIN — DIPHENHYDRAMINE HYDROCHLORIDE 50 MG: 25 CAPSULE ORAL at 09:10

## 2022-01-01 RX ADMIN — LIDOCAINE HYDROCHLORIDE 100 MG: 20 INJECTION, SOLUTION INFILTRATION; PERINEURAL at 20:12

## 2022-01-01 RX ADMIN — RITUXIMAB-ABBS 600 MG: 10 INJECTION, SOLUTION INTRAVENOUS at 09:49

## 2022-01-01 RX ADMIN — ACETAMINOPHEN 325MG 650 MG: 325 TABLET ORAL at 18:50

## 2022-01-01 RX ADMIN — LEVETIRACETAM 500 MG: 500 TABLET, FILM COATED ORAL at 19:36

## 2022-01-01 RX ADMIN — FENTANYL CITRATE 25 MCG: 50 INJECTION, SOLUTION INTRAMUSCULAR; INTRAVENOUS at 20:12

## 2022-01-01 RX ADMIN — ACETAMINOPHEN 325MG 650 MG: 325 TABLET ORAL at 19:05

## 2022-01-01 RX ADMIN — SENNOSIDES AND DOCUSATE SODIUM 1 TABLET: 8.6; 5 TABLET ORAL at 08:19

## 2022-01-01 RX ADMIN — FENTANYL CITRATE 75 MCG: 50 INJECTION, SOLUTION INTRAMUSCULAR; INTRAVENOUS at 21:05

## 2022-01-01 RX ADMIN — ACETAMINOPHEN 650 MG: 325 TABLET, FILM COATED ORAL at 16:11

## 2022-01-01 RX ADMIN — SODIUM CHLORIDE, PRESERVATIVE FREE 90 ML: 5 INJECTION INTRAVENOUS at 14:21

## 2022-01-01 RX ADMIN — ACETAMINOPHEN 650 MG: 325 TABLET, FILM COATED ORAL at 17:13

## 2022-01-01 RX ADMIN — LEVETIRACETAM 750 MG: 750 TABLET, FILM COATED ORAL at 08:31

## 2022-01-01 RX ADMIN — DOCUSATE SODIUM 50 MG AND SENNOSIDES 8.6 MG 1 TABLET: 8.6; 5 TABLET, FILM COATED ORAL at 07:57

## 2022-01-01 RX ADMIN — ACETAMINOPHEN 650 MG: 325 TABLET, FILM COATED ORAL at 12:50

## 2022-01-01 RX ADMIN — LEVETIRACETAM 500 MG: 500 TABLET, FILM COATED ORAL at 08:25

## 2022-01-01 RX ADMIN — LEVETIRACETAM 750 MG: 750 TABLET, FILM COATED ORAL at 21:14

## 2022-01-01 RX ADMIN — ACETAMINOPHEN 325MG 975 MG: 325 TABLET ORAL at 08:30

## 2022-01-01 RX ADMIN — OXYCODONE HYDROCHLORIDE 5 MG: 5 TABLET ORAL at 12:07

## 2022-01-01 RX ADMIN — OXYCODONE HYDROCHLORIDE 2.5 MG: 5 TABLET ORAL at 03:15

## 2022-01-01 RX ADMIN — VILAZODONE HYDROCHLORIDE 20 MG: 20 TABLET ORAL at 17:09

## 2022-01-01 RX ADMIN — OXYCODONE HYDROCHLORIDE 2.5 MG: 5 TABLET ORAL at 13:22

## 2022-01-01 RX ADMIN — Medication 30 MG: at 20:36

## 2022-01-01 RX ADMIN — ATORVASTATIN CALCIUM 40 MG: 40 TABLET, FILM COATED ORAL at 19:39

## 2022-01-01 RX ADMIN — LEVETIRACETAM 500 MG: 500 TABLET, FILM COATED ORAL at 08:44

## 2022-01-01 RX ADMIN — ACETAMINOPHEN 650 MG: 325 TABLET, FILM COATED ORAL at 08:50

## 2022-01-01 RX ADMIN — ACETAMINOPHEN 975 MG: 325 TABLET, FILM COATED ORAL at 02:41

## 2022-01-01 RX ADMIN — LEVETIRACETAM 500 MG: 500 TABLET, FILM COATED ORAL at 20:53

## 2022-01-01 RX ADMIN — POLYETHYLENE GLYCOL 3350 17 G: 17 POWDER, FOR SOLUTION ORAL at 09:10

## 2022-01-01 RX ADMIN — RITUXIMAB-ABBS 600 MG: 10 INJECTION, SOLUTION INTRAVENOUS at 09:51

## 2022-01-01 RX ADMIN — LEVETIRACETAM 750 MG: 750 TABLET, FILM COATED ORAL at 21:11

## 2022-01-01 RX ADMIN — ACETAMINOPHEN 650 MG: 325 TABLET, FILM COATED ORAL at 04:27

## 2022-01-01 RX ADMIN — ATORVASTATIN CALCIUM 40 MG: 40 TABLET, FILM COATED ORAL at 21:11

## 2022-01-01 RX ADMIN — LEVETIRACETAM 750 MG: 750 TABLET, FILM COATED ORAL at 08:13

## 2022-01-01 RX ADMIN — LEVETIRACETAM 500 MG: 500 TABLET, FILM COATED ORAL at 20:55

## 2022-01-01 RX ADMIN — ACETAMINOPHEN 650 MG: 325 TABLET, FILM COATED ORAL at 20:29

## 2022-01-01 RX ADMIN — SENNOSIDES AND DOCUSATE SODIUM 1 TABLET: 8.6; 5 TABLET ORAL at 08:00

## 2022-01-01 RX ADMIN — ACETAMINOPHEN 650 MG: 325 TABLET, FILM COATED ORAL at 09:06

## 2022-01-01 RX ADMIN — ACETAMINOPHEN 325MG 650 MG: 325 TABLET ORAL at 21:44

## 2022-01-01 RX ADMIN — SENNOSIDES AND DOCUSATE SODIUM 1 TABLET: 8.6; 5 TABLET ORAL at 19:44

## 2022-01-01 RX ADMIN — POLYETHYLENE GLYCOL 3350 17 G: 17 POWDER, FOR SOLUTION ORAL at 08:05

## 2022-01-01 RX ADMIN — LEVETIRACETAM 500 MG: 500 TABLET, FILM COATED ORAL at 09:50

## 2022-01-01 RX ADMIN — Medication 400 MG: at 08:09

## 2022-01-01 RX ADMIN — PHENYLEPHRINE HYDROCHLORIDE 100 MCG: 10 INJECTION INTRAVENOUS at 21:02

## 2022-01-01 RX ADMIN — OXYCODONE HYDROCHLORIDE 2.5 MG: 5 TABLET ORAL at 19:06

## 2022-01-01 RX ADMIN — LEVETIRACETAM 500 MG: 500 TABLET, FILM COATED ORAL at 20:21

## 2022-01-01 RX ADMIN — LEVETIRACETAM 500 MG: 500 TABLET, FILM COATED ORAL at 08:08

## 2022-01-01 RX ADMIN — GADOBUTROL 5.5 ML: 604.72 INJECTION INTRAVENOUS at 02:58

## 2022-01-01 RX ADMIN — ACETAMINOPHEN 650 MG: 325 TABLET ORAL at 07:46

## 2022-01-01 RX ADMIN — ACETAMINOPHEN 650 MG: 325 TABLET, FILM COATED ORAL at 05:36

## 2022-01-01 RX ADMIN — SENNOSIDES AND DOCUSATE SODIUM 1 TABLET: 8.6; 5 TABLET ORAL at 08:25

## 2022-01-01 RX ADMIN — SENNOSIDES AND DOCUSATE SODIUM 1 TABLET: 8.6; 5 TABLET ORAL at 19:36

## 2022-01-01 RX ADMIN — RITUXIMAB-ABBS 600 MG: 10 INJECTION, SOLUTION INTRAVENOUS at 11:05

## 2022-01-01 RX ADMIN — Medication 5 MG: at 21:29

## 2022-01-01 RX ADMIN — LEVETIRACETAM 750 MG: 750 TABLET, FILM COATED ORAL at 08:24

## 2022-01-01 RX ADMIN — METHYLPREDNISOLONE 40 MG: 40 INJECTION, POWDER, LYOPHILIZED, FOR SOLUTION INTRAMUSCULAR; INTRAVENOUS at 12:01

## 2022-01-01 ASSESSMENT — ACTIVITIES OF DAILY LIVING (ADL)
ADLS_ACUITY_SCORE: 28
ADLS_ACUITY_SCORE: 32
ADLS_ACUITY_SCORE: 29
ADLS_ACUITY_SCORE: 28
ADLS_ACUITY_SCORE: 26
ADLS_ACUITY_SCORE: 32
ADLS_ACUITY_SCORE: 36
ADLS_ACUITY_SCORE: 32
ADLS_ACUITY_SCORE: 28
ADLS_ACUITY_SCORE: 37
ADLS_ACUITY_SCORE: 27
FALL_HISTORY_WITHIN_LAST_SIX_MONTHS: YES
ADLS_ACUITY_SCORE: 29
ADLS_ACUITY_SCORE: 29
ADLS_ACUITY_SCORE: 26
ADLS_ACUITY_SCORE: 37
FALL_HISTORY_WITHIN_LAST_SIX_MONTHS: YES
ADLS_ACUITY_SCORE: 45
ADLS_ACUITY_SCORE: 32
ADLS_ACUITY_SCORE: 32
ADLS_ACUITY_SCORE: 26
ADLS_ACUITY_SCORE: 27
ADLS_ACUITY_SCORE: 32
ADLS_ACUITY_SCORE: 47
ADLS_ACUITY_SCORE: 26
ADLS_ACUITY_SCORE: 29
ADLS_ACUITY_SCORE: 31
ADLS_ACUITY_SCORE: 33
ADLS_ACUITY_SCORE: 26
ADLS_ACUITY_SCORE: 27
ADLS_ACUITY_SCORE: 33
ADLS_ACUITY_SCORE: 32
CHANGE_IN_FUNCTIONAL_STATUS_SINCE_ONSET_OF_CURRENT_ILLNESS/INJURY: YES
ADLS_ACUITY_SCORE: 29
ADLS_ACUITY_SCORE: 37
ADLS_ACUITY_SCORE: 28
ADLS_ACUITY_SCORE: 29
ADLS_ACUITY_SCORE: 31
ADLS_ACUITY_SCORE: 47
ADLS_ACUITY_SCORE: 31
ADLS_ACUITY_SCORE: 47
ADLS_ACUITY_SCORE: 35
ADLS_ACUITY_SCORE: 24
ADLS_ACUITY_SCORE: 35
ADLS_ACUITY_SCORE: 37
ADLS_ACUITY_SCORE: 32
ADLS_ACUITY_SCORE: 28
ADLS_ACUITY_SCORE: 31
ADLS_ACUITY_SCORE: 24
WALKING_OR_CLIMBING_STAIRS: AMBULATION DIFFICULTY, ASSISTANCE 1 PERSON;STAIR CLIMBING DIFFICULTY, ASSISTANCE 1 PERSON;TRANSFERRING DIFFICULTY, ASSISTANCE 1 PERSON
ADLS_ACUITY_SCORE: 29
DIFFICULTY_EATING/SWALLOWING: NO
ADLS_ACUITY_SCORE: 37
ADLS_ACUITY_SCORE: 31
WALKING_OR_CLIMBING_STAIRS: AMBULATION DIFFICULTY, ASSISTANCE 1 PERSON
ADLS_ACUITY_SCORE: 47
ADLS_ACUITY_SCORE: 27
ADLS_ACUITY_SCORE: 37
DOING_ERRANDS_INDEPENDENTLY_DIFFICULTY: YES
ADLS_ACUITY_SCORE: 26
ADLS_ACUITY_SCORE: 32
ADLS_ACUITY_SCORE: 47
BADLS,_PREVIOUS_FUNCTIONAL_LEVEL: INDEPENDENT
WEAR_GLASSES_OR_BLIND: YES
ADLS_ACUITY_SCORE: 36
ADLS_ACUITY_SCORE: 37
TRANSFERRING: 0-->INDEPENDENT
ADLS_ACUITY_SCORE: 27
ADLS_ACUITY_SCORE: 24
ADLS_ACUITY_SCORE: 29
ADLS_ACUITY_SCORE: 37
ADLS_ACUITY_SCORE: 29
ADLS_ACUITY_SCORE: 32
WEAR_GLASSES_OR_BLIND: YES
ADLS_ACUITY_SCORE: 29
ADLS_ACUITY_SCORE: 27
ADLS_ACUITY_SCORE: 35
ADLS_ACUITY_SCORE: 31
DIFFICULTY_COMMUNICATING: NO
ADLS_ACUITY_SCORE: 32
ADLS_ACUITY_SCORE: 37
ADLS_ACUITY_SCORE: 29
ADLS_ACUITY_SCORE: 37
ADLS_ACUITY_SCORE: 32
ADLS_ACUITY_SCORE: 32
ADLS_ACUITY_SCORE: 35
ADLS_ACUITY_SCORE: 33
ADLS_ACUITY_SCORE: 29
ADLS_ACUITY_SCORE: 24
ADLS_ACUITY_SCORE: 32
TRANSFERRING: 0-->INDEPENDENT
ADLS_ACUITY_SCORE: 26
WALKING_OR_CLIMBING_STAIRS_DIFFICULTY: YES
WALKING_OR_CLIMBING_STAIRS_DIFFICULTY: YES
ADLS_ACUITY_SCORE: 26
ADLS_ACUITY_SCORE: 26
ADLS_ACUITY_SCORE: 47
ADLS_ACUITY_SCORE: 31
ADLS_ACUITY_SCORE: 32
ADLS_ACUITY_SCORE: 26
ADLS_ACUITY_SCORE: 26
ADLS_ACUITY_SCORE: 29
CHANGE_IN_FUNCTIONAL_STATUS_SINCE_ONSET_OF_CURRENT_ILLNESS/INJURY: YES
ADLS_ACUITY_SCORE: 31
ADLS_ACUITY_SCORE: 29
ADLS_ACUITY_SCORE: 29
ADLS_ACUITY_SCORE: 37
ADLS_ACUITY_SCORE: 26
ADLS_ACUITY_SCORE: 28
ADLS_ACUITY_SCORE: 28
IADLS,_PREVIOUS_FUNCTIONAL_LEVEL: INDEPENDENT
ADLS_ACUITY_SCORE: 28
ADLS_ACUITY_SCORE: 29
ADLS_ACUITY_SCORE: 28
ADLS_ACUITY_SCORE: 24
ADLS_ACUITY_SCORE: 47
ADLS_ACUITY_SCORE: 28
ADLS_ACUITY_SCORE: 35
ADLS_ACUITY_SCORE: 28
ADLS_ACUITY_SCORE: 31
ADLS_ACUITY_SCORE: 31
ADLS_ACUITY_SCORE: 32
ADLS_ACUITY_SCORE: 31
ADLS_ACUITY_SCORE: 32
ADLS_ACUITY_SCORE: 32
ADLS_ACUITY_SCORE: 26
ADLS_ACUITY_SCORE: 29
ADLS_ACUITY_SCORE: 37
ADLS_ACUITY_SCORE: 29
ADLS_ACUITY_SCORE: 32
ADLS_ACUITY_SCORE: 33
ADLS_ACUITY_SCORE: 36
ADLS_ACUITY_SCORE: 26
ADLS_ACUITY_SCORE: 29
ADLS_ACUITY_SCORE: 28
ADLS_ACUITY_SCORE: 31
ADLS_ACUITY_SCORE: 31
ADLS_ACUITY_SCORE: 45
ADLS_ACUITY_SCORE: 29
ADLS_ACUITY_SCORE: 47
ADLS_ACUITY_SCORE: 29
ADLS_ACUITY_SCORE: 29
ADLS_ACUITY_SCORE: 32
NUMBER_OF_TIMES_PATIENT_HAS_FALLEN_WITHIN_LAST_SIX_MONTHS: 2
VISION_MANAGEMENT: GLASSES
ADLS_ACUITY_SCORE: 29
ADLS_ACUITY_SCORE: 32
ADLS_ACUITY_SCORE: 29
ADLS_ACUITY_SCORE: 35
ADLS_ACUITY_SCORE: 37
ADLS_ACUITY_SCORE: 31
ADLS_ACUITY_SCORE: 26
ADLS_ACUITY_SCORE: 47
ADLS_ACUITY_SCORE: 32
CONCENTRATING,_REMEMBERING_OR_MAKING_DECISIONS_DIFFICULTY: YES
TRANSFERRING: 0-->INDEPENDENT
TOILETING_ISSUES: NO
ADLS_ACUITY_SCORE: 32
ADLS_ACUITY_SCORE: 29
CONCENTRATING,_REMEMBERING_OR_MAKING_DECISIONS_DIFFICULTY: NO
ADLS_ACUITY_SCORE: 35
DIFFICULTY_EATING/SWALLOWING: NO
ADLS_ACUITY_SCORE: 37
TRANSFERRING: 0-->INDEPENDENT
ADLS_ACUITY_SCORE: 26
ADLS_ACUITY_SCORE: 28
ADLS_ACUITY_SCORE: 26
ADLS_ACUITY_SCORE: 28
ADLS_ACUITY_SCORE: 32
ADLS_ACUITY_SCORE: 32
ADLS_ACUITY_SCORE: 36
ADLS_ACUITY_SCORE: 37
ADLS_ACUITY_SCORE: 31
ADLS_ACUITY_SCORE: 37
ADLS_ACUITY_SCORE: 45
ADLS_ACUITY_SCORE: 24
ADLS_ACUITY_SCORE: 37
ADLS_ACUITY_SCORE: 31
DRESSING/BATHING_DIFFICULTY: NO
ADLS_ACUITY_SCORE: 26
NUMBER_OF_TIMES_PATIENT_HAS_FALLEN_WITHIN_LAST_SIX_MONTHS: 1
ADLS_ACUITY_SCORE: 37
ADLS_ACUITY_SCORE: 28
ADLS_ACUITY_SCORE: 29
ADLS_ACUITY_SCORE: 37
VISION_MANAGEMENT: GLASSES
ADLS_ACUITY_SCORE: 28
ADLS_ACUITY_SCORE: 35
ADLS_ACUITY_SCORE: 29
ADLS_ACUITY_SCORE: 37
ADLS_ACUITY_SCORE: 31
ADLS_ACUITY_SCORE: 31
ADLS_ACUITY_SCORE: 24
ADLS_ACUITY_SCORE: 28
DRESSING/BATHING_DIFFICULTY: NO
ADLS_ACUITY_SCORE: 28
ADLS_ACUITY_SCORE: 32
ADLS_ACUITY_SCORE: 32
ADLS_ACUITY_SCORE: 45
ADLS_ACUITY_SCORE: 32
ADLS_ACUITY_SCORE: 40
ADLS_ACUITY_SCORE: 31
ADLS_ACUITY_SCORE: 35
ADLS_ACUITY_SCORE: 26
PREVIOUS_RESPONSIBILITIES: MEAL PREP;LAUNDRY;HOUSEKEEPING
ADLS_ACUITY_SCORE: 26
ADLS_ACUITY_SCORE: 35
ADLS_ACUITY_SCORE: 29
ADLS_ACUITY_SCORE: 29
ADLS_ACUITY_SCORE: 26
ADLS_ACUITY_SCORE: 29
ADLS_ACUITY_SCORE: 47
ADLS_ACUITY_SCORE: 36
DOING_ERRANDS_INDEPENDENTLY_DIFFICULTY: YES
ADLS_ACUITY_SCORE: 31
TOILETING_ISSUES: NO
ADLS_ACUITY_SCORE: 37
ADLS_ACUITY_SCORE: 40
ADLS_ACUITY_SCORE: 26
ADLS_ACUITY_SCORE: 28
ADLS_ACUITY_SCORE: 26
PREVIOUS_RESPONSIBILITIES: DRIVING
ADLS_ACUITY_SCORE: 29
ADLS_ACUITY_SCORE: 32
ADLS_ACUITY_SCORE: 32
ADLS_ACUITY_SCORE: 28
ADLS_ACUITY_SCORE: 33
ADLS_ACUITY_SCORE: 29

## 2022-01-01 ASSESSMENT — PAIN SCALES - GENERAL
PAINLEVEL: NO PAIN (0)
PAINLEVEL: NO PAIN (0)
PAINLEVEL: MILD PAIN (3)
PAINLEVEL: NO PAIN (0)
PAINLEVEL: MILD PAIN (2)
PAINLEVEL: NO PAIN (0)
PAINLEVEL: MILD PAIN (3)
PAINLEVEL: NO PAIN (1)
PAINLEVEL: MODERATE PAIN (5)
PAINLEVEL: MILD PAIN (3)

## 2022-01-01 ASSESSMENT — VISUAL ACUITY
OU: BASELINE
OU: GLASSES;BASELINE
OU: GLASSES;BASELINE
OU: BASELINE
OU: BASELINE
OU: GLASSES;BASELINE

## 2022-01-01 ASSESSMENT — ENCOUNTER SYMPTOMS
VOMITING: 0
NECK STIFFNESS: 0
DYSURIA: 0
NUMBNESS: 0
EYE DISCHARGE: 0
COUGH: 0
MYALGIAS: 0
SHORTNESS OF BREATH: 0
LIGHT-HEADEDNESS: 0
CONSTITUTIONAL NEGATIVE: 1
COLOR CHANGE: 0
ABDOMINAL PAIN: 0
NUMBNESS: 0
HEADACHES: 1
NAUSEA: 0
CHILLS: 0
VOMITING: 0
DIARRHEA: 0
COUGH: 0
NECK PAIN: 0
SPEECH DIFFICULTY: 0
FREQUENCY: 0
NAUSEA: 0
SHORTNESS OF BREATH: 0
HEADACHES: 1
WEAKNESS: 0
DIZZINESS: 0
CONFUSION: 1
SORE THROAT: 0
LIGHT-HEADEDNESS: 0
AGITATION: 0
ADENOPATHY: 0
FEVER: 0
BACK PAIN: 0
WEAKNESS: 0

## 2022-01-01 ASSESSMENT — PATIENT HEALTH QUESTIONNAIRE - PHQ9: SUM OF ALL RESPONSES TO PHQ QUESTIONS 1-9: 4

## 2022-01-07 NOTE — PROGRESS NOTES
Progress Note  Behavioral Activation/ CBT Psychotherapy      Name: Lilly Babcock  : 1950  MRN: [id number]  Age: 71 year old  Date: 2022  Duration: 55 minutes (start = 12:10 PM--patient arrived late; end = 1:05 PM)    This visit was conducted in person      Psychotherapy Session Content  Discussed her decision to resume therapy and precipitants of her worsening depression over the past several months. Focused on the challenge of family demands and caretaking as well as travel between [place] and [place]. Discussed strategies for assertive communication as well as developmental factors contributing to self-sacrificing for others. Reviewed potential options for addressing challenges with parenting. Reviewed behavioral activation, particularly daily schedule and challenges with mail accumulation. Discussed current schedule and provided information about the science of habit formation. Reviewed potential strategies for slight changes in her morning routine as well as the positive impact of continuing to read the paper in the mornings, walk/play with her dog, attend her book groups, and see her friends. Discussed the positive impact of having her  pre-sort her mail and her plans to have her son recycle accumulated mail. Focused on potential habits to avoid mail re-accumulation and the decision to give donations to non-profit organizations in the month of February only. Discussed the potential risks of file cabinets and the potential advantages of taking photos of important information and recycling the paper copy. Reviewed suicidality and safety.    Current Symptoms  Current symptoms include persistent dysphoria, low energy, low motivation, self-criticism, guilt, avoidance behavior, concentration impairment, anxiety, ruminative thoughts, intermittent anhedonia. Denies suicidal ideation.       Mental Status Exam    Attitude: engaged  Behavior: normal  Eye Contact: sustained  Speech:  fluent  Mood: dysphoric, anxiety  Affect: mood congruient  Thought Process: clear  Suicidal Ideation: denied  Psychosis: not present      Current Diagnosis  Major depression, recurrent, moderate, without psychotic features  R/O PTSD      Treatment Plan  Continue CBT/behavioral activation     Estrella Blanton, Ph.D., L.P.

## 2022-02-01 NOTE — PROGRESS NOTES
Progress Note  Behavioral Activation/ CBT Psychotherapy      Name: Lilly Babcock  : 1950  MRN: [id number]  Age: 71 year old  Date: 2022  Duration: 56 minutes (start = 10:04 AM; end = 11:00 AM)    This visit was conducted in person      Psychotherapy Session Content  Discussed her upcoming trip out of the country and strategies to manage her daughter's financial behavior, as well as self-care and emotion regulation during travel. Focused on challenges with assertive communication including developmental origins and alternative conceptualizations. Reviewed progress in behavioral activation as well as improvements in steps to reduce mail accumulation. Continued discussing strategies for habit building, particularly in the context of household tasks, as well as improvements in her satisfaction with her morning routine.     Current Symptoms  Current symptoms include persistent dysphoria (improved), low energy, low motivation, self-criticism, guilt, avoidance behavior (improved), concentration impairment, anxiety, ruminative thoughts, intermittent anhedonia. Denies suicidal ideation.       Mental Status Exam    Attitude: engaged  Behavior: normal  Eye Contact: sustained  Speech: fluent  Mood: anxious  Affect: mood congruient  Thought Process: clear  Suicidal Ideation: denied  Psychosis: not present      Current Diagnosis  Major depression, recurrent, moderate, without psychotic features  R/O PTSD      Treatment Plan  Continue CBT/behavioral activation     Estrella Blanton, Ph.D., L.P.

## 2022-02-04 NOTE — PROGRESS NOTES
Progress Note  Behavioral Activation/ CBT Psychotherapy      Name: Lilly Babcock  : 1950  MRN: [id number]  Age: 71 year old  Date: 2022  Duration: 50 minutes (start = 12:10 PM--arrived late; end = 1:00 PM)    This visit was conducted in person      Psychotherapy Session Content  Discussed her recent trip to [place] with her family. Focused on the positive impact of travel as well as interpersonal challenges. Discussed her experiences of cognitive confusion and disorientation, which her children described to her, as well as intermittent clarity. Reviewed her plans to meet with her neurologist including the possibility of dementia. Focused on her concerns about her children and living situations as well as short-term goals. Reviewed plans to continue with her routines including getting support with mail sorting, as well as the importance of interpersonal support.    Current Symptoms  Current symptoms include persistent dysphoria, fear, low energy, low motivation, self-criticism, guilt, avoidance, concentration impairment, anxiety, ruminative thoughts, intermittent anhedonia; intermittent confusion, disorientation. Denies suicidal ideation.       Mental Status Exam    Attitude: engaged  Behavior: normal  Eye Contact: sustained  Speech: fluent  Mood: anxious, tearful  Affect: mood congruient  Thought Process: clear  Suicidal Ideation: denied  Psychosis: not present      Current Diagnosis  Major depression, recurrent, moderate, without psychotic features        Treatment Plan  Continue cognitive-behavioral sessions.     Estrella Blanton, Ph.D., L.P.

## 2022-02-10 NOTE — TELEPHONE ENCOUNTER
I discussed the results of Dr. Babcock's CT/PET scan with her.  She does have hypermetabolic bone lesions bilaterally particularly in the iliac bones.  The neck step is biopsy and I think it would make sense just to go for routine bilateral bone marrow biopsy.  I think we will get our answer that way.  Once we have the results of this, we will will make a plan regarding treatment.    I ordered bilateral bone marrow biopsy under conscious sedation.  I will touch base with her once we get the results of that.    Refugio Cornejo MD, MSc  Associate Professor of Medicine  [employer] [facility] [employer]  [facility]  [address] [place], [place] [address] [phone]

## 2022-02-11 NOTE — PROGRESS NOTES
Progress Note  Behavioral Activation/ CBT Psychotherapy      Name: Lilly Babcock  : 1950  MRN: [id number]  Age: 71 year old  Date: 2022  Duration: 53 minutes (start = 11:02 AM; end = 11:55 AM)    This visit was conducted in person      Psychotherapy Session Content  Discussed the results of her PET scan this week which revealed Stage 4 lymphoma. Focused on her emotional response and surprise as well as her struggle in communicating this information with friends and family. Discussed communication strategies as well as ways of increasing interpersonal support and help. Reviewed the research findings of psychological aspects to facilitate resilience in the context of cancer treatment including behavioral activation, interpersonal support, physical activity, and the maintenance of routines. Discussed ways of simplifying household tasks including mail sorting. Discussed the improvement of her memory issues this week and her upcoming visit to the neurologist to confirm that they are due to migraines. Agreed to continue to meet weekly, with the option of telehealth as needed.    Current Symptoms  Current symptoms include persistent dysphoria, fear, low energy, low motivation, self-criticism, guilt, avoidance, concentration impairment, anxiety, ruminative thoughts, intermittent anhedonia; intermittent confusion, grief, worry. Denies suicidal ideation.       Mental Status Exam    Attitude: engaged  Behavior: normal  Eye Contact: sustained  Speech: fluent  Mood: anxious, tearful  Affect: mood congruient  Thought Process: clear  Suicidal Ideation: denied  Psychosis: not present      Current Diagnosis  Major depression, recurrent, moderate, without psychotic features        Treatment Plan  Continue cognitive-behavioral sessions.     Estrella Blanton, Ph.D., L.P.

## 2022-02-16 NOTE — PROGRESS NOTES
[facility] Medical Oncology Note  2/17/2022    Outpatient Progress Note      Assessment:     1. 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 These lymphomas usually are in the more aggressive category and it is pretty amazing that Joyce has really never needed to have systemic therapy for this. She has received focal radiotherapy almost 9 years ago and that has been it for her lymphoma. I suspect that she has had significant treatment of her lymphoma with the steroids she has been on intermittently to treat her polymyalgia rheumatica. But at some point, with this histology, we do expect the need for systemic treatment.  2. And we are there now. Joyce presents with significant widespread pain, fatigue, and worsening symptoms in areas of her known lymphoma. CT/PET shows multiple areas of hypermetabolic ivolvement of bone, muscle and axillary nodes.  3. No contraindication to bone marrow biopsy to assess the current pathology of her lymphoma. Once we get the results, we will consult Rafa Gutierrez, our new lymphoma expert on next steps regarding management.        Plan:     1. F/U for bone marrow biopsy tomorrow under conscious sedation.  2. Covid test today  3. Follow-up with Dr. Gutierrez after the results are known        Refugio Cornejo MD, MSc  Associate Professor of Medicine  [employer] [place] [employer]  [facility]  [address] [place] [address]  [phone]    __________________________________________________________________    Diagnosis     1. Stage YOUSUF follicular lymphoma, diagnosed at August 2008 after biopsy of a left breast mass (grade 2-3A). Initial w/u identified involvement of multiple skeletal sites and the bone marrow.   2. Polymyalgia rheumatica for which she is on chronic steroids, with current dose being 7 mg daily  3. Osteoarthritis status post bilateral  "hip replacement (please see below and therapy to date).      History of Present Illness/Therapy to Date:     1. Observation from [id number]  2. Radiotherapy (4140 cGy in 20 fractions to the left hip/proximal femur) 08/20 thru 09/20/2012 at [facility].  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   3. In July 2016 developed nausea of unknown etiology, workup negative for CNS involvement with MR and CSF analysis. PET scan on 8/18/16 suggested \"progressive\" skeletal disease, but overall her disease had been variable.    4. Left total hip arthroplasty on 10/19/16 for the avascular necrosis and a right hip replacement on 05/10/2017 for degenerative disease.  5. 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 This resulted in major improvement in her symptoms and her CRP decreased substantially - she has been trying tov very slowly taper off.      6. 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 Thereafter, she developed an ulcerative lesion that was diagnosed as pyoderma gangrenosum.  It was treated with Dapsone and topical tacrolimus topical.  The lesion, which was about 6 cm in size, responded.   7. Her most recent PET/CT scan was on 08/04/2018 and was compared to that of 08/2016.  Radiology commented on the waxing and waning course of the numerous hypermetabolic skeletal lesions.  Several lesions had diminished or disappeared and there were several new lesions along the spine, pelvis and left distal femur.  Some of these were associated with underlying sclerotic bone changes. There  were a few small hypermetabolic lymph nodes in both axillae and the right iliac chain.   8. Last seen by Dr. Cornell Malagon 2019. \" There is no indication for further imaging or interventions relative to her lymphoma at this point and will continue to follow her at perhaps 6-month " "intervals with exam and laboratory testing.  She knows to call in the interim if additional issues arise.\"          Interval history:     Joyce is back prior to her bone marrow biopsy.  She feels about the same.  She still has bone ache intermittent.  The most worrisome is in the C2 area where her PET scan has lit up.  She gets a sharp pain there that lasts just for a second or 2 and then is gone.  It only happens intermittently throughout the day.  There is no associated neurologic symptoms.  But this is the area that she is obviously most concerned about.  She remains fatigued.  She has loose stools that have not improved with multiple evaluations.    As per previously, she saw rheumatology 12/1/2021:\"This is the three-month follow-up for the patient. We have been trying to reduce the dose of prednisone by half a mg every few weeks. However, she still takes 7 mg a day. She has been helping her sister in [place] and every time she tries to cut the dose of prednisone, she feels more aching. She has been back to [place] since last night. She hopes to cut down the dose of prednisone further whenever she feels better. I am not certain whether ongoing aching is secondary to PMR or the results of overuse.\" It was suggested to her to have blood tests for sed rate and CRP for an objective evaluation, but these labs were not drawn.    She denies fevers or cough. She does get short of breath with activity however. She has been tested multiple times for Covid and found to be negative.    She denies any adenopathy, but has never had this as part of her lymphoma.        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 She is . She has 2 children. She lives in a house by [place]. She used to love to ski but this has been difficult for her to do since her hip replacements.          Family History:     Family History   Problem Relation Age of Onset     Asthma Sister      Diabetes Father      Depression Sister      Depression Mother      Mental Illness Mother      Mental Illness Sister      Asthma Sister      Asthma Sister      Cancer Mother         breast     Cancer Father         lung ca, was a smoker     Depression Sister      Other Cancer Mother      Other Cancer Father      Other Cancer Sister             Medications:     Current Outpatient Medications   Medication Sig Dispense Refill     acetaminophen (TYLENOL) 500 MG tablet Take 500-1,000 mg by mouth (Patient not taking: Reported on 12/23/2021)       acyclovir (ZOVIRAX) 400 MG tablet Take 1 tablet by mouth daily       albuterol (PROAIR HFA/PROVENTIL HFA/VENTOLIN HFA) 108 (90 Base) MCG/ACT inhaler Inhale 2 puffs into the lungs (Patient not taking: Reported on 12/23/2021)       amoxicillin (AMOXIL) 500 MG capsule TAKE 4 CAPS BY MOUTH ONE HOUR PRIOR TO DENTAL PROCEDURES/CLEANING (Patient not taking: Reported on 12/23/2021)  3     Cholecalciferol (VITAMIN D) 1000 UNIT capsule Take 1 capsule by mouth daily.       clobetasol (TEMOVATE) 0.05 % ointment Reported on 3/9/2017 (Patient not taking: Reported on 12/23/2021)       escitalopram (LEXAPRO) 10 MG tablet  (Patient not taking: Reported on 12/23/2021)       escitalopram (LEXAPRO) 20 MG tablet Take 20 mg by mouth daily (Patient not taking: Reported on 12/23/2021)  5     fexofenadine (ALLEGRA) 180 MG tablet Take  by mouth daily. (Patient not taking: Reported on 12/23/2021)       ipratropium  (ATROVENT) 0.06 % spray Spray 2 sprays in nostril       ketoconazole (NIZORAL) 2 % shampoo        levalbuterol (XOPENEX HFA) 45 MCG/ACT Inhaler Inhale 1-2 puffs into the lungs       methylPREDNISolone (MEDROL) 32 MG tablet Take one tablet (32mg) by mouth 12 hours prior to scheduled CT scan.  Repeat above dose 2 hours prior to CT scan (Patient not taking: Reported on 12/23/2021) 2 tablet 1     mometasone (ELOCON) 0.1 % ointment Reported on 3/9/2017 (Patient not taking: Reported on 12/23/2021)       predniSONE (DELTASONE) 1 MG tablet Take 7 mg by mouth daily        triamcinolone (KENALOG) 0.1 % cream Apply 0.5 inches topically 2 times daily Reported on 3/9/2017 (Patient not taking: Reported on 12/23/2021)       valACYclovir (VALTREX) 500 MG tablet  (Patient not taking: Reported on 12/23/2021)       VIIBRYD 20 MG TABS tablet Take 20 mg by mouth daily                 Physical Exam:   not currently breastfeeding.    ECOG PS: 1  Constitutional: WDWN female in NAD, pleasant and appropriate. She does appear minimally uncomfortable.  HEENT:  NC/AT, minimal conjunctival pallor.  Skin: No jaundice nor ecchymoses. She has a lot of evidence of prior sun damage but no acutely worsening or concerning lesion  Lungs: CTAB, no w/r/r, nonlabored breathing  Cardiovascular: RRR, S1, S2, no m/r/g  Abdomen: +BS, soft, nontender, nondistended, no organomegaly nor masses  MSK/Extremities: Warm, well perfused. No edema  LN: no cervical, supraclavicular, axillary, nor inguinal lymphadenopathy  Neurologic: alert, answering questions appropriately, moving all extremities spontaneously. CN 2-12 grossly intact.  Psych: appropriate affect    Data:     Recent Labs   Lab Test 10/03/19  1610 03/07/19  1014 12/13/18  1535 07/12/18  1222 03/15/18  1349   WBC 8.2 7.6 7.3 6.2 8.2   NEUTROPHIL 78.4 71.0 70.5 76.6 83.9   HGB 12.3 12.8 12.8 12.4 12.5    229 227 263 255     Recent Labs   Lab Test 10/03/19  1610 03/07/19  1014 12/13/18  1532  07/12/18  1222 03/15/18  1349    134 139 137 137   POTASSIUM 4.4 3.9 4.3 4.2 4.2   CHLORIDE 102 100 103 103 103   CO2 28 29 29 27 28   ANIONGAP 5 4 7 6 6   BUN 12 14 19 10 16   CR 0.67 0.72 0.76 0.74 0.70   FLORENCIA 9.1 8.8 9.4 9.4 9.2     Recent Labs   Lab Test 10/03/19  1610 03/07/19  1014 12/13/18  1535 07/12/18  1222 03/15/18  1349 12/07/16  1109 10/20/16  0742 10/19/16  0624 08/04/16  1354   MAG  --   --   --   --   --   --  1.8 1.6 2.1   PHOS  --   --   --   --   --   --   --   --  3.4   * 206 229   < > 226   < >  --   --  210   URIC  --   --  5.4  --  5.4  --   --   --  4.2    < > = values in this interval not displayed.     Recent Labs   Lab Test 10/03/19  1610 03/07/19  1014 12/13/18  1535   BILITOTAL 0.2 0.5 0.2   ALKPHOS 64 69 69   ALT 27 25 28   AST 27 26 28   ALBUMIN 3.8 3.6 3.7   * 206 229       No results found for this or any previous visit (from the past 24 hour(s)).    Other data     CT/PET 2/8/22:  HEAD/NECK:  Increase in size and numbers of the hypermetabolic lymphomatous  osseous infiltration in the skeleton including C2 vertebral body,  .      CHEST:  New hypermetabolic bilateral axillary lymphadenopathy, for example  left axillary lymph node measures 1 cm with SUV max of 8.2 (Series 3  image 144).      ABDOMEN AND PELVIS:  New lymphomatous infiltration of the superior right iliac muscle and  lateral right spinal erector muscle with SUV max of 9.9 (Series 3  image 348).         Focal hypermetabolic nodularity in the left pelvic region adjacent to  obturator region with SUV max of 8.1 (Series 3 image 402).     Visualization of the pelvis is somewhat limited due to streak artifact  from the bilateral total hip arthroplasties.      Unchanged hepatomegaly measures 20 cm in sagittal dimension.     LOWER EXTREMITIES:   Hypermetabolic intraosseous lesion in the distal left femur (series 4,  image 580). Bilateral hip prosthesis.     BONES:   Increase in size and numbers of the  hypermetabolic lymphomatous  osseous infiltration in the skeleton including C2 vertebral body,  proximal/medial bilateral clavicle, right sixth rib and eighth rib,  inferior right scapula,     left sixth rib, right L5 pedicle, bilateral  iliac bones and left distal femur with SUV max of 18.5 in the right  iliac bone lesion (series 3 image 377).                                                                        IMPRESSION: In this patient with history of stage IV follicular  lymphoma, evidence of disease progression ([place] 5) per [place]  criteria since PET/CT from 8/4/2018:  1. New hypermetabolic axillary lymphadenopathy.  2. New lymphomatous infiltration of the superior right iliac muscle  and lateral right spinal erector muscle.  3. New focal hypermetabolic nodularity in the left pelvic region.  4. New and increasing hypermetabolic intraosseous lesions.        Labs, imaging and treatment plan reviewed with patient. All questions answered.        40 minutes spent on the date of the encounter doing chart review, review of outside records, review of test results, interpretation of tests, patient visit and documentation

## 2022-02-17 NOTE — LETTER
2/17/2022         RE: Lilly Babcock  [address]  [facility] [address]        Dear Colleague,    Thank you for referring your patient, Lilly Babcock, to the [facility]. Please see a copy of my visit note below.      [facility] Medical Oncology Note  2/17/2022    Outpatient Progress Note      Assessment:     1. 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 These lymphomas usually are in the more aggressive category and it is pretty amazing that Joyce has really never needed to have systemic therapy for this. She has received focal radiotherapy almost 9 years ago and that has been it for her lymphoma. I suspect that she has had significant treatment of her lymphoma with the steroids she has been on intermittently to treat her polymyalgia rheumatica. But at some point, with this histology, we do expect the need for systemic treatment.  2. And we are there now. Joyce presents with significant widespread pain, fatigue, and worsening symptoms in areas of her known lymphoma. CT/PET shows multiple areas of hypermetabolic ivolvement of bone, muscle and axillary nodes.  3. No contraindication to bone marrow biopsy to assess the current pathology of her lymphoma. Once we get the results, we will consult Rafa Gutierrez, our new lymphoma expert on next steps regarding management.        Plan:     1. F/U for bone marrow biopsy tomorrow under conscious sedation.  2. Covid test today  3. Follow-up with Dr. Gutierrez after the results are known        Refugio Cornejo MD, MSc  Associate Professor of Medicine  [employer]  [address] [address]  [phone]    __________________________________________________________________    Diagnosis     1. Stage YOUSUF follicular lymphoma, diagnosed at August 2008 after biopsy of  "a left breast mass (grade 2-3A). Initial w/u identified involvement of multiple skeletal sites and the bone marrow.   2. Polymyalgia rheumatica for which she is on chronic steroids, with current dose being 7 mg daily  3. Osteoarthritis status post bilateral hip replacement (please see below and therapy to date).      History of Present Illness/Therapy to Date:     1. Observation from 3312-2366  2. Radiotherapy (4140 cGy in 20 fractions to the left hip/proximal femur) 08/20 thru 09/20/2012 at [facility].  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   3. In July 2016 developed nausea of unknown etiology, workup negative for CNS involvement with MR and CSF analysis. PET scan on 8/18/16 suggested \"progressive\" skeletal disease, but overall her disease had been variable.    4. Left total hip arthroplasty on 10/19/16 for the avascular necrosis and a right hip replacement on 05/10/2017 for degenerative disease.  5. 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 This resulted in major improvement in her symptoms and her CRP decreased substantially - she has been trying tov very slowly taper off.      6. 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 Thereafter, she developed an ulcerative lesion that was diagnosed as pyoderma gangrenosum.  It was treated with Dapsone and topical tacrolimus topical.  The lesion, which was about 6 cm in size, responded.   7. Her most recent PET/CT scan was on 08/04/2018 and was compared to that of 08/2016.  Radiology commented on the waxing and waning course of the numerous hypermetabolic skeletal lesions.  Several lesions had diminished or disappeared and there were several new lesions along the spine, pelvis and left distal femur.  Some of these were associated with underlying sclerotic bone changes. There  were a few small hypermetabolic " "lymph nodes in both axillae and the right iliac chain.   8. Last seen by Dr. Cornell Malagon 2019. \" There is no indication for further imaging or interventions relative to her lymphoma at this point and will continue to follow her at perhaps 6-month intervals with exam and laboratory testing.  She knows to call in the interim if additional issues arise.\"          Interval history:     Joyce is back prior to her bone marrow biopsy.  She feels about the same.  She still has bone ache intermittent.  The most worrisome is in the C2 area where her PET scan has lit up.  She gets a sharp pain there that lasts just for a second or 2 and then is gone.  It only happens intermittently throughout the day.  There is no associated neurologic symptoms.  But this is the area that she is obviously most concerned about.  She remains fatigued.  She has loose stools that have not improved with multiple evaluations.    As per previously, she saw rheumatology 12/1/2021:\"This is the three-month follow-up for the patient. We have been trying to reduce the dose of prednisone by half a mg every few weeks. However, she still takes 7 mg a day. She has been helping her sister in [place] and every time she tries to cut the dose of prednisone, she feels more aching. She has been back to [place] since last night. She hopes to cut down the dose of prednisone further whenever she feels better. I am not certain whether ongoing aching is secondary to PMR or the results of overuse.\" It was suggested to her to have blood tests for sed rate and CRP for an objective evaluation, but these labs were not drawn.    She denies fevers or cough. She does get short of breath with activity however. She has been tested multiple times for Covid and found to be negative.    She denies any adenopathy, but has never had this as part of her lymphoma.        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 She is . She has 2 children. She lives in a house by [place]. She used to love to ski but this has been difficult for her to do since her hip replacements.          Family History:     Family History   Problem Relation Age of Onset     Asthma Sister      Diabetes Father      Depression Sister      Depression Mother      Mental Illness Mother      Mental Illness Sister      Asthma Sister      Asthma Sister      Cancer Mother         breast     Cancer Father         lung ca, was a smoker     Depression Sister      Other Cancer Mother      Other Cancer Father      Other Cancer Sister             Medications:     Current Outpatient Medications   Medication Sig Dispense Refill     acetaminophen (TYLENOL) 500 MG tablet Take 500-1,000 mg by mouth (Patient not taking: Reported on 12/23/2021)       acyclovir (ZOVIRAX) 400 MG tablet Take 1 tablet by mouth daily       albuterol (PROAIR HFA/PROVENTIL HFA/VENTOLIN HFA) 108 (90 Base) MCG/ACT inhaler Inhale 2 puffs into the lungs (Patient not taking: Reported on 12/23/2021)       amoxicillin (AMOXIL) 500 MG capsule TAKE 4 CAPS BY MOUTH ONE HOUR PRIOR TO DENTAL PROCEDURES/CLEANING (Patient not taking: Reported on 12/23/2021)  3     Cholecalciferol (VITAMIN D) 1000 UNIT capsule Take 1 capsule by mouth daily.       clobetasol (TEMOVATE) 0.05 % ointment Reported on 3/9/2017 (Patient not taking: Reported on 12/23/2021)       escitalopram (LEXAPRO) 10 MG tablet  (Patient not taking: Reported on  12/23/2021)       escitalopram (LEXAPRO) 20 MG tablet Take 20 mg by mouth daily (Patient not taking: Reported on 12/23/2021)  5     fexofenadine (ALLEGRA) 180 MG tablet Take  by mouth daily. (Patient not taking: Reported on 12/23/2021)       ipratropium (ATROVENT) 0.06 % spray Spray 2 sprays in nostril       ketoconazole (NIZORAL) 2 % shampoo        levalbuterol (XOPENEX HFA) 45 MCG/ACT Inhaler Inhale 1-2 puffs into the lungs       methylPREDNISolone (MEDROL) 32 MG tablet Take one tablet (32mg) by mouth 12 hours prior to scheduled CT scan.  Repeat above dose 2 hours prior to CT scan (Patient not taking: Reported on 12/23/2021) 2 tablet 1     mometasone (ELOCON) 0.1 % ointment Reported on 3/9/2017 (Patient not taking: Reported on 12/23/2021)       predniSONE (DELTASONE) 1 MG tablet Take 7 mg by mouth daily        triamcinolone (KENALOG) 0.1 % cream Apply 0.5 inches topically 2 times daily Reported on 3/9/2017 (Patient not taking: Reported on 12/23/2021)       valACYclovir (VALTREX) 500 MG tablet  (Patient not taking: Reported on 12/23/2021)       VIIBRYD 20 MG TABS tablet Take 20 mg by mouth daily                 Physical Exam:   not currently breastfeeding.    ECOG PS: 1  Constitutional: WDWN female in NAD, pleasant and appropriate. She does appear minimally uncomfortable.  HEENT:  NC/AT, minimal conjunctival pallor.  Skin: No jaundice nor ecchymoses. She has a lot of evidence of prior sun damage but no acutely worsening or concerning lesion  Lungs: CTAB, no w/r/r, nonlabored breathing  Cardiovascular: RRR, S1, S2, no m/r/g  Abdomen: +BS, soft, nontender, nondistended, no organomegaly nor masses  MSK/Extremities: Warm, well perfused. No edema  LN: no cervical, supraclavicular, axillary, nor inguinal lymphadenopathy  Neurologic: alert, answering questions appropriately, moving all extremities spontaneously. CN 2-12 grossly intact.  Psych: appropriate affect    Data:     Recent Labs   Lab Test 10/03/19  1610  03/07/19  1014 12/13/18  1535 07/12/18  1222 03/15/18  1349   WBC 8.2 7.6 7.3 6.2 8.2   NEUTROPHIL 78.4 71.0 70.5 76.6 83.9   HGB 12.3 12.8 12.8 12.4 12.5    229 227 263 255     Recent Labs   Lab Test 10/03/19  1610 03/07/19  1014 12/13/18  1535 07/12/18  1222 03/15/18  1349    134 139 137 137   POTASSIUM 4.4 3.9 4.3 4.2 4.2   CHLORIDE 102 100 103 103 103   CO2 28 29 29 27 28   ANIONGAP 5 4 7 6 6   BUN 12 14 19 10 16   CR 0.67 0.72 0.76 0.74 0.70   FLORENCIA 9.1 8.8 9.4 9.4 9.2     Recent Labs   Lab Test 10/03/19  1610 03/07/19  1014 12/13/18  1535 07/12/18  1222 03/15/18  1349 12/07/16  1109 10/20/16  0742 10/19/16  0624 08/04/16  1354   MAG  --   --   --   --   --   --  1.8 1.6 2.1   PHOS  --   --   --   --   --   --   --   --  3.4   * 206 229   < > 226   < >  --   --  210   URIC  --   --  5.4  --  5.4  --   --   --  4.2    < > = values in this interval not displayed.     Recent Labs   Lab Test 10/03/19  1610 03/07/19  1014 12/13/18  1535   BILITOTAL 0.2 0.5 0.2   ALKPHOS 64 69 69   ALT 27 25 28   AST 27 26 28   ALBUMIN 3.8 3.6 3.7   * 206 229       No results found for this or any previous visit (from the past 24 hour(s)).    Other data     CT/PET 2/8/22:  HEAD/NECK:  Increase in size and numbers of the hypermetabolic lymphomatous  osseous infiltration in the skeleton including C2 vertebral body,  .      CHEST:  New hypermetabolic bilateral axillary lymphadenopathy, for example  left axillary lymph node measures 1 cm with SUV max of 8.2 (Series 3  image 144).      ABDOMEN AND PELVIS:  New lymphomatous infiltration of the superior right iliac muscle and  lateral right spinal erector muscle with SUV max of 9.9 (Series 3  image 348).         Focal hypermetabolic nodularity in the left pelvic region adjacent to  obturator region with SUV max of 8.1 (Series 3 image 402).     Visualization of the pelvis is somewhat limited due to streak artifact  from the bilateral total hip arthroplasties.       Unchanged hepatomegaly measures 20 cm in sagittal dimension.     LOWER EXTREMITIES:   Hypermetabolic intraosseous lesion in the distal left femur (series 4,  image 580). Bilateral hip prosthesis.     BONES:   Increase in size and numbers of the hypermetabolic lymphomatous  osseous infiltration in the skeleton including C2 vertebral body,  proximal/medial bilateral clavicle, right sixth rib and eighth rib,  inferior right scapula,     left sixth rib, right L5 pedicle, bilateral  iliac bones and left distal femur with SUV max of 18.5 in the right  iliac bone lesion (series 3 image 377).                                                                        IMPRESSION: In this patient with history of stage IV follicular  lymphoma, evidence of disease progression ([place] 5) per [place]  criteria since PET/CT from 8/4/2018:  1. New hypermetabolic axillary lymphadenopathy.  2. New lymphomatous infiltration of the superior right iliac muscle  and lateral right spinal erector muscle.  3. New focal hypermetabolic nodularity in the left pelvic region.  4. New and increasing hypermetabolic intraosseous lesions.      Labs, imaging and treatment plan reviewed with patient. All questions answered.    40 minutes spent on the date of the encounter doing chart review, review of outside records, review of test results, interpretation of tests, patient visit and documentation           Again, thank you for allowing me to participate in the care of your patient.      Sincerely,    Refugio Cornejo MD

## 2022-02-17 NOTE — NURSING NOTE
"Oncology Rooming Note    February 17, 2022 8:31 AM   Lilly Babcock is a 71 year old female who presents for:    Chief Complaint   Patient presents with     Oncology Clinic Visit     Follicular non-Hodgkin's lymphoma      Initial Vitals: /57 (BP Location: Left arm, Patient Position: Sitting, Cuff Size: Adult Small)   Pulse 68   Temp 97.8  F (36.6  C) (Oral)   Wt 60.4 kg (133 lb 3.2 oz)   SpO2 100%   BMI 20.61 kg/m   Estimated body mass index is 20.61 kg/m  as calculated from the following:    Height as of 12/13/18: 1.712 m (5' 7.4\").    Weight as of this encounter: 60.4 kg (133 lb 3.2 oz). Body surface area is 1.69 meters squared.  Mild Pain (3) Comment: Data Unavailable   No LMP recorded. Patient is postmenopausal.  Allergies reviewed: Yes  Medications reviewed: Yes    Medications: Medication refills not needed today.  Pharmacy name entered into [employer]:    [id number] - [place], [place] - [address] PHARMACY [facility] - [place], [place] - [address] [facility]  [id number] - [place], [place] - [address] [facility]  [id number] - [place], [place] - [address] [facility]    Clinical concerns: None    Simeon Cardenas            "

## 2022-02-21 NOTE — PROGRESS NOTES
Lila Cook is a 71 year old who presents for  initial consultation for follicular lymphoma      HPI     Oncology History Overview Note   This is a 71-year-old female who was diagnosed of follicular lymphoma grade 1 through 2 in 2008 following detection of a breast mass.  Both breast biopsy and bone marrow biopsy at the time showed follicular lymphoma.  She was initially observed until 2016 where she had left femur neck involvement with lymphoma causing hip pain.  That led to radiation.  At that time she did have lesions in her clavicle as well.  Radiation was successful in ameliorating the pain.  She ultimately underwent hip replacement.  Since then she has had skeletal lesions that have been stable over the years.  Recently she underwent PET scan on February 8, 2022 that showed progressive diffuse skeletal lesions involving C2, clavicles, scapula, axillary lymph nodes, lumbar spine, iliac bone and musculature.    She is referred to me for discussion of further management.  She denies any B symptoms, endorses intermittent, sharp neck pain when she turns her neck.  She does have longstanding back pain.  None of these pains are disabling and she does not ask for pain remedies.  Energy levels are good and appetite is good.  She is very active.  She underwent bone marrow biopsy at [facility].         Review of Systems     8 point review of systems was negative except pertinent positives listed above.  {    Objective    /74   Pulse 79   Wt 60.3 kg (133 lb)   SpO2 97%   BMI 20.58 kg/m    Body mass index is 20.58 kg/m .  Physical Exam   GENERAL:  Alert oriented well nourished  SKIN:  no rash, hives, other lesions.  EYE: no icterus/pallor  ENT: no throat erythema, enlarged tonsills, no ulcers or mucositis in the mouth  LYMPHATIC: no abnormal lymph nodes palable in cervical, axillary, supraclavicular or inguinal area.  RESP:  No rales or rhonchi, breath sounds bilaterally equal and vesicular  CV:  No  tachycardia, S1 S2 normal No murmur.  GI:  Abdomen soft nontender no hepato or splenomegaly  MUSCULOSKELETAL:  No visible joint redness or swelling.  NEURO:  No gross weakness gait normal    Labs: Labs reviewed. No cytopenias, renal or liver abnormalities, LDH normal.  Bone marrow biopsy results are pending.  Viral studies negative.    Imaging: PET scan reviewed.  Diffuse skeletal involvement including cervical spine, clavicle, scapula, lumbar spine, iliac bone and femur.  Bilateral axillary lymph nodes suspect involvement by lymphoma.    Assessment and plan:  1) Follicular lymphoma stage IV, FLIPI score pending results of beta-2 microglobulin test:  -She does not meet any Karrie criteria as for treatment initiation except that she has pain in her back and in her neck because of the lymphoma.  -We discussed that with advanced stage but nonbulky disease, lenalidomide and rituximab would be the most appropriate treatment option for her.  This regimen has been studied in randomized control trials with noninferior response rate and survival to chemotherapy with regimen.  -I briefly went over side effects and schedule of lenalidomide and rituximab.  -I told her that we need to wait for the results of bone marrow biopsy and we need to get the biopsy specimen evaluated here.  -We will plan to meet in 2 weeks to start treatment.  In the meantime, we will get the slides from the bone marrow biopsy here for evaluation.    Total time spent on date of service in review of medical records, review of labs, history taking, physical exam, discussion of assessment and plan, counseling and patient education is 75 minutes.    Rafa Gutierrez MD  Attending Physician  Pager [phone]

## 2022-02-21 NOTE — NURSING NOTE
"Oncology Rooming Note    February 21, 2022 8:00 AM   Lilly Babcock is a 71 year old female who presents for:    Chief Complaint   Patient presents with     Oncology Clinic Visit     follicular lymphoma     Initial Vitals: /74   Pulse 79   Wt 60.3 kg (133 lb)   SpO2 97%   BMI 20.58 kg/m   Estimated body mass index is 20.58 kg/m  as calculated from the following:    Height as of 12/13/18: 1.712 m (5' 7.4\").    Weight as of this encounter: 60.3 kg (133 lb). Body surface area is 1.69 meters squared.  Mild Pain (3) Comment: Data Unavailable   No LMP recorded. Patient is postmenopausal.  Allergies reviewed: Yes  Medications reviewed: Yes    Medications: Medication refills not needed today.  Pharmacy name entered into [employer]:    [id number] - [place], [place] - [address] PHARMACY [facility] - [place], [place] - [address] [facility]  [id number] - [place], [place] - [address] [facility]  [id number] - [place], [place] - [address] [facility]    Clinical concerns: none       Kristy Chan CMA            "

## 2022-02-21 NOTE — LETTER
2/21/2022         RE: Lilly Babcock  [address]  [facility] [address]        Dear Colleague,    Thank you for referring your patient, Lilly Babcock, to the [facility] [place] [facility]. Please see a copy of my visit note below.    Lila Cook is a 71 year old who presents for  initial consultation for follicular lymphoma      HPI     Oncology History Overview Note   This is a 71-year-old female who was diagnosed of follicular lymphoma grade 1 through 2 in 2008 following detection of a breast mass.  Both breast biopsy and bone marrow biopsy at the time showed follicular lymphoma.  She was initially observed until 2016 where she had left femur neck involvement with lymphoma causing hip pain.  That led to radiation.  At that time she did have lesions in her clavicle as well.  Radiation was successful in ameliorating the pain.  She ultimately underwent hip replacement.  Since then she has had skeletal lesions that have been stable over the years.  Recently she underwent PET scan on February 8, 2022 that showed progressive diffuse skeletal lesions involving C2, clavicles, scapula, axillary lymph nodes, lumbar spine, iliac bone and musculature.    She is referred to me for discussion of further management.  She denies any B symptoms, endorses intermittent, sharp neck pain when she turns her neck.  She does have longstanding back pain.  None of these pains are disabling and she does not ask for pain remedies.  Energy levels are good and appetite is good.  She is very active.  She underwent bone marrow biopsy at [facility].         Review of Systems     8 point review of systems was negative except pertinent positives listed above.  {    Objective    /74   Pulse 79   Wt 60.3 kg (133 lb)   SpO2 97%   BMI 20.58 kg/m    Body mass index is 20.58 kg/m .  Physical Exam   GENERAL:  Alert oriented well nourished  SKIN:  no rash, hives, other lesions.  EYE: no icterus/pallor  ENT: no  throat erythema, enlarged tonsills, no ulcers or mucositis in the mouth  LYMPHATIC: no abnormal lymph nodes palable in cervical, axillary, supraclavicular or inguinal area.  RESP:  No rales or rhonchi, breath sounds bilaterally equal and vesicular  CV:  No tachycardia, S1 S2 normal No murmur.  GI:  Abdomen soft nontender no hepato or splenomegaly  MUSCULOSKELETAL:  No visible joint redness or swelling.  NEURO:  No gross weakness gait normal    Labs: Labs reviewed. No cytopenias, renal or liver abnormalities, LDH normal.  Bone marrow biopsy results are pending.  Viral studies negative.    Imaging: PET scan reviewed.  Diffuse skeletal involvement including cervical spine, clavicle, scapula, lumbar spine, iliac bone and femur.  Bilateral axillary lymph nodes suspect involvement by lymphoma.    Assessment and plan:  1) Follicular lymphoma stage IV, FLIPI score pending results of beta-2 microglobulin test:  -She does not meet any Karrie criteria as for treatment initiation except that she has pain in her back and in her neck because of the lymphoma.  -We discussed that with advanced stage but nonbulky disease, lenalidomide and rituximab would be the most appropriate treatment option for her.  This regimen has been studied in randomized control trials with noninferior response rate and survival to chemotherapy with regimen.  -I briefly went over side effects and schedule of lenalidomide and rituximab.  -I told her that we need to wait for the results of bone marrow biopsy and we need to get the biopsy specimen evaluated here.  -We will plan to meet in 2 weeks to start treatment.  In the meantime, we will get the slides from the bone marrow biopsy here for evaluation.    Total time spent on date of service in review of medical records, review of labs, history taking, physical exam, discussion of assessment and plan, counseling and patient education is 75 minutes.    Rafa Gutierrez MD  Attending Physician  Pager  [phone]                  Again, thank you for allowing me to participate in the care of your patient.        Sincerely,        Rafa Gutierrez MD

## 2022-02-21 NOTE — PROGRESS NOTES
RN Care Coordination Note:     Met with Joyce after office visit/consult with Dr. Gutierrez. Introduced self as RN Care Coordinator. Went over contact information for [facility]. Discussed roles of RNCC, MD, SHERYL, nurse triage line, and clinic coordinators. Discussed how to get a hold of different team members via [employer] and at [phone].      Provided [facility] Guidebook folder with printed literature on Rituximab and Revlamid. Auth to Discuss PHI form completed- form placed in scanning.   -Barriers: No know barriers.   -Learning: Appreciative of verbal and written literature. Prefers verbal communication. Is signed up for and can use [employer].      Further POC: Will begin Rituxan and Revlamid treatment in 2 weeks per Dr. Gutierrez.     Patient verbalizes understanding and has no questions or concerns at this time. Has contact information for [facility] if she has any further needs.    AMARA KurtzN, RN  RN Care Coordinator   [facility] [address] [address]   [phone]

## 2022-02-22 NOTE — PROGRESS NOTES
INBOUND CALL: VM received from patient about BMB results from [facility]  and a dermatology question.    OUTBOUND CALL: RNCC called patient back. Patient wanted to inform Dr. Gutierrez that her BMB results were back from [facility] . RNCC informed patient that the slides were in the process of being transferred to [facility] and our pathology would also read them.     Pt also wanted to inform provider that she had a dermatology appointment to remove multiple precancerous skin lesions for 3/9. She wanted to make sure this was ok with her upcoming oncology treatment on 3/7. RNCC sent a message to Dr. Gutierrez regarding these questions.     Marlene More, AMARAN, RN  RN Care Coordinator   [facility] [address] [id number] [phone]

## 2022-02-22 NOTE — PROGRESS NOTES
Action 2.22.22 MJ   Action Taken Received message to request path slides from [facility] on 2.18.22. Called Jean- spoke with Kamille on hold for a while. Transferred to pathology- fax number is [phone]  Sent request.

## 2022-02-22 NOTE — PROGRESS NOTES
Received message from provider that dermatology appointment was ok to proceed with. Updated patient.

## 2022-02-22 NOTE — TELEPHONE ENCOUNTER
Prior Authorization Approval    Authorization Effective Date: 1/23/2022  Authorization Expiration Date: 2/21/2025  Medication: Revlimid- approved   Approved Dose/Quantity: 21/28 ds  Reference #: Key: [id number]   Insurance Company: [employer] - Phone [phone] Fax [phone]  Expected CoPay:       CoPay Card Available:      Foundation Assistance Needed:    Which Pharmacy is filling the prescription (Not needed for infusion/clinic administered): [facility] - [address] [place] [address]  Pharmacy Notified: No  Patient Notified: Yes

## 2022-02-24 NOTE — PROGRESS NOTES
Progress Note  Behavioral Activation/ CBT Psychotherapy      Name: Lilly Babcock  : 1950  MRN: [id number]  Age: 71 year old  Date: 2022  Duration: 56 minutes (start = 2:04 PM; end = 3:00 PM)    This visit was conducted in person      Psychotherapy Session Content  Discussed her bone biopsy this morning, which went smoothly, and medical visits for her cancer as well as neurological symptoms. Focused on the experience of telling her children and sisters about her cancer. Discussed communication strategies as well as ways of increasing interpersonal support and help. Focused on ways of simplifying household tasks including mail sorting. Reviewed behavioral activation and emotion regulation strategies in the context of cancer treatment. Discussed the improvement of her memory issues this past week. Agreed to continue to meet weekly, with the option of telehealth as needed.    Current Symptoms  Current symptoms include persistent dysphoria, fear, low energy, low motivation, self-criticism, guilt, avoidance, concentration impairment, anxiety, ruminative thoughts, intermittent anhedonia; intermittent confusion, grief, worry. Denies suicidal ideation.       Mental Status Exam  Attitude: engaged  Behavior: normal  Eye Contact: sustained  Speech: fluent  Mood: anxious  Affect: mood congruient  Thought Process: clear  Suicidal Ideation: denied  Psychosis: not present      Current Diagnosis  Major depression, recurrent, moderate, without psychotic features        Treatment Plan  Continue cognitive-behavioral sessions.     Estrella Blanton, Ph.D., L.P.

## 2022-02-28 NOTE — PROGRESS NOTES
Progress Note  Behavioral Activation/ CBT Psychotherapy      Name: Lilly Babcock  : 1950  MRN: [id number]  Age: 71 year old  Date: 2022  Duration: 51 minutes (start = 9:04 AM; end = 9:55 AM)    This visit was conducted in person      Psychotherapy Session Content  Discussed her plans for her upcoming chemotherapy. Focused on continued support from her family and friends, as well as specific tasks which are especially helpful, and the necessity to simplify household tasks. Discussed parenting concerns as well as support from her children, including her son's willingness to help her sort her mail. Reviewed challenges with e-mail and behavioral strategies to manage her in-box. Discussed her neurology appointment and testing results. Focused on emotion regulation, behavioral activation, and coping strategies as well as her optimism about success medical treatment.    Current Symptoms  Current symptoms include intermittent dysphoria, fear, low energy, low motivation, self-criticism, guilt, avoidance, concentration impairment, anxiety, ruminative thoughts, grief, worry. Denies suicidal ideation.       Mental Status Exam  Attitude: engaged  Behavior: normal  Eye Contact: sustained  Speech: fluent  Mood: anxious with brightening  Affect: mood congruient  Thought Process: clear  Suicidal Ideation: denied  Psychosis: not present      Current Diagnosis  Major depression, recurrent, moderate, without psychotic features        Treatment Plan  Continue cognitive-behavioral sessions.     Estrella Blanton, Ph.D., L.P.

## 2022-03-02 NOTE — TELEPHONE ENCOUNTER
Free Drug Application Initiated  Medication: REVLIMID  Sponsor: [employer]  Phone #: [phone]  Fax #: [phone]  Additional Information: FAXED 3/2/22        Janki Weinberg   [facility] Pharm[facility] Liaison, alpha split [facility]  Phone: [phone]  Fax: [phone]  Email: [email or web address]

## 2022-03-04 NOTE — PROGRESS NOTES
[facility]: Cancer Care Plan of Care Education Note                                    Discussion with Patient:                                                         OUTGOING CALL: spoke with Joyce via telephone call.    Provided RNCC contact information, [facility] phone number (which has options to talk with a Nurse available 24/7 - triage and RNCC via this option during business hours).   Reviewed appropriate use of MyChart, not to be used for symptom reporting.   Reviewed [facility] care team members including Advanced Practice providers in oncology department and Dr. Gutierrez's usual clinic hours.        Assessment:                                                      Assessment completed with:: Patient         Plan of Care Education   Does patient understand diagnosis?: Yes  Tx plan/regimen:: Rituxan and Revlimid  Does patient understand treatment plan/regimen?: Yes  Preparing for treatment:: Reviewed treatment preparation information with patient (vascular access, day of chemo, visitor policy, what to bring, etc.)  Vascular access:: Peripheral IV  Side effect education:: Diarrhea/Constipation, Fatigue, Infection, Lab value monitoring (anemia, neutropenia, thrombocytopenia), Mouth sores, Nausea/Vomiting, Neuropathy, Skin changes  Transportation means:: Accessible car  Safety/self care at home reviewed with patient:: Yes  Coping - concerns/fears reviewed with patient:: Yes  Plan of Care:: MD follow-up appointment  When to call provider:: Bleeding, Increased shortness of breath, New/worsening pain, Shaking chills, Temperature >100.4F, Uncontrolled diarrhea/constipation, Uncontrolled nausea/vomiting    Evaluation of Learning  Patient Education Provided: Yes  Readiness:: Eager  Method:: Booklet/Handout, Literature, Explanation  Response:: Verbalizes understanding      Intervention/Education provided during outreach:                                                       Chemo Teach  re: Rituxan and  "Revlimid treatment plan   - See Pt Education documentation - Chemo Effects (Adult).  - Reviewed treatment schedule including cycle length, lab monitoring, and prn medications.  - Verbal and written instruction provided using [facility] Drug Information. Reviewed possible side effects, when to contact your doctor or health care provider, and self care tips sections.   We discussed what to expect on day of infusion and [facility] visitor policy.  Pt voiced understanding of above instructions and information and denied further questions    Provided overview of supportive services at [facility] including SW, Cancer Rehab, Cancer Survivorship, oncology dietitian, and oncology behavioral health providers (psychology, psychiatry, counseling)..     Reviewed sections of [facility] Care Guidebook, including \"Getting Ready for Chemotherapy\".     Answered questions regarding: oral chemo coverage. Patient also wanting information on Rituxan chemo coverage. Told patient I would reach out to Infusion finance.      Patient voiced understanding and appreciation of above information and denies any further questions, and he/she understands that I will follow and provide coordination as needed    Marlene More, AMARAN, RN  RN Care Coordinator   [facility] [address] [address]   [phone]       "

## 2022-03-04 NOTE — TELEPHONE ENCOUNTER
Oral Chemotherapy Monitoring Program    Medication: Revlimid  Rx:  20mg PO daily 21/28    Auth #: [id number]  Risk Category: Adult female NOT of reproductive capacity    Routine survey questions reviewed. Yes left message       Janki Price Pharmacy Liaison, alpha split P[facility]  Phone: [phone]  Fax: [phone]  Email: [email or web address]

## 2022-03-04 NOTE — TELEPHONE ENCOUNTER
Free Drug Application Approved  Effective Dates: 03/04/2022-12/31/2022  Patient notified: yes, left voicemail  [facility]: [address], [facility] [place]  [phone]

## 2022-03-08 NOTE — ORAL ONC MGMT
"Oral Chemotherapy Monitoring Program    Lab Monitoring Plan  Per treatment plan  Labs drawn outside of [place]: No  Subjective/Objective:  Lilly Babcock is a 71 year old female with follicular lymphoma seen in clinic for an initial visit for oral chemotherapy education. Joyce was able to arrange delivery through [facility] for her Revlimid and it will arrive today. She will start tomorrow (3/9/22). She states she also plans to start baby aspirin daily tomorrow along with her Revlimid.     ORAL CHEMOTHERAPY 2/22/2022   Assessment Type Initial Work up   Diagnosis Code Non-Hodgkin Lymphoma (NHL)   Providers Dr Gutierrez   Clinic Name/Location [facility]   Drug Name Revlimid (lenalidomide)   Dose 20 mg   Current Schedule Daily   Cycle Details 3 weeks on, 1 week off       Last PHQ-2 Score on record:   PHQ-2 ( 1999 [employer]) 7/6/2017 12/7/2016   Q1: Little interest or pleasure in doing things 0 0   Q2: Feeling down, depressed or hopeless 0 0   PHQ-2 Score 0 0       Vitals:  BP:   BP Readings from Last 1 Encounters:   03/08/22 102/59     Wt Readings from Last 1 Encounters:   03/08/22 61.1 kg (134 lb 11.2 oz)     Estimated body surface area is 1.7 meters squared as calculated from the following:    Height as of this encounter: 1.702 m (5' 7\").    Weight as of an earlier encounter on 3/8/22: 61.1 kg (134 lb 11.2 oz).    Labs:  Result Component Current Result Ref Range   Sodium 139 (3/8/2022) 133 - 144 mmol/L     Result Component Current Result Ref Range   Potassium 4.1 (3/8/2022) 3.4 - 5.3 mmol/L     Result Component Current Result Ref Range   Calcium 9.0 (3/8/2022) 8.5 - 10.1 mg/dL     No results found for Mag within last 30 days.     No results found for Phos within last 30 days.     Result Component Current Result Ref Range   Albumin 3.6 (3/8/2022) 3.4 - 5.0 g/dL     Result Component Current Result Ref Range   Urea Nitrogen 14 (3/8/2022) 7 - 30 mg/dL     Result Component Current Result Ref Range   Creatinine 0.79 (3/8/2022) " 0.52 - 1.04 mg/dL     Result Component Current Result Ref Range   AST 22 (3/8/2022) 0 - 45 U/L     Result Component Current Result Ref Range   ALT 24 (3/8/2022) 0 - 50 U/L     Result Component Current Result Ref Range   Bilirubin Total 0.2 (3/8/2022) 0.2 - 1.3 mg/dL     Result Component Current Result Ref Range   WBC Count 6.4 (3/8/2022) 4.0 - 11.0 10e3/uL     Result Component Current Result Ref Range   Hemoglobin 11.8 (3/8/2022) 11.7 - 15.7 g/dL     Result Component Current Result Ref Range   Platelet Count 255 (3/8/2022) 150 - 450 10e3/uL     No results found for ANC within last 30 days.     Result Component Current Result Ref Range   Absolute Neutrophils 4.0 (3/8/2022) 1.6 - 8.3 10e3/uL      Medication Reconciliation:  Upon review of medication list, the following potential drug interactions were identified:     Aspirin + Viibryd: Selective Serotonin Reuptake Inhibitors may enhance the antiplatelet effect of Aspirin - counseled patient to monitor for bleeding    Atrovent + Compazine + Zyrtec: may enhance the adverse/toxic effect of other Anticholinergic Agents - counseled patient to monitor for anticholinergic effects    Compazine + Viibryd: serotonergic agents may enhance dopamine blockade, possibly increasing the risk for neuroleptic malignant syndrome. Antipsychotic Agents may enhance the serotonergic effect of Serotonergic Agents - Counseled patient    Aspirin + prednisone: Salicylates may enhance the adverse/toxic effect of Corticosteroids. Corticosteroids may decrease the serum concentration of Salicylates - Counseled patient to monitor for gastrointestinal ulceration and bleeding    Assessment:  Patient is appropriate to start therapy.    Plan:  Basic chemotherapy teaching was reviewed with the patient including indication, start date of therapy, dose, administration, adverse effects, missed doses, food and drug interactions, monitoring, side effect management, office contact information, and safe  handling. Written materials were provided and all questions answered.    Follow-Up:  The oral chemotherapy team will call Joyce in about 1 week for her initial follow-up.     Daniel Becerril, PharmD  [facility] Pharmacist  [phone]

## 2022-03-08 NOTE — NURSING NOTE
Chief Complaint   Patient presents with     Oncology Clinic Visit     Follicular lymphoma      Blood Draw     Vitals, blood drawn and PIV placed by LPN. Pt checked into appt.      SHEA Rivers LPN

## 2022-03-08 NOTE — LETTER
3/8/2022     RE: Lilly Babcock  [address]  [facility] [address]    Dear Colleague,    Thank you for referring your patient, Lilly Babcock, to the [facility]. Please see a copy of my visit note below.    Lila Cook is a 71 year old who presents for the follow-up of follicular lymphoma    HPI     Oncology History Overview Note   This is a 71-year-old female who was diagnosed of follicular lymphoma grade 1 through 2 in 2008 following detection of a breast mass.  Both breast biopsy and bone marrow biopsy at the time showed follicular lymphoma.  She was initially observed until 2016 where she had left femur neck involvement with lymphoma causing hip pain.  That led to radiation.  At that time she did have lesions in her clavicle as well.  Radiation was successful in ameliorating the pain.  She ultimately underwent hip replacement.  Since then she has had skeletal lesions that have been stable over the years.  Recently she underwent PET scan on February 8, 2022 that showed progressive diffuse skeletal lesions involving C2, clavicles, scapula, axillary lymph nodes, lumbar spine, iliac bone and musculature.    She is referred to me for discussion of further management.  She denies any B symptoms, endorses intermittent, sharp neck pain when she turns her neck.  She does have longstanding back pain.  None of these pains are disabling and she does not ask for pain remedies.  Energy levels are good and appetite is good.  She is very active.  She underwent bone marrow biopsy at [facility].     Follicular non-Hodgkin's lymphoma (H)   10/15/2009 Initial Diagnosis    Follicular non-Hodgkin's lymphoma (H)     3/8/2022 -  Chemotherapy    OP ONC Lymphoma - riTUXimab / LENalidomide  Plan Provider: Rafa Gutierrez MD  Treatment goal: Palliative  Line of treatment: [No plan line of treatment]       She denies any new symptoms today no new lymphadenopathy.    Review of Systems   8 point  review of systems was negative except pertinent positives listed above.        Objective    /59   Pulse 81   Temp 97.7  F (36.5  C) (Axillary)   Resp 16   Wt 61.1 kg (134 lb 11.2 oz)   SpO2 100%   BMI 20.85 kg/m    Body mass index is 20.85 kg/m .  Physical Exam   GENERAL:  Alert oriented well nourished  SKIN:  no rash, hives, other lesions.  EYE: no icterus/pallor  ENT: no throat erythema, enlarged tonsills, no ulcers or mucositis in the mouth  LYMPHATIC: no abnormal lymph nodes palable in cervical, axillary, supraclavicular or inguinal area.  RESP:  No rales or rhonchi, breath sounds bilaterally equal and vesicular  CV:  No tachycardia, S1 S2 normal No murmur.  GI:  Abdomen soft nontender no hepato or splenomegaly  MUSCULOSKELETAL:  No visible joint redness or swelling.  NEURO:  No gross weakness gait normal    Labs: Labs reviewed. No cytopenias, renal or liver abnormalities, LDH normal.  Beta-2 microglobulin normal    Assessment and plan:    1) follicular lymphoma, stage IV, FLIPI 2  -She has intermediate risk disease.  -We will start rituximab and lenalidomide cycle 1 today.  -Detailed discussion of rituximab side effects and infusion reaction was conducted with the patient and she verbalized understanding.  -I will also start aspirin 81 mg daily to mild to get thromboembolic risk associated with lenalidomide.    Total time spent on date of service in review of medical records, review of labs, history taking, physical exam, discussion of assessment and plan, counseling and patient education is 30 minutes.    Rafa Gutierrez MD  Attending Physician  Pager [phone]

## 2022-03-08 NOTE — NURSING NOTE
"Oncology Rooming Note    March 8, 2022 7:56 AM   Lilly Babcock is a 71 year old female who presents for:    Chief Complaint   Patient presents with     Oncology Clinic Visit     Follicular lymphoma      Initial Vitals: /59   Pulse 81   Temp 97.7  F (36.5  C) (Axillary)   Resp 16   Wt 61.1 kg (134 lb 11.2 oz)   SpO2 100%   BMI 20.85 kg/m   Estimated body mass index is 20.85 kg/m  as calculated from the following:    Height as of 12/13/18: 1.712 m (5' 7.4\").    Weight as of this encounter: 61.1 kg (134 lb 11.2 oz). Body surface area is 1.7 meters squared.  Mild Pain (3) Comment: Data Unavailable   No LMP recorded. Patient is postmenopausal.  Allergies reviewed: Yes  Medications reviewed: Yes    Medications: Medication refills not needed today.  Pharmacy name entered into EPIC:    [id number] - [place], [place] - [address] [place] - [address]  [id number] - [place], [place] - [address]  [id number] - [place], [place] - [address]  [facility] [address] [place] [address]    Clinical concerns: None       Sandra Belcher LPN            "

## 2022-03-08 NOTE — PATIENT INSTRUCTIONS
[facility] Triage Nurse Line: [phone]    Call triage nurse with chills and/or temperature greater than or equal to 100.4, uncontrolled nausea/vomiting, diarrhea, constipation, dizziness, shortness of breath, chest pain, bleeding, unexplained bruising, or any new/concerning symptoms, questions/concerns.     If you are having any concerning symptoms or wish to speak to a provider before your next infusion visit, please call your care coordinator or triage to notify them so we can adequately serve you.       Lab Results:  Recent Results (from the past 12 hour(s))   Comprehensive metabolic panel    Collection Time: 03/08/22  7:52 AM   Result Value Ref Range    Sodium 139 133 - 144 mmol/L    Potassium 4.1 3.4 - 5.3 mmol/L    Chloride 104 94 - 109 mmol/L    Carbon Dioxide (CO2) 26 20 - 32 mmol/L    Anion Gap 9 3 - 14 mmol/L    Urea Nitrogen 14 7 - 30 mg/dL    Creatinine 0.79 0.52 - 1.04 mg/dL    Calcium 9.0 8.5 - 10.1 mg/dL    Glucose 107 (H) 70 - 99 mg/dL    Alkaline Phosphatase 63 40 - 150 U/L    AST 22 0 - 45 U/L    ALT 24 0 - 50 U/L    Protein Total 6.2 (L) 6.8 - 8.8 g/dL    Albumin 3.6 3.4 - 5.0 g/dL    Bilirubin Total 0.2 0.2 - 1.3 mg/dL    GFR Estimate 79 >60 mL/min/1.73m2   Uric acid    Collection Time: 03/08/22  7:52 AM   Result Value Ref Range    Uric Acid 5.4 2.6 - 6.0 mg/dL   CBC with platelets and differential    Collection Time: 03/08/22  7:52 AM   Result Value Ref Range    WBC Count 6.4 4.0 - 11.0 10e3/uL    RBC Count 3.87 3.80 - 5.20 10e6/uL    Hemoglobin 11.8 11.7 - 15.7 g/dL    Hematocrit 36.0 35.0 - 47.0 %    MCV 93 78 - 100 fL    MCH 30.5 26.5 - 33.0 pg    MCHC 32.8 31.5 - 36.5 g/dL    RDW 12.1 10.0 - 15.0 %    Platelet Count 255 150 - 450 10e3/uL    % Neutrophils 63 %    % Lymphocytes 26 %    % Monocytes 7 %    % Eosinophils 2 %    % Basophils 1 %    % Immature Granulocytes 1 %    NRBCs per 100 WBC 0 <1 /100    Absolute Neutrophils 4.0 1.6 - 8.3 10e3/uL    Absolute Lymphocytes 1.7  0.8 - 5.3 10e3/uL    Absolute Monocytes 0.4 0.0 - 1.3 10e3/uL    Absolute Eosinophils 0.1 0.0 - 0.7 10e3/uL    Absolute Basophils 0.0 0.0 - 0.2 10e3/uL    Absolute Immature Granulocytes 0.1 <=0.4 10e3/uL    Absolute NRBCs 0.0 10e3/uL

## 2022-03-08 NOTE — PROGRESS NOTES
Infusion Nursing Note:  Lilly Ang presents today for Cycle 1 Day 1 rituximab-abbs.    Patient seen by provider today: Yes: Dr. Gutierrez   present during visit today: Not Applicable.    Note: Joyce is new to oncology infusion.  New patient teaching done previously.  All medications and side effects reviewed with patient. Patient instructed to call triage with questions/concerns or if they have chills and/or temperature greater than or equal to 100.5, pt verbalized understanding of plan.     Joyce presents today feeling well. Endorses chronic neck/hip pain, managed without intervention. Declines intervention at this appointment. Offers no concerns since visit with Dr. Gutierrez prior to infusion.      Intravenous Access:  Peripheral IV placed.    Treatment Conditions:    Results for JOYCE ANG (MRN [id number]) as of 3/8/2022 09:20   Ref. Range 3/8/2022 07:52   Sodium Latest Ref Range: 133 - 144 mmol/L 139   Potassium Latest Ref Range: 3.4 - 5.3 mmol/L 4.1   Chloride Latest Ref Range: 94 - 109 mmol/L 104   Carbon Dioxide Latest Ref Range: 20 - 32 mmol/L 26   Urea Nitrogen Latest Ref Range: 7 - 30 mg/dL 14   Creatinine Latest Ref Range: 0.52 - 1.04 mg/dL 0.79   GFR Estimate Latest Ref Range: >60 mL/min/1.73m2 79   Calcium Latest Ref Range: 8.5 - 10.1 mg/dL 9.0   Anion Gap Latest Ref Range: 3 - 14 mmol/L 9   Albumin Latest Ref Range: 3.4 - 5.0 g/dL 3.6   Protein Total Latest Ref Range: 6.8 - 8.8 g/dL 6.2 (L)   Bilirubin Total Latest Ref Range: 0.2 - 1.3 mg/dL 0.2   Alkaline Phosphatase Latest Ref Range: 40 - 150 U/L 63   ALT Latest Ref Range: 0 - 50 U/L 24   AST Latest Ref Range: 0 - 45 U/L 22   Uric Acid Latest Ref Range: 2.6 - 6.0 mg/dL 5.4   Glucose Latest Ref Range: 70 - 99 mg/dL 107 (H)   WBC Latest Ref Range: 4.0 - 11.0 10e3/uL 6.4   Hemoglobin Latest Ref Range: 11.7 - 15.7 g/dL 11.8   Hematocrit Latest Ref Range: 35.0 - 47.0 % 36.0   Platelet Count Latest Ref Range: 150 - 450 10e3/uL 255   RBC Count  Latest Ref Range: 3.80 - 5.20 10e6/uL 3.87   MCV Latest Ref Range: 78 - 100 fL 93   MCH Latest Ref Range: 26.5 - 33.0 pg 30.5   MCHC Latest Ref Range: 31.5 - 36.5 g/dL 32.8   RDW Latest Ref Range: 10.0 - 15.0 % 12.1   % Neutrophils Latest Units: % 63   % Lymphocytes Latest Units: % 26   % Monocytes Latest Units: % 7   % Eosinophils Latest Units: % 2   % Basophils Latest Units: % 1   Absolute Basophils Latest Ref Range: 0.0 - 0.2 10e3/uL 0.0   Absolute Eosinophils Latest Ref Range: 0.0 - 0.7 10e3/uL 0.1   Absolute Immature Granulocytes Latest Ref Range: <=0.4 10e3/uL 0.1   Absolute Lymphocytes Latest Ref Range: 0.8 - 5.3 10e3/uL 1.7   Absolute Monocytes Latest Ref Range: 0.0 - 1.3 10e3/uL 0.4   % Immature Granulocytes Latest Units: % 1   Absolute Neutrophils Latest Ref Range: 1.6 - 8.3 10e3/uL 4.0   Absolute NRBCs Latest Units: 10e3/uL 0.0   NRBCs per 100 WBC Latest Ref Range: <1 /100 0     Results reviewed, labs MET treatment parameters, ok to proceed with treatment.      Post Infusion Assessment:  Patient tolerated infusion without incident.  Blood return noted pre and post infusion.  Site patent and intact, free from redness, edema or discomfort.  No evidence of extravasations.  Access discontinued per protocol.       Discharge Plan:   Patient declined prescription refills.  Discharge instructions reviewed with: Patient.  Patient and/or family verbalized understanding of discharge instructions and all questions answered.  AVS to patient via [employer].  Patient will return 03/15 for next infusion appointment.   Patient discharged in stable condition accompanied by: self.  Departure Mode: Ambulatory.      Ashley Fritz RN

## 2022-03-09 NOTE — PROGRESS NOTES
Lila Cook is a 71 year old who presents for the follow-up of follicular lymphoma    HPI     Oncology History Overview Note   This is a 71-year-old female who was diagnosed of follicular lymphoma grade 1 through 2 in 2008 following detection of a breast mass.  Both breast biopsy and bone marrow biopsy at the time showed follicular lymphoma.  She was initially observed until 2016 where she had left femur neck involvement with lymphoma causing hip pain.  That led to radiation.  At that time she did have lesions in her clavicle as well.  Radiation was successful in ameliorating the pain.  She ultimately underwent hip replacement.  Since then she has had skeletal lesions that have been stable over the years.  Recently she underwent PET scan on February 8, 2022 that showed progressive diffuse skeletal lesions involving C2, clavicles, scapula, axillary lymph nodes, lumbar spine, iliac bone and musculature.    She is referred to me for discussion of further management.  She denies any B symptoms, endorses intermittent, sharp neck pain when she turns her neck.  She does have longstanding back pain.  None of these pains are disabling and she does not ask for pain remedies.  Energy levels are good and appetite is good.  She is very active.  [facility]     Follicular non-Hodgkin's lymphoma (H)   10/15/2009 Initial Diagnosis    Follicular non-Hodgkin's lymphoma (H)     3/8/2022 -  Chemotherapy    OP ONC Lymphoma - riTUXimab / LENalidomide  Plan Provider: Rafa Gutierrez MD  Treatment goal: Palliative  Line of treatment: [No plan line of treatment]       She denies any new symptoms today no new lymphadenopathy.    Review of Systems   8 point review of systems was negative except pertinent positives listed above.        Objective    /59   Pulse 81   Temp 97.7  F (36.5  C) (Axillary)   Resp 16   Wt 61.1 kg (134 lb 11.2 oz)   SpO2 100%   BMI 20.85 kg/m    Body mass index  is 20.85 kg/m .  Physical Exam   GENERAL:  Alert oriented well nourished  SKIN:  no rash, hives, other lesions.  EYE: no icterus/pallor  ENT: no throat erythema, enlarged tonsills, no ulcers or mucositis in the mouth  LYMPHATIC: no abnormal lymph nodes palable in cervical, axillary, supraclavicular or inguinal area.  RESP:  No rales or rhonchi, breath sounds bilaterally equal and vesicular  CV:  No tachycardia, S1 S2 normal No murmur.  GI:  Abdomen soft nontender no hepato or splenomegaly  MUSCULOSKELETAL:  No visible joint redness or swelling.  NEURO:  No gross weakness gait normal    Labs: Labs reviewed. No cytopenias, renal or liver abnormalities, LDH normal.  Beta-2 microglobulin normal    Assessment and plan:    1) follicular lymphoma, stage IV, FLIPI 2  -She has intermediate risk disease.  -We will start rituximab and lenalidomide cycle 1 today.  -Detailed discussion of rituximab side effects and infusion reaction was conducted with the patient and she verbalized understanding.  -I will also start aspirin 81 mg daily to mild to get thromboembolic risk associated with lenalidomide.    Total time spent on date of service in review of medical records, review of labs, history taking, physical exam, discussion of assessment and plan, counseling and patient education is 30 minutes.    Rafa Gutierrez MD  Attending Physician  Pager [phone]

## 2022-03-11 NOTE — PROGRESS NOTES
Progress Note  Behavioral Activation/ CBT Psychotherapy      Name: Lilly Babcock  : 1950  MRN: [id number]  Age: 71 year old  Date: 2022  Duration: 50 minutes (start = 12:05 PM; end = 12:55 PM)    This visit was conducted in person      Psychotherapy Session Content  Discussed the start of chemotherapy, which has gone extremely smoothly with no side effects, along with her continued optimism. Reviewed additional health concerns including hip pain. Focused on continued support from friends and family. Discussed behavioral activation including socializing, Pilates, household tasks/organization, and time with her dog. 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 Discussed upcoming activities including time with friends and attending a concert. Agreed to continue regular CBT sessions.    Current Symptoms  Current symptoms include intermittent dysphoria (improved), fear, low energy, low motivation, self-criticism, guilt, avoidance (improved), concentration impairment, anxiety, ruminative thoughts, grief, worry. Denies suicidal ideation.       Mental Status Exam  Attitude: engaged  Behavior: normal  Eye Contact: sustained  Speech: fluent  Mood: anxious with brightening  Affect: mood congruient  Thought Process: clear  Suicidal Ideation: denied  Psychosis: not present      Current Diagnosis  Major depression, recurrent, moderate, without psychotic features        Treatment Plan  Continue cognitive-behavioral sessions.     Estrella Blanton, Ph.D., L.P.

## 2022-03-15 NOTE — NURSING NOTE
Chief Complaint   Patient presents with     Blood Draw     Labs drawn via piv placed by RN in lab. VS Taken.      Labs drawn from PIV placed by RN. Line flushed with saline. Vitals taken. Pt checked in for appointment(s).    Maria Isabel Flores RN

## 2022-03-15 NOTE — PROGRESS NOTES
Infusion Nursing Note:  Lilly Babcock presents today for D8 C1 Rituxan .    Patient seen by provider today: No   present during visit today: Not Applicable.    Note: Joyce reports she is feeling well.  Denies SOB, cough or fevers.  No c/o nausea and eating well.  Has not had fatigue and able to do exercise classes twice a week.  Rapid Rituxan completed without issues      Intravenous Access:  Peripheral IV placed.    Treatment Conditions:  Lab Results   Component Value Date    HGB 12.3 03/15/2022    WBC 5.7 03/15/2022    ANEU 6.4 10/03/2019    ANEUTAUTO 3.1 03/15/2022     03/15/2022      Lab Results   Component Value Date     03/15/2022    POTASSIUM 4.0 03/15/2022    MAG 1.8 10/20/2016    CR 0.75 03/15/2022    FLORENCIA 9.1 03/15/2022    BILITOTAL 0.3 03/15/2022    ALBUMIN 3.6 03/15/2022    ALT 28 03/15/2022    AST 24 03/15/2022     Results reviewed, labs MET treatment parameters, ok to proceed with treatment.      Post Infusion Assessment:  Patient tolerated infusion without incident.  Blood return noted pre and post infusion.  Site patent and intact, free from redness, edema or discomfort.  No evidence of extravasations.  Access discontinued per protocol.       Discharge Plan:   Patient declined prescription refills.  Discharge instructions reviewed with: Patient.  Patient and/or family verbalized understanding of discharge instructions and all questions answered.  AVS to patient via [employer].  Patient will return 3/21 for next infusion and 3/29 for next provider appointment.   Patient discharged in stable condition accompanied by: self.  Departure Mode: Ambulatory.      Raven Jaime RN

## 2022-03-15 NOTE — PATIENT INSTRUCTIONS
[facility] Triage and after hours / weekends / holidays:  [phone]    Please call the triage or after hours line if you experience a temperature greater than or equal to 100.4, shaking chills, have uncontrolled nausea, vomiting and/or diarrhea, dizziness, shortness of breath, chest pain, bleeding, unexplained bruising, or if you have any other new/concerning symptoms, questions or concerns.      If you are having any concerning symptoms or wish to speak to a provider before your next infusion visit, please call your care coordinator or triage to notify them so we can adequately serve you.     If you need a refill on a narcotic prescription or other medication, please call before your infusion appointment.               March 2022 Sunday Monday Tuesday Wednesday Thursday Friday Saturday             1    LAB MAIL IN  10:15 AM   (15 min.)   [facility] LAB MAIL IN   [facility] 2     3     4    XR RIBS BILATERAL 2 VIEWS   9:30 AM   (20 min.)   [id number]   [facility] [place] 5       6     7     8    LAB PERIPHERAL   7:15 AM   (15 min.)    [facility] LAB DRAW   [facility]    RETURN   7:30 AM   (30 min.)   Rafa Gutierrez MD   [facility]    ONC INFUSION 6 HR (360 MIN)   9:30 AM   (360 min.)    ONC INFUSION NURSE   [facility] 9     10     11    ADULT PSYCHOTHERAPY RETURN  12:00 PM   (60 min.)   Estrella Blanton, PhD   [facility] 12       13     14     15    LAB PERIPHERAL   7:00 AM   (15 min.)    [facility] LAB DRAW   [facility]    ONC INFUSION 6 HR (360 MIN)   7:30 AM   (360 min.)    ONC INFUSION NURSE   [facility] 16     17     18     19       20     21    LAB PERIPHERAL   6:15 AM   (15 min.)    [facility] LAB DRAW   [facility]    ONC INFUSION 6 HR (360 MIN)   7:00 AM   (360  min.)   [facility] ONC INFUSION NURSE   [facility] 22     23     24     25    ADULT PSYCHOTHERAPY RETURN  12:00 PM   (60 min.)   Estrella Blanton, PhD   [facility] Psych[facility] 26       27     28     29    LAB PERIPHERAL   6:45 AM   (15 min.)   [facility] DRAW   [facility]    RETURN   7:00 AM   (30 min.)   Rafa Gutierrez MD   [facility]    ONC INFUSION 6 HR (360 MIN)   8:00 AM   (360 min.)   UC ONC INFUSION NURSE   [facility] 30 31 April 2022 Sunday Monday Tuesday Wednesday Thursday Friday Saturday                            1     2       3     4    LAB PERIPHERAL   7:30 AM   (15 min.)   [facility] DRAW   [facility]    RETURN   7:45 AM   (45 min.)   Janet Walsh APRN CNP   [facility]    ONC INFUSION 6 HR (360 MIN)   9:00 AM   (360 min.)    ONC INFUSION NURSE   [facility] 5     6     7     8     9       10     11     12     13     14     15     16       17     18     19     20     21     22     23       24     25  Happy Birthday!     26     27     28     29     30                     Lab Results:  Recent Results (from the past 12 hour(s))   Comprehensive metabolic panel    Collection Time: 03/15/22  7:24 AM   Result Value Ref Range    Sodium 140 133 - 144 mmol/L    Potassium 4.0 3.4 - 5.3 mmol/L    Chloride 102 94 - 109 mmol/L    Carbon Dioxide (CO2) 30 20 - 32 mmol/L    Anion Gap 8 3 - 14 mmol/L    Urea Nitrogen 18 7 - 30 mg/dL    Creatinine 0.75 0.52 - 1.04 mg/dL    Calcium 9.1 8.5 - 10.1 mg/dL    Glucose 94 70 - 99 mg/dL    Alkaline Phosphatase 65 40 - 150 U/L    AST 24 0 - 45 U/L    ALT 28 0 - 50 U/L    Protein Total 6.6 (L) 6.8 - 8.8 g/dL    Albumin 3.6 3.4 - 5.0 g/dL    Bilirubin Total 0.3 0.2 - 1.3 mg/dL    GFR Estimate 84 >60 mL/min/1.73m2   CBC with platelets and  differential    Collection Time: 03/15/22  7:24 AM   Result Value Ref Range    WBC Count 5.7 4.0 - 11.0 10e3/uL    RBC Count 4.05 3.80 - 5.20 10e6/uL    Hemoglobin 12.3 11.7 - 15.7 g/dL    Hematocrit 37.4 35.0 - 47.0 %    MCV 92 78 - 100 fL    MCH 30.4 26.5 - 33.0 pg    MCHC 32.9 31.5 - 36.5 g/dL    RDW 12.2 10.0 - 15.0 %    Platelet Count 238 150 - 450 10e3/uL    % Neutrophils 55 %    % Lymphocytes 30 %    % Monocytes 8 %    % Eosinophils 6 %    % Basophils 1 %    % Immature Granulocytes 0 %    NRBCs per 100 WBC 0 <1 /100    Absolute Neutrophils 3.1 1.6 - 8.3 10e3/uL    Absolute Lymphocytes 1.7 0.8 - 5.3 10e3/uL    Absolute Monocytes 0.5 0.0 - 1.3 10e3/uL    Absolute Eosinophils 0.3 0.0 - 0.7 10e3/uL    Absolute Basophils 0.0 0.0 - 0.2 10e3/uL    Absolute Immature Granulocytes 0.0 <=0.4 10e3/uL    Absolute NRBCs 0.0 10e3/uL

## 2022-03-16 NOTE — TELEPHONE ENCOUNTER
"Subjective/Objective:  Lilly Babcock is a 71 year old female contacted by phone for a follow-up visit for oral chemotherapy.  Pt confirms they are taking Revlimid 20mg daily for 3 weeks on and 1 week. Pt reports no missed doses. Pt reports starting cycle on 3/8/22. Pt reports tolerating the medication well. Pt denies side effects including nausea, vomiting, diarrhea, constipation, itchy/rashy skin and fatigue or worsening pain. Overall she feels really good. Even went to a [employer] concert last night. Pt denies any new or changes to her current medication regimen since starting chemo.     ORAL CHEMOTHERAPY 2/22/2022 3/16/2022   Assessment Type Initial Work up Initial Follow up   Diagnosis Code Non-Hodgkin Lymphoma (NHL) Non-Hodgkin Lymphoma (NHL)   Providers Dr Brenda Gutierrez   [facility]   Drug Name Revlimid (lenalidomide) Revlimid (lenalidomide)   Dose 20 mg 20 mg   Current Schedule Daily Daily   Cycle Details 3 weeks on, 1 week off 3 weeks on, 1 week off   Start Date of Last Cycle - 3/8/2022   Doses missed in last 2 weeks - 0   Adherence Assessment - Adherent   Adverse Effects - No AE identified during assessment   Any new drug interactions? - No       Vitals:  BP:   BP Readings from Last 1 Encounters:   03/15/22 116/74     Wt Readings from Last 1 Encounters:   03/15/22 61.3 kg (135 lb 3.2 oz)     Estimated body surface area is 1.7 meters squared as calculated from the following:    Height as of 3/8/22: 1.702 m (5' 7\").    Weight as of 3/15/22: 61.3 kg (135 lb 3.2 oz).    Labs:  _  Result Component Current Result Ref Range   Sodium 140 (3/15/2022) 133 - 144 mmol/L     _  Result Component Current Result Ref Range   Potassium 4.0 (3/15/2022) 3.4 - 5.3 mmol/L     _  Result Component Current Result Ref Range   Calcium 9.1 (3/15/2022) 8.5 - 10.1 mg/dL     No results found for Mag within last 30 days.     No results found for Phos within last 30 days.     _  Result Component Current " Result Ref Range   Albumin 3.6 (3/15/2022) 3.4 - 5.0 g/dL     _  Result Component Current Result Ref Range   Urea Nitrogen 18 (3/15/2022) 7 - 30 mg/dL     _  Result Component Current Result Ref Range   Creatinine 0.75 (3/15/2022) 0.52 - 1.04 mg/dL       _  Result Component Current Result Ref Range   AST 24 (3/15/2022) 0 - 45 U/L     _  Result Component Current Result Ref Range   ALT 28 (3/15/2022) 0 - 50 U/L     _  Result Component Current Result Ref Range   Bilirubin Total 0.3 (3/15/2022) 0.2 - 1.3 mg/dL       _  Result Component Current Result Ref Range   WBC Count 5.7 (3/15/2022) 4.0 - 11.0 10e3/uL     _  Result Component Current Result Ref Range   Hemoglobin 12.3 (3/15/2022) 11.7 - 15.7 g/dL     _  Result Component Current Result Ref Range   Platelet Count 238 (3/15/2022) 150 - 450 10e3/uL     No results found for ANC within last 30 days.       Assessment/Plan:  Pt is currently tolerating therapy well. Plan to continue Revlimid 20mg daily for 3 weeks on 1 week off as prescribed. Pt verbalized understanding and agrees to plan. Encouraged pt to call with any issues or concerns.    Follow-Up:  3/21 labs + infusion  3/29 labs + Dr. Gutierrez    Refill Due:  Prior to 4/5    Melissa Alexandre, PharmD, BCACP  Oral Chemotherapy Monitoring Program  [facility]  [phone]  March 16, 2022

## 2022-03-17 NOTE — TELEPHONE ENCOUNTER
Oral Chemotherapy Monitoring Program     Placed call to Lilly Babcock in follow up of Revlimid oral chemotherapy. This was to follow-up on the phone call this morning that pt placed to triage noting issues with skin rash. Pt stated the rash started in the evening after we had spoke. She states rash started at the base of saccrum and progressed to the abdomen and chest and breast. She did have steroid cream in which she used and noted she took 2 tablets of Zytrec before bed. Helped quite a bit and was able to sleep at night. Was able to feel it, but not horrible. Today rash is still there, but less pink and overall much better. Recommended to continue taking Zytrec daily and utilize the steroid cream 1-2 times a day as needed. Encouraged pt to call with any worsening of symptoms. Recommended pt to be seen in the Emergency Department with side effects such as difficulty breathing, chest pain, inability to eat or drink enough, or have severe vomiting, diarrhea, or weakness. If it is a life threatening emergency call 911. Pt to continue to take Revlimid as prescribed. Pt stated understanding and agreed to plan. No further questions or concerns.     Melissa Alexandre, PharmD, BCACP  Oral Chemotherapy Monitoring Program  [facility]  [phone]  March 17, 2022

## 2022-03-17 NOTE — TELEPHONE ENCOUNTER
[facility] Triage    Incoming call:  Started Revlimid March 8th- takes every day, took this morning    Has eczema    Rash started on 3/15 -     Itchy and Pink, and raised, little tiny bumps  Started at the base of saccrum and progressed around to the abdomen and then a slight space of clear skin and then starts again Chest and breast.    Took antihistamine last night and put steriod cream on was able to sleep, less pink today, but still present and itchy.    Mometasone 0.1% steriod ointment    No SOB  No CP  No FEVER  NO new detergents  No new soaps  No new lotions  No new sheets on the bed  No new antibiotics  No new medications  No new treatments    Advised to take antihistame again and use cream as directed, advised will call oral chemo to advise.    LM for Oral Chemo to Call RN Writer.    Routing to Marlene Garnica and Oral Chemo Pharmacists

## 2022-03-18 NOTE — PROGRESS NOTES
[facility]: Cancer Care Short Note                                    Discussion with Patient:                                                      Follow-up with patient on body rash after starting new oral chemotherapy. Pt states the rash is staying the same or looking better. She has been using steroid creams and Zyrtec, 20mg, each day. Does not feel like she needs anything stronger for now.    Patient did express interested in receiving Evusheld. RNCC informed patient that a consent appointment can be made with an SHERYL and then Evusheld can bryce given at the next infusion appointment.        Assessment:                                                      Assessment completed with:: Patient    Constitutional  Fatigue: Absent or within normal limits  Fever: Absent or within normal limits    Gastrointestinal  Anorexia: Absent or within normal limits  Nausea: Absent or within normal limits  Dehydration: Absent or within normal limits  Constipation: Absent or within normal limits  Diarrhea: Absent or within normal limits    Musculoskeletal and Connective Tissue Disorders  Generalized Muscle Weakness: Absent or within normal limits    Integumentary  Rash Maculo-Papular: Macules/papules covering less than 10% BSA with or without symptoms (e.g., pruritus, burning, tightness)    Pain  Pain Score: No Pain (0)         Clinic Utilization  Patient Aware of Next Appointment?: Yes    Other Patient Concerns  Other Patient Reported Concerns: Yes, see comments (Would like to receive Evusheld. RNCC sent message to scheduling to set pt up for SHERYL consent for Evusheld.)        Intervention/Education provided during outreach:                                                       Patient to follow up as scheduled at next apt. Scheduling will reach out to schedule Evusheld appointment.     AMARA KurtzN, RN  RN Care Coordinator   [phone]

## 2022-03-21 NOTE — PATIENT INSTRUCTIONS
[facility] Triage and after hours / weekends / holidays:  [phone]    Please call the triage or after hours line if you experience a temperature greater than or equal to 100.4, shaking chills, have uncontrolled nausea, vomiting and/or diarrhea, dizziness, shortness of breath, chest pain, bleeding, unexplained bruising, or if you have any other new/concerning symptoms, questions or concerns.      If you are having any concerning symptoms or wish to speak to a provider before your next infusion visit, please call your care coordinator or triage to notify them so we can adequately serve you.     If you need a refill on a narcotic prescription or other medication, please call before your infusion appointment.                 March 2022 Sunday Monday Tuesday Wednesday Thursday Friday Saturday             1    LAB MAIL IN  10:15 AM   (15 min.)   UU LAB MAIL IN   [facility] Laboratory 2     3     4    XR RIBS BILATERAL 2 VIEWS   9:30 AM   (20 min.)   [id number]   [facility] Xray [place] 5       6     7     8    LAB PERIPHERAL   7:15 AM   (15 min.)    [facility] LAB DRAW   [facility]    RETURN   7:30 AM   (30 min.)   Rafa Gutierrez MD   [facility]    ONC INFUSION 6 HR (360 MIN)   9:30 AM   (360 min.)    ONC INFUSION NURSE   [facility] 9     10     11    ADULT PSYCHOTHERAPY RETURN  12:00 PM   (60 min.)   Estrella Blanton, PhD   [facility] 12       13     14     15    LAB PERIPHERAL   7:00 AM   (15 min.)    [facility] LAB DRAW   [facility]    ONC INFUSION 6 HR (360 MIN)   7:30 AM   (360 min.)    ONC INFUSION NURSE   [facility] 16     17     18     19       20     21    LAB PERIPHERAL   6:15 AM   (15 min.)    [facility] LAB DRAW   [facility]    ONC INFUSION 6 HR (360 MIN)   7:00 AM   (360  min.)   UC ONC INFUSION NURSE   [facility] 22     23     24     25    ADULT PSYCHOTHERAPY RETURN  12:00 PM   (60 min.)   Estrella Blanton, PhD   [facility] Psychiatry 26       27     28     29    LAB PERIPHERAL   6:45 AM   (15 min.)   [facility] DRAW   [facility]    RETURN   7:00 AM   (30 min.)   Rafa Gutierrez MD   [facility]    ONC INFUSION 6 HR (360 MIN)   8:00 AM   (360 min.)   UC ONC INFUSION NURSE   [facility] 30 31 April 2022 Sunday Monday Tuesday Wednesday Thursday Friday Saturday                            1     2       3     4    LAB PERIPHERAL   7:30 AM   (15 min.)   [facility]   [facility]    RETURN   7:45 AM   (45 min.)   Janet Walsh APRN CNP   [facility]    ONC INFUSION 6 HR (360 MIN)   9:00 AM   (360 min.)    ONC INFUSION NURSE   [facility] 5     6     7     8     9       10     11     12     13     14     15     16       17     18     19     20     21     22     23       24     25  Happy Birthday!     26     27     28     29     30                    Recent Results (from the past 24 hour(s))   Comprehensive metabolic panel    Collection Time: 03/21/22  7:25 AM   Result Value Ref Range    Sodium 139 133 - 144 mmol/L    Potassium 3.7 3.4 - 5.3 mmol/L    Chloride 105 94 - 109 mmol/L    Carbon Dioxide (CO2) 30 20 - 32 mmol/L    Anion Gap 4 3 - 14 mmol/L    Urea Nitrogen 15 7 - 30 mg/dL    Creatinine 0.88 0.52 - 1.04 mg/dL    Calcium 9.3 8.5 - 10.1 mg/dL    Glucose 86 70 - 99 mg/dL    Alkaline Phosphatase 64 40 - 150 U/L    AST 32 0 - 45 U/L    ALT 35 0 - 50 U/L    Protein Total 6.5 (L) 6.8 - 8.8 g/dL    Albumin 3.7 3.4 - 5.0 g/dL    Bilirubin Total 0.3 0.2 - 1.3 mg/dL    GFR Estimate 69 >60 mL/min/1.73m2   CBC with platelets and differential     Collection Time: 03/21/22  7:25 AM   Result Value Ref Range    WBC Count 6.3 4.0 - 11.0 10e3/uL    RBC Count 3.89 3.80 - 5.20 10e6/uL    Hemoglobin 11.9 11.7 - 15.7 g/dL    Hematocrit 36.5 35.0 - 47.0 %    MCV 94 78 - 100 fL    MCH 30.6 26.5 - 33.0 pg    MCHC 32.6 31.5 - 36.5 g/dL    RDW 12.2 10.0 - 15.0 %    Platelet Count 210 150 - 450 10e3/uL    % Neutrophils 61 %    % Lymphocytes 24 %    % Monocytes 9 %    % Eosinophils 5 %    % Basophils 1 %    % Immature Granulocytes 0 %    NRBCs per 100 WBC 0 <1 /100    Absolute Neutrophils 3.9 1.6 - 8.3 10e3/uL    Absolute Lymphocytes 1.5 0.8 - 5.3 10e3/uL    Absolute Monocytes 0.6 0.0 - 1.3 10e3/uL    Absolute Eosinophils 0.3 0.0 - 0.7 10e3/uL    Absolute Basophils 0.0 0.0 - 0.2 10e3/uL    Absolute Immature Granulocytes 0.0 <=0.4 10e3/uL    Absolute NRBCs 0.0 10e3/uL

## 2022-03-21 NOTE — PROGRESS NOTES
Infusion Nursing Note:  Lilly Ang presents today for Cycle 1 Day 15 Rituxan.    Patient seen by provider today: No    Note: Patient presents to the infusion center today denying any new symptoms or concerns. Ongoing left hip pain, pain tolerable. A rash on her torso and groin that is improving. She denies any fevers, chills, chest pain or shortness of breath.    Intravenous Access:  Peripheral IV placed.    Treatment Conditions:  Results for YOLA ANG (MRN [id number]) as of 3/21/2022 07:57   Ref. Range 3/21/2022 07:25   Sodium Latest Ref Range: 133 - 144 mmol/L 139   Potassium Latest Ref Range: 3.4 - 5.3 mmol/L 3.7   Chloride Latest Ref Range: 94 - 109 mmol/L 105   Carbon Dioxide Latest Ref Range: 20 - 32 mmol/L 30   Urea Nitrogen Latest Ref Range: 7 - 30 mg/dL 15   Creatinine Latest Ref Range: 0.52 - 1.04 mg/dL 0.88   GFR Estimate Latest Ref Range: >60 mL/min/1.73m2 69   Calcium Latest Ref Range: 8.5 - 10.1 mg/dL 9.3   Anion Gap Latest Ref Range: 3 - 14 mmol/L 4   Albumin Latest Ref Range: 3.4 - 5.0 g/dL 3.7   Protein Total Latest Ref Range: 6.8 - 8.8 g/dL 6.5 (L)   Bilirubin Total Latest Ref Range: 0.2 - 1.3 mg/dL 0.3   Alkaline Phosphatase Latest Ref Range: 40 - 150 U/L 64   ALT Latest Ref Range: 0 - 50 U/L 35   AST Latest Ref Range: 0 - 45 U/L 32   Glucose Latest Ref Range: 70 - 99 mg/dL 86   WBC Latest Ref Range: 4.0 - 11.0 10e3/uL 6.3   Hemoglobin Latest Ref Range: 11.7 - 15.7 g/dL 11.9   Hematocrit Latest Ref Range: 35.0 - 47.0 % 36.5   Platelet Count Latest Ref Range: 150 - 450 10e3/uL 210   RBC Count Latest Ref Range: 3.80 - 5.20 10e6/uL 3.89   MCV Latest Ref Range: 78 - 100 fL 94   MCH Latest Ref Range: 26.5 - 33.0 pg 30.6   MCHC Latest Ref Range: 31.5 - 36.5 g/dL 32.6   RDW Latest Ref Range: 10.0 - 15.0 % 12.2   % Neutrophils Latest Units: % 61   % Lymphocytes Latest Units: % 24   % Monocytes Latest Units: % 9   % Eosinophils Latest Units: % 5   % Basophils Latest Units: % 1   Absolute  Basophils Latest Ref Range: 0.0 - 0.2 10e3/uL 0.0   Absolute Eosinophils Latest Ref Range: 0.0 - 0.7 10e3/uL 0.3   Absolute Immature Granulocytes Latest Ref Range: <=0.4 10e3/uL 0.0   Absolute Lymphocytes Latest Ref Range: 0.8 - 5.3 10e3/uL 1.5   Absolute Monocytes Latest Ref Range: 0.0 - 1.3 10e3/uL 0.6   % Immature Granulocytes Latest Units: % 0   Absolute Neutrophils Latest Ref Range: 1.6 - 8.3 10e3/uL 3.9   Absolute NRBCs Latest Units: 10e3/uL 0.0   NRBCs per 100 WBC Latest Ref Range: <1 /100 0     Results reviewed, labs MET treatment parameters, ok to proceed with treatment.    Post Infusion Assessment:  Patient tolerated infusion without incident.  Blood return noted pre and post infusion.  No evidence of extravasations.  Access discontinued per protocol.     Discharge Plan:   Patient declined prescription refills.  Discharge instructions reviewed with: Patient.  Patient and/or family verbalized understanding of discharge instructions and all questions answered.  AVS to patient via [employer] 28.  Patient will return 3/29 for next appointment.   Patient discharged in stable condition accompanied by: self.  Departure Mode: Ambulatory.      Amaya Martino RN

## 2022-03-22 NOTE — LETTER
3/22/2022         RE: Lilly Babcock  [address]  [facility] [address]        Dear Colleague,    Thank you for referring your patient, Lilly Babcock, to the [facility]. Please see a copy of my visit note below.    Joyce is a 71 year old who is being evaluated via a billable video visit.      How would you like to obtain your AVS? MyChart  If the video visit is dropped, the invitation should be resent by: Send to e-mail at: [email or web address]  Will anyone else be joining your video visit? No    Madiha [place]     Video did not connect. Convert to phone: Duration: 8 minutes     HEMATOLOGY/ONCOLOGY CONSENT VISIT  Mar 22, 2022    Brief History: Joyce Babcock is a 71 year old woman with follicular lymphoma  currently undergoing treatment with Rituxan.     Presents today to discuss the use of Evusheld.     S: Doing well. Tired from waking up early yesterday.    O:   Objective:  General: patient sounds in no audible acute distress, alert and oriented, speech clear and fluid  Resp: Speaking in full sentences, no audible respiratory distress, no cough, no audible wheeze  Psych: able to articulate logical thoughts, able to abstract reason, no tangential thoughts, no hallucinations or delusions.  Affect is normal    The rest of a comprehensive physical examination is deferred due to PHE (public health emergency) phone restrictions    I personally reviewed labs today   Most Recent 3 CBC's:  Recent Labs   Lab Test 03/21/22  0725 03/15/22  0724 03/08/22  0752   WBC 6.3 5.7 6.4   HGB 11.9 12.3 11.8   MCV 94 92 93    238 255     Most Recent 3 BMP's:  Recent Labs   Lab Test 03/21/22  0725 03/15/22  0724 03/08/22  0752    140 139   POTASSIUM 3.7 4.0 4.1   CHLORIDE 105 102 104   CO2 30 30 26   BUN 15 18 14   CR 0.88 0.75 0.79   ANIONGAP 4 8 9   FLORENCIA 9.3 9.1 9.0   GLC 86 94 107*     Most Recent 2 LFT's:  Recent Labs   Lab Test 03/21/22  0725 03/15/22  0724   AST 32 24   ALT 35 28    ALKPHOS 64 65   BILITOTAL 0.3 0.3       A/P:    #Follicular Lymphoma  -currently undergoing treatment with Rituxan  -follow-up per treating provider's plan     #COVID ppx  -The patient was seen and evaluated by me. We discussed the risks and benefits of receiving EVUSHELD, as well as alternative treatments. The Emergency Use Administration (EUA) was provided to the patient for review and an opportunity for questions was provided. Patient wishes to proceed with EVUSHELD.  -confirmed plt >20 and I do not anticipate them to chema within the next couple days   -Confirmed they do not have an active COVID infection or COVID symptoms           Again, thank you for allowing me to participate in the care of your patient.      Sincerely,    Colette Flores PA-C

## 2022-03-22 NOTE — PROGRESS NOTES
HEMATOLOGY/ONCOLOGY CONSENT VISIT  Mar 22, 2022    Brief History: Joyce Babcock is a 71 year old woman with follicular lymphoma  currently undergoing treatment with Rituxan.     Presents today to discuss the use of Evusheld.     S: Doing well. Tired from waking up early yesterday.    O:   Objective:  General: patient sounds in no audible acute distress, alert and oriented, speech clear and fluid  Resp: Speaking in full sentences, no audible respiratory distress, no cough, no audible wheeze  Psych: able to articulate logical thoughts, able to abstract reason, no tangential thoughts, no hallucinations or delusions.  Affect is normal    The rest of a comprehensive physical examination is deferred due to PHE (public health emergency) phone restrictions    I personally reviewed labs today   Most Recent 3 CBC's:  Recent Labs   Lab Test 03/21/22  0725 03/15/22  0724 03/08/22  0752   WBC 6.3 5.7 6.4   HGB 11.9 12.3 11.8   MCV 94 92 93    238 255     Most Recent 3 BMP's:  Recent Labs   Lab Test 03/21/22  0725 03/15/22  0724 03/08/22  0752    140 139   POTASSIUM 3.7 4.0 4.1   CHLORIDE 105 102 104   CO2 30 30 26   BUN 15 18 14   CR 0.88 0.75 0.79   ANIONGAP 4 8 9   FLORENCIA 9.3 9.1 9.0   GLC 86 94 107*     Most Recent 2 LFT's:  Recent Labs   Lab Test 03/21/22  0725 03/15/22  0724   AST 32 24   ALT 35 28   ALKPHOS 64 65   BILITOTAL 0.3 0.3       A/P:    #Follicular Lymphoma  -currently undergoing treatment with Rituxan  -follow-up per treating provider's plan     #COVID ppx  -The patient was seen and evaluated by me. We discussed the risks and benefits of receiving EVUSHELD, as well as alternative treatments. The Emergency Use Administration (EUA) was provided to the patient for review and an opportunity for questions was provided. Patient wishes to proceed with EVUSHELD.  -confirmed plt >20 and I do not anticipate them to chema within the next couple days   -Confirmed they do not have an active COVID infection or COVID  symptoms     Colette Flores PA-C  [facility]  [address], [place] [id number] [phone]

## 2022-03-22 NOTE — PROGRESS NOTES
Joyce is a 71 year old who is being evaluated via a billable video visit.      How would you like to obtain your AVS? MyChart  If the video visit is dropped, the invitation should be resent by: Send to e-mail at: [email or web address]  Will anyone else be joining your video visit? No    Madiha [facility]    Video did not connect. Convert to phone: Duration: 8 minutes

## 2022-03-25 NOTE — PROGRESS NOTES
Progress Note  Behavioral Activation/ CBT Psychotherapy      Name: Lilly Babcock  : 1950  MRN: [id number]  Age: 71 year old  Date: 2022  Duration: 45 minutes (start = 12:10 PM; end = 12:55 PM)    This visit was conducted in person      Psychotherapy Session Content  Focused on her continued tolerance of chemotherapy. Discussed behavioral activation as well as the positive impact of support from her friends. Reviewed continued challenges with mail sorting as well as potential strategies and sources of support. Focused on her effective use of CBT and emotion regulation strategies, particularly in the context of cancer.    Current Symptoms  Current symptoms include intermittent dysphoria (improved), anxiety, low energy, self-criticism, guilt, avoidance (improved), concentration impairment, worry. Denies suicidal ideation.       Mental Status Exam  Attitude: engaged  Behavior: normal  Eye Contact: sustained  Speech: fluent  Mood: anxious with brightening  Affect: mood congruient  Thought Process: clear  Suicidal Ideation: denied  Psychosis: not present      Current Diagnosis  Major depression, recurrent, moderate, without psychotic features, in partial remission        Treatment Plan  Continue cognitive-behavioral sessions.     Estrella Blanton, Ph.D., L.P.

## 2022-03-29 NOTE — PATIENT INSTRUCTIONS
Contact Numbers    [facility] Main Line (for Scheduling/Triage/After Hours Nurse Line): [phone]    Please call the [facility] nurse triage or the after hours nurse line if you experience a temperature greater than or equal to 100.5, shaking chills, have uncontrolled nausea, vomiting and/or diarrhea, dizziness, lightheadedness, shortness of breath, chest pain, bleeding, unexplained bruising, or if you have any other new/concerning symptoms, questions or concerns.     If you are having any concerning symptoms or wish to speak to a provider before your next infusion visit, please call your care coordinator or triage to notify them so we can adequately serve you.     If you need any refills on medications (narcotics or other medications), please call before your infusion appointment.       March 2022 Sunday Monday Tuesday Wednesday Thursday Friday Saturday             1    LAB MAIL IN  10:15 AM   (15 min.)   UU LAB MAIL IN   [facility] Laboratory 2     3     4    XR RIBS BILATERAL 2 VIEWS   9:30 AM   (20 min.)   [id number]   [facility] Xray [place] 5       6     7     8    LAB PERIPHERAL   7:15 AM   (15 min.)   [facility] DRAW   [facility]    RETURN   7:30 AM   (30 min.)   Rafa Gutierrez MD   [facility]    ONC INFUSION 6 HR (360 MIN)   9:30 AM   (360 min.)    ONC INFUSION NURSE   [facility] 9     10     11    ADULT PSYCHOTHERAPY RETURN  12:00 PM   (60 min.)   Estrella Blanton, PhD   [facility] Psychiatry 12       13     14     15    LAB PERIPHERAL   7:00 AM   (15 min.)   [facility] DRAW   [facility]    ONC INFUSION 6 HR (360 MIN)   7:30 AM   (360 min.)    ONC INFUSION NURSE   [facility] 16     17     18     19       20     21    LAB PERIPHERAL   6:15 AM   (15 min.)    [facility] LAB DRAW   [facility]  Cancer Clinic    ONC INFUSION 6 HR (360 MIN)   7:00 AM   (360 min.)   UC ONC INFUSION NURSE   [facility] 22    VIDEO VISIT RETURN   9:15 AM   (45 min.)   Colette Flores PA-C   [facility] 23     24     25    ADULT PSYCHOTHERAPY RETURN  12:00 PM   (60 min.)   Estrella Blanton, PhD   [facility] Psychiatry 26       27     28     29    LAB PERIPHERAL   6:45 AM   (15 min.)   [facility] DRAW   [facility]    RETURN   7:00 AM   (30 min.)   Rafa Gutierrez MD   [facility]    ONC INFUSION 6 HR (360 MIN)   8:00 AM   (360 min.)   UC ONC INFUSION NURSE   [facility] 30 31 April 2022 Sunday Monday Tuesday Wednesday Thursday Friday Saturday                            1     2       3     4     5    LAB PERIPHERAL  12:45 PM   (15 min.)   [facility] DRAW   [facility]    VIDEO VISIT RETURN   1:00 PM   (45 min.)   Janet Walsh, APRN CNP   [facility] 6    ONC INFUSION 6 HR (360 MIN)   9:00 AM   (360 min.)    ONC INFUSION NURSE   [facility] 7    ADULT PSYCHOTHERAPY RETURN  12:00 PM   (60 min.)   Estrella Blanton, PhD   [facility] Psychiatry 8     9       10     11     12     13     14     15     16       17     18     19     20     21     22     23       24     25  Happy Birthday!     26     27     28     29     30                     Lab Results:  Recent Results (from the past 12 hour(s))   Comprehensive metabolic panel    Collection Time: 03/29/22  7:12 AM   Result Value Ref Range    Sodium 141 133 - 144 mmol/L    Potassium 3.6 3.4 - 5.3 mmol/L    Chloride 104 94 - 109 mmol/L    Carbon Dioxide (CO2) 31 20 - 32 mmol/L    Anion Gap 6 3 - 14 mmol/L    Urea Nitrogen 8 7 - 30 mg/dL    Creatinine 0.78 0.52 - 1.04 mg/dL    Calcium 9.1 8.5 - 10.1 mg/dL    Glucose 84 70 -  99 mg/dL    Alkaline Phosphatase 72 40 - 150 U/L    AST 29 0 - 45 U/L    ALT 40 0 - 50 U/L    Protein Total 6.5 (L) 6.8 - 8.8 g/dL    Albumin 3.5 3.4 - 5.0 g/dL    Bilirubin Total 0.4 0.2 - 1.3 mg/dL    GFR Estimate 80 >60 mL/min/1.73m2   CBC with platelets and differential    Collection Time: 03/29/22  7:12 AM   Result Value Ref Range    WBC Count 5.2 4.0 - 11.0 10e3/uL    RBC Count 3.83 3.80 - 5.20 10e6/uL    Hemoglobin 11.5 (L) 11.7 - 15.7 g/dL    Hematocrit 36.1 35.0 - 47.0 %    MCV 94 78 - 100 fL    MCH 30.0 26.5 - 33.0 pg    MCHC 31.9 31.5 - 36.5 g/dL    RDW 12.6 10.0 - 15.0 %    Platelet Count 205 150 - 450 10e3/uL    % Neutrophils 37 %    % Lymphocytes 42 %    % Monocytes 11 %    % Eosinophils 9 %    % Basophils 1 %    % Immature Granulocytes 0 %    NRBCs per 100 WBC 0 <1 /100    Absolute Neutrophils 1.9 1.6 - 8.3 10e3/uL    Absolute Lymphocytes 2.2 0.8 - 5.3 10e3/uL    Absolute Monocytes 0.6 0.0 - 1.3 10e3/uL    Absolute Eosinophils 0.5 0.0 - 0.7 10e3/uL    Absolute Basophils 0.1 0.0 - 0.2 10e3/uL    Absolute Immature Granulocytes 0.0 <=0.4 10e3/uL    Absolute NRBCs 0.0 10e3/uL

## 2022-03-29 NOTE — PROGRESS NOTES
Infusion Nursing Note:  Lilly Babcock presents today for Rituxan, Evusheld.    Patient seen by provider today: Yes: Dr. Gutierrez    Consent for Evusheld:   Informed Consent confirmed in chart, obtained on this date: 3/22/22    Tixagevimab given in left ventral gluteal muscle  Cilgaveimab given in right ventral gluteal muscle    Tixagevimab Lot: [id number]  Exp 7/2022    Intravenous Access:  Peripheral IV placed.    Treatment Conditions:  Lab Results   Component Value Date    HGB 11.5 (L) 03/29/2022    WBC 5.2 03/29/2022    ANEU 6.4 10/03/2019    ANEUTAUTO 1.9 03/29/2022     03/29/2022      Lab Results   Component Value Date     03/29/2022    POTASSIUM 3.6 03/29/2022    MAG 1.8 10/20/2016    CR 0.78 03/29/2022    FLORENCIA 9.1 03/29/2022    BILITOTAL 0.4 03/29/2022    ALBUMIN 3.5 03/29/2022    ALT 40 03/29/2022    AST 29 03/29/2022     Results reviewed, labs MET treatment parameters, ok to proceed with treatment.    Post Infusion Assessment:  Patient tolerated infusion without incident.  Patient observed for 60 minutes post Evusheld per protocol.  Blood return noted pre and post infusion.  Site patent and intact, free from redness, edema or discomfort.  No evidence of extravasations. Access discontinued per protocol.     Discharge Plan:   Patient declined prescription refills.  Copy of AVS reviewed with patient and/or family.  Patient will return 4/6 for next appointment.  Patient discharged in stable condition accompanied by: self.  Departure Mode: Ambulatory.      Maria Isabel Adames RN

## 2022-03-29 NOTE — PROGRESS NOTES
Lila Cook is a 71 year old who presents for the follow up of follicular lymphoma   HPI     Oncology History Overview Note   This is a 71-year-old female who was diagnosed of follicular lymphoma grade 1 through 2 in 2008 following detection of a breast mass.  Both breast biopsy and bone marrow biopsy at the time showed follicular lymphoma.  She was initially observed until 2016 where she had left femur neck involvement with lymphoma causing hip pain.  That led to radiation.  At that time she did have lesions in her clavicle as well.  Radiation was successful in ameliorating the pain.  She ultimately underwent hip replacement.  Since then she has had skeletal lesions that have been stable over the years.  Recently she underwent PET scan on February 8, 2022 that showed progressive diffuse skeletal lesions involving C2, clavicles, scapula, axillary lymph nodes, lumbar spine, iliac bone and musculature.    She is referred to me for discussion of further management.  She denies any B symptoms, endorses intermittent, sharp neck pain when she turns her neck.  She does have longstanding back pain.  None of these pains are disabling and she does not ask for pain remedies.  Energy levels are good and appetite is good.  She is very active.  She underwent bone marrow biopsy at [facility].     Follicular non-Hodgkin's lymphoma (H)   10/15/2009 Initial Diagnosis    Follicular non-Hodgkin's lymphoma (H)     3/8/2022 -  Chemotherapy    OP ONC Lymphoma - riTUXimab / LENalidomide  Plan Provider: Rafa Gutierrez MD  Treatment goal: Palliative  Line of treatment: [No plan line of treatment]       She comes today for follow-up after cycle 1.  Few days after starting lenalidomide she had maculopapular eruptions arising from her lower back and extending to her belly, chest and arm.  She was advised to take topical steroids and Benadryl by oral chemo pharmacy and that led to significant improvement of symptoms.  She denies any  fevers chills night sweats or weight loss.  No new lymphadenopathy.  No fatigue no hand spasms.    Review of Systems   8 point review of systems was negative except pertinent positives listed above.        Objective    /69   Pulse 78   Temp 97.8  F (36.6  C) (Oral)   Resp 16   Wt 60.4 kg (133 lb 1.6 oz)   SpO2 98%   BMI 20.85 kg/m    Body mass index is 20.85 kg/m .  Physical Exam   GENERAL:  Alert oriented well nourished  SKIN:  no rash, hives, other lesions.  EYE: no icterus/pallor  ENT: no throat erythema, enlarged tonsills, no ulcers or mucositis in the mouth  LYMPHATIC: no abnormal lymph nodes palable in cervical, axillary, supraclavicular or inguinal area.  RESP:  No rales or rhonchi, breath sounds bilaterally equal and vesicular  CV:  No tachycardia, S1 S2 normal No murmur.  GI:  Abdomen soft nontender no hepato or splenomegaly  MUSCULOSKELETAL:  No visible joint redness or swelling.  NEURO:  No gross weakness gait normal    Labs reviewed. No cytopenias, renal or liver abnormalities, LDH normal.    Assessment and plan:  1) follicular lymphoma, stage IV, FLIPI of 1:  -We will continue lenalidomide and rituximab therapy.  The rash is significantly improved with supportive care so I will not dose reduce lenalidomide.  -We will see her back in 1 month for toxicity check.    -Restaging scans would be after

## 2022-03-29 NOTE — NURSING NOTE
"Oncology Rooming Note    March 29, 2022 7:21 AM   Lilly Babcock is a 71 year old female who presents for:    Chief Complaint   Patient presents with     Blood Draw     Labs drawn via piv by RN in lab.  VS taken     Oncology Clinic Visit     follicular non hodgkins lymphoma     Initial Vitals: /69   Pulse 78   Temp 97.8  F (36.6  C) (Oral)   Resp 16   Wt 60.4 kg (133 lb 1.6 oz)   SpO2 98%   BMI 20.85 kg/m   Estimated body mass index is 20.85 kg/m  as calculated from the following:    Height as of 3/8/22: 1.702 m (5' 7\").    Weight as of this encounter: 60.4 kg (133 lb 1.6 oz). Body surface area is 1.69 meters squared.  No Pain (0) Comment: Data Unavailable   No LMP recorded. Patient is postmenopausal.  Allergies reviewed: Yes  Medications reviewed: Yes    Medications: Medication refills not needed today.  Pharmacy name entered into Psychiatric:    [id number] - [place], [place] - [address] [facility] [place], [place] - [address] [facility]  [id number] - [place], [place] - [address] [facility]  [id number] - [place], [place] - [address] [facility]  [facility]  [address] [place] [address]    Clinical concerns: none      Edmond Scales            "

## 2022-03-29 NOTE — PROGRESS NOTES
Infusion Nursing Note:  Lilly Babcock presents today for ***.    Patient seen by provider today: {YES (EXPLAIN)/NO:549008}   present during visit today: {UMHLANGUAGE:701608}    Note: EVUSHELD Administration Note:  Lilly L Sadiq presents today for EVUSHELD.   present during visit today: {UMHLANGUAGE:585046}    Consent:   Informed Consent confirmed in chart, obtained on this date: ***    tix lot [id number]  Exp 7/2022    cil lot [id number]  Exp 6/2022    Post Injection Assessment:   {UMHPOSTINFUSION:524475}      Patient was observed in the room for a minimum of 10 minutes after injection per standard, then remained in the buidling for a total 60 minute observation period.         Discharge Plan:    .      Intravenous Access:  {UMHIVACCESS:734078}    Treatment Conditions:  {UMHTXCONDITIONS:440344}      Post Infusion Assessment:  {UMHPOSTINFUSION:754586}       Discharge Plan:   {UMHDISCHARGE:538661}      Maria Isabel Adames RN   Biopsy Type: H and E

## 2022-03-29 NOTE — PROGRESS NOTES
Lila Cook is a 71 year old who presents for the follow up of follicular lymphoma  HPI     Oncology History Overview Note   This is a 71-year-old female who was diagnosed of follicular lymphoma grade 1 through 2 in 2008 following detection of a breast mass.  Both breast biopsy and bone marrow biopsy at the time showed follicular lymphoma.  She was initially observed until 2016 where she had left femur neck involvement with lymphoma causing hip pain.  That led to radiation.  At that time she did have lesions in her clavicle as well.  Radiation was successful in ameliorating the pain.  She ultimately underwent hip replacement.  Since then she has had skeletal lesions that have been stable over the years.  Recently she underwent PET scan on February 8, 2022 that showed progressive diffuse skeletal lesions involving C2, clavicles, scapula, axillary lymph nodes, lumbar spine, iliac bone and musculature.    She is referred to me for discussion of further management.  She denies any B symptoms, endorses intermittent, sharp neck pain when she turns her neck.  She does have longstanding back pain.  None of these pains are disabling and she does not ask for pain remedies.  Energy levels are good and appetite is good.  She is very active.  She underwent bone marrow biopsy at [facility].     Follicular non-Hodgkin's lymphoma (H)   10/15/2009 Initial Diagnosis    Follicular non-Hodgkin's lymphoma (H)     3/8/2022 -  Chemotherapy    OP ONC Lymphoma - riTUXimab / LENalidomide  Plan Provider: Rafa Gutierrez MD  Treatment goal: Palliative  Line of treatment: [No plan line of treatment]       She comes today for follow-up after cycle 1.  Few days after starting lenalidomide she had maculopapular eruptions arising from her lower back and extending to her lower belly, groin and gluteal folds.  She took zyrtec that led to significant improvement of symptoms.  She denies any fevers chills night sweats or weight loss.  No  new lymphadenopathy.  No fatigue no hand spasms. Her energy levels are significantly better than before starting treatment.  She is still feels sharp shooting pain on the neck.  Pain is intermittent but now more severe when it comes on.  She does not want to do any intervention at this point, she wants to wait for a couple of cycles to allow the chemotherapy to work.       Review of Systems   8 point review of systems was negative except pertinent positives listed above.        Objective    /69   Pulse 78   Temp 97.8  F (36.6  C) (Oral)   Resp 16   Wt 60.4 kg (133 lb 1.6 oz)   SpO2 98%   BMI 20.85 kg/m    Body mass index is 20.85 kg/m .  Physical Exam   GENERAL:  Alert oriented well nourished  SKIN:  no rash, hives, other lesions.  EYE: no icterus/pallor  ENT: no throat erythema, enlarged tonsills, no ulcers or mucositis in the mouth  LYMPHATIC: no abnormal lymph nodes palable in cervical, axillary, supraclavicular or inguinal area.  RESP:  No rales or rhonchi, breath sounds bilaterally equal and vesicular  CV:  No tachycardia, S1 S2 normal No murmur.  GI:  Abdomen soft nontender no hepato or splenomegaly  MUSCULOSKELETAL:  No visible joint redness or swelling.  NEURO:  No gross weakness gait normal    Labs reviewed. No cytopenias, renal or liver abnormalities, LDH normal.      Assessment and plan:  1) Follicular lymphoma, advanced stage, FLIPI 2:  -She is tolerating lenalidomide and rituximab fairly well.  -Rash has not recurred using lenalidomide continue lenalidomide at current dose.  -We discussed further treatment plan.  I discussed with her that rituximab will be every month starting cycle 2 next week for 4/20/2022.  -In the protocol, for 6 cycles everyone received lenalidomide at 20 mg daily.  After 6 cycles patients with complete response could reduce the  lenalidomide dose to 10 mg daily.  -We will do a PET scan after 3 cycles to reassess response to treatment and then another PET scan at 6  cycles to plan dose reductions of lenalidomide for remaining cycles.    2) Sharp shooting pain in the neck:  -I discussed that this could possibly be related to follicular lymphoma.  It will take a few cycles for the pain to get better if it is related to follicular lymphoma.  -We discussed possible radiation to the C2, for pain control if pain rapidly progresses or is not well controlled despite regression of lymphoma.  -At this point, patient is agreeable to wait to allow lenalidomide rituximab to work before she can pursue these interventions.    .Total time spent on date of service in review of medical records, review of labs, history taking, physical exam, discussion of assessment and plan, counseling and patient education is 30 minutes.    Rafa Gutierrez MD  Attending Physician  Pager [phone]

## 2022-03-29 NOTE — LETTER
3/29/2022         RE: Lilly Babcock  [address]  [facility] [address]        Dear Colleague,    Thank you for referring your patient, Lilly Babcock, to the [facility]. Please see a copy of my visit note below.      Lila Cook is a 71 year old who presents for the follow up of follicular lymphoma   HPI     Oncology History Overview Note   This is a 71-year-old female who was diagnosed of follicular lymphoma grade 1 through 2 in 2008 following detection of a breast mass.  Both breast biopsy and bone marrow biopsy at the time showed follicular lymphoma.  She was initially observed until 2016 where she had left femur neck involvement with lymphoma causing hip pain.  That led to radiation.  At that time she did have lesions in her clavicle as well.  Radiation was successful in ameliorating the pain.  She ultimately underwent hip replacement.  Since then she has had skeletal lesions that have been stable over the years.  Recently she underwent PET scan on February 8, 2022 that showed progressive diffuse skeletal lesions involving C2, clavicles, scapula, axillary lymph nodes, lumbar spine, iliac bone and musculature.    She is referred to me for discussion of further management.  She denies any B symptoms, endorses intermittent, sharp neck pain when she turns her neck.  She does have longstanding back pain.  None of these pains are disabling and she does not ask for pain remedies.  Energy levels are good and appetite is good.  She is very active.  She underwent bone marrow biopsy at [facility].     Follicular non-Hodgkin's lymphoma (H)   10/15/2009 Initial Diagnosis    Follicular non-Hodgkin's lymphoma (H)     3/8/2022 -  Chemotherapy    OP ONC Lymphoma - riTUXimab / LENalidomide  Plan Provider: Rafa Gutierrez MD  Treatment goal: Palliative  Line of treatment: [No plan line of treatment]       She comes today for follow-up after cycle 1.  Few days after starting  lenalidomide she had maculopapular eruptions arising from her lower back and extending to her belly, chest and arm.  She was advised to take topical steroids and Benadryl by oral chemo pharmacy and that led to significant improvement of symptoms.  She denies any fevers chills night sweats or weight loss.  No new lymphadenopathy.  No fatigue no hand spasms.    Review of Systems   8 point review of systems was negative except pertinent positives listed above.        Objective    /69   Pulse 78   Temp 97.8  F (36.6  C) (Oral)   Resp 16   Wt 60.4 kg (133 lb 1.6 oz)   SpO2 98%   BMI 20.85 kg/m    Body mass index is 20.85 kg/m .  Physical Exam   GENERAL:  Alert oriented well nourished  SKIN:  no rash, hives, other lesions.  EYE: no icterus/pallor  ENT: no throat erythema, enlarged tonsills, no ulcers or mucositis in the mouth  LYMPHATIC: no abnormal lymph nodes palable in cervical, axillary, supraclavicular or inguinal area.  RESP:  No rales or rhonchi, breath sounds bilaterally equal and vesicular  CV:  No tachycardia, S1 S2 normal No murmur.  GI:  Abdomen soft nontender no hepato or splenomegaly  MUSCULOSKELETAL:  No visible joint redness or swelling.  NEURO:  No gross weakness gait normal    Labs reviewed. No cytopenias, renal or liver abnormalities, LDH normal.    Assessment and plan:  1) follicular lymphoma, stage IV, FLIPI of 1:  -We will continue lenalidomide and rituximab therapy.  The rash is significantly improved with supportive care so I will not dose reduce lenalidomide.  -We will see her back in 1 month for toxicity check.    -Restaging scans would be after                  Subjective   Joyce is a 71 year old who presents for the follow up of follicular lymphoma  HPI     Oncology History Overview Note   This is a 71-year-old female who was diagnosed of follicular lymphoma grade 1 through 2 in 2008 following detection of a breast mass.  Both breast biopsy and bone marrow biopsy at the time showed  follicular lymphoma.  She was initially observed until 2016 where she had left femur neck involvement with lymphoma causing hip pain.  That led to radiation.  At that time she did have lesions in her clavicle as well.  Radiation was successful in ameliorating the pain.  She ultimately underwent hip replacement.  Since then she has had skeletal lesions that have been stable over the years.  Recently she underwent PET scan on February 8, 2022 that showed progressive diffuse skeletal lesions involving C2, clavicles, scapula, axillary lymph nodes, lumbar spine, iliac bone and musculature.    She is referred to me for discussion of further management.  She denies any B symptoms, endorses intermittent, sharp neck pain when she turns her neck.  She does have longstanding back pain.  None of these pains are disabling and she does not ask for pain remedies.  Energy levels are good and appetite is good.  She is very active.  She underwent bone marrow biopsy at [facility].     Follicular non-Hodgkin's lymphoma (H)   10/15/2009 Initial Diagnosis    Follicular non-Hodgkin's lymphoma (H)     3/8/2022 -  Chemotherapy    OP ONC Lymphoma - riTUXimab / LENalidomide  Plan Provider: Rafa Gutierrez MD  Treatment goal: Palliative  Line of treatment: [No plan line of treatment]       She comes today for follow-up after cycle 1.  Few days after starting lenalidomide she had maculopapular eruptions arising from her lower back and extending to her lower belly, groin and gluteal folds.  She took zyrtec that led to significant improvement of symptoms.  She denies any fevers chills night sweats or weight loss.  No new lymphadenopathy.  No fatigue no hand spasms. Her energy levels are significantly better than before starting treatment.  She is still feels sharp shooting pain on the neck.  Pain is intermittent but now more severe when it comes on.  She does not want to do any intervention at this point, she wants to wait for a couple of  cycles to allow the chemotherapy to work.       Review of Systems   8 point review of systems was negative except pertinent positives listed above.        Objective    /69   Pulse 78   Temp 97.8  F (36.6  C) (Oral)   Resp 16   Wt 60.4 kg (133 lb 1.6 oz)   SpO2 98%   BMI 20.85 kg/m    Body mass index is 20.85 kg/m .  Physical Exam   GENERAL:  Alert oriented well nourished  SKIN:  no rash, hives, other lesions.  EYE: no icterus/pallor  ENT: no throat erythema, enlarged tonsills, no ulcers or mucositis in the mouth  LYMPHATIC: no abnormal lymph nodes palable in cervical, axillary, supraclavicular or inguinal area.  RESP:  No rales or rhonchi, breath sounds bilaterally equal and vesicular  CV:  No tachycardia, S1 S2 normal No murmur.  GI:  Abdomen soft nontender no hepato or splenomegaly  MUSCULOSKELETAL:  No visible joint redness or swelling.  NEURO:  No gross weakness gait normal    Labs reviewed. No cytopenias, renal or liver abnormalities, LDH normal.      Assessment and plan:  1) Follicular lymphoma, advanced stage, FLIPI 2:  -She is tolerating lenalidomide and rituximab fairly well.  -Rash has not recurred using lenalidomide continue lenalidomide at current dose.  -We discussed further treatment plan.  I discussed with her that rituximab will be every month starting cycle 2 next week for 4/20/2022.  -In the protocol, for 6 cycles everyone received lenalidomide at 20 mg daily.  After 6 cycles patients with complete response could reduce the  lenalidomide dose to 10 mg daily.  -We will do a PET scan after 3 cycles to reassess response to treatment and then another PET scan at 6 cycles to plan dose reductions of lenalidomide for remaining cycles.    2) Sharp shooting pain in the neck:  -I discussed that this could possibly be related to follicular lymphoma.  It will take a few cycles for the pain to get better if it is related to follicular lymphoma.  -We discussed possible radiation to the C2, for pain  control if pain rapidly progresses or is not well controlled despite regression of lymphoma.  -At this point, patient is agreeable to wait to allow lenalidomide rituximab to work before she can pursue these interventions.    .Total time spent on date of service in review of medical records, review of labs, history taking, physical exam, discussion of assessment and plan, counseling and patient education is 30 minutes.        Again, thank you for allowing me to participate in the care of your patient.      Sincerely,    Rafa Gutierrez MD

## 2022-03-29 NOTE — TELEPHONE ENCOUNTER
Oral Chemotherapy Monitoring Program    Medication: Revlimid  Rx:  20mg PO daily 21/28 ds    Auth #: [id number]  Risk Category ,Adult female NOT of reproductive capacity    Routine survey questions reviewed. yes        Janki Price Pharmacy Liaison, [facility]  Phone: [phone]  Fax: [phone]  Email: [email or web address]

## 2022-03-29 NOTE — NURSING NOTE
Chief Complaint   Patient presents with     Blood Draw     Labs drawn via piv by RN in lab.  VS taken       Labs drawn from PIV placed by RN. Line flushed with saline. Vitals taken. Pt checked in for appointment(s).    Chelsea Ware RN

## 2022-04-04 NOTE — PROGRESS NOTES
Lila Cook is a 71 year old who presents for the follow up of follicular lymphoma  HPI     Oncology History Overview Note   This is a 71-year-old female who was diagnosed of follicular lymphoma grade 1 through 2 in 2008 following detection of a breast mass.  Both breast biopsy and bone marrow biopsy at the time showed follicular lymphoma.  She was initially observed until 2016 where she had left femur neck involvement with lymphoma causing hip pain.  That led to radiation.  At that time she did have lesions in her clavicle as well.  Radiation was successful in ameliorating the pain.  She ultimately underwent hip replacement.  Since then she has had skeletal lesions that have been stable over the years.  Recently she underwent PET scan on February 8, 2022 that showed progressive diffuse skeletal lesions involving C2, clavicles, scapula, axillary lymph nodes, lumbar spine, iliac bone and musculature.    She is referred to me for discussion of further management.  She denies any B symptoms, endorses intermittent, sharp neck pain when she turns her neck.  She does have longstanding back pain.  None of these pains are disabling and she does not ask for pain remedies.  Energy levels are good and appetite is good.  She is very active.  She underwent bone marrow biopsy at [facility].     Follicular non-Hodgkin's lymphoma (H)   10/15/2009 Initial Diagnosis    Follicular non-Hodgkin's lymphoma (H)     3/8/2022 -  Chemotherapy    OP ONC Lymphoma - riTUXimab / LENalidomide  Plan Provider: Rafa Gutierrez MD  Treatment goal: Palliative  Line of treatment: [No plan line of treatment]         INTERIM HISTORY:  She comes today for cycle 2 Rituxan + Revlimid.      -Rash resolved a week after starting Revlimid   -Due to restart Rev tomorrow   -She notes that she seems clumsier lately. She is bumping into things, tripping over things, running into a wall. Ran into an open window yesterday and a car door early this week    -Does have baseline neuropathy for years. Has been gradually getting worse over the last couple years, maybe a little worse in the last couple week, though overall stable  -She is able to feel the bottom of her feet, though numb and burning misbah at night. No symptoms in her hands  -Denies vision changes  -Has baseline migraines with aura (visual only). Getting aura about 2-3 times/week for the last month. Prior to this is was occasional. Gets a HA after aura about 20% of the time. Has been associated with stress   -Decreased hearing but wears hearing aids. Not changed  -No difficulties with speech.  -If turning head, will have some sharp pain in neck. Does not radiate. Unchanged   -no bleeding or swelling  -appetite is ok, drinking   -No f/c/ns  -Energy has been pretty good   -no new lumps or bumps    Review of Systems   8 point review of systems was negative except pertinent positives listed above.        Objective    There were no vitals taken for this visit.  There is no height or weight on file to calculate BMI.  Physical Exam   Video physical exam  General: Patient appears well in no acute distress.   Skin: No visualized rash or lesions on visualized skin  Eyes: EOMI, no erythema, sclera icterus or discharge noted  Resp: Appears to be breathing comfortably without accessory muscle usage, speaking in full sentences, no cough  MSK: Appears to have normal range of motion based on visualized movements  Neurologic: No apparent tremors, facial movements symmetric  Psych: affect normal, alert and oriented    The rest of a comprehensive physical examination is deferred due to PHE (public health emergency) video restrictions      Labs:  I personally reviewed the following labs:    Most Recent 3 CBC's:Recent Labs   Lab Test 03/29/22  0712 03/21/22  0725 03/15/22  0724   WBC 5.2 6.3 5.7   HGB 11.5* 11.9 12.3   MCV 94 94 92    210 238     Most Recent 3 BMP's:  Recent Labs   Lab Test 03/29/22  0712 03/21/22  0725  03/15/22  0724    139 140   POTASSIUM 3.6 3.7 4.0   CHLORIDE 104 105 102   CO2 31 30 30   BUN 8 15 18   CR 0.78 0.88 0.75   ANIONGAP 6 4 8   FLORENCIA 9.1 9.3 9.1   GLC 84 86 94     Most Recent 2 LFT's:  Recent Labs   Lab Test 03/29/22  0712 03/21/22  0725   AST 29 32   ALT 40 35   ALKPHOS 72 64   BILITOTAL 0.4 0.3           Assessment and plan:    # Follicular lymphoma, advanced stage, FLIPI 2:  -She is tolerating lenalidomide and rituximab fairly well.  -Rash has not recurred using lenalidomide, continue zyrtec; may have a little rash upon restarting Rev again but generally will resolve quickly  -In the protocol, for 6 cycles everyone received lenalidomide at 20 mg daily.  After 6 cycles patients with complete response could reduce the  lenalidomide dose to 10 mg daily.  -We will do a PET scan after 3 cycles to reassess response to treatment and then another PET scan at 6 cycles to plan dose reductions of lenalidomide for remaining cycles.  -will continue with Cycle 2 of Rituxan and Revlimid tomorrow. Symptoms below potentially from chemo though would not change doses or hold treatment due to them.   -will follow-up prior to Cycle 3  -follow-up with Dr. Gutierrez with PET prior to Cycle 4    #Sharp shooting pain in the neck:  -possibly be related to follicular lymphoma.  It will take a few cycles for the pain to get better if it is related to follicular lymphoma.  -previously discussed possible radiation to the C2, for pain control if pain rapidly progresses or is not well controlled despite regression of lymphoma.  -Will allow for a couple cycle before intervention     #Migraines with Aura  #Clumpsiness  -Ongoing history of migraines with auras.  Has establish care with a neurologist for this.  This generally is only occasional but since starting chemo it has been 2-3 times a week for the last month.  She also notes increased clumsiness, noting she has been bumping into windows, doors/walls and tripping more.  She  notes always being more clumsy than her peers but this is more so than usual.  This also seems to be since starting chemo.  -Since this is virtual, I am unable to do any kind of neuro exam on her today  -has been over 1.5 years since seeing an ophthalmologist. She denies having visual changes but does note her eye has been drooping and may be obscuring her vision. Would like her go see the optometrist again for a formal evaluation  -with increased migraines, should see neurologist again  -Do not think this would be due to Rituxan, maybe Rev though uncommon  -will discuss with Dr. Gutierrez and see if she wants to do additional w/u such as MRI    60 minutes spent on the date of the encounter doing chart review, review of test results, interpretation of tests, patient visit and documentation     Colette Flores PA-C  [facility]  [address] [place] [address] [phone]

## 2022-04-05 NOTE — PROGRESS NOTES
Joyce is a 71 year old who is being evaluated via a billable video visit.      How would you like to obtain your AVS? [employer]  If the video visit is dropped, the invitation should be resent by: Text to cell phone: [phone]  Will anyone else be joining your video visit? Luci Cleary [facility]        Video Duration: 20 minutes

## 2022-04-05 NOTE — NURSING NOTE
Chief Complaint   Patient presents with     Video Visit     Blood Draw     Labs drawn via  by RN in lab. VS taken.      Labs drawn via venipuncture. Vital signs taken. Checked into next appointment.   Kelsy Bajwa RN

## 2022-04-05 NOTE — LETTER
4/5/2022     RE: Lilly Babcock  [address]  [facility] [address]    Dear Colleague,    Thank you for referring your patient, Lilly Babcock, to the [facility]. Please see a copy of my visit note below.      Lila Cook is a 71 year old who presents for the follow up of follicular lymphoma  HPI     Oncology History Overview Note   This is a 71-year-old female who was diagnosed of follicular lymphoma grade 1 through 2 in 2008 following detection of a breast mass.  Both breast biopsy and bone marrow biopsy at the time showed follicular lymphoma.  She was initially observed until 2016 where she had left femur neck involvement with lymphoma causing hip pain.  That led to radiation.  At that time she did have lesions in her clavicle as well.  Radiation was successful in ameliorating the pain.  She ultimately underwent hip replacement.  Since then she has had skeletal lesions that have been stable over the years.  Recently she underwent PET scan on February 8, 2022 that showed progressive diffuse skeletal lesions involving C2, clavicles, scapula, axillary lymph nodes, lumbar spine, iliac bone and musculature.    She is referred to me for discussion of further management.  She denies any B symptoms, endorses intermittent, sharp neck pain when she turns her neck.  She does have longstanding back pain.  None of these pains are disabling and she does not ask for pain remedies.  Energy levels are good and appetite is good.  She is very active.  She underwent bone marrow biopsy at [facility].     Follicular non-Hodgkin's lymphoma (H)   10/15/2009 Initial Diagnosis    Follicular non-Hodgkin's lymphoma (H)     3/8/2022 -  Chemotherapy    OP ONC Lymphoma - riTUXimab / LENalidomide  Plan Provider: Rafa Gutierrez MD  Treatment goal: Palliative  Line of treatment: [No plan line of treatment]         INTERIM HISTORY:  She comes today for cycle 2 Rituxan + Revlimid.      -Rash resolved a  week after starting Revlimid   -Due to restart Rev tomorrow   -She notes that she seems clumsier lately. She is bumping into things, tripping over things, running into a wall. Ran into an open window yesterday and a car door early this week   -Does have baseline neuropathy for years. Has been gradually getting worse over the last couple years, maybe a little worse in the last couple week, though overall stable  -She is able to feel the bottom of her feet, though numb and burning misbah at night. No symptoms in her hands  -Denies vision changes  -Has baseline migraines with aura (visual only). Getting aura about 2-3 times/week for the last month. Prior to this is was occasional. Gets a HA after aura about 20% of the time. Has been associated with stress   -Decreased hearing but wears hearing aids. Not changed  -No difficulties with speech.  -If turning head, will have some sharp pain in neck. Does not radiate. Unchanged   -no bleeding or swelling  -appetite is ok, drinking   -No f/c/ns  -Energy has been pretty good   -no new lumps or bumps    Review of Systems   8 point review of systems was negative except pertinent positives listed above.        Objective    There were no vitals taken for this visit.  There is no height or weight on file to calculate BMI.  Physical Exam   Video physical exam  General: Patient appears well in no acute distress.   Skin: No visualized rash or lesions on visualized skin  Eyes: EOMI, no erythema, sclera icterus or discharge noted  Resp: Appears to be breathing comfortably without accessory muscle usage, speaking in full sentences, no cough  MSK: Appears to have normal range of motion based on visualized movements  Neurologic: No apparent tremors, facial movements symmetric  Psych: affect normal, alert and oriented    The rest of a comprehensive physical examination is deferred due to PHE (public health emergency) video restrictions      Labs:  I personally reviewed the following  labs:    Most Recent 3 CBC's:Recent Labs   Lab Test 03/29/22  0712 03/21/22  0725 03/15/22  0724   WBC 5.2 6.3 5.7   HGB 11.5* 11.9 12.3   MCV 94 94 92    210 238     Most Recent 3 BMP's:  Recent Labs   Lab Test 03/29/22  0712 03/21/22  0725 03/15/22  0724    139 140   POTASSIUM 3.6 3.7 4.0   CHLORIDE 104 105 102   CO2 31 30 30   BUN 8 15 18   CR 0.78 0.88 0.75   ANIONGAP 6 4 8   FLORENCIA 9.1 9.3 9.1   GLC 84 86 94     Most Recent 2 LFT's:  Recent Labs   Lab Test 03/29/22  0712 03/21/22  0725   AST 29 32   ALT 40 35   ALKPHOS 72 64   BILITOTAL 0.4 0.3           Assessment and plan:    # Follicular lymphoma, advanced stage, FLIPI 2:  -She is tolerating lenalidomide and rituximab fairly well.  -Rash has not recurred using lenalidomide, continue zyrtec; may have a little rash upon restarting Rev again but generally will resolve quickly  -In the protocol, for 6 cycles everyone received lenalidomide at 20 mg daily.  After 6 cycles patients with complete response could reduce the  lenalidomide dose to 10 mg daily.  -We will do a PET scan after 3 cycles to reassess response to treatment and then another PET scan at 6 cycles to plan dose reductions of lenalidomide for remaining cycles.  -will continue with Cycle 2 of Rituxan and Revlimid tomorrow. Symptoms below potentially from chemo though would not change doses or hold treatment due to them.   -will follow-up prior to Cycle 3  -follow-up with Dr. Gutierrez with PET prior to Cycle 4    #Sharp shooting pain in the neck:  -possibly be related to follicular lymphoma.  It will take a few cycles for the pain to get better if it is related to follicular lymphoma.  -previously discussed possible radiation to the C2, for pain control if pain rapidly progresses or is not well controlled despite regression of lymphoma.  -Will allow for a couple cycle before intervention     #Migraines with Aura  #Clumpsiness  -Ongoing history of migraines with auras.  Has establish care with a  neurologist for this.  This generally is only occasional but since starting chemo it has been 2-3 times a week for the last month.  She also notes increased clumsiness, noting she has been bumping into windows, doors/walls and tripping more.  She notes always being more clumsy than her peers but this is more so than usual.  This also seems to be since starting chemo.  -Since this is virtual, I am unable to do any kind of neuro exam on her today  -has been over 1.5 years since seeing an ophthalmologist. She denies having visual changes but does note her eye has been drooping and may be obscuring her vision. Would like her go see the optometrist again for a formal evaluation  -with increased migraines, should see neurologist again  -Do not think this would be due to Rituxan, maybe Rev though uncommon  -will discuss with Dr. Gutierrez and see if she wants to do additional w/u such as MRI    60 minutes spent on the date of the encounter doing chart review, review of test results, interpretation of tests, patient visit and documentation     Colette Flores PA-C  [facility]  [address] [place] [id number]  [phone]      Joyce is a 71 year old who is being evaluated via a billable video visit.      How would you like to obtain your AVS? MyChart  If the video visit is dropped, the invitation should be resent by: Text to cell phone: [phone]  Will anyone else be joining your video visit? No   Madiha Encinas     Video Duration: 20 minutes

## 2022-04-06 NOTE — PROGRESS NOTES
Infusion Nursing Note:  Lilly Babcock presents today for cycle 2 day 1 rapid rituxan.    Patient seen by provider 4/5/22: Colette Flores PA-C   present during visit today: Not Applicable.    Note:   Patient is feeling well today. She deneis any changes since she saw Colette Flores yesterday, including fevers, chills, SOB, chest pain, nausea, diarrhea, and pain.    Intravenous Access:  Peripheral IV placed.    Treatment Conditions:  Lab Results   Component Value Date    HGB 11.6 (L) 04/05/2022    WBC 4.8 04/05/2022    ANEU 6.4 10/03/2019    ANEUTAUTO 2.9 04/05/2022     04/05/2022      Lab Results   Component Value Date     04/05/2022    POTASSIUM 4.2 04/05/2022    MAG 1.8 10/20/2016    CR 0.73 04/05/2022    FLORENCIA 9.2 04/05/2022    BILITOTAL 0.2 04/05/2022    ALBUMIN 3.7 04/05/2022    ALT 39 04/05/2022    AST 31 04/05/2022     Results reviewed, labs MET treatment parameters, ok to proceed with treatment.      Post Infusion Assessment:  Patient tolerated infusion without incident.  Blood return noted pre and post infusion.  Site patent and intact, free from redness, edema or discomfort.  No evidence of extravasations.  Access discontinued per protocol.       Discharge Plan:   Patient declined prescription refills.  Discharge instructions reviewed with: Patient.  Patient and/or family verbalized understanding of discharge instructions and all questions answered.  AVS to patient via [employer].  Patient will return 5/3 for next appointment.   Patient discharged in stable condition accompanied by: self.  Departure Mode: Ambulatory.      Grace Gonzalez RN

## 2022-04-06 NOTE — PATIENT INSTRUCTIONS
[facility] Triage and after hours / weekends / holidays:  [phone]    Please call the triage or after hours line if you experience a temperature greater than or equal to 100.5, shaking chills, have uncontrolled nausea, vomiting and/or diarrhea, dizziness, shortness of breath, chest pain, bleeding, unexplained bruising, or if you have any other new/concerning symptoms, questions or concerns.      If you are having any concerning symptoms or wish to speak to a provider before your next infusion visit, please call your care coordinator or triage to notify them so we can adequately serve you.     If you need a refill on a narcotic prescription or other medication, please call before your infusion appointment.       April 2022 Sunday Monday Tuesday Wednesday Thursday Friday Saturday                            1     2       3     4     5    LAB PERIPHERAL  12:45 PM   (15 min.)   [facility] LAB DRAW   [facility]    VIDEO VISIT RETURN   1:00 PM   (45 min.)   Colette Flores PA-C   [facility] 6    ONC INFUSION 6 HR (360 MIN)   9:00 AM   (360 min.)    ONC INFUSION NURSE   [facility] 7    ADULT PSYCHOTHERAPY RETURN  12:00 PM   (60 min.)   Estrella Blanton, PhD   [facility] 8     9       10     11     12     13     14     15     16       17     18     19     20     21     22     23       24     25  Happy Birthday!     26     27     28     29     30                 May 2022      Nayan Monday Tuesday Wednesday Thursday Friday Saturday   1     2     3    LAB PERIPHERAL   8:00 AM   (15 min.)   [facility] DRAW   [facility]    VIDEO VISIT RETURN   8:15 AM   (45 min.)   Colette Flores PA-C   [facility]    ONC INFUSION 6 HR (360 MIN)   9:00 AM   (360 min.)    ONC INFUSION NURSE   [facility] 4     5     6     7       8     9     10      11     12     13     14       15     16     17     18     19     20     21       22     23     24    PET ONCOLOGY WHOLE BODY   9:00 AM   (30 min.)   [id number]   [facility] 25     26    RETURN  12:30 PM   (30 min.)   Rafa Gutierrez MD   [facility] 27     28       29     30     31                                        Recent Results (from the past 24 hour(s))   Comprehensive metabolic panel    Collection Time: 04/05/22  1:03 PM   Result Value Ref Range    Sodium 138 133 - 144 mmol/L    Potassium 4.2 3.4 - 5.3 mmol/L    Chloride 103 94 - 109 mmol/L    Carbon Dioxide (CO2) 29 20 - 32 mmol/L    Anion Gap 6 3 - 14 mmol/L    Urea Nitrogen 12 7 - 30 mg/dL    Creatinine 0.73 0.52 - 1.04 mg/dL    Calcium 9.2 8.5 - 10.1 mg/dL    Glucose 105 (H) 70 - 99 mg/dL    Alkaline Phosphatase 76 40 - 150 U/L    AST 31 0 - 45 U/L    ALT 39 0 - 50 U/L    Protein Total 6.8 6.8 - 8.8 g/dL    Albumin 3.7 3.4 - 5.0 g/dL    Bilirubin Total 0.2 0.2 - 1.3 mg/dL    GFR Estimate 87 >60 mL/min/1.73m2   CBC with platelets and differential    Collection Time: 04/05/22  1:03 PM   Result Value Ref Range    WBC Count 4.8 4.0 - 11.0 10e3/uL    RBC Count 3.82 3.80 - 5.20 10e6/uL    Hemoglobin 11.6 (L) 11.7 - 15.7 g/dL    Hematocrit 36.4 35.0 - 47.0 %    MCV 95 78 - 100 fL    MCH 30.4 26.5 - 33.0 pg    MCHC 31.9 31.5 - 36.5 g/dL    RDW 13.1 10.0 - 15.0 %    Platelet Count 253 150 - 450 10e3/uL    % Neutrophils 60 %    % Lymphocytes 28 %    % Monocytes 9 %    % Eosinophils 1 %    % Basophils 2 %    % Immature Granulocytes 0 %    NRBCs per 100 WBC 0 <1 /100    Absolute Neutrophils 2.9 1.6 - 8.3 10e3/uL    Absolute Lymphocytes 1.3 0.8 - 5.3 10e3/uL    Absolute Monocytes 0.4 0.0 - 1.3 10e3/uL    Absolute Eosinophils 0.0 0.0 - 0.7 10e3/uL    Absolute Basophils 0.1 0.0 - 0.2 10e3/uL    Absolute Immature Granulocytes 0.0 <=0.4 10e3/uL    Absolute NRBCs 0.0 10e3/uL

## 2022-04-07 NOTE — NURSING NOTE
"Oncology Rooming Note    April 7, 2022 4:45 PM   Lilly Babcock is a 71 year old female who presents for:    Chief Complaint   Patient presents with     Oncology Clinic Visit     Rtn Follicular Lymphoma     Initial Vitals: BP 93/53   Pulse 79   Temp 98  F (36.7  C) (Oral)   Wt 61.6 kg (135 lb 14.4 oz)   SpO2 99%   BMI 21.28 kg/m   Estimated body mass index is 21.28 kg/m  as calculated from the following:    Height as of 3/8/22: 1.702 m (5' 7\").    Weight as of this encounter: 61.6 kg (135 lb 14.4 oz). Body surface area is 1.71 meters squared.  Moderate Pain (5) Comment: Data Unavailable   No LMP recorded. Patient is postmenopausal.  Allergies reviewed: Yes  Medications reviewed: Yes    Medications: Medication refills not needed today.  Pharmacy name entered into [employer]:    [id number] - [place], [place] - [address] [facility]Y [facility] [place] [place] [facility] [address] [facility]  [id number] - [place], [place] - [address] [facility]  [id number] - [place], [place] - [address] [facility]  [facility] [place] - [address] [facility]    Clinical concerns: Hip and neck pain      Dona Dey, EMT on April 7, 2022 at 4:45 PM            "

## 2022-04-07 NOTE — PROGRESS NOTES
Lila Cook is a 71 year old who presents for the follow up of follicular lymphoma  HPI     Oncology History Overview Note   This is a 71-year-old female who was diagnosed of follicular lymphoma grade 1 through 2 in 2008 following detection of a breast mass.  Both breast biopsy and bone marrow biopsy at the time showed follicular lymphoma.  She was initially observed until 2016 where she had left femur neck involvement with lymphoma causing hip pain.  That led to radiation.  At that time she did have lesions in her clavicle as well.  Radiation was successful in ameliorating the pain.  She ultimately underwent hip replacement.  Since then she has had skeletal lesions that have been stable over the years.  Recently she underwent PET scan on February 8, 2022 that showed progressive diffuse skeletal lesions involving C2, clavicles, scapula, axillary lymph nodes, lumbar spine, iliac bone and musculature.    She is referred to me for discussion of further management.  She denies any B symptoms, endorses intermittent, sharp neck pain when she turns her neck.  She does have longstanding back pain.  None of these pains are disabling and she does not ask for pain remedies.  Energy levels are good and appetite is good.  She is very active.  [facility]     Follicular non-Hodgkin's lymphoma (H)   10/15/2009 Initial Diagnosis    Follicular non-Hodgkin's lymphoma (H)     3/8/2022 -  Chemotherapy    OP ONC Lymphoma - riTUXimab / LENalidomide  Plan Provider: Rafa Gutierrez MD  Treatment goal: Palliative  Line of treatment: [No plan line of treatment]         INTERIM HISTORY:  She comes today for in person evaluation due to symptoms of clumsiness and accidents.     She continues to endorse a feeling of clumsiness though has not had any new accidents since I talked to her 2 days ago.  She eliminates that she has been climbing the most of her life just seems worse lately.  No new  symptoms since 2 days ago.    Review of Systems   8 point review of systems was negative except pertinent positives listed above.        Objective    BP 93/53   Pulse 79   Temp 98  F (36.7  C) (Oral)   Wt 61.6 kg (135 lb 14.4 oz)   SpO2 99%   BMI 21.28 kg/m    Body mass index is 21.28 kg/m .     Physical Exam     -Generally well appearing.   -Skin: No rashes, dry skin about his nose and mouth.   -Hematologic/ lymphatic: Sporadic bruising on upper extremities.  No swelling.  -Psychiatric: Good mood and affect. Pleasant, talkative and tangential in conversation.  -Neurologic:   MENTAL STATUS:                          Alert, oriented to date.                         Recall: Immediate 3/3, delayed 2/3.                         Speech fluent. Comprehension intact to multi-step commands.                        Normal naming, repetition. Able to read.                        Good right-left orientation.     CRANIAL NERVES:                          Discs flat on fundoscopy.                         Pupils are equal, round, reactive to light.                          Extraocular movements full, patient denies diplopia. Smooth visual pursuit.                         Visual fields full.                          Facial sensation intact to light touch.                        Symmetric facial movements.                        Hearing intact.                        Palate moves symmetrically.                          Sternocleidomastoid and trapezius strength intact.                        Tongue midline.  MOTOR:                         Normal and symmetric tone.                        Strength 5/5 throughout                        No pronation or drift. No orbiting.                        Not able to rise from a chair without use of arms.  SENSATION:                         Intact to light touch throughout.  COORDINATION:                        Intact finger-nose with eyes open and closed.   REFLEXES:                          Normal, symmetric.                         Toes not tested. No clonus. No Hoffmans.                        No grasp.    GAIT:  Walks without assistance.   Good speed. Normal stride length and heel strike. Normal turns. Normal arm swing.   Good positioning of the left leg in stride.    Able to toe, heel walk and tandem walk       Labs:  I personally reviewed the following labs:    Most Recent 3 CBC's:  Recent Labs   Lab Test 04/05/22  1303 03/29/22  0712 03/21/22  0725   WBC 4.8 5.2 6.3   HGB 11.6* 11.5* 11.9   MCV 95 94 94    205 210     Most Recent 3 BMP's:  Recent Labs   Lab Test 04/05/22  1303 03/29/22  0712 03/21/22  0725    141 139   POTASSIUM 4.2 3.6 3.7   CHLORIDE 103 104 105   CO2 29 31 30   BUN 12 8 15   CR 0.73 0.78 0.88   ANIONGAP 6 6 4   FLORENCIA 9.2 9.1 9.3   * 84 86     Most Recent 2 LFT's:  Recent Labs   Lab Test 04/05/22  1303 03/29/22  0712   AST 31 29   ALT 39 40   ALKPHOS 76 72   BILITOTAL 0.2 0.4       Assessment and plan:    #Migraines with Aura  #Clumpsiness  -Ongoing history of migraines with auras.  Has establish care with a neurologist for this.  Should reestablish care with neurology  -has been over 1.5 years since seeing an ophthalmologist. She did have cataract surgery and has two different vision strengths.  Feels like her awareness changed after this.  This could be contributing to her running into objects due to change in her depth perception.  I did a very thorough neuro exam today and found no deficits or abnormalities.  At this point I do not feel like there is an organic cause for her clumsiness.  We will continue to monitor and if she were to develop focal neuro symptoms would obtain an MRI of the brain  -Again these are uncommon symptoms associated with her chemotherapy; you can get some brain fog though I am not sure that this is what that is    # Follicular lymphoma, advanced stage, FLIPI 2:  -She is tolerating lenalidomide and rituximab fairly well.  -Rash has  not recurred using lenalidomide, continue zyrtec; may have a little rash upon restarting Rev again but generally will resolve quickly  -In the protocol, for 6 cycles everyone received lenalidomide at 20 mg daily.  After 6 cycles patients with complete response could reduce the  lenalidomide dose to 10 mg daily.  -We will do a PET scan after 3 cycles to reassess response to treatment and then another PET scan at 6 cycles to plan dose reductions of lenalidomide for remaining cycles.  -Had Cycle 2 yesterday, feels like she has no symptoms   -will follow-up prior to Cycle 3  -follow-up with Dr. Gutierrez with PET prior to Cycle 4    Colette Flores PA-C  [facility]  [address] [place] [address] [phone]

## 2022-04-07 NOTE — LETTER
4/7/2022         RE: Lilly Babcock  [address]  [facility] [address]        Dear Colleague,    Thank you for referring your patient, Lilly Babcock, to the [facility]. Please see a copy of my visit note below.      Lila Cook is a 71 year old who presents for the follow up of follicular lymphoma  HPI     Oncology History Overview Note   This is a 71-year-old female who was diagnosed of follicular lymphoma grade 1 through 2 in 2008 following detection of a breast mass.  Both breast biopsy and bone marrow biopsy at the time showed follicular lymphoma.  She was initially observed until 2016 where she had left femur neck involvement with lymphoma causing hip pain.  That led to radiation.  At that time she did have lesions in her clavicle as well.  Radiation was successful in ameliorating the pain.  She ultimately underwent hip replacement.  Since then she has had skeletal lesions that have been stable over the years.  Recently she underwent PET scan on February 8, 2022 that showed progressive diffuse skeletal lesions involving C2, clavicles, scapula, axillary lymph nodes, lumbar spine, iliac bone and musculature.    She is referred to me for discussion of further management.  She denies any B symptoms, endorses intermittent, sharp neck pain when she turns her neck.  She does have longstanding back pain.  None of these pains are disabling and she does not ask for pain remedies.  Energy levels are good and appetite is good.  She is very active.  She underwent bone marrow biopsy at [facility].     Follicular non-Hodgkin's lymphoma (H)   10/15/2009 Initial Diagnosis    Follicular non-Hodgkin's lymphoma (H)     3/8/2022 -  Chemotherapy    OP ONC Lymphoma - riTUXimab / LENalidomide  Plan Provider: Rafa Gutierrez MD  Treatment goal: Palliative  Line of treatment: [No plan line of treatment]         INTERIM HISTORY:  She comes today for in person evaluation due to symptoms of  clumsiness and accidents.     She continues to endorse a feeling of clumsiness though has not had any new accidents since I talked to her 2 days ago.  She eliminates that she has been climbing the most of her life just seems worse lately.  No new symptoms since 2 days ago.    Review of Systems   8 point review of systems was negative except pertinent positives listed above.        Objective    BP 93/53   Pulse 79   Temp 98  F (36.7  C) (Oral)   Wt 61.6 kg (135 lb 14.4 oz)   SpO2 99%   BMI 21.28 kg/m    Body mass index is 21.28 kg/m .     Physical Exam     -Generally well appearing.   -Skin: No rashes, dry skin about his nose and mouth.   -Hematologic/ lymphatic: Sporadic bruising on upper extremities.  No swelling.  -Psychiatric: Good mood and affect. Pleasant, talkative and tangential in conversation.  -Neurologic:   MENTAL STATUS:                          Alert, oriented to date.                         Recall: Immediate 3/3, delayed 2/3.                         Speech fluent. Comprehension intact to multi-step commands.                        Normal naming, repetition. Able to read.                        Good right-left orientation.     CRANIAL NERVES:                          Discs flat on fundoscopy.                         Pupils are equal, round, reactive to light.                          Extraocular movements full, patient denies diplopia. Smooth visual pursuit.                         Visual fields full.                          Facial sensation intact to light touch.                        Symmetric facial movements.                        Hearing intact.                        Palate moves symmetrically.                          Sternocleidomastoid and trapezius strength intact.                        Tongue midline.  MOTOR:                         Normal and symmetric tone.                        Strength 5/5 throughout                        No pronation or drift. No orbiting.                         Not able to rise from a chair without use of arms.  SENSATION:                         Intact to light touch throughout.  COORDINATION:                        Intact finger-nose with eyes open and closed.   REFLEXES:                         Normal, symmetric.                         Toes not tested. No clonus. No Hoffmans.                        No grasp.    GAIT:  Walks without assistance.   Good speed. Normal stride length and heel strike. Normal turns. Normal arm swing.   Good positioning of the left leg in stride.    Able to toe, heel walk and tandem walk       Labs:  I personally reviewed the following labs:    Most Recent 3 CBC's:  Recent Labs   Lab Test 04/05/22  1303 03/29/22  0712 03/21/22  0725   WBC 4.8 5.2 6.3   HGB 11.6* 11.5* 11.9   MCV 95 94 94    205 210     Most Recent 3 BMP's:  Recent Labs   Lab Test 04/05/22  1303 03/29/22  0712 03/21/22  0725    141 139   POTASSIUM 4.2 3.6 3.7   CHLORIDE 103 104 105   CO2 29 31 30   BUN 12 8 15   CR 0.73 0.78 0.88   ANIONGAP 6 6 4   FLORENCIA 9.2 9.1 9.3   * 84 86     Most Recent 2 LFT's:  Recent Labs   Lab Test 04/05/22  1303 03/29/22  0712   AST 31 29   ALT 39 40   ALKPHOS 76 72   BILITOTAL 0.2 0.4       Assessment and plan:    #Migraines with Aura  #Clumpsiness  -Ongoing history of migraines with auras.  Has establish care with a neurologist for this.  Should reestablish care with neurology  -has been over 1.5 years since seeing an ophthalmologist. She did have cataract surgery and has two different vision strengths.  Feels like her awareness changed after this.  This could be contributing to her running into objects due to change in her depth perception.  I did a very thorough neuro exam today and found no deficits or abnormalities.  At this point I do not feel like there is an organic cause for her clumsiness.  We will continue to monitor and if she were to develop focal neuro symptoms would obtain an MRI of the brain  -Again these are  uncommon symptoms associated with her chemotherapy; you can get some brain fog though I am not sure that this is what that is    # Follicular lymphoma, advanced stage, FLIPI 2:  -She is tolerating lenalidomide and rituximab fairly well.  -Rash has not recurred using lenalidomide, continue zyrtec; may have a little rash upon restarting Rev again but generally will resolve quickly  -In the protocol, for 6 cycles everyone received lenalidomide at 20 mg daily.  After 6 cycles patients with complete response could reduce the  lenalidomide dose to 10 mg daily.  -We will do a PET scan after 3 cycles to reassess response to treatment and then another PET scan at 6 cycles to plan dose reductions of lenalidomide for remaining cycles.  -Had Cycle 2 yesterday, feels like she has no symptoms   -will follow-up prior to Cycle 3  -follow-up with Dr. Gutierrez with PET prior to Cycle 4    Colette Flores PA-C  [facility]  [address] [place] [address] [phone]

## 2022-04-08 NOTE — PROGRESS NOTES
Progress Note  Behavioral Activation/ CBT Psychotherapy      Name: Lilly Babcock  : 1950  MRN: [id number]  Age: 71 year old  Date: 2022  Duration: 45 minutes (start = 12:15 PM; end = 1:00 PM)    This visit was conducted in person      Psychotherapy Session Content  Discussed her continued tolerance of chemotherapy and resilience in the context of physical pain. Reviewed stability of migraines and reduction in memory disturbance. Focused on concerns about her sister as well as her visit last week. Discussed behavioral activation including interpersonal interactions and plans for upcoming trips  as well as the positive impact of support from her friends. Reviewed continued challenges with mail sorting as well as potential strategies and sources of support. Focused on her effective use of CBT and emotion regulation strategies, particularly in the context of cancer.    Current Symptoms  Current symptoms include intermittent dysphoria, anxiety, low energy, low motivation, self-criticism, guilt, avoidance behaviors, concentration impairment, worry. Denies suicidal ideation.       Mental Status Exam  Attitude: engaged  Behavior: normal  Eye Contact: sustained  Speech: fluent  Mood: anxious with brightening  Affect: mood congruient  Thought Process: clear  Suicidal Ideation: denied  Psychosis: not present      Current Diagnosis  Major depression, recurrent, moderate, without psychotic features, in partial remission        Treatment Plan  Continue cognitive-behavioral sessions.     Estrella Blanton, Ph.D., L.P.

## 2022-04-13 NOTE — ORAL ONC MGMT
"Subjective/Objective:  Lilly Babcokc is a 71 year old female contacted by phone for a follow-up visit for oral chemotherapy.  Pt confirms they are taking Revlimid 20 mg daily, 3 weeks on 1 week off. Pt reports no missed doses. Pt reports starting cycle on 4/6/22. Pt reports tolerating the medication well. Pt denies any major side effects. Joyce does state she feels stiff, but is tolerating with no interventions. Joyce also stated she is tired, enjoys reading on couch or taking naps, but that she can still complete ADLs, walks dog, and does pilates for exercise.    ORAL CHEMOTHERAPY 2/22/2022 3/16/2022 3/17/2022 3/29/2022   Assessment Type Initial Work up Initial Follow up - Refill   Diagnosis Code Non-Hodgkin Lymphoma (NHL) Non-Hodgkin Lymphoma (NHL) Non-Hodgkin Lymphoma (NHL) Non-Hodgkin Lymphoma (NHL)   Providers Dr Brenda Gutierrez   [facility]   Drug Name Revlimid (lenalidomide) Revlimid (lenalidomide) Revlimid (lenalidomide) Revlimid (lenalidomide)   Dose 20 mg 20 mg 20 mg 20 mg   Current Schedule Daily Daily Daily Daily   Cycle Details 3 weeks on, 1 week off 3 weeks on, 1 week off 3 weeks on, 1 week off 3 weeks on, 1 week off   Start Date of Last Cycle - 3/8/2022 3/8/2022 -   Planned next cycle start date - - - 4/5/2022   Doses missed in last 2 weeks - 0 - -   Adherence Assessment - Adherent - -   Adverse Effects - No AE identified during assessment Rash -   Rash - - Grade 1 -   Pharmacist Intervention(Rash) - - Yes -   Intervention(s) - - Patient education;OTC recommendation -   Any new drug interactions? - No - -       Vitals:  BP:   BP Readings from Last 1 Encounters:   04/07/22 93/53     Wt Readings from Last 1 Encounters:   04/07/22 61.6 kg (135 lb 14.4 oz)     Estimated body surface area is 1.71 meters squared as calculated from the following:    Height as of 3/8/22: 1.702 m (5' 7\").    Weight as of 4/7/22: 61.6 kg (135 lb 14.4 " oz).    Labs:  _  Result Component Current Result Ref Range   Sodium 138 (4/5/2022) 133 - 144 mmol/L     _  Result Component Current Result Ref Range   Potassium 4.2 (4/5/2022) 3.4 - 5.3 mmol/L     _  Result Component Current Result Ref Range   Calcium 9.2 (4/5/2022) 8.5 - 10.1 mg/dL     No results found for Mag within last 30 days.     No results found for Phos within last 30 days.     _  Result Component Current Result Ref Range   Albumin 3.7 (4/5/2022) 3.4 - 5.0 g/dL     _  Result Component Current Result Ref Range   Urea Nitrogen 12 (4/5/2022) 7 - 30 mg/dL     _  Result Component Current Result Ref Range   Creatinine 0.73 (4/5/2022) 0.52 - 1.04 mg/dL       _  Result Component Current Result Ref Range   AST 31 (4/5/2022) 0 - 45 U/L     _  Result Component Current Result Ref Range   ALT 39 (4/5/2022) 0 - 50 U/L     _  Result Component Current Result Ref Range   Bilirubin Total 0.2 (4/5/2022) 0.2 - 1.3 mg/dL       _  Result Component Current Result Ref Range   WBC Count 4.8 (4/5/2022) 4.0 - 11.0 10e3/uL     _  Result Component Current Result Ref Range   Hemoglobin 11.6 (L) (4/5/2022) 11.7 - 15.7 g/dL     _  Result Component Current Result Ref Range   Platelet Count 253 (4/5/2022) 150 - 450 10e3/uL     No results found for ANC within last 30 days.       Assessment/Plan:  Pt is currently tolerating therapy well. Plan to continue Revlimid as prescribed. Pt verbalized understanding and agrees to plan. Encouraged pt to call with any issues or concerns.    Follow-Up:  5/3: labs, provider appt, and infusion.    Refill Due:  Prior to 5/4    Laura Arevalo PharmD  Oral Chemotherapy Monitoring Program  [facility]  [phone]  April 13, 2022

## 2022-04-18 NOTE — TELEPHONE ENCOUNTER
"Oncology Nurse Triage    Situation:   Joyce (pt) reporting the following symptoms:  -rash  -body aches/cough    Background:   Treating Provider:   Dr. Lazcano    Date of last office visit: 4/7/22 Colette Flores PA-C    Recent Treatments:Taking REVLIMID regularly, did stop for 1 week then restarted 4/6/22.   RiTUXimab on 4/6/22    Assessment:     Onset: Rash returned on Saturday 4/16//22  Located on abdomen from clavicle down to tops of thighs, front and back, in axillary of arm, but not down arms.  Describes appearance as irregular pink spots, slightly elevated, and itchy. \"bunch of bumps\".   Denies blister appearance or drainage from bumps.   Medications: Taking Zyrtec once daily, and when has severity in rash, does take 1 extra on occasion (not every day). That did seem to help.   Applied topical Mometasone steroid ointment that also helped with itching.   These medications are effective for rash and wanted to report pt condition.    Denies any new exposure to new lotions, soap, clothes etc.      Pt also reporting woke up with body aches and cough, Took home COVID test which was negative. Cancelled book club event for tonight.   Pt did take a good nap today and feels better now than did in afternoon.  Unable to take temperature while on phone as drinking diet coke, and also plans to continue drinking water.     Denies any known exposure to COVID and travel.     This writer asked if pt could send picture through [employer].     Per Joyce, was suppose to have labs for test if COVID in the blood?    Also double check plan for In-Person appts May 3,      1630 paged Dr. Daniels on call    Recommendations:   1632 Per Dr. Daniels, TORB COVID PCR test to be done, continue to self-monitor for symptoms, if symptoms gets worse, provider visit this week.   If any red flag symptoms, go to emergency room.     Follow-Up Plan:  1648 This writer called and relayed information to Pt. Asked pt to repeat back info/plan.  Pt was able to " reverbalize understanding and is hoping to feel better as trying to make vacation plans for Thursday 4/21 in morning to [place].     Instructed patient to seek care immediately for worsening symptoms, including: fever, chest pain, shortness of breath, dizziness, cyanosis, new confusion, pressure on chest.     Scheduling request sent for COVID test appt.

## 2022-04-22 NOTE — TELEPHONE ENCOUNTER
Oral Chemotherapy Monitoring Program    Medication: Revlimid  Rx:  20mg PO daily 21/28 ds    Auth #: [id number]  Risk Category: Adult female NOT of reproductive capacity    Routine survey questions reviewed. yes        Janki Price Pharmacy Liaison, [employer] [facility]  Phone: [phone]  Fax: [phone]  Email: [email or web address]

## 2022-05-02 NOTE — PROGRESS NOTES
Lila Cook is a 72 year old  who presents for the follow up of follicular lymphoma  HPI     Oncology History Overview Note   This is a 71-year-old female who was diagnosed of follicular lymphoma grade 1 through 2 in 2008 following detection of a breast mass.  Both breast biopsy and bone marrow biopsy at the time showed follicular lymphoma.  She was initially observed until 2016 where she had left femur neck involvement with lymphoma causing hip pain.  That led to radiation.  At that time she did have lesions in her clavicle as well.  Radiation was successful in ameliorating the pain.  She ultimately underwent hip replacement.  Since then she has had skeletal lesions that have been stable over the years.  Recently she underwent PET scan on February 8, 2022 that showed progressive diffuse skeletal lesions involving C2, clavicles, scapula, axillary lymph nodes, lumbar spine, iliac bone and musculature.    She is referred to me for discussion of further management.  She denies any B symptoms, endorses intermittent, sharp neck pain when she turns her neck.  She does have longstanding back pain.  None of these pains are disabling and she does not ask for pain remedies.  Energy levels are good and appetite is good.  She is very active.  She underwent bone marrow biopsy at [facility].     Follicular non-Hodgkin's lymphoma (H)   10/15/2009 Initial Diagnosis    Follicular non-Hodgkin's lymphoma (H)     3/8/2022 -  Chemotherapy    OP ONC Lymphoma - riTUXimab / LENalidomide  Plan Provider: Rafa Gutierrez MD  Treatment goal: Palliative  Line of treatment: [No plan line of treatment]         INTERIM HISTORY:  She comes today for C3 Rituxan + Revlimid    She has two episodes of rash. Takes Zyrtec and uses triamcinalone cream which are helpful.      She sees her neurologist tomorrow for increased migraines (preceeded her current treatment regimen) and dysarthria (this happened about 2.5 years ago, she was  hospitalized, it was blamed on a medication, resolved on its own, and now is recurring). Seems to get worse when she is anxious. Peripheral neuropathy for the past few years may be contributing to clumsiness.     If she bends down and then stands up quickly, she feels momentarily breathless and dizzy.  Has been occurring at least 6 months. She has scheduled an appointment with a pulmonologist for further assessment.    Notes some mild bilateral lower extremity edema.    She continues to have intermittent, transient, intense pain radiating from C2 when she turns her head certain directions.  No motor or sensation changes in upper extremities.    Denies fevers, chills, night sweats, unexplained weight changes, vision or hearing changes, new lumps or bumps, chest pain, cough, abdominal pain, nausea, vomiting, changes to bowel or bladder, or bleeding issues.        Review of Systems   8 point review of systems was negative except pertinent positives listed above.        Objective    /61   Pulse 74   Temp 97.6  F (36.4  C) (Oral)   Resp 18   Wt 59.6 kg (131 lb 4.8 oz)   SpO2 99%   BMI 20.56 kg/m    Body mass index is 20.56 kg/m .     Physical Exam   Gen: alert, pleasant and conversational, NAD  HEENT: NC/AT,EOMI w/ PERRL, anicteric sclera. OP clear. MMM.   Neck: Supple, no LAD  CV: normal S1,S2 with RRR no m/r/g  Resp: lungs CTA bilaterally with adequate air movement to bases. No wheezes or crackles  Ext: warm and well perfused, trace bilateral lower extremity edema  Lymphatics: no palpable cervical or axillary LAD  Skin: no concerning lesions or rashes on exposed skin  Neuro: A&Ox4, no lateralizing sx. Grossly nonfocal.  Psych: appropriate, reactive    Labs:  I personally reviewed the following labs:    Most Recent 3 CBC's:  Recent Labs   Lab Test 05/03/22  0834 04/05/22  1303 03/29/22  0712   WBC 4.1 4.8 5.2   HGB 11.1* 11.6* 11.5*   MCV 95 95 94    253 205     Most Recent 3 BMP's:  Recent Labs   Lab  Test 05/03/22  0834 04/05/22  1303 03/29/22  0712    138 141   POTASSIUM 4.0 4.2 3.6   CHLORIDE 106 103 104   CO2 28 29 31   BUN 13 12 8   CR 0.70 0.73 0.78   ANIONGAP 5 6 6   FLORENCIA 9.1 9.2 9.1   GLC 90 105* 84     Most Recent 2 LFT's:  Recent Labs   Lab Test 05/03/22  0834 04/05/22  1303   AST 30 31   ALT 32 39   ALKPHOS 58 76   BILITOTAL 0.4 0.2           Assessment and plan:    # Follicular lymphoma, advanced stage, FLIPI 2:  -She is tolerating lenalidomide and rituximab fairly well.  -She has had two episodes of rash well-controlled with Zyrtec and triamcinolone. Continue zyrtec and triamcinolone  -In the protocol, for 6 cycles everyone received lenalidomide at 20 mg daily.  After 6 cycles patients with complete response could reduce the  lenalidomide dose to 10 mg daily.  -We will do a PET scan after 3 cycles to reassess response to treatment and then another PET scan at 6 cycles to plan dose reductions of lenalidomide for remaining cycles.  -will continue with Cycle 3 of Rituxan and Revlimid today.   -follow-up with Dr. Gutierrez with PET prior to Cycle 4     #Sharp shooting pain in the neck:  -possibly be related to follicular lymphoma.  It will take a few cycles for the pain to get better if it is related to follicular lymphoma.  -previously discussed possible radiation to the C2, for pain control if pain rapidly progresses or is not well controlled despite regression of lymphoma.  -Pain continues; will await scan following this cycle before planning further intervention     #Migraines with Aura  #Clumsiness  -Ongoing history of migraines with auras.  She also notes increased clumsiness, noting she has been bumping into windows, doors/walls and tripping more.  She notes always being more clumsy than her peers but this is more so than usual.  This also seems to be since starting chemo.  -Has follow-up with neurology tomorrow  -has been over 1.5 years since seeing an ophthalmologist. She denies having visual  changes but does note her eye has been drooping and may be obscuring her vision. Would like her go see the optometrist again for a formal evaluation  -Do not think this would be due to Rituxan, maybe Rev though uncommon    40 minutes spent on the date of the encounter doing chart review, review of test results, patient visit and documentation     LING Everett [facility] [address] [address] [phone]

## 2022-05-03 NOTE — NURSING NOTE
"Oncology Rooming Note    May 3, 2022 9:06 AM   Lilly Babcock is a 72 year old female who presents for:    Chief Complaint   Patient presents with     Blood Draw     Blood drawn via IV and vitals taken     Oncology Clinic Visit     Nodular lymphoma of extranodal and/or solid organ site     Initial Vitals: /61   Pulse 74   Temp 97.6  F (36.4  C) (Oral)   Resp 18   Wt 59.6 kg (131 lb 4.8 oz)   SpO2 99%   BMI 20.56 kg/m   Estimated body mass index is 20.56 kg/m  as calculated from the following:    Height as of 3/8/22: 1.702 m (5' 7\").    Weight as of this encounter: 59.6 kg (131 lb 4.8 oz). Body surface area is 1.68 meters squared.  Mild Pain (2) Comment: Data Unavailable   No LMP recorded. Patient is postmenopausal.  Allergies reviewed: Yes  Medications reviewed: Yes    Medications: Medication refills not needed today.  Pharmacy name entered into [facility]:    [id number] - [place], [place] - [address] [facility] [place], [place] - [address] [facility]  [id number] - [place], [place] - [address] [facility]  [id number] - [place], [place] - [address] [facility]  [facility] [place] - [address] [place] [address]    Clinical concerns: none       Margarita Aguirre"

## 2022-05-03 NOTE — NURSING NOTE
Chief Complaint   Patient presents with     Blood Draw     Blood drawn via IV and vitals taken     Labs drawn via IV placed by lab RN and vitals taken. Provider visit arrived.    Teresa Avelar RN

## 2022-05-03 NOTE — PROGRESS NOTES
Infusion Nursing Note:  Lilly Babcock presents today for Cycle 3 Day 1 Rapid Rituxan   Patient seen by provider today: Yes: Janet Walsh NP   present during visit today: Not Applicable.    Note: N/A.      Intravenous Access:  Peripheral IV placed.    Treatment Conditions:  Lab Results   Component Value Date    HGB 11.1 (L) 05/03/2022    WBC 4.1 05/03/2022    ANEU 6.4 10/03/2019    ANEUTAUTO 2.2 05/03/2022     05/03/2022      Lab Results   Component Value Date     05/03/2022    POTASSIUM 4.0 05/03/2022    MAG 1.8 10/20/2016    CR 0.70 05/03/2022    FLORENCIA 9.1 05/03/2022    BILITOTAL 0.4 05/03/2022    ALBUMIN 3.4 05/03/2022    ALT 32 05/03/2022    AST 30 05/03/2022     Results reviewed, labs MET treatment parameters, ok to proceed with treatment. Pt confirms she is taking her Revlimid as prescribed. Requires no intervention for pain in infusion.       Post Infusion Assessment:  Patient tolerated infusion without incident.  Blood return noted pre and post infusion.  Access discontinued per protocol.       Discharge Plan:   Patient declined prescription refills.  AVS to patient via [employer].  Patient will return 6/1 for next appointment.   Patient discharged in stable condition accompanied by: self.  Departure Mode: Ambulatory.  Face to Face time: 0.      Yuli Flores RN

## 2022-05-03 NOTE — LETTER
5/3/2022         RE: Lilly Babcock  [address]  [facility] [address]        Dear Colleague,    Thank you for referring your patient, Lilly Babcock, to the [facility]. Please see a copy of my visit note below.      Lila Cook is a 72 year old  who presents for the follow up of follicular lymphoma  HPI     Oncology History Overview Note   This is a 71-year-old female who was diagnosed of follicular lymphoma grade 1 through 2 in 2008 following detection of a breast mass.  Both breast biopsy and bone marrow biopsy at the time showed follicular lymphoma.  She was initially observed until 2016 where she had left femur neck involvement with lymphoma causing hip pain.  That led to radiation.  At that time she did have lesions in her clavicle as well.  Radiation was successful in ameliorating the pain.  She ultimately underwent hip replacement.  Since then she has had skeletal lesions that have been stable over the years.  Recently she underwent PET scan on February 8, 2022 that showed progressive diffuse skeletal lesions involving C2, clavicles, scapula, axillary lymph nodes, lumbar spine, iliac bone and musculature.    She is referred to me for discussion of further management.  She denies any B symptoms, endorses intermittent, sharp neck pain when she turns her neck.  She does have longstanding back pain.  None of these pains are disabling and she does not ask for pain remedies.  Energy levels are good and appetite is good.  She is very active.  She underwent bone marrow biopsy at [facility].     Follicular non-Hodgkin's lymphoma (H)   10/15/2009 Initial Diagnosis    Follicular non-Hodgkin's lymphoma (H)     3/8/2022 -  Chemotherapy    OP ONC Lymphoma - riTUXimab / LENalidomide  Plan Provider: Rafa Gutierrez MD  Treatment goal: Palliative  Line of treatment: [No plan line of treatment]         INTERIM HISTORY:  She comes today for C3 Rituxan + Revlimid    She has two  episodes of rash. Takes Zyrtec and uses triamcinalone cream which are helpful.      She sees her neurologist tomorrow for increased migraines (preceeded her current treatment regimen) and dysarthria (this happened about 2.5 years ago, she was hospitalized, it was blamed on a medication, resolved on its own, and now is recurring). Seems to get worse when she is anxious. Peripheral neuropathy for the past few years may be contributing to clumsiness.     If she bends down and then stands up quickly, she feels momentarily breathless and dizzy.  Has been occurring at least 6 months. She has scheduled an appointment with a pulmonologist for further assessment.    Notes some mild bilateral lower extremity edema.    She continues to have intermittent, transient, intense pain radiating from C2 when she turns her head certain directions.  No motor or sensation changes in upper extremities.    Denies fevers, chills, night sweats, unexplained weight changes, vision or hearing changes, new lumps or bumps, chest pain, cough, abdominal pain, nausea, vomiting, changes to bowel or bladder, or bleeding issues.        Review of Systems   8 point review of systems was negative except pertinent positives listed above.        Objective    /61   Pulse 74   Temp 97.6  F (36.4  C) (Oral)   Resp 18   Wt 59.6 kg (131 lb 4.8 oz)   SpO2 99%   BMI 20.56 kg/m    Body mass index is 20.56 kg/m .     Physical Exam   Gen: alert, pleasant and conversational, NAD  HEENT: NC/AT,EOMI w/ PERRL, anicteric sclera. OP clear. MMM.   Neck: Supple, no LAD  CV: normal S1,S2 with RRR no m/r/g  Resp: lungs CTA bilaterally with adequate air movement to bases. No wheezes or crackles  Ext: warm and well perfused, trace bilateral lower extremity edema  Lymphatics: no palpable cervical or axillary LAD  Skin: no concerning lesions or rashes on exposed skin  Neuro: A&Ox4, no lateralizing sx. Grossly nonfocal.  Psych: appropriate, reactive    Labs:  I  personally reviewed the following labs:    Most Recent 3 CBC's:  Recent Labs   Lab Test 05/03/22  0834 04/05/22  1303 03/29/22  0712   WBC 4.1 4.8 5.2   HGB 11.1* 11.6* 11.5*   MCV 95 95 94    253 205     Most Recent 3 BMP's:  Recent Labs   Lab Test 05/03/22  0834 04/05/22  1303 03/29/22  0712    138 141   POTASSIUM 4.0 4.2 3.6   CHLORIDE 106 103 104   CO2 28 29 31   BUN 13 12 8   CR 0.70 0.73 0.78   ANIONGAP 5 6 6   FLORENCIA 9.1 9.2 9.1   GLC 90 105* 84     Most Recent 2 LFT's:  Recent Labs   Lab Test 05/03/22  0834 04/05/22  1303   AST 30 31   ALT 32 39   ALKPHOS 58 76   BILITOTAL 0.4 0.2           Assessment and plan:    # Follicular lymphoma, advanced stage, FLIPI 2:  -She is tolerating lenalidomide and rituximab fairly well.  -She has had two episodes of rash well-controlled with Zyrtec and triamcinolone. Continue zyrtec and triamcinolone  -In the protocol, for 6 cycles everyone received lenalidomide at 20 mg daily.  After 6 cycles patients with complete response could reduce the  lenalidomide dose to 10 mg daily.  -We will do a PET scan after 3 cycles to reassess response to treatment and then another PET scan at 6 cycles to plan dose reductions of lenalidomide for remaining cycles.  -will continue with Cycle 3 of Rituxan and Revlimid today.   -follow-up with Dr. Gutierrez with PET prior to Cycle 4     #Sharp shooting pain in the neck:  -possibly be related to follicular lymphoma.  It will take a few cycles for the pain to get better if it is related to follicular lymphoma.  -previously discussed possible radiation to the C2, for pain control if pain rapidly progresses or is not well controlled despite regression of lymphoma.  -Pain continues; will await scan following this cycle before planning further intervention     #Migraines with Aura  #Clumsiness  -Ongoing history of migraines with auras.  She also notes increased clumsiness, noting she has been bumping into windows, doors/walls and tripping more.   She notes always being more clumsy than her peers but this is more so than usual.  This also seems to be since starting chemo.  -Has follow-up with neurology tomorrow  -has been over 1.5 years since seeing an ophthalmologist. She denies having visual changes but does note her eye has been drooping and may be obscuring her vision. Would like her go see the optometrist again for a formal evaluation  -Do not think this would be due to Rituxan, maybe Rev though uncommon    40 minutes spent on the date of the encounter doing chart review, review of test results, patient visit and documentation     LING Everett [facility]  [address] [place] [address] [phone]

## 2022-05-05 NOTE — PROGRESS NOTES
Progress Note  Behavioral Activation/ CBT Psychotherapy      Name: Lilly Babcock  : 1950  MRN: [id number]  Age: 72 year old  Date: May 5, 2022  Duration: 48 minutes (start = 11:12 AM; end = 12:00 PM)    This visit was conducted in person      Psychotherapy Session Content  Discussed her continued tolerance of chemotherapy and resilience in the context of physical pain. Reviewed the worsening of her migraines and intermittent difficulties with speech and memory (word retrieval). Discussed her recent appointment with her neurologist (attended by her friend as well, who expressed concerns) the possibility that these neurological symptoms are linked with migraines (considering a medication change). Focused on concerns about her sister, who was recently diagnosed with dementia and Parkinson's, and strategies to provide her care. Discussed her success in sorting and discarding mail with the help of her friend, with plans to continue this collaboration on a regular basis. Reviewed behavioral activation including a recent and upcoming trip. Focused on her experiences with her ex- through the lens of resilience.    Current Symptoms  Current symptoms include intermittent anxiety, low energy, low motivation, self-criticism, guilt, avoidance behaviors, concentration impairment, worry, memory difficulties (intermittent). Denies suicidal ideation.       Mental Status Exam  Attitude: engaged  Behavior: normal  Eye Contact: sustained  Speech: fluent  Mood: anxious with brightening  Affect: mood congruient  Thought Process: clear  Suicidal Ideation: denied  Psychosis: not present      Current Diagnosis  Major depression, recurrent, moderate, without psychotic features, in partial remission        Treatment Plan  Continue cognitive-behavioral sessions.     Estrella Blanton, Ph.D., L.P.

## 2022-05-11 NOTE — TELEPHONE ENCOUNTER
Oncology Nurse Triage - Diarrhea:     Treating Provider:   Brenda    Date of last office visit: 5/3/22, Janet Walsh CNP  Onset of symptoms: 5/9/22   Duration of symptoms:  3 days    Is patient on active treatment? (if yes, drug and date of last treatment): Yes: Lenalidomide and rituximab    Is patient receiving radiation to the pelvis area: No    Has patient started any new medications: No  - if yes, what is the new medication:  NA    Is patient currently on or recently on antibiotics: No  - if yes, what is the medication:  N/A    Does patient have history of Clostridium difficile infection: No    Recent hospitalization: NO    Labs:     Lab Results   Component Value Date/Time    WBC 4.1 05/03/2022 08:34 AM    WBC 8.2 10/03/2019 04:10 PM       Symptom assessment    Number of bowel movements in 24 hour period: 2-5    Bowel Characteristics:   Size:   Large   Color:   watery with bits of food she ate,  Consistency:   Liquid  Difficult passing stool:  No   explosive; passing gas with stool; had one episode of incontinence when in bed  Passing gas: Yes    Associated symptoms:  Dizziness, lightheadedness, feels weak, worse when bending down    Oral intake:   Oral intake good. Intake is about 6-8 large glasses of water per day    Urine output:   No problems    Current bowel regimen:   No current bowel regimen.     Grades of diarrhea for reference:       Grade 1 Diarrhea:   o Less than 6 stools per day and no recent c. diff infection or antibiotic use    Recommendations:       Grade 1 Recommendations: Education and  - Nurse may advise patient to:  --Take two capsules of Imodium at onset of diarrhea  --Take one capsule after each unformed stool  --Avoid fried, fatty, greasy and spicy foods  --Avoid high fiber foods  --Avoid caffeine, alcohol, and milk products  --Apply over the counter skin barrier to protect rectum from irritation and breakdown    Pt is currently in [place] from 5/8-5/27 taking care of her sister.  She is an internist. Pt does not want to go to the ED, however, advised ED if continues to have dizziness, lightheadedness, weakness, diarrhea.  Pt agreed to this.  Document and route to oncology MD, SHERYL and RNCC

## 2022-05-20 NOTE — TELEPHONE ENCOUNTER
Oral Chemotherapy Monitoring Program    Medication: Revlimid  Rx:  20mg PO daily 21/28 ds    Auth #: [id number]  Risk Category: Adult female NOT of reproductive capacity    Routine survey questions reviewed. yes      Janki Price Pharmacy Liaison, alpha split R[facility]  Phone: [phone]  Fax: [phone]  Email: [email or web address]

## 2022-05-26 NOTE — LETTER
5/26/2022         RE: Lilly Babcock  [address]  [facility] [address]        Dear Colleague,    Thank you for referring your patient, Lilly Babcock, to the [facility]. Please see a copy of my visit note below.      Subjective   Joyce is a 72 year old  who presents for the follow up of follicular lymphoma  HPI     Oncology History Overview Note   This is a 71-year-old female who was diagnosed of follicular lymphoma grade 1 through 2 in 2008 following detection of a breast mass.  Both breast biopsy and bone marrow biopsy at the time showed follicular lymphoma.  She was initially observed until 2016 where she had left femur neck involvement with lymphoma causing hip pain.  That led to radiation.  At that time she did have lesions in her clavicle as well.  Radiation was successful in ameliorating the pain.  She ultimately underwent hip replacement.  Since then she has had skeletal lesions that have been stable over the years.  Recently she underwent PET scan on February 8, 2022 that showed progressive diffuse skeletal lesions involving C2, clavicles, scapula, axillary lymph nodes, lumbar spine, iliac bone and musculature.    She is referred to me for discussion of further management.  She denies any B symptoms, endorses intermittent, sharp neck pain when she turns her neck.  She does have longstanding back pain.  None of these pains are disabling and she does not ask for pain remedies.  Energy levels are good and appetite is good.  She is very active.  She underwent bone marrow biopsy at [facility].     Follicular non-Hodgkin's lymphoma (H)   10/15/2009 Initial Diagnosis    Follicular non-Hodgkin's lymphoma (H)     3/8/2022 -  Chemotherapy    OP ONC Lymphoma - riTUXimab / LENalidomide  Plan Provider: Rafa Gutierrez MD  Treatment goal: Palliative  Line of treatment: [No plan line of treatment]         INTERIM HISTORY:   Joyce presents for follow-up today accompanied by her  son.  She recently had a trip down to [place], 11 hours drive each way.  They had a fun time.  However she has been feeling more fatigued in the last few weeks and wonders if it is related to all the recent traveling or she is starting to feel the effects of the treatment.  Otherwise she reports that she gets a rash for about a week with every cycle that is well controlled with topical steroids and as needed Zyrtec for itching.  She denies fevers or chills.  Appetite has been good.  Weight has been stable.  No new lumps or bumps anywhere that she has noticed.  We reviewed the results of her recent PET scan that shows a complete response in all previously seen areas and we reviewed the 2 indeterminate areas that they commented on.  We will follow on subsequent imaging.  She and her son were glad to hear this.    Denies fevers, chills, night sweats, unexplained weight changes, vision or hearing changes, new lumps or bumps, chest pain, cough, abdominal pain, nausea, vomiting, changes to bowel or bladder, or bleeding issues.        Review of Systems   8 point review of systems was negative except pertinent positives listed above.        Objective    /56 (BP Location: Right arm, Patient Position: Sitting)   Pulse 76   Temp 97.7  F (36.5  C) (Oral)   Resp 18   Wt 58.8 kg (129 lb 9.6 oz)   SpO2 100%   BMI 20.30 kg/m    Body mass index is 20.3 kg/m .     Physical Exam   Gen: alert, pleasant and conversational, NAD  HEENT: NC/AT,EOMI w/ PERRL, anicteric sclera. OP clear. MMM.   Neck: Supple, no LAD  CV: normal S1,S2 with RRR no m/r/g  Resp: lungs CTA bilaterally with adequate air movement to bases. No wheezes or crackles  Ext: warm and well perfused, trace bilateral lower extremity edema  Lymphatics: no palpable cervical or axillary LAD  Skin: no concerning lesions or rashes on exposed skin  Neuro: A&Ox4, no lateralizing sx. Grossly nonfocal.  Psych: appropriate, reactive    Labs:  I personally reviewed the  following labs:    Most Recent 3 CBC's:  Recent Labs   Lab Test 05/03/22  0834 04/05/22  1303 03/29/22  0712   WBC 4.1 4.8 5.2   HGB 11.1* 11.6* 11.5*   MCV 95 95 94    253 205     Most Recent 3 BMP's:  Recent Labs   Lab Test 05/03/22  0834 04/05/22  1303 03/29/22  0712    138 141   POTASSIUM 4.0 4.2 3.6   CHLORIDE 106 103 104   CO2 28 29 31   BUN 13 12 8   CR 0.70 0.73 0.78   ANIONGAP 5 6 6   FLORENCIA 9.1 9.2 9.1   GLC 90 105* 84     Most Recent 2 LFT's:  Recent Labs   Lab Test 05/03/22  0834 04/05/22  1303   AST 30 31   ALT 32 39   ALKPHOS 58 76   BILITOTAL 0.4 0.2       Assessment and plan:    # Follicular lymphoma, advanced stage, FLIPI 2:  -She is tolerating lenalidomide and rituximab fairly well. Now completed 3 cycles.   -She has had 3 episodes of lenalidomide-induced rash well-controlled with Zyrtec and triamcinolone. Continue zyrtec and triamcinolone. As long as rash resolves with supportive treatment, we will continue current dose of lenalidomide.  -In the protocol, for 6 cycles everyone received lenalidomide at 20 mg daily.  After 6 cycles patients with complete response could reduce the  lenalidomide dose to 10 mg daily, and the rituximab could be switched to every 8 weeks.   -PET CT after 3 cycles shows complete response of all previously seen sites of disease. They comment on 2 indeterminate sites (right 7th rib, and a left pelvis lesion) which will be followed on subsequent imaging. Will plan for repeat PET CT at end of treatment.   -will continue with Cycle 4 of Rituxan and Revlimid. Plan for repeat PET/CT after completion of 6 cycles.     #Sharp shooting pain in the neck:  -previously thought could be related to the C2 lesion, but now this is completely resolved on her interim PET scan.  -We discussed that reason for pain might not be lymphoma related as the lymphomatous mass seems to have resolved.    # Hypermetabolic activity in the intestines:  -She has h/o diarrhea which is now in  remission. She follows up with nurse practitioner and will look into the need for more GI focused investigation.   - Increased hypermetabolism of GI tract is probably physiological at this time.    #Fatigue  Has become more noticeable in the last few weeks, but she was travelling recently as well and feels that it might be related to exertion. She will monitor and let us know if its gets too bothersome or is interfering with QOL, at which point we can discuss if the revlimid dose needs to be reduced.     #Migraines with Aura  -Ongoing history of migraines with auras x years.  Has not really noticed worsening of this since starting the treatment. Notes that it gets worse in stressful situations.   -Recently had a follow up with neurology. Had some lab testing and an EEG that were all reportedly normal.     Patient interviewed and discusses with Dr. Brenda Levin MD  PGY6 Fellow. Hematology/Oncology/Transplantation    I saw and examined the patient with fellow. I discussed assessment and plan. I agree with findings documented in fellows note.    Total time spent on date of service in review of medical records, review of labs, history taking, physical exam, discussion of assessment and plan, counseling and patient education is 30 minutes.      Rafa Gutierrez MD  Attending Physician  Pager [phone]

## 2022-05-26 NOTE — NURSING NOTE
"Oncology Rooming Note    May 26, 2022 12:33 PM   Lilly Babcock is a 72 year old female who presents for:    Chief Complaint   Patient presents with     Oncology Clinic Visit     Follicular grade 2/3 Lymphoma     Initial Vitals: /56 (BP Location: Right arm, Patient Position: Sitting)   Pulse 76   Temp 97.7  F (36.5  C) (Oral)   Resp 18   Wt 58.8 kg (129 lb 9.6 oz)   SpO2 100%   BMI 20.30 kg/m   Estimated body mass index is 20.3 kg/m  as calculated from the following:    Height as of 3/8/22: 1.702 m (5' 7\").    Weight as of this encounter: 58.8 kg (129 lb 9.6 oz). Body surface area is 1.67 meters squared.  No Pain (1) Comment: Data Unavailable   No LMP recorded. Patient is postmenopausal.  Allergies reviewed: Yes  Medications reviewed: Yes    Medications: Medication refills not needed today.  Pharmacy name entered into [facility]:    [id number] - [place], [place] - [address] [facility] - [place], [place] - [address] [facility]  [id number] - [place], [place] - [address] [facility]  [id number] - [place], [place] - [address] [facility]  [facility] [place] [facility] [address] [place] [address]    Clinical concerns: None       Sandra Belcher LPN            "

## 2022-05-26 NOTE — PROGRESS NOTES
Subjective   Joyce is a 72 year old  who presents for the follow up of follicular lymphoma  HPI     Oncology History Overview Note   This is a 71-year-old female who was diagnosed of follicular lymphoma grade 1 through 2 in 2008 following detection of a breast mass.  Both breast biopsy and bone marrow biopsy at the time showed follicular lymphoma.  She was initially observed until 2016 where she had left femur neck involvement with lymphoma causing hip pain.  That led to radiation.  At that time she did have lesions in her clavicle as well.  Radiation was successful in ameliorating the pain.  She ultimately underwent hip replacement.  Since then she has had skeletal lesions that have been stable over the years.  Recently she underwent PET scan on February 8, 2022 that showed progressive diffuse skeletal lesions involving C2, clavicles, scapula, axillary lymph nodes, lumbar spine, iliac bone and musculature.    She is referred to me for discussion of further management.  She denies any B symptoms, endorses intermittent, sharp neck pain when she turns her neck.  She does have longstanding back pain.  None of these pains are disabling and she does not ask for pain remedies.  Energy levels are good and appetite is good.  She is very active.  She underwent bone marrow biopsy at [facility].     Follicular non-Hodgkin's lymphoma (H)   10/15/2009 Initial Diagnosis    Follicular non-Hodgkin's lymphoma (H)     3/8/2022 -  Chemotherapy    OP ONC Lymphoma - riTUXimab / LENalidomide  Plan Provider: Rafa Gutierrez MD  Treatment goal: Palliative  Line of treatment: [No plan line of treatment]         INTERIM HISTORY:   Joyce presents for follow-up today accompanied by her son.  She recently had a trip down to [place], 11 hours drive each way.  They had a fun time.  However she has been feeling more fatigued in the last few weeks and wonders if it is related to all the recent traveling or she is starting to feel the effects  of the treatment.  Otherwise she reports that she gets a rash for about a week with every cycle that is well controlled with topical steroids and as needed Zyrtec for itching.  She denies fevers or chills.  Appetite has been good.  Weight has been stable.  No new lumps or bumps anywhere that she has noticed.  We reviewed the results of her recent PET scan that shows a complete response in all previously seen areas and we reviewed the 2 indeterminate areas that they commented on.  We will follow on subsequent imaging.  She and her son were glad to hear this.    Denies fevers, chills, night sweats, unexplained weight changes, vision or hearing changes, new lumps or bumps, chest pain, cough, abdominal pain, nausea, vomiting, changes to bowel or bladder, or bleeding issues.        Review of Systems   8 point review of systems was negative except pertinent positives listed above.        Objective    /56 (BP Location: Right arm, Patient Position: Sitting)   Pulse 76   Temp 97.7  F (36.5  C) (Oral)   Resp 18   Wt 58.8 kg (129 lb 9.6 oz)   SpO2 100%   BMI 20.30 kg/m    Body mass index is 20.3 kg/m .     Physical Exam   Gen: alert, pleasant and conversational, NAD  HEENT: NC/AT,EOMI w/ PERRL, anicteric sclera. OP clear. MMM.   Neck: Supple, no LAD  CV: normal S1,S2 with RRR no m/r/g  Resp: lungs CTA bilaterally with adequate air movement to bases. No wheezes or crackles  Ext: warm and well perfused, trace bilateral lower extremity edema  Lymphatics: no palpable cervical or axillary LAD  Skin: no concerning lesions or rashes on exposed skin  Neuro: A&Ox4, no lateralizing sx. Grossly nonfocal.  Psych: appropriate, reactive    Labs:  I personally reviewed the following labs:    Most Recent 3 CBC's:  Recent Labs   Lab Test 05/03/22  0834 04/05/22  1303 03/29/22  0712   WBC 4.1 4.8 5.2   HGB 11.1* 11.6* 11.5*   MCV 95 95 94    253 205     Most Recent 3 BMP's:  Recent Labs   Lab Test 05/03/22  0834  04/05/22  1303 03/29/22  0712    138 141   POTASSIUM 4.0 4.2 3.6   CHLORIDE 106 103 104   CO2 28 29 31   BUN 13 12 8   CR 0.70 0.73 0.78   ANIONGAP 5 6 6   FLORENCIA 9.1 9.2 9.1   GLC 90 105* 84     Most Recent 2 LFT's:  Recent Labs   Lab Test 05/03/22  0834 04/05/22  1303   AST 30 31   ALT 32 39   ALKPHOS 58 76   BILITOTAL 0.4 0.2       Assessment and plan:    # Follicular lymphoma, advanced stage, FLIPI 2:  -She is tolerating lenalidomide and rituximab fairly well. Now completed 3 cycles.   -She has had 3 episodes of lenalidomide-induced rash well-controlled with Zyrtec and triamcinolone. Continue zyrtec and triamcinolone. As long as rash resolves with supportive treatment, we will continue current dose of lenalidomide.  -In the protocol, for 6 cycles everyone received lenalidomide at 20 mg daily.  After 6 cycles patients with complete response could reduce the  lenalidomide dose to 10 mg daily, and the rituximab could be switched to every 8 weeks.   -PET CT after 3 cycles shows complete response of all previously seen sites of disease. They comment on 2 indeterminate sites (right 7th rib, and a left pelvis lesion) which will be followed on subsequent imaging. Will plan for repeat PET CT at end of treatment.   -will continue with Cycle 4 of Rituxan and Revlimid. Plan for repeat PET/CT after completion of 6 cycles.     #Sharp shooting pain in the neck:  -previously thought could be related to the C2 lesion, but now this is completely resolved on her interim PET scan.  -We discussed that reason for pain might not be lymphoma related as the lymphomatous mass seems to have resolved.    # Hypermetabolic activity in the intestines:  -She has h/o diarrhea which is now in remission. She follows up with nurse practitioner and will look into the need for more GI focused investigation.   - Increased hypermetabolism of GI tract is probably physiological at this time.    #Fatigue  Has become more noticeable in the last few  weeks, but she was travelling recently as well and feels that it might be related to exertion. She will monitor and let us know if its gets too bothersome or is interfering with QOL, at which point we can discuss if the revlimid dose needs to be reduced.     #Migraines with Aura  -Ongoing history of migraines with auras x years.  Has not really noticed worsening of this since starting the treatment. Notes that it gets worse in stressful situations.   -Recently had a follow up with neurology. Had some lab testing and an EEG that were all reportedly normal.     Patient interviewed and discusses with Dr. Brenda Levin MD  PGY6 Fellow. Hematology/Oncology/Transplantation    I saw and examined the patient with fellow. I discussed assessment and plan. I agree with findings documented in fellows note.    Total time spent on date of service in review of medical records, review of labs, history taking, physical exam, discussion of assessment and plan, counseling and patient education is 30 minutes.    Rafa Gutierrez MD  Attending Physician  Pager [phone]

## 2022-05-26 NOTE — NURSING NOTE
Venipuncture done in right AC space in clinic, pt tolerated. See flowsheet for additional details.    Kristy Chan CMA on 5/26/2022 at 1:51 PM

## 2022-06-01 NOTE — PROGRESS NOTES
Infusion Nursing Note:  Lilly Babcock presents today for cycle 4 day 1 rapid rituxan.    Patient seen by provider today: No - patient saw Dr. Gutierrez 5/26/22   present during visit today: Not Applicable.    Note:   Patient reports feeling fatigued, especially in the morning. She denies any other complaints or concerns. She denies fevers, chills, SOB, chest pain, nausea, and pain.    Patient confirms she is taking revlimid as prescribed.    Intravenous Access:  Peripheral IV placed.    Treatment Conditions:  Lab Results   Component Value Date    HGB 12.0 06/01/2022    WBC 4.4 06/01/2022    ANEU 6.4 10/03/2019    ANEUTAUTO 2.4 06/01/2022     06/01/2022      Lab Results   Component Value Date     06/01/2022    POTASSIUM 3.4 06/01/2022    MAG 1.8 10/20/2016    CR 0.74 06/01/2022    FLORENCIA 9.2 06/01/2022    BILITOTAL 0.3 06/01/2022    ALBUMIN 3.7 06/01/2022    ALT 34 06/01/2022    AST 25 06/01/2022     Results reviewed, labs MET treatment parameters, ok to proceed with treatment.      Post Infusion Assessment:  Patient tolerated infusion without incident.  Blood return noted pre and post infusion.  Site patent and intact, free from redness, edema or discomfort.  No evidence of extravasations.  Access discontinued per protocol.       Discharge Plan:   Patient declined prescription refills.  Discharge instructions reviewed with: Patient.  Patient and/or family verbalized understanding of discharge instructions and all questions answered.  AVS to patient via [facility].  Patient due to return 6/29 for next appointment, IB sent to scheduling, patient aware.   Patient discharged in stable condition accompanied by: self.  Departure Mode: Ambulatory.      Grace Gonzalez RN

## 2022-06-05 PROBLEM — S06.5XAA SUBDURAL HEMATOMA (H): Status: ACTIVE | Noted: 2022-01-01

## 2022-06-05 NOTE — H&P
[facility]       NEUROSURGERY  H&P NOTE    HPI: Lilly Babcock is a 72 year old female with a previous medical history of lymphoma, on active chemotherapy (PET CT less than 1 mo ago demonstrating remission), on ASA 81mg, who presents after 2 days of severe headaches. Only recent trauma occured when hitting her head on a cabinet about 3 days ago, she did not lose consciousness and did not have any symptoms at that time. Two days ago, when driving back from a friend's party, she developed sudden onset severe bifrontal tension like headaches that were persistent throughout the next day. She had a brief episode of numbness in her left fingertips that resolved within the next day. She took naproxen and tylenol for headaches, her pain is currently 3/10. She denies any nausea, vomiting, weakness, vision changes, somnolence.         PAST MEDICAL HISTORY:   Past Medical History:   Diagnosis Date     Asthma      Degenerative joint disease      Depression      Eczema      Lymphoma, follicular (H) 8/2008 diagnosed    Stage YOUSUF - bone mets to L greater trochanter     Nasal congestion      Seasonal allergies        PAST SURGICAL HISTORY:   Past Surgical History:   Procedure Laterality Date     ARTHROPLASTY HIP Left 10/18/2016    Procedure: ARTHROPLASTY HIP;  Surgeon: Chepe Peterson MD;  [facility]     BIOPSY BONE CLAVICLE  3/1/2012    Procedure:BIOPSY BONE CLAVICLE; Biopsy Left Clavicle, Bilateral Hip Injections; Surgeon:JENNY BELTRAN; Location:[facility] OR     BREAST BIOPSY, RT/LT      left     DILATION AND CURETTAGE       HAND SURGERY      right flexor tendon laceration repair     INJECT STEROID (LOCATION)  3/1/2012    Procedure:INJECT STEROID (LOCATION); Surgeon:JENNY BELTRAN; [facility]     LAPAROSCOPY DIAGNOSTIC (GYN)         FAMILY HISTORY:   Family History   Problem Relation Age of Onset     Depression Mother      Mental Illness Mother      Cancer Mother   "       breast     Other Cancer Mother      Diabetes Father      Cancer Father         lung ca, was a smoker     Other Cancer Father      Abdominal Aortic Aneurysm Father      Asthma Sister      Depression Sister      Mental Illness Sister      Asthma Sister      Depression Sister      Breast Cancer Sister      Liver Disease Sister        SOCIAL HISTORY: Lives with her son, a dog and a cat.   Social History     Tobacco Use     Smoking status: Never Smoker     Smokeless tobacco: Never Used   Substance Use Topics     Alcohol use: Not Currently     Alcohol/week: 5.8 standard drinks       MEDICATIONS:  (Not in a hospital admission)      Allergies:  Allergies   Allergen Reactions     Diagnostic X-Ray Materials Hives     ct     Diatrizoate Rash     Dye [Contrast Dye] Rash and Hives     Ioxaglate Hives and Rash     Vortioxetine Nausea and Vomiting     Revlimid [Lenalidomide] Rash       ROS: 10 point ROS of systems including Constitutional, Eyes, Respiratory, Cardiovascular, Gastroenterology, Genitourinary, Integumentary, Muscularskeletal, Psychiatric were all negative except for pertinent positives noted in my HPI.    Physical exam:   Blood pressure 113/60, pulse 64, temperature 97.4  F (36.3  C), temperature source Oral, resp. rate 18, height 1.702 m (5' 7\"), weight 57.2 kg (126 lb), SpO2 96 %, not currently breastfeeding.  CV: BP and HR as noted above  PULM: breathing comfortably on room air  ABD: soft, non-distended  NEUROLOGIC:  -- Awake; Alert; oriented x 3  -- Follows commands briskly  -- +repetition, calculation, and naming  -- Speech fluent, spontaneous. No aphasia or dysarthria.  -- no gaze preference. No apparent hemineglect.  Cranial Nerves:  -- visual fields full to confrontation, PERRL 3-2mm bilat and brisk, extraocular movements intact  -- face symmetrical, tongue midline  -- sensory V1-V3 intact bilaterally  -- palate elevates symmetrically, uvula midline  -- hearing grossly intact bilat  -- Trapezii 5/5 " strength bilat symmetric  -- Cerebellar: Finger nose finger without dysmetria, intact rapid alternating motions bilaterally    Motor:  Normal bulk / tone; no tremor, rigidity, or bradykinesia.  No muscle wasting or fasciculations  No Pronator Drift     Delt Bi Tri Hand Flexion/  Extension Iliopsoas Quadriceps Hamstrings Tibialis Anterior Gastroc    C5 C6 C7 C8/T1 L2 L3 L4-S1 L4 S1   R 5 5 5 5 5 5 5 5 5   L 5 5 5 5 5 5 5 5 5   Sensory:  intact to LT x 4 extremities     Reflexes:     Bi Tri BR Young Pat Ach Bab    C5-6 C7-8 C6 UMN L2-4 S1 UMN   R 2+ 2+ 2+ Norm 2+ 2+ Norm   L 2+ 2+ 2+ Norm 2+ 2+ Norm     Gait:Deferred      LABS:  Last Comprehensive Metabolic Panel:  Sodium   Date Value Ref Range Status   06/04/2022 136 133 - 144 mmol/L Final   10/03/2019 135 133 - 144 mmol/L Final     Potassium   Date Value Ref Range Status   06/04/2022 4.3 3.4 - 5.3 mmol/L Final   10/03/2019 4.4 3.4 - 5.3 mmol/L Final     Chloride   Date Value Ref Range Status   06/04/2022 103 94 - 109 mmol/L Final   10/03/2019 102 94 - 109 mmol/L Final     Carbon Dioxide   Date Value Ref Range Status   10/03/2019 28 20 - 32 mmol/L Final     Carbon Dioxide (CO2)   Date Value Ref Range Status   06/04/2022 29 20 - 32 mmol/L Final     Anion Gap   Date Value Ref Range Status   06/04/2022 4 3 - 14 mmol/L Final   10/03/2019 5 3 - 14 mmol/L Final     Glucose   Date Value Ref Range Status   06/04/2022 119 (H) 70 - 99 mg/dL Final   10/03/2019 100 (H) 70 - 99 mg/dL Final     Urea Nitrogen   Date Value Ref Range Status   06/04/2022 15 7 - 30 mg/dL Final   10/03/2019 12 7 - 30 mg/dL Final     Creatinine   Date Value Ref Range Status   06/04/2022 0.80 0.52 - 1.04 mg/dL Final   10/03/2019 0.67 0.52 - 1.04 mg/dL Final     Creatinine POCT   Date Value Ref Range Status   06/04/2022 0.8 0.5 - 1.0 mg/dL Final     GFR Estimate   Date Value Ref Range Status   06/04/2022 77 >60 mL/min/1.73m2 Final     Comment:     Effective December 21, 2021 eGFRcr in adults is calculated  using the 2021 CKD-EPI creatinine equation which includes age and gender (Juan et al., NEJ, DOI: 10.1056/OZWLvl4739596)   10/03/2019 89 >60 mL/min/[1.73_m2] Final     Comment:     Non  GFR Calc  Starting 12/18/2018, serum creatinine based estimated GFR (eGFR) will be   calculated using the Chronic Kidney Disease Epidemiology Collaboration   (CKD-EPI) equation.       GFR, ESTIMATED POCT   Date Value Ref Range Status   06/04/2022 >60 >60 mL/min/1.73m2 Final     Calcium   Date Value Ref Range Status   06/04/2022 9.1 8.5 - 10.1 mg/dL Final   10/03/2019 9.1 8.5 - 10.1 mg/dL Final     Lab Results   Component Value Date    WBC 6.3 06/04/2022    WBC 8.2 10/03/2019     Lab Results   Component Value Date    RBC 3.71 06/04/2022    RBC 3.98 10/03/2019     Lab Results   Component Value Date    HGB 11.7 06/04/2022    HGB 12.3 10/03/2019     Lab Results   Component Value Date    HCT 36.0 06/04/2022    HCT 37.4 10/03/2019     Lab Results   Component Value Date    MCV 97 06/04/2022    MCV 94 10/03/2019     Lab Results   Component Value Date    MCH 31.5 06/04/2022    MCH 30.9 10/03/2019     Lab Results   Component Value Date    MCHC 32.5 06/04/2022    MCHC 32.9 10/03/2019     Lab Results   Component Value Date    RDW 16.2 06/04/2022    RDW 13.9 10/03/2019     Lab Results   Component Value Date     06/04/2022     10/03/2019         IMAGING:  CT head: IMPRESSION:  1.  Acute right-sided subdural hematoma diffusely over the right cerebral convexity and extending along the interhemispheric falx. The maximal thickness is 8 mm in the right parietal region.  2.  Associated intracranial mass effect with 3 mm of right to left shift of midline structures.  3.  Underlying mild to moderate presumed chronic small vessel ischemic changes.    ASSESSMENT: 71yo female with headaches and acute, likely traumatic right 8mm SDH, with mass effect. Will admit for observation of symptoms and repeat  Imaging.         PLAN:  No  neurosurgical intervention indicated at this time   Repeat head CT in 6 hours from the time of first acquisition  Activity: Up w/ assist  HOB > 30 degrees  Q4h neuro exams   Pain control  Keppra 500 BID for 7d for seizure ppx  Maintain SBP < 140   Regular diet  Hold aspirin  Platelets > 100,000  INR < 1.5  Hemoglobin > 8  DVT: SCDs while in bed  Disposition: Observation  PT/OT consulted    The patient was discussed with Dr. Hayward, neurosurgery chief resident, and Dr. Cruz, neurosurgery staff, and they agree with the above.    Aziza Lawrence MD  Neurosurgery Resident, PGY-2        In addition to the assessment of diagnoses detailed above, this 72 year old female  patient admitted from the Emergency Department has the following conditions contributing to the complexity of their medical care:    Brain compression which was evident on the CT/MRI.,  Metastatic cancer,    Clinically Significant Risk Factors Present on Admission                  # Compression of brain

## 2022-06-05 NOTE — ED PROVIDER NOTES
[facility] EMERGENCY DEPARTMENT ([facility])  6/04/22    History     Chief Complaint   Patient presents with     Headache     Numbness     The history is provided by the patient and medical records.     Lilly Babcock is a 72 year old female with a history of metastatic follicular non-Hodgkin's lymphoma who presents to the Emergency Department with headache. Patient reports she has a history of unilateral aura-type migraines, does not typically get pain. Patient reports last night she made a rich chocolate cake with espresso. She states shortly after having some she developed auras on both eyes which is unusual for her. Patient reports shortly after that she developed the worst headache of her life. Patient reports she also notices numbness in the fingers of her left hand. Patient reports she slept for about 5 hours and the headache persisted. She states she went back to sleep and when she woke up this morning headache was much improved. She did notice continued head pain when bending over. She states this improved throughout the day. She still has pain in the frontal area currently. The numbness in her hand nearly resolved about 2 hours ago. Patient denies change in vision. She notes some word finding difficulty at baseline, no change in this. No confusion. Patient does note some soreness with moving her eyes. No other symptoms noted.    Past Medical History  Past Medical History:   Diagnosis Date     Asthma      Degenerative joint disease      Depression      Eczema      Lymphoma, follicular (H) 8/2008 diagnosed    Stage YOUSUF - bone mets to L greater trochanter     Nasal congestion      Seasonal allergies      Past Surgical History:   Procedure Laterality Date     ARTHROPLASTY HIP Left 10/18/2016    Procedure: ARTHROPLASTY HIP;  Surgeon: Chepe Peterson MD;  [facility]     BIOPSY BONE CLAVICLE  3/1/2012    Procedure:BIOPSY BONE CLAVICLE; Biopsy Left Clavicle, Bilateral Hip Injections;  Surgeon:JENNY BELTRAN; [facility]     BREAST BIOPSY, RT/LT      left     DILATION AND CURETTAGE       HAND SURGERY      right flexor tendon laceration repair     INJECT STEROID (LOCATION)  3/1/2012    Procedure:INJECT STEROID (LOCATION); Surgeon:JENNY BELTRAN; [facility]     LAPAROSCOPY DIAGNOSTIC (GYN)       acyclovir (ZOVIRAX) 400 MG tablet  albuterol (PROAIR HFA/PROVENTIL HFA/VENTOLIN HFA) 108 (90 Base) MCG/ACT inhaler  amoxicillin (AMOXIL) 500 MG capsule  cetirizine (ZYRTEC) 10 MG tablet  Cholecalciferol (VITAMIN D) 1000 UNIT capsule  clobetasol (TEMOVATE) 0.05 % ointment  ipratropium (ATROVENT) 0.06 % spray  ketoconazole (NIZORAL) 2 % shampoo  LENalidomide (REVLIMID) 20 MG CAPS capsule  methylPREDNISolone (MEDROL) 32 MG tablet  mometasone (ELOCON) 0.1 % ointment  mupirocin (BACTROBAN) 2 % external ointment  predniSONE (DELTASONE) 1 MG tablet  prochlorperazine (COMPAZINE) 10 MG tablet  VIIBRYD 20 MG TABS tablet      Allergies   Allergen Reactions     Diagnostic X-Ray Materials Hives     ct     Diatrizoate Rash     Dye [Contrast Dye] Rash and Hives     Ioxaglate Hives and Rash     Vortioxetine Nausea and Vomiting     Revlimid [Lenalidomide] Rash     Family History  Family History   Problem Relation Age of Onset     Depression Mother      Mental Illness Mother      Cancer Mother         breast     Other Cancer Mother      Diabetes Father      Cancer Father         lung ca, was a smoker     Other Cancer Father      Abdominal Aortic Aneurysm Father      Asthma Sister      Depression Sister      Mental Illness Sister      Asthma Sister      Depression Sister      Breast Cancer Sister      Liver Disease Sister      Social History   Social History     Tobacco Use     Smoking status: Never Smoker     Smokeless tobacco: Never Used   Substance Use Topics     Alcohol use: Not Currently     Alcohol/week: 5.8 standard drinks     Drug use: No      Past medical history, past surgical history, medications,  "allergies, family history, and social history were reviewed with the patient. No additional pertinent items.       Review of Systems  A complete review of systems was performed with pertinent positives and negatives noted in the HPI, and all other systems negative.    Physical Exam   BP: 131/69  Pulse: 72  Temp: 97.4  F (36.3  C)  Resp: 18  Height: 170.2 cm (5' 7\")  Weight: 57.2 kg (126 lb)  SpO2: 100 %  Physical Exam  Vitals and nursing note reviewed.   Constitutional:       General: She is not in acute distress.     Appearance: She is well-developed. She is not diaphoretic.   HENT:      Head: Normocephalic and atraumatic.      Mouth/Throat:      Pharynx: No oropharyngeal exudate.   Eyes:      General: No scleral icterus.        Right eye: No discharge.         Left eye: No discharge.      Pupils: Pupils are equal, round, and reactive to light.   Cardiovascular:      Rate and Rhythm: Normal rate and regular rhythm.      Heart sounds: Normal heart sounds. No murmur heard.    No friction rub. No gallop.   Pulmonary:      Effort: Pulmonary effort is normal. No respiratory distress.      Breath sounds: Normal breath sounds. No wheezing.   Chest:      Chest wall: No tenderness.   Abdominal:      General: Bowel sounds are normal. There is no distension.      Palpations: Abdomen is soft.      Tenderness: There is no abdominal tenderness.   Musculoskeletal:         General: No tenderness or deformity. Normal range of motion.      Cervical back: Normal range of motion and neck supple.   Skin:     General: Skin is warm and dry.      Coloration: Skin is not pale.      Findings: No erythema or rash.   Neurological:      General: No focal deficit present.      Mental Status: She is alert and oriented to person, place, and time.      Cranial Nerves: No cranial nerve deficit.      Sensory: No sensory deficit.      Motor: No weakness.      Coordination: Coordination normal.      Gait: Gait normal.   Psychiatric:         Mood and " Affect: Mood normal.         Behavior: Behavior normal.         ED Course   11:40 PM  The patient was seen and examined by Ian Reyes DO in Room ED06.      Procedures              Critical Care time was 15 minutes for this patient excluding procedures.       No results found for any visits on 06/04/22.  Medications - No data to display     Assessments & Plan (with Medical Decision Making)   Is a 72-year-old female who presents with headache.  This began suddenly yesterday.  It has been improving since that time.  She also had an episode of numbness/tingling in her left hand which has since resolved.  Headache is currently minimal.  Exam demonstrates no acute abnormalities.  There is no focal neurodeficits.  CT head shows acute right subdural hematoma with max width of 8 mm.  There is 3 mm of midline shift.  Lab work shows no acute abnormalities.  I discussed all results with patient.  I discussed the case with Neurosurgery who saw the patient.  They will admit to their service.     I have reviewed the nursing notes. I have reviewed the findings, diagnosis, plan and need for follow up with the patient.    New Prescriptions    No medications on file       Final diagnoses:   None     I, Dona Andrade, am serving as a trained medical scribe to document services personally performed by Ian Reyes DO, based on the provider's statements to me.      IIan DO, was physically present and have reviewed and verified the accuracy of this note documented by Dona Andrade.    --  Ian Reyes DO  [facility] EMERGENCY DEPARTMENT  6/4/2022     Ian Reyes DO  06/05/22 0313

## 2022-06-05 NOTE — PLAN OF CARE
Occupational Therapy: Orders received. Chart reviewed and discussed with care team.? Occupational Therapy not indicated due to pt at functional baseline, up in room independently, headache is improving and no concerns with ADLs/IADLs.?Planning to discharge home shortly.? Will complete orders.

## 2022-06-05 NOTE — ED TRIAGE NOTES
Patient arrives to triage ambulatory from home, son drove her here.   Pt states she is getting chemo for lymphoma, most recent Wednesday, she takes rituxin as an infusion, she also takes an oral capsule which she has been off for a week and she was suppose to restart it but she has not.  She was cooking and making a super chocolaty cake. She has a history of migraine aura. She got an aura on both sides which is uncommon, when she got home she had a horrific pain in her head. No alcohol, just chocolate. Pain was horrible, slept alright, woke up with the pain being better, but it is still pretty bad when she bends over or sneezes. She had numbness in her fingers on the left hand the night last night, but it is better now yet still a little numb. Patient states she can get strokes from the meds she was taking.   VSS

## 2022-06-05 NOTE — TELEPHONE ENCOUNTER
"[facility]: Dr Gutierrez. Last seen 5/26.  Chemo patient DX: Follicular non-Hodgkins lympoma grade 2/3.   On the way back home from a party last night I developed disturbing auras in both sides of my vision. I didn't have any alcohol to drink. I was able to drive. When I got home I had the worst headache of my life. I still have a headache today. I have only had aura migraines before. Last night the pain was extreme. No auras today. (sparkly lights and decreased vision in the area where it is happening. Start small and become bigger and bigger. Lasting for about 15-30 min. I have some numbness in my left hand: thumb and middle finger, which began last night. I know that there are some chemos that can cause strokes. This is my off week, and I am supposed to restart it today. She has not discussed this with the Dr yet. She states when she bends over the headache is much worse. Located in the forehead in the middle and the top of the head. Currently pain= \"4\" which is better, it was better this morning. I slept in until 11am, nap from 2-7:30pm. Temperature checked during this call = 97.4 orally. Neurologist @ [facility].   Triaged to a disposition of See PCP within 24 hrs. (However, upgraded to 2LT due to severity of sx last night).  Answering service contacted @ 10:07pm: On call provider: Dr Mel bear. 10:20pm: patient should be evaluated in the ER tonight. 10:23pm patient contacted with this instruction. She will go to the ER tonight.     Jennifer Dasilva RN Triage Nurse Advisor 10:37 PM 6/4/2022  Reason for Disposition    [1] MODERATE headache (e.g., interferes with normal activities) AND [2] present > 24 hours AND [3] unexplained  (Exceptions: analgesics not tried, typical migraine, or headache part of viral illness)    Additional Information    Negative: Difficult to awaken or acting confused (e.g., disoriented, slurred speech)    Negative: [1] Weakness of the face, arm or leg on one side of the " "body AND [2] new onset    Negative: [1] Numbness of the face, arm or leg on one side of the body AND [2] new onset    Negative: [1] Loss of speech or garbled speech AND [2] new onset    Negative: Passed out (i.e., lost consciousness, collapsed and was not responding)    Negative: Sounds like a life-threatening emergency to the triager    Negative: Followed a head injury    Negative: Pregnant    Negative: Postpartum (from 0 to 6 weeks after delivery)    Negative: Traumatic Brain Injury (TBI) is suspected    Negative: Unable to walk, or can only walk with assistance (e.g., requires support)    Negative: Stiff neck (can't touch chin to chest)    Negative: Severe pain in one eye    Negative: [1] Other family members (or roommates) with headaches AND [2] possibility of carbon monoxide exposure    Negative: [1] SEVERE headache (e.g., excruciating) AND [2] \"worst headache\" of life    Negative: [1] SEVERE headache AND [2] sudden-onset (i.e., reaching maximum intensity within seconds)    Negative: [1] SEVERE headache AND [2] fever    Negative: Loss of vision or double vision (Exception: same as prior migraines)    Negative: [1] Fever > 100.0 F (37.8 C) AND [2] diabetes mellitus or weak immune system (e.g., HIV positive, cancer chemo, splenectomy, organ transplant, chronic steroids)    Negative: Patient sounds very sick or weak to the triager    Negative: [1] SEVERE headache (e.g., excruciating) AND [2] not improved after 2 hours of pain medicine    Negative: [1] Vomiting AND [2] 2 or more times (Exception: similar to previous migraines)    Negative: Fever > 104 F (40 C)    Protocols used: HEADACHE-A-AH  COVID 19 Nurse Triage Plan/Patient Instructions    Please be aware that novel coronavirus (COVID-19) may be circulating in the community. If you develop symptoms such as fever, cough, or SOB or if you have concerns about the presence of another infection including coronavirus (COVID-19), please contact your health care " provider or visit [email or web address].     Disposition/Instructions    ED Visit recommended. Follow protocol based instructions.     Bring Your Own Device:  Please also bring your smart device(s) (smart phones, tablets, laptops) and their charging cables for your personal use and to communicate with your care team during your visit.    Thank you for taking steps to prevent the spread of this virus.  o Limit your contact with others.  o Wear a simple mask to cover your cough.  o Wash your hands well and often.    Resources    [facility]: About COVID-19: [email or web address]    CDC: What to Do If You're Sick: www.cdc.gov/coronavirus/2019-ncov/about/steps-when-sick.html    CDC: Ending Home Isolation: www.cdc.gov/coronavirus/2019-ncov/hcp/disposition-in-home-patients.html     CDC: Caring for Someone: www.cdc.gov/coronavirus/2019-ncov/if-you-are-sick/care-for-someone.html     [facility]: Interim Guidance for Hospital Discharge to Home: www.health.[facility].[place].us/diseases/coronavirus/hcp/hospdischarge.pdf    [facility] clinical trials (COVID-19 research studies): [email or web address]-clinical-trials     Below are the COVID-19 hotlines at the [place] [facility] ([facility]). Interpreters are available.   o For health questions: Call 425-169-6867 or 1-206.182.3465 (7 a.m. to 7 p.m.)  o For questions about schools and childcare: Call 066-816-0658 or 1-784.495.3674 (7 a.m. to 7 p.m.)

## 2022-06-05 NOTE — DISCHARGE SUMMARY
[facility] Discharge Summary and Instructions    Lilly Babcock MRN# [id number]   Age: 72 year old YOB: 1950     Date of Admission:  6/4/2022  Date of Discharge::  6/5/2022  Admitting Physician:  Jarrod Cruz MD  Discharge Physician:  Jarrod Cruz MD          Admission Diagnoses:   Subdural hematoma (H) [S06.5X9A]          Discharge Diagnosis:     Subdural hematoma (H) [S06.5X9A]     In addition to the assessment of diagnoses detailed above, this 72 year old female  patient admitted from the Emergency Department has the following conditions contributing to the complexity of their medical care:    Brain compression which was evident on the CT/MRI.,           Procedures:   None           Brief History of Illness:   Lilly Babcock is a 72 year old female with a previous medical history of lymphoma, on active chemotherapy (PET CT less than 1 mo ago demonstrating remission), on ASA 81mg, who presented on 6/5 after 2 days of severe headaches. Only recent trauma occured when hitting her head on a cabinet about 3 days ago, she did not lose consciousness and did not have any symptoms at that time. Two days ago, when driving back from a friend's party, she developed sudden onset severe bifrontal tension like headaches that were persistent throughout the next day. She had a brief episode of numbness in her left fingertips that resolved within the next day. She took naproxen and tylenol for headaches, her pain is currently 3/10. She denies any nausea, vomiting, weakness, vision changes, somnolence.            Hospital Course:   The patient was admitted for observation, no surgical intervention indicated at this time. She underwent repeat head CT 6 hours from the first time of acquisition which was stable. After the repeat head CT on 6/5, the patient was deemed stable for discharge. At that time, she was ambulating, voiding without a reilly, eating a regular diet, pain was well controlled and  therefore she was discharged home. Plan for 1 week follow-up in clinic with repeat head CT.           Discharge Medications:     Discharge Medication List as of 6/5/2022  9:40 AM      START taking these medications    Details   acetaminophen (TYLENOL) 325 MG tablet Take 2 tablets (650 mg) by mouth every 4 hours as needed for other (For optimal non-opioid multimodal pain management to improve pain control.), Disp-60 tablet, R-0, E-Prescribe      levETIRAcetam (KEPPRA) 500 MG tablet 1 tablet (500 mg) by Oral or NG Tube route 2 times daily for 7 days, Disp-14 tablet, R-0, E-Prescribe         CONTINUE these medications which have NOT CHANGED    Details   acyclovir (ZOVIRAX) 400 MG tablet Take 1 tablet by mouth daily, Historical      albuterol (PROAIR HFA/PROVENTIL HFA/VENTOLIN HFA) 108 (90 Base) MCG/ACT inhaler Inhale 2 puffs into the lungs every 4 hours as needed for shortness of breath / dyspnea or wheezing, Historical      cetirizine (ZYRTEC) 10 MG tablet Take 10 mg by mouth daily, Historical      Cholecalciferol (VITAMIN D) 1000 UNIT capsule Take 2 capsules by mouth daily , Historical      clobetasol (TEMOVATE) 0.05 % ointment Apply topically 2 times daily as needed (eczema) Historical      ipratropium (ATROVENT) 0.06 % spray Spray 2 sprays in nostril daily , Historical      ketoconazole (NIZORAL) 2 % shampoo Apply topically daily as needed (eczema) Historical      LENalidomide (REVLIMID) 20 MG CAPS capsule Take 1 capsule (20 mg) by mouth daily for 21 days , Days 1 through 21, then off for 7 days., Disp-21 capsule, R-0, E-PrescribeAdult Female NOT of Reproductive Capacity :  [facility]: [id number]      methylPREDNISolone (MEDROL) 32 MG tablet Take one tablet (32mg) by mouth 12 hours prior to scheduled CT scan.  Repeat above dose 2 hours prior to CT scan, Disp-2 tablet, R-1, E-Prescribe      mometasone (ELOCON) 0.1 % ointment Apply topically daily as needed (eczema) Historical      predniSONE (DELTASONE) 1 MG  tablet Take 7 mg by mouth daily , Historical      VIIBRYD 20 MG TABS tablet Take 20 mg by mouth daily , NOLAN, Historical                     Discharge Instructions and Follow-Up:     Discharge diet: Regular   Discharge activity: You may advance activity as tolerated. No strenuous exercise or heay lifting greater than 10 lbs for 4 weeks or until seen and cleared in clinic.   Discharge follow-up: Follow-up with Neurosurgery SHERYL in 1 week with repeat head CT.       Wound care: No wound care needs     Please call if you have:  1. increased pain, redness, drainage, swelling at your incision  2. fevers > 101.5 F degrees  3. with any questions or concerns.  You may reach the Neurosurgery clinic at [phone] during regular work hours. ER at [phone].    and ask for the Neurosurgery Resident on call at [phone], during off hours or weekends.         Discharge Disposition:     Discharged to home        Herberth Blanton MD  PGY-1  Neurosurgery Service.  Please see [facility] for on-call provider

## 2022-06-06 NOTE — PROGRESS NOTES
Dr. Herberth Blanton requesting that pt have 1 week follow up with SHERYL with repeat CT scan for evaluation of R SDH stability    CT order was placed. Message routed to scheduling team to assist.

## 2022-06-06 NOTE — ED PROVIDER NOTES
"ED Triage Provider Note  [facility]  Encounter Date: Jun 6, 2022    History:  Chief Complaint   Patient presents with     Fall     Headache     Lilly Babcock is a 72 year old female with a history of metastatic follicular non-Hodgkin's lymphoma who presents to the Emergency Department with complaints of worsening headache and balance. Two days ago she was diagnosed with a subdural hematoma and discharged home yesterday. She reports she had trouble with balance prior to discharge with her bumping into objects while walking. She denies feeling dizzy, but she reports this has worsened since discharge. She also reports her headache which she states is across the front of her head, has slightly worsened as well. Denies vomiting or nausea, blurry or double vision.      Review of Systems:  Review of Systems   Gastrointestinal: Negative for nausea and vomiting.   Neurological: Positive for headaches. Negative for dizziness, speech difficulty, weakness and numbness.        Balance       Exam:  /63   Pulse 83   Temp 98  F (36.7  C) (Oral)   Resp 16   Ht 1.702 m (5' 7\")   Wt 56.7 kg (125 lb)   SpO2 100%   BMI 19.58 kg/m    General: No acute distress. Appears stated age.   Cardio: Regular rate, extremities well perfused  Resp: Normal work of breathing, grossly normal respiratory rate  Neuro:      General: No visual field deficit.     Extraocular Movements: Extraocular movements intact.      Conjunctiva/sclera: Conjunctivae normal.      Pupils: Pupils are equal, round, and reactive to light.   Neurological:      Mental Status: She is oriented to person, place, and time.      GCS: GCS eye subscore is 4. GCS verbal subscore is 5. GCS motor subscore is 6.      Cranial Nerves: No facial asymmetry.      Sensory: Sensation is intact.      Motor: No weakness, tremor, abnormal muscle tone or pronator drift.      Coordination: Heel to Shin Test normal. Rapid alternating " movements normal.       Medical Decision Making:  Patient arriving to the ED with problem as above. A medical screening exam was performed. Labs, repeat CT orders initiated from Triage. The patient is appropriate to be immediately roomed.      LING Chandler CNP on 6/6/2022 at 12:19 PM        Cristian Lantigua APRN CNP  06/06/22 1224

## 2022-06-06 NOTE — CONSULTS
[facility]       NEUROSURGERY CONSULTATION NOTE    This consultation was requested by Dr. Painter from the Emergency Medicine service.      Reason for Consultation  Headache in setting of known subdural hematoma     HPI:  Lilly Babcock is a 72 year old female with a history of metastatic follicular non-Hodgkin's lymphoma who presents to the Emergency Department with complaints of worsening headache and balance issues. Two days ago she was diagnosed with a subdural hematoma and discharged home yesterday. She reports she had trouble with balance prior to discharge with her bumping into objects while walking. She denies feeling dizzy, and denies dizziness currently but endorsed it prior. She also reports her headache which she states is across the front of her head, has slightly worsened as well.  Patient states that earlier today she also noted confusion while getting dressed.  Since discharge the patient has been taking Keppra as prescribed and has continued to hold aspirin.  Patient did fall today due to losing her balance while trying to transitioning to upright position, however did not hit her head or experience any trauma.  Prior to discharge on 6/5/22 the patient was assessed by PT and OT who felt she was at her functional baseline and save to discharge home.         PAST MEDICAL HISTORY:   Past Medical History:   Diagnosis Date     Asthma      Degenerative joint disease      Depression      Eczema      Lymphoma, follicular (H) 8/2008 diagnosed    Stage YOUSUF - bone mets to L greater trochanter     Nasal congestion      Seasonal allergies        PAST SURGICAL HISTORY:   Past Surgical History:   Procedure Laterality Date     ARTHROPLASTY HIP Left 10/18/2016    Procedure: ARTHROPLASTY HIP;  Surgeon: Chepe Peterson MD;  Location: [facility] OR     BIOPSY BONE CLAVICLE  3/1/2012    Procedure:BIOPSY BONE CLAVICLE; Biopsy Left Clavicle, Bilateral Hip Injections;  Surgeon:JENNY BELTRAN; Location:[facility] OR     BREAST BIOPSY, RT/LT      left     DILATION AND CURETTAGE       HAND SURGERY      right flexor tendon laceration repair     INJECT STEROID (LOCATION)  3/1/2012    Procedure:INJECT STEROID (LOCATION); Surgeon:JENNY BELTRAN; Location:[facility] OR     LAPAROSCOPY DIAGNOSTIC (GYN)         FAMILY HISTORY:   Family History   Problem Relation Age of Onset     Depression Mother      Mental Illness Mother      Cancer Mother         breast     Other Cancer Mother      Diabetes Father      Cancer Father         lung ca, was a smoker     Other Cancer Father      Abdominal Aortic Aneurysm Father      Asthma Sister      Depression Sister      Mental Illness Sister      Asthma Sister      Depression Sister      Breast Cancer Sister      Liver Disease Sister        SOCIAL HISTORY:   Social History     Tobacco Use     Smoking status: Never Smoker     Smokeless tobacco: Never Used   Substance Use Topics     Alcohol use: Not Currently     Alcohol/week: 5.8 standard drinks       MEDICATIONS:  Current Outpatient Medications   Medication Sig Dispense Refill     [START ON 6/8/2022] acetaminophen (TYLENOL) 325 MG tablet Take 2 tablets (650 mg) by mouth every 4 hours as needed for other (For optimal non-opioid multimodal pain management to improve pain control.) 60 tablet 0     acyclovir (ZOVIRAX) 400 MG tablet Take 1 tablet by mouth daily       albuterol (PROAIR HFA/PROVENTIL HFA/VENTOLIN HFA) 108 (90 Base) MCG/ACT inhaler Inhale 2 puffs into the lungs every 4 hours as needed for shortness of breath / dyspnea or wheezing       cetirizine (ZYRTEC) 10 MG tablet Take 10 mg by mouth daily       Cholecalciferol (VITAMIN D) 1000 UNIT capsule Take 2 capsules by mouth daily        clobetasol (TEMOVATE) 0.05 % ointment Apply topically 2 times daily as needed (eczema)        ipratropium (ATROVENT) 0.06 % spray Spray 2 sprays in nostril daily        ketoconazole (NIZORAL) 2 % shampoo Apply topically  "daily as needed (eczema)        LENalidomide (REVLIMID) 20 MG CAPS capsule Take 1 capsule (20 mg) by mouth daily for 21 days , Days 1 through 21, then off for 7 days. 21 capsule 0     levETIRAcetam (KEPPRA) 500 MG tablet 1 tablet (500 mg) by Oral or NG Tube route 2 times daily for 7 days 14 tablet 0     methylPREDNISolone (MEDROL) 32 MG tablet Take one tablet (32mg) by mouth 12 hours prior to scheduled CT scan.  Repeat above dose 2 hours prior to CT scan (Patient taking differently: Take one tablet (32mg) by mouth 12 hours prior to scheduled CT scan.  Repeat above dose 2 hours prior to CT scan) 2 tablet 1     mometasone (ELOCON) 0.1 % ointment Apply topically daily as needed (eczema)        predniSONE (DELTASONE) 1 MG tablet Take 7 mg by mouth daily        VIIBRYD 20 MG TABS tablet Take 20 mg by mouth daily          Allergies:  Allergies   Allergen Reactions     Diagnostic X-Ray Materials Hives     ct     Diatrizoate Rash     Dye [Contrast Dye] Rash and Hives     Ioxaglate Hives and Rash     Vortioxetine Nausea and Vomiting     Revlimid [Lenalidomide] Rash       ROS: 10 point ROS of systems including Constitutional, Eyes, Respiratory, Cardiovascular, Gastroenterology, Genitourinary, Integumentary, Muscularskeletal, Psychiatric were all negative except for pertinent positives noted in my HPI.    Physical exam:   Blood pressure 120/63, pulse 83, temperature 98  F (36.7  C), temperature source Oral, resp. rate 16, height 1.702 m (5' 7\"), weight 56.7 kg (125 lb), SpO2 100 %, not currently breastfeeding.  General: awake and alert  HEENT: atraumatic   PULM: breathing comfortably on room air  MSK: normal bulk and tone  NEUROLOGIC:  -- Awake; Alert; oriented x 3  -- Follows commands briskly  -- +repetition, calculation, and naming  -- Speech fluent, spontaneous. No aphasia or dysarthria.  -- no gaze preference. No apparent hemineglect.  Cranial Nerves:  -- visual fields full to confrontation, PERRL 3-2mm bilat and brisk, " extraocular movements intact  -- face symmetrical, tongue midline  -- sensory V1-V3 intact bilaterally  -- palate elevates symmetrically, uvula midline  -- hearing grossly intact bilat  -- Trapezii 5/5 strength bilat symmetric  -- Cerebellar: Finger nose finger without dysmetria, intact rapid alternating motions bilaterally    Motor:  Normal bulk / tone; no tremor, rigidity, or bradykinesia.  No muscle wasting or fasciculations  No Pronator Drift     Delt Bi Tri Hand Flexion/  Extension Iliopsoas Quadriceps Hamstrings Tibialis Anterior Gastroc    C5 C6 C7 C8/T1 L2 L3 L4-S1 L4 S1   R 5 5 5 5 5 5 5 5 5   L 5 5 5 5 5 5 5 5 5     Sensory:  intact to LT x 4 extremities               Bi Tri BR Young Pat Ach Bab     C5-6 C7-8 C6 UMN L2-4 S1 UMN   R 2+ 2+ 2+ Norm 2+ 2+ Norm   L 2+ 2+ 2+ Norm 2+ 2+ Norm           IMAGING:  CT 6/6/22:  Stable right parietal convexity SDH.     LABS:   Last Comprehensive Metabolic Panel:  Sodium   Date Value Ref Range Status   06/06/2022 137 133 - 144 mmol/L Final   10/03/2019 135 133 - 144 mmol/L Final     Potassium   Date Value Ref Range Status   06/06/2022 4.1 3.4 - 5.3 mmol/L Final   10/03/2019 4.4 3.4 - 5.3 mmol/L Final     Chloride   Date Value Ref Range Status   06/06/2022 102 94 - 109 mmol/L Final   10/03/2019 102 94 - 109 mmol/L Final     Carbon Dioxide   Date Value Ref Range Status   10/03/2019 28 20 - 32 mmol/L Final     Carbon Dioxide (CO2)   Date Value Ref Range Status   06/06/2022 32 20 - 32 mmol/L Final     Anion Gap   Date Value Ref Range Status   06/06/2022 3 3 - 14 mmol/L Final   10/03/2019 5 3 - 14 mmol/L Final     Glucose   Date Value Ref Range Status   06/06/2022 103 (H) 70 - 99 mg/dL Final   10/03/2019 100 (H) 70 - 99 mg/dL Final     Urea Nitrogen   Date Value Ref Range Status   06/06/2022 12 7 - 30 mg/dL Final   10/03/2019 12 7 - 30 mg/dL Final     Creatinine   Date Value Ref Range Status   06/06/2022 0.80 0.52 - 1.04 mg/dL Final   10/03/2019 0.67 0.52 - 1.04 mg/dL Final      GFR Estimate   Date Value Ref Range Status   06/06/2022 77 >60 mL/min/1.73m2 Final     Comment:     Effective December 21, 2021 eGFRcr in adults is calculated using the 2021 CKD-EPI creatinine equation which includes age and gender (Juan turner al., NEJM, DOI: 10.1056/TWGWkb6650575)   10/03/2019 89 >60 mL/min/[1.73_m2] Final     Comment:     Non  GFR Calc  Starting 12/18/2018, serum creatinine based estimated GFR (eGFR) will be   calculated using the Chronic Kidney Disease Epidemiology Collaboration   (CKD-EPI) equation.       GFR, ESTIMATED POCT   Date Value Ref Range Status   06/04/2022 >60 >60 mL/min/1.73m2 Final     Calcium   Date Value Ref Range Status   06/06/2022 9.2 8.5 - 10.1 mg/dL Final   10/03/2019 9.1 8.5 - 10.1 mg/dL Final     Lab Results   Component Value Date    WBC 6.6 06/06/2022    WBC 8.2 10/03/2019     Lab Results   Component Value Date    RBC 3.50 06/06/2022    RBC 3.98 10/03/2019     Lab Results   Component Value Date    HGB 10.8 06/06/2022    HGB 12.3 10/03/2019     Lab Results   Component Value Date    HCT 34.0 06/06/2022    HCT 37.4 10/03/2019     Lab Results   Component Value Date    MCV 97 06/06/2022    MCV 94 10/03/2019     Lab Results   Component Value Date    MCH 30.9 06/06/2022    MCH 30.9 10/03/2019     Lab Results   Component Value Date    MCHC 31.8 06/06/2022    MCHC 32.9 10/03/2019     Lab Results   Component Value Date    RDW 16.0 06/06/2022    RDW 13.9 10/03/2019     Lab Results   Component Value Date     06/06/2022     10/03/2019     INR   Date Value Ref Range Status   06/06/2022 1.01 0.85 - 1.15 Final   10/21/2016 1.15 (H) 0.86 - 1.14 Final      aPTT   Date Value Ref Range Status   06/06/2022 26 22 - 38 Seconds Final          ASSESSMENT:  72 year old female presenting with ongoing headache, dizziness, and confusion with known right convexity subdural hematoma, stable on repeat imaging.      In addition to the assessment of diagnoses detailed above,  this 72 year old female  Patient who has the following conditions contributing to the complexity of their medical care:    Brain compression which was evident on the CT/MRI.,          RECOMMENDATIONS:  -No neurosurgical intervention indicated at this time   -Repeat head CT remains stable, ok to hold on repeat imaging at this time unless exam changes.   -Recommend further work-up of dizziness given her imaging doesn't necessarily correlate with her symptoms.  Would recommend medicine for syncope work-up given falls related to movement/transitioning.  Symptoms likely could be due to recent TBI, however  work-up warranted at this time.   - Continue Keppra 500 mg BID  - Hold Aspirin as formerly recommended   - Recommend PT/OT evaluations for discharge recommendations  - Ok for diet from Neurosurgery perspective.    - Admit for observation to ensure patient remains stable given sudden return ED visit.       LING Murphy, CNP  Department of Neurosurgery  Pager: [phone]      The patient was discussed with Dr. Franci Lemus, neurosurgery chief resident, and Dr. Jarrod Cruz, neurosurgery staff.

## 2022-06-06 NOTE — TELEPHONE ENCOUNTER
Called patient as requested. Patient notes she is currently in ED for re-evaluation. Just finished with CT scan. Verbalized that I am glad that she decided to return and that she is safe.     Ksenia Luciano RN  [facility] Care Resource [facility]

## 2022-06-06 NOTE — TELEPHONE ENCOUNTER
Patient was released from hospital yesterday 6/5/22. Vanessa is patients sister called  says she has deteriorated and has a question. Can someone please contact Vanessa? [phone].

## 2022-06-06 NOTE — ED TRIAGE NOTES
Pt comes in with worse headache today. Pt fell this morning, no injuries. Pt discharged yesterday after being diagnosed with subdural hematoma. Ongoing dizziness/balance issues (present when discharged).     Triage Assessment     Row Name 06/06/22 1154       Triage Assessment (Adult)    Airway WDL WDL       Respiratory WDL    Respiratory WDL WDL       Skin Circulation/Temperature WDL    Skin Circulation/Temperature WDL WDL       Cardiac WDL    Cardiac WDL WDL       Peripheral/Neurovascular WDL    Peripheral Neurovascular WDL WDL       Cognitive/Neuro/Behavioral WDL    Cognitive/Neuro/Behavioral WDL WDL

## 2022-06-06 NOTE — PLAN OF CARE
[facility]      OUTPATIENT PHYSICAL THERAPY EVALUATION  PLAN OF TREATMENT FOR OUTPATIENT REHABILITATION  (COMPLETE FOR INITIAL CLAIMS ONLY)  Patient's Last Name, First Name, M.I.  YOB: 1950  SadiqLilly  YADI                        Provider's Name  [facility] Medical Record No.  [id number]                               Onset Date:  06/06/22   Start of Care Date:  06/06/22      Type:     _X_PT   ___OT   ___SLP Medical Diagnosis:  falls                        PT Diagnosis:  impaired functional mobilty   Visits from SOC:  1   _________________________________________________________________________________  Plan of Treatment/Functional Goals    Planned Interventions: balance training, bed mobility training, gait training, neuromuscular re-education, ROM (range of motion), stair training, strengthening, stretching, transfer training     Goals: See Physical Therapy Goals on Care Plan in [facility] electronic health record.    Therapy Frequency: 5x/week  Predicted Duration of Therapy Intervention: 06/20/22  _________________________________________________________________________________    I CERTIFY THE NEED FOR THESE SERVICES FURNISHED UNDER        THIS PLAN OF TREATMENT AND WHILE UNDER MY CARE     (Physician co-signature of this document indicates review and certification of the therapy plan).              Certification date from: 06/06/22, Certification date to: 06/20/22    Referring Physician: Penelope Rees APRN CNP            Initial Assessment        See Physical Therapy evaluation dated 06/06/22 in Epic electronic health record.

## 2022-06-06 NOTE — LETTER
Transition Communication Hand-off for Care Transitions to Next Level of Care Provider    Name: Lilly Babcock  : 1950  MRN #: [id number]  Primary Care Provider: Talya Rodas     Primary Clinic: [address] [place] [address]     Reason for Hospitalization:  Subdural hematoma (H) [S06.5X9A]  Admit Date/Time: 2022 12:03 PM  Discharge Date: 22  Payor Source: Payor: MEDICARE / Plan: MEDICARE / Product Type: Medicare /          Reason for Communication Hand-off Referral: Other Care coordination    Discharge Plan:       Concern for non-adherence with plan of care:   Y/N N  Discharge Needs Assessment:  Needs    Flowsheet Row Most Recent Value   Equipment Currently Used at Home none  [has equipment from previous FERNANDA]   PAS Number [id number]          Already enrolled in Tele-monitoring program and name of program:  N  Follow-up specialty is recommended: Yes    Follow-up plan:    Future Appointments   Date Time Provider Department Center   6/10/2022  8:00 AM Melania Mcmillan OT [facility] O   2022  7:15 AM  [facility] DRAW [facility]   2022  7:45 AM Bella Hurd APRN CNP [facility]   2022  9:00 AM  ONC INFUSION NURSE [facility]       Any outstanding tests or procedures:        Radiology & Cardiology Orders     Future Labs/Procedures Complete By Expires    CT Head w/o contrast*  6/15/2022 (Approximate) 2022    Process Instructions:    Administration of IV contrast (contrast agent, dose, and amount) will be tailored to this examination per the appropriate written protocol listed in the Protocol E-Book, or by the interpreting provider.         Referrals     Future Labs/Procedures    Adult Neurology  Referral     Comments:    Please be aware that coverage of these services is subject to the terms and limitations of your health insurance plan.  Call member services at your health plan with any benefit or coverage questions.  [facility]  will call you to coordinate your care as prescribed by your provider. If you don't hear from a representative within 2 business days, please call [phone].    Neurosurgery Referral     Process Instructions:    **If referring to spine, use Spine Surgery Referral or Ortho & Spine  Referral**    Comments:    Please be aware that coverage of these services is subject to the terms and limitations of your health insurance plan.  Call member services at your health plan with any benefit or coverage questions.  Please call to schedule your appointment              Key Recommendations:      OMAR Lockett    AVS/Discharge Summary is the source of truth; this is a helpful guide for improved communication of patient story

## 2022-06-06 NOTE — ED PROVIDER NOTES
ED Provider Note  [facility]      History     Chief Complaint   Patient presents with     Fall     Headache     HPI  Lilly Babcock is a 72 year old female who has a history of metastatic follicular non-Hodgkin's lymphoma who was diagnosed with a subdural hematoma 2 days ago and admitted to the hospital.  She was discharged home yesterday.  Patient does report that she was having some balance issues prior to discharge with bumping into objects while she was walking.  She reports that her symptoms seem to have worsened when she got home.  She has fallen but does not report any injury.  She says she feels clumsy and has difficulty with putting on her clothing.  She also reports worsening headache.    Past Medical History  Past Medical History:   Diagnosis Date     Asthma      Degenerative joint disease      Depression      Eczema      Lymphoma, follicular (H) 8/2008 diagnosed    Stage YOUSUF - bone mets to L greater trochanter     Nasal congestion      Seasonal allergies      Past Surgical History:   Procedure Laterality Date     ARTHROPLASTY HIP Left 10/18/2016    Procedure: ARTHROPLASTY HIP;  Surgeon: Chepe Peterson MD;  Location: [facility] OR     BIOPSY BONE CLAVICLE  3/1/2012    Procedure:BIOPSY BONE CLAVICLE; Biopsy Left Clavicle, Bilateral Hip Injections; Surgeon:JENNY BELTRAN; Location:[facility] OR     BREAST BIOPSY, RT/LT      left     DILATION AND CURETTAGE       HAND SURGERY      right flexor tendon laceration repair     INJECT STEROID (LOCATION)  3/1/2012    Procedure:INJECT STEROID (LOCATION); Surgeon:JENNY BELTRAN; Location:[facility] OR     LAPAROSCOPY DIAGNOSTIC (GYN)       [START ON 6/8/2022] acetaminophen (TYLENOL) 325 MG tablet  acyclovir (ZOVIRAX) 400 MG tablet  albuterol (PROAIR HFA/PROVENTIL HFA/VENTOLIN HFA) 108 (90 Base) MCG/ACT inhaler  cetirizine (ZYRTEC) 10 MG tablet  Cholecalciferol (VITAMIN D) 1000 UNIT capsule  clobetasol (TEMOVATE) 0.05 % ointment  ipratropium  (ATROVENT) 0.06 % spray  ketoconazole (NIZORAL) 2 % shampoo  LENalidomide (REVLIMID) 20 MG CAPS capsule  levETIRAcetam (KEPPRA) 500 MG tablet  methylPREDNISolone (MEDROL) 32 MG tablet  mometasone (ELOCON) 0.1 % ointment  predniSONE (DELTASONE) 1 MG tablet  VIIBRYD 20 MG TABS tablet      Allergies   Allergen Reactions     Diagnostic X-Ray Materials Hives     ct     Diatrizoate Rash     Dye [Contrast Dye] Rash and Hives     Ioxaglate Hives and Rash     Vortioxetine Nausea and Vomiting     Revlimid [Lenalidomide] Rash     Family History  Family History   Problem Relation Age of Onset     Depression Mother      Mental Illness Mother      Cancer Mother         breast     Other Cancer Mother      Diabetes Father      Cancer Father         lung ca, was a smoker     Other Cancer Father      Abdominal Aortic Aneurysm Father      Asthma Sister      Depression Sister      Mental Illness Sister      Asthma Sister      Depression Sister      Breast Cancer Sister      Liver Disease Sister      Social History   Social History     Tobacco Use     Smoking status: Never Smoker     Smokeless tobacco: Never Used   Substance Use Topics     Alcohol use: Not Currently     Alcohol/week: 5.8 standard drinks     Drug use: No      Past medical history, past surgical history, medications, allergies, family history, and social history were reviewed with the patient. No additional pertinent items.       Review of Systems   Constitutional: Negative for chills and fever.   HENT: Negative for congestion and sore throat.    Eyes: Negative for discharge.   Respiratory: Negative for cough and shortness of breath.    Cardiovascular: Negative for chest pain and leg swelling.   Gastrointestinal: Negative for abdominal pain, diarrhea, nausea and vomiting.   Genitourinary: Negative for dysuria and frequency.   Musculoskeletal: Positive for gait problem. Negative for myalgias.   Skin: Negative for color change and rash.   Neurological: Positive for  "headaches. Negative for weakness and light-headedness.   Hematological: Negative for adenopathy.   Psychiatric/Behavioral: Positive for confusion. Negative for agitation and behavioral problems.         Physical Exam   BP: 120/63  Pulse: 83  Temp: 98  F (36.7  C)  Resp: 16  Height: 170.2 cm (5' 7\")  Weight: 56.7 kg (125 lb)  SpO2: 100 %  Physical Exam  Constitutional:       General: She is not in acute distress.     Appearance: She is well-developed.   HENT:      Head: Normocephalic and atraumatic.   Eyes:      Conjunctiva/sclera: Conjunctivae normal.      Pupils: Pupils are equal, round, and reactive to light.   Neck:      Thyroid: No thyromegaly.      Trachea: No tracheal deviation.   Cardiovascular:      Rate and Rhythm: Normal rate and regular rhythm.      Heart sounds: Normal heart sounds. No murmur heard.  Pulmonary:      Effort: Pulmonary effort is normal. No respiratory distress.      Breath sounds: Normal breath sounds. No wheezing.   Chest:      Chest wall: No tenderness.   Abdominal:      General: There is no distension.      Palpations: Abdomen is soft.      Tenderness: There is no abdominal tenderness.   Musculoskeletal:         General: No tenderness.      Cervical back: Normal range of motion and neck supple.   Skin:     General: Skin is warm.      Findings: No rash.   Neurological:      Mental Status: She is alert and oriented to person, place, and time.      Sensory: No sensory deficit.   Psychiatric:         Behavior: Behavior normal.         ED Course      Procedures               Results for orders placed or performed during the hospital encounter of 06/06/22   CT Head w/o Contrast     Status: None    Narrative    Exam: CT HEAD W/O CONTRAST  6/6/2022    History: Headache, intracranial hemorrhage suspected; Headache, new or  worsening (Age >= 50y).    Comparison:  CT head exams dated 6/5/2022.    Technique: Using multidetector thin collimation helical acquisition  technique, axial, coronal and " sagittal CT images from the skull base  to the vertex were obtained without intravenous contrast.     Findings:    Unchanged subdural hemorrhage over the right cerebral convexity  measuring up to 9 mm in maximum thickness with extension along the  falx and tentorial leaflets, right greater than left. Unchanged mass  effect on the right cerebral hemisphere with partial effacement of the  right cerebral sulci and 3 mm leftward midline shift at the septum  pellucidum. No new intracranial hemorrhage. Unchanged leukoaraiosis.  The gray-white differentiation of the cerebral hemispheres is  preserved. Basal cisterns are patent.    No acute osseous change. The paranasal sinuses and mastoid air cells  are clear.      Impression    Impression:   Unchanged right convexity subdural hemorrhage measuring up to 9 mm  thick with partial effacement of the right cerebral sulci and 3 mm  leftward midline shift. No new intracranial findings.    I have personally reviewed the examination and initial interpretation  and I agree with the findings.    AMANDO ASHLEY MD         SYSTEM ID:  [id number]   Comprehensive metabolic panel     Status: Abnormal   Result Value Ref Range    Sodium 137 133 - 144 mmol/L    Potassium 4.1 3.4 - 5.3 mmol/L    Chloride 102 94 - 109 mmol/L    Carbon Dioxide (CO2) 32 20 - 32 mmol/L    Anion Gap 3 3 - 14 mmol/L    Urea Nitrogen 12 7 - 30 mg/dL    Creatinine 0.80 0.52 - 1.04 mg/dL    Calcium 9.2 8.5 - 10.1 mg/dL    Glucose 103 (H) 70 - 99 mg/dL    Alkaline Phosphatase 64 40 - 150 U/L    AST 18 0 - 45 U/L    ALT 32 0 - 50 U/L    Protein Total 6.2 (L) 6.8 - 8.8 g/dL    Albumin 3.5 3.4 - 5.0 g/dL    Bilirubin Total 0.4 0.2 - 1.3 mg/dL    GFR Estimate 77 >60 mL/min/1.73m2   INR     Status: Normal   Result Value Ref Range    INR 1.01 0.85 - 1.15   Partial thromboplastin time     Status: Normal   Result Value Ref Range    aPTT 26 22 - 38 Seconds   CBC with platelets and differential     Status: Abnormal   Result  Value Ref Range    WBC Count 6.6 4.0 - 11.0 10e3/uL    RBC Count 3.50 (L) 3.80 - 5.20 10e6/uL    Hemoglobin 10.8 (L) 11.7 - 15.7 g/dL    Hematocrit 34.0 (L) 35.0 - 47.0 %    MCV 97 78 - 100 fL    MCH 30.9 26.5 - 33.0 pg    MCHC 31.8 31.5 - 36.5 g/dL    RDW 16.0 (H) 10.0 - 15.0 %    Platelet Count 198 150 - 450 10e3/uL    % Neutrophils 49 %    % Lymphocytes 31 %    % Monocytes 17 %    % Eosinophils 1 %    % Basophils 2 %    % Immature Granulocytes 0 %    NRBCs per 100 WBC 0 <1 /100    Absolute Neutrophils 3.2 1.6 - 8.3 10e3/uL    Absolute Lymphocytes 2.0 0.8 - 5.3 10e3/uL    Absolute Monocytes 1.1 0.0 - 1.3 10e3/uL    Absolute Eosinophils 0.1 0.0 - 0.7 10e3/uL    Absolute Basophils 0.2 0.0 - 0.2 10e3/uL    Absolute Immature Granulocytes 0.0 <=0.4 10e3/uL    Absolute NRBCs 0.0 10e3/uL   UA with Microscopic reflex to Culture     Status: Normal     Specimen: Urine, Clean Catch   Result Value Ref Range    Color Urine Straw Colorless, Straw, Light Yellow, Yellow    Appearance Urine Clear Clear    Glucose Urine Negative Negative mg/dL    Bilirubin Urine Negative Negative    Ketones Urine Negative Negative mg/dL    Specific Gravity Urine 1.004 1.003 - 1.035    Blood Urine Negative Negative    pH Urine 7.0 5.0 - 7.0    Protein Albumin Urine Negative Negative mg/dL    Urobilinogen Urine Normal Normal, 2.0 mg/dL    Nitrite Urine Negative Negative    Leukocyte Esterase Urine Negative Negative    RBC Urine <1 <=2 /HPF    WBC Urine 1 <=5 /HPF    Transitional Epithelials Urine <1 <=1 /HPF    Narrative    Urine Culture not indicated     Medications - No data to display     Assessments & Plan (with Medical Decision Making)   Patient is a 72-year-old female who was just discharged from the hospital yesterday after admission for subdural hematoma of unknown etiology.  Patient started to develop some balance issues prior to discharge yesterday.  These continued when she got home and she felt clumsy.  She also reported an increased  headache.  She does have a history of migraine headaches, but does not feel like this feels similar to that.  She has no new focal neurologic abnormalities.  She has no fever.    On exam, patient's blood pressure is 120/63 with a pulse rate of 83 and temperature 98.  Respirations are 16 and O2 saturations are 100%.    She does not appear to be acutely uncomfortable.  Her neurologic exam is nonfocal.  Labs and imaging will be repeated.    Patient's chemistries were unremarkable with the exception of a slightly elevated glucose of 103 and total protein of 6.2.  Her INR was 1.01 with a PTT of 26.  Patient's white count was 6.6 with a hemoglobin of 10.8.    CT scan of the head showed unchanged right convexity subdural hemorrhage.  Measuring up to 9 mm thick with partial effacement of the right cerebral sulci.  There were no new intracranial findings.    Neurosurgery was consulted.  They did not identify any acute neurosurgical interventions that were indicated.  They did not feel that the dizziness necessarily correlated to her intracranial hemorrhage and recommended alternative work-up for vertigo and syncope.  It seems most likely her symptoms are from her head bleed as she is not really describing vertigo.  She also is not describing syncope or a non neurologic etiology of her falls.  She may need placement if family cannot adequately care for her.  She will be placed in observation status for PT and OT assessments and attempt to optimize her home environment.  I spoke to LING Lopez CNP from the observation unit who accepted her for admission.    I have reviewed the nursing notes. I have reviewed the findings, diagnosis, plan and need for follow up with the patient.    New Prescriptions    No medications on file       Final diagnoses:   Subdural hematoma (H)       --  Donte Painter  [facility] EMERGENCY DEPARTMENT  6/6/2022     Donte Painter MD  06/07/22 [id number]

## 2022-06-06 NOTE — PROGRESS NOTES
"ED PT Eval     06/06/22 1600   Quick Adds   Quick Adds Certification;Vestibular Eval   Type of Visit Initial PT Evaluation   Living Environment   People in Home child(obdulio), adult   Current Living Arrangements house   Home Accessibility stairs to enter home;stairs within home   Number of Stairs, Main Entrance 3   Number of Stairs, Within Home, Primary other (see comments)   Living Environment Comments Patient lives IND in home, her son recently moved home from college and is staying with her for the summer, able to assist as needed. Patient stays on main level of home.   Self-Care   Usual Activity Tolerance excellent   Current Activity Tolerance moderate   Equipment Currently Used at Home none   Fall history within last six months yes   Activity/Exercise/Self-Care Comment Patient is unreliable historian but son states that she tripped over chair on her L side twice since recent d/c from hospital. Patient is typically IND at baseline.   General Information   Onset of Illness/Injury or Date of Surgery 06/06/22   Referring Physician Penelope Rees, APRN CNP   Patient/Family Therapy Goals Statement (PT) To return home   Pertinent History of Current Problem (include personal factors and/or comorbidities that impact the POC) Per EMR \"Lilly Babcock is a 72 year old female with a history of metastatic follicular non-Hodgkin's lymphoma who presents to the Emergency Department with complaints of worsening headache and balance issues. Two days ago she was diagnosed with a subdural hematoma and discharged home yesterday. She reports she had trouble with balance prior to discharge with her bumping into objects while walking. She denies feeling dizzy, and denies dizziness currently but endorsed it prior.\"   Existing Precautions/Restrictions fall   General Observations cleared for participation by SHERYL   Cognition   Cognitive Status Comments patient slightly confused but appropraite throughout   Range of Motion (ROM)   Range of " Motion ROM is WFL   Strength (Manual Muscle Testing)   Strength (Manual Muscle Testing) strength is WFL   Bed Mobility   Bed Mobility supine-sit;sit-supine   Supine-Sit [place] (Bed Mobility) independent   Sit-Supine [place] (Bed Mobility) independent   Transfers   Transfers sit-stand transfer   Sit-Stand Transfer   Sit-Stand [place] (Transfers) contact guard   Gait/Stairs (Locomotion)   [place] Level (Gait) minimum assist (75% patient effort)   Comment, (Gait/Stairs) L trendelenburg, poor balance, good endurance   Balance   Balance Comments IND marching in place with eyes closed, unable to walk tandem due to poor L foot placement   Sensory Examination   Sensory Perception patient reports no sensory changes   Sensory Perception Comments R great toe proprioception intact   Cervicogenic Screen   Neck ROM WFL   Vertebral Artery Test Normal   Oculomotor Exam   Smooth Pursuit Normal   Saccades Abnormal   Saccades Comments hypometric L   VOR Abnormal   VOR Comments normal at very slow speed, unable to stay on target with quick head turns   Convergence Testing Normal   Clinical Impression   Criteria for Skilled Therapeutic Intervention Yes, treatment indicated   PT Diagnosis (PT) impaired functional mobilty   Influenced by the following impairments decreased activity tolerance, L side neglect, impaired functional balance   Functional limitations due to impairments transfers, gait, stairs   Clinical Presentation (PT Evaluation Complexity) Stable/Uncomplicated   Clinical Presentation Rationale clinical judgement   Clinical Decision Making (Complexity) low complexity   Planned Therapy Interventions (PT) balance training;bed mobility training;gait training;neuromuscular re-education;ROM (range of motion);stair training;strengthening;stretching;transfer training   Risk & Benefits of therapy have been explained evaluation/treatment results reviewed;care plan/treatment goals reviewed;risks/benefits  reviewed;current/potential barriers reviewed;participants voiced agreement with care plan;patient;participants included   PT Discharge Planning   PT Discharge Recommendation (DC Rec) [facility] patient will benefit from intensive, interdisciplinary therapy.  Anticipate will be able to tolerate 3 hours of therapy per day   PT Rationale for DC Rec At this time patient presents with L sided neglect, impaired cognition, and grossly impaired mobility. Recommend ARU at this time although suspect patient will likely progress quickly and become appropriate for home pending LOS.   PT Brief overview of current status Ax1 with gait belt for all ambualtion. Poor awareness of L side   Therapy Certification   Start of care date 06/06/22   Certification date from 06/06/22   Certification date to 06/20/22   Medical Diagnosis falls   Total Evaluation Time   Total Evaluation Time (Minutes) 15   Physical Therapy Goals   PT Frequency 5x/week   PT Predicted Duration/Target Date for Goal Attainment 06/20/22   PT Goals Bed Mobility;Transfers;Gait;Stairs   PT: Bed Mobility Independent;Supine to/from sit   PT: Transfers Independent;Sit to/from stand   PT: Gait Independent;Greater than 200 feet   PT: Stairs Independent;Greater than 10 stairs     Fortunato oCdy PT, DPT  Pager #[phone]

## 2022-06-06 NOTE — H&P
[facility]    History and Physical - Hospitalist Service       Date of Admission:  6/6/2022    Assessment & Plan      Lilly Babcock is a 72 year old female admitted on 6/6/2022. She has a history of follicular grade 1 through 2 (on active palliative chemotherapy), recent diagnosis of a subdural hematoma and history of migraines with Aura presents to the ED for the second time     ## Subdural Hematoma (diagnosed 6/4/22)    ## Loss of Balance and Clumsiness  ## Frontal headache and behind the eye pain bilateral worse with head movement and eyes moving side to side.  ## Migraines with Aura  ## Confusion  Initially presented on 6/4 with the worst headache of her life. Reports this does not feel like vertigo. She does have has a history of unilateral aura-type migraines, does not typically get pain. She had made a rich chocolate cake with espresso and thought that may of caused her headache, but then her headache persisted. She presented to the ED on 6/4 and was found to have a subdural hematoma. A repeat CT 6 hours later showed the hematoma was stable.  Patient had numbness in the fingers of her left hand, but this has since resolved. . Son reports she was confused on how to put on her sweater this morning. She and he have noted increased clumsiness whereby she has been running into furniture. This is unusual for her. Patient had a fall today due to losing her balance while trying to transitioning to upright position, however did not hit her head or experience any trauma.   She denies any peripheral neuropathy. Prior to discharge on 6/5/22 the patient was assessed by PT and OT who felt she was at her functional baseline and save to discharge home.She denies any nausea, vomiting, weakness, vision changes, somnolence.      CT from 6/5 shows: Acute right-sided subdural hematoma diffusely over the right cerebral convexity and extending along the interhemispheric falx. The  maximal thickness is 8 mm in the right parietal region.  2.  Associated intracranial mass effect with 3 mm of right to left shift of midline structures.  3.  Underlying mild to moderate presumed chronic small vessel ischemic changes.    Patient was seen by neurosurgery this afternoon 6/6/22. Repeat head CT remains stable, ok to hold on repeat imaging at this time unless exam changes. Recommended further workup for dizziness as may not be related to subdural hematoma. Patient was seen by PT. Patient had leftsided visual neglect.Some high level confusion.   - Neurology consult.   - Neuro checks q 4 hours   - Continue Keppra 500 mg BID  - Hold Aspirin as formerly recommended   - Ok for diet from Neurosurgery perspective.      ## Follicular lymphoma, advanced stage, FLIPI 2:  She is tolerating lenalidomide and rituximab fairly well.   She has had 3 episodes of lenalidomide-induced rash well-controlled with Zyrtec and triamcinolone. In the protocol, for 6 cycles everyone received lenalidomide at 20 mg daily.  After 6 cycles patients with complete response could reduce the  lenalidomide dose to 10 mg daily, and the rituximab could be switched to every 8 weeks.   -PET CT after 3 cycles shows complete response of all previously seen sites of disease. They comment on 2 indeterminate sites (right 7th rib, and a left pelvis lesion) which will be followed on subsequent imaging. Will plan for repeat PET CT at end of treatment.   -will continue with Cycle 4 of Rituxan and Revlimid. Plan for repeat PET/CT after completion of 6 cycles. Completed cycle 4 day 1 rapid rituxan on 6/1/22. Patient has not started Revlomid.  - Hematology consult.      Diet:   Regular diet  DVT Prophylaxis: Low Risk/Ambulatory with no VTE prophylaxis indicated  Feldman Catheter: Not present  Central Lines: None  Cardiac Monitoring: None  Code Status:   Full    Clinically Significant Risk Factors Present on Admission                      Disposition Plan  "  Expected Discharge:  3-4 days  Anticipated discharge location:   [facility]       The patient's care was discussed with the Bedside Nurse, Patient and ED physician, Dr. Boland.    LING [facility]  Securely message with the Vocera Web Console (learn more here)  Text page via [facility] Paging/Directory         ______________________________________________________________________    Chief Complaint   Headache  Clumsiness    History is obtained from the patient and patient's son    History of Present Illness   Per ED note, \" Lilly Babcock is a 72 year old female who has a history of metastatic follicular non-Hodgkin's lymphoma who was diagnosed with a subdural hematoma 2 days ago and admitted to the hospital.  She was discharged home yesterday.  Patient does report that she was having some balance issues prior to discharge with bumping into objects while she was walking.  She reports that her symptoms seem to have worsened when she got home.  She has fallen but does not report any injury.  She says she feels clumsy and has difficulty with putting on her clothing.  She also reports worsening headache. \"    Review of Systems    The 10 point Review of Systems is negative other than noted in the HPI or here. Vice like frontal headache. Pain with movement of her eyes. Clumsiness, uncoordinated.     Past Medical History    I have reviewed this patient's medical history and updated it with pertinent information if needed.   Past Medical History:   Diagnosis Date     Asthma      Degenerative joint disease      Depression      Eczema      Lymphoma, follicular (H) 8/2008 diagnosed    Stage YOUSUF - bone mets to L greater trochanter     Nasal congestion      Seasonal allergies        Past Surgical History   I have reviewed this patient's surgical history and updated it with pertinent information if needed.  Past Surgical History:   Procedure " Laterality Date     ARTHROPLASTY HIP Left 10/18/2016    Procedure: ARTHROPLASTY HIP;  Surgeon: Chepe Peterson MD;  Location: [facility] OR     BIOPSY BONE CLAVICLE  3/1/2012    Procedure:BIOPSY BONE CLAVICLE; Biopsy Left Clavicle, Bilateral Hip Injections; Surgeon:JENNY BELTRAN; Location:[facility] OR     BREAST BIOPSY, RT/LT      left     DILATION AND CURETTAGE       HAND SURGERY      right flexor tendon laceration repair     INJECT STEROID (LOCATION)  3/1/2012    Procedure:INJECT STEROID (LOCATION); Surgeon:JENNY BELTRAN; Location:[facility] OR     LAPAROSCOPY DIAGNOSTIC (GYN)         Social History   I have reviewed this patient's social history and updated it with pertinent information if needed.  Social History     Tobacco Use     Smoking status: Never Smoker     Smokeless tobacco: Never Used   Substance Use Topics     Alcohol use: Not Currently     Alcohol/week: 5.8 standard drinks     Drug use: No       Family History   I have reviewed this patient's family history and updated it with pertinent information if needed.  Family History   Problem Relation Age of Onset     Depression Mother      Mental Illness Mother      Cancer Mother         breast     Other Cancer Mother      Diabetes Father      Cancer Father         lung ca, was a smoker     Other Cancer Father      Abdominal Aortic Aneurysm Father      Asthma Sister      Depression Sister      Mental Illness Sister      Asthma Sister      Depression Sister      Breast Cancer Sister      Liver Disease Sister        Prior to Admission Medications   Prior to Admission Medications   Prescriptions Last Dose Informant Patient Reported? Taking?   Cholecalciferol (VITAMIN D) 1000 UNIT capsule  Self Yes No   Sig: Take 2 capsules by mouth daily    LENalidomide (REVLIMID) 20 MG CAPS capsule  Self No No   Sig: Take 1 capsule (20 mg) by mouth daily for 21 days , Days 1 through 21, then off for 7 days.   VIIBRYD 20 MG TABS tablet  Self Yes No   Sig: Take 20 mg by mouth daily     acetaminophen (TYLENOL) 325 MG tablet   No No   Sig: Take 2 tablets (650 mg) by mouth every 4 hours as needed for other (For optimal non-opioid multimodal pain management to improve pain control.)   acyclovir (ZOVIRAX) 400 MG tablet  Self Yes No   Sig: Take 1 tablet by mouth daily   albuterol (PROAIR HFA/PROVENTIL HFA/VENTOLIN HFA) 108 (90 Base) MCG/ACT inhaler  Self Yes No   Sig: Inhale 2 puffs into the lungs every 4 hours as needed for shortness of breath / dyspnea or wheezing   cetirizine (ZYRTEC) 10 MG tablet  Self Yes No   Sig: Take 10 mg by mouth daily   clobetasol (TEMOVATE) 0.05 % ointment  Self Yes No   Sig: Apply topically 2 times daily as needed (eczema)    ipratropium (ATROVENT) 0.06 % spray  Self Yes No   Sig: Spray 2 sprays in nostril daily    ketoconazole (NIZORAL) 2 % shampoo  Self Yes No   Sig: Apply topically daily as needed (eczema)    levETIRAcetam (KEPPRA) 500 MG tablet   No No   Si tablet (500 mg) by Oral or NG Tube route 2 times daily for 7 days   methylPREDNISolone (MEDROL) 32 MG tablet  Self No No   Sig: Take one tablet (32mg) by mouth 12 hours prior to scheduled CT scan.  Repeat above dose 2 hours prior to CT scan   Patient taking differently: Take one tablet (32mg) by mouth 12 hours prior to scheduled CT scan.  Repeat above dose 2 hours prior to CT scan   mometasone (ELOCON) 0.1 % ointment  Self Yes No   Sig: Apply topically daily as needed (eczema)    predniSONE (DELTASONE) 1 MG tablet  Self Yes No   Sig: Take 7 mg by mouth daily       Facility-Administered Medications: None     Allergies   Allergies   Allergen Reactions     Diagnostic X-Ray Materials Hives     ct     Diatrizoate Rash     Dye [Contrast Dye] Rash and Hives     Ioxaglate Hives and Rash     Vortioxetine Nausea and Vomiting     Revlimid [Lenalidomide] Rash       Physical Exam   Vital Signs: Temp: 98  F (36.7  C) Temp src: Oral BP: 120/63 Pulse: 83   Resp: 16 SpO2: 100 % O2 Device: None (Room air)    Weight: 125 lbs 0  oz    Constitutional: healthy, alert and no distress   Head: Normocephalic. No masses, lesions, tenderness or abnormalities   Neck: Neck supple. No adenopathy. Thyroid symmetric, normal size,, Carotids without bruits.   ENT: ENT exam normal, no neck nodes or sinus tenderness   Cardiovascular: RRR. No murmurs, clicks gallops or rub   Respiratory: . Good diaphragmatic excursion. Lungs clear   Gastrointestinal: Abdomen soft,. BS normal. No masses, organomegaly.   : Deferred   Musculoskeletal: extremities normal- no gross deformities noted, gait normal and normal muscle tone   Skin: no suspicious lesions or rashes   Neurologic: CV: BP and HR as noted above  PULM: breathing comfortably on room air  ABD: soft, non-distended  NEUROLOGIC:  -- Awake; Alert; oriented x 3  -- need to repeat commands,   -- Speech fluent, spontaneous. No aphasia or dysarthria.  Cranial Nerves:  -- visual fields appears to neglect left side., PERRL 3-2mm bilat and brisk, extraocular movements intact, pain with eye movement   -- face symmetrical, tongue midline  -- sensory V1-V3 intact bilaterally  -- palate elevates symmetrically, uvula midline  -- hearing grossly intact bilat  -- Trapezii 5/5 strength bilat symmetric  -- Cerebellar: Finger nose finger without dysmetria, intact rapid alternating motions bilaterally.   Psychiatric: mentation appears normal and affect normal/bright     Data   Data reviewed today: I reviewed all medications, new labs and imaging results over the last 24 hours.    Recent Labs   Lab 06/06/22  1230 06/05/22  0601 06/04/22  2353 06/04/22  2352   WBC 6.6 5.6  --  6.3   HGB 10.8* 10.2*  --  11.7   MCV 97 96  --  97    194  --  219   INR 1.01  --   --  0.94    142  --  136   POTASSIUM 4.1 4.2  --  4.3   CHLORIDE 102 109  --  103   CO2 32 28  --  29   BUN 12 14  --  15   CR 0.80 0.79 0.8 0.80   ANIONGAP 3 5  --  4   FLORENCIA 9.2 8.8  --  9.1   * 92  --  119*   ALBUMIN 3.5  --   --  3.9   PROTTOTAL 6.2*  --    --  6.9   BILITOTAL 0.4  --   --  0.3   ALKPHOS 64  --   --  79   ALT 32  --   --  44   AST 18  --   --  31     Most Recent 3 CBC's:Recent Labs   Lab Test 06/06/22  1230 06/05/22  0601 06/04/22 2352   WBC 6.6 5.6 6.3   HGB 10.8* 10.2* 11.7   MCV 97 96 97    194 219     Most Recent 3 BMP's:Recent Labs   Lab Test 06/06/22  1230 06/05/22  0601 06/04/22  2353 06/04/22  2352    142  --  136   POTASSIUM 4.1 4.2  --  4.3   CHLORIDE 102 109  --  103   CO2 32 28  --  29   BUN 12 14  --  15   CR 0.80 0.79 0.8 0.80   ANIONGAP 3 5  --  4   FLORENCIA 9.2 8.8  --  9.1   * 92  --  119*     Most Recent 2 LFT's:Recent Labs   Lab Test 06/06/22  1230 06/04/22 2352   AST 18 31   ALT 32 44   ALKPHOS 64 79   BILITOTAL 0.4 0.3     Recent Results (from the past 24 hour(s))   CT Head w/o Contrast    Narrative    Exam: CT HEAD W/O CONTRAST  6/6/2022    History: Headache, intracranial hemorrhage suspected; Headache, new or  worsening (Age >= 50y).    Comparison:  CT head exams dated 6/5/2022.    Technique: Using multidetector thin collimation helical acquisition  technique, axial, coronal and sagittal CT images from the skull base  to the vertex were obtained without intravenous contrast.     Findings:    Unchanged subdural hemorrhage over the right cerebral convexity  measuring up to 9 mm in maximum thickness with extension along the  falx and tentorial leaflets, right greater than left. Unchanged mass  effect on the right cerebral hemisphere with partial effacement of the  right cerebral sulci and 3 mm leftward midline shift at the septum  pellucidum. No new intracranial hemorrhage. Unchanged leukoaraiosis.  The gray-white differentiation of the cerebral hemispheres is  preserved. Basal cisterns are patent.    No acute osseous change. The paranasal sinuses and mastoid air cells  are clear.      Impression    Impression:   Unchanged right convexity subdural hemorrhage measuring up to 9 mm  thick with partial effacement of  the right cerebral sulci and 3 mm  leftward midline shift. No new intracranial findings.    I have personally reviewed the examination and initial interpretation  and I agree with the findings.    AMANDO ASHLEY MD         SYSTEM ID:  [id number]

## 2022-06-06 NOTE — TELEPHONE ENCOUNTER
"Clinic Care Coordination Contact  [facility]: Post-Discharge Note  SITUATION                                                      Admission:    Admission Date: 06/04/22   Reason for Admission: headache, numbness  Discharge:   Discharge Date: 06/05/22  Discharge Diagnosis: subdural hematoma    BACKGROUND                                                      Per hospital discharge summary and inpatient provider notes:    Lilly Babcock is a 72 year old female with a history of metastatic follicular non-Hodgkin's lymphoma who presents to the Emergency Department with headache. Patient reports she has a history of unilateral aura-type migraines, does not typically get pain. Patient reports last night she made a rich chocolate cake with espresso. She states shortly after having some she developed auras on both eyes which is unusual for her. Patient reports shortly after that she developed the worst headache of her life. Patient reports she also notices numbness in the fingers of her left hand. Patient reports she slept for about 5 hours and the headache persisted. She states she went back to sleep and when she woke up this morning headache was much improved. She did notice continued head pain when bending over. She states this improved throughout the day. She still has pain in the frontal area currently. The numbness in her hand nearly resolved about 2 hours ago. Patient denies change in vision. She notes some word finding difficulty at baseline, no change in this. No confusion. Patient does note some soreness with moving her eyes. No other symptoms noted.       ASSESSMENT      Enrollment  Primary Care Care Coordination Status: Not a Candidate    Discharge Assessment  How are you doing now that you are home?: \"miserable\"  How are your symptoms? (Red Flag symptoms escalate to triage hotline per guidelines): Unchanged  Do you feel your condition is stable enough to be safe at home until your provider visit?: " Yes  Does the patient have their discharge instructions? : No - Review discharge instructions (has paperwork but spilled water on them. discharge instructions reviewed in detail)  Does the patient have questions regarding their discharge instructions? : No  Were you started on any new medications or were there changes to any of your previous medications? : Yes  Does the patient have all of their medications?: Yes  Do you have questions regarding any of your medications? : No  Do you have all of your needed medical supplies or equipment (DME)?  (i.e. oxygen tank, CPAP, cane, etc.): No - What equipment or supplies are needed? (patient would like walker. has one in her garage but it is damaged and unsure if it usable)  Discharge follow-up appointment scheduled within 14 calendar days? : No  Is patient agreeable to assistance with scheduling? : No (RN to provide phone number for neurosurgery clinic to son)         Post-op (Clinicians Only)  Did the patient have surgery or a procedure: No    Reviewed with patient. Patient feels unbalanced. Fell in kitchen this morning with no injury. Recommended she return to ED for re-evaluation of imbalance but she declines. She is taking Tylenol 325 mg 3 tabs once daily for headache. Directions for Tylenol use reviewed with patient but she is unable to write these down and asks for return call at 1 pm when I can speak with her son. Suggested she contact PCP for consideration of home physical therapy and order for walker. Patient states she does have a walker in her garage but it is damaged and unsure if it is usable but will have her son look at it. Will call patient back as requested.    PLAN                                                      Outpatient Plan:  Follow up with neurosurgery clinic in one week.     Future Appointments   Date Time Provider Department Center   6/29/2022  7:15 [facility]   6/29/2022  7:45 AM Bella [facility] APRN CNP [facility]  [facility]   6/29/2022  9:00 AM UC ONC INFUSION NURSE [facility]         For any urgent concerns, please contact our 24 hour nurse triage line: [phone] ([phone])         Ksenia Luciano RN  [facility] Resource [place], [facility]

## 2022-06-06 NOTE — TELEPHONE ENCOUNTER
LM- for patient along with sister to call the clinic- need to schedule visit and CT one week from 6-6-22  
Private car

## 2022-06-07 NOTE — PROGRESS NOTES
Textpage to neurology--->Obs 9 LAURO Wooten's sister has been trying to get a hold of you concerning her sister's results.  Please call Vanessa at  or the desk at .   Thanks.

## 2022-06-07 NOTE — PROGRESS NOTES
Received ER request to admit to patient to medicine. However, given her SDH and new MRI findings of diffusion restriction, will benefit from admission to neurology. Discussed with neuro team who graciously agreed to take over her care. Also let the team to not hesitate to consult medicine for any further assistance.

## 2022-06-07 NOTE — CONSULTS
Stroke Education Note     The following information has been reviewed with the family:     1. Warning signs of stroke     2. Calling 911 if having warning signs of stroke     3. All modifiable risk factors: hypertension, CAD, atrial fib, diabetes, hypercholesterolemia, smoking, substance abuse, diet, physical inactivity, obesity, sleep apnea.     4. Patient's risk factors for stroke which include: none     5. Follow-up plan for after discharge     6. Discharge medications which include: TBD     In addition, the above information was given to the family in writing as a part of the [facility] Stroke Class Handout.     Learner's response to risk factors / lifestyle modification education: NA - Precontemplative      Jamilah Adames RN

## 2022-06-07 NOTE — H&P
[facility]    Stroke Admission Note    Chief Complaint  dizziness    HPI  Lilly Babcock is a 72 year old female history of metastatic follicular non-Hodgkin's lymphoma currently on lenalidomide and rituximab, recent traumatic right sided subdural hematoma admitted for observation for ~9 hours who presents with headache and continued dizziness since discharge yesterday. General neurology was initially consulted for dizziness and recommended MRI brain with and without contrast to rule out posterior circulation acute ischemic stroke.     MRI brain was completed overnight and demonstrated right occipital lobe IPH with surrounding subarachnoid blood. Upon further review this appears to also be present on CT head completed on arrival. In any event, patient will be admitted to Vascular Neurology service for further work up and management. Please see my consult note from today's date for further HPI.    Intravenous Thrombolysis  Not given due to:   - history of intracranial hemorrhage    Endovascular Treatment  Not initiated due to absence of proximal vessel occlusion    Impression   Traumatic right subdural hematoma  Right occipital IPH with surrounding SAH  Punctate acute ischemic stroke, right pericaval gyrus    Plan  #Traumatic right subdural hematoma  #Right IPH with surrounding SAH, ICH score 0  #Punctate acute ischemic stroke, right pericaval gyrus  - Admit to stroke Neurology  - NSGY consulted already for SDH, appreciate recommendations  - Neurochecks Q 4 hours  - Systolic BP Goal: < 140  - Avoid hypotonic IV fluids  - Head of bed elevated  - Echocardiogram  - Telemetry, EKG  - Imaging: Repeat CT head, and CTA head and neck at 1100   - Pre-medicate with methylprednisolone 40 mg NOW and 40 mg at 0900 for noted allergy   - Confirmed pre-medication with radiology resident on call  - Continue Keppra 500 mg BID for 7 day course after traumatic SDH  - Bedside Glucose  Monitoring  - LDL, A1c, Troponin x 3  - PT/OT  - Stroke Education  - Euthermia, Euglycemia    #Bifrontal headache  - Tylenol prn  - Consider headache cocktail with IVF, magnesium, and compazine if headache persists    #Metastatic follicular lymphoma  Currently taking lenalidomide and rituximab.  - Hematology was consulted while patient in ED OBS, follow up recs as far as what should medications need to be administered during current admission.      Prophylaxis            For VTE Prevention:  - pneumatic compression device    For Acid Suppression:  - GI prophylaxis is not indicated    Code Status  Full Code    Patient was admitted via [facility] ED ([place])    The patient will be admitted to the Neuro Critical Care/Stroke team..     The patient was discussed with Stroke Fellow, Dr. Salvador.  The Stroke Staff is Dr. Reese.    Dayan Fraser MD  Neurology Resident    ___________________________________________________    Nutrition:   Orders Placed This Encounter      NPO for Medical/Clinical Reasons Except for: Meds    Clinically Significant Risk Factors Present on Admission                    Past Medical History   Past Medical History:   Diagnosis Date     Asthma      Degenerative joint disease      Depression      Eczema      Lymphoma, follicular (H) 8/2008 diagnosed    Stage YOUSUF - bone mets to L greater trochanter     Nasal congestion      Seasonal allergies      Past Surgical History   Past Surgical History:   Procedure Laterality Date     ARTHROPLASTY HIP Left 10/18/2016    Procedure: ARTHROPLASTY HIP;  Surgeon: Chepe Peterson MD;  Location: [facility] OR     BIOPSY BONE CLAVICLE  3/1/2012    Procedure:BIOPSY BONE CLAVICLE; Biopsy Left Clavicle, Bilateral Hip Injections; Surgeon:JENNY BELTARN; Location:[facility] OR     BREAST BIOPSY, RT/LT      left     DILATION AND CURETTAGE       HAND SURGERY      right flexor tendon laceration repair     INJECT STEROID (LOCATION)  3/1/2012    Procedure:INJECT STEROID  (LOCATION); Surgeon:JENNY BELTRAN; Location:[facility] OR     LAPAROSCOPY DIAGNOSTIC (GYN)       Medications   Home Meds  Prior to Admission medications    Medication Sig Start Date End Date Taking? Authorizing Provider   acetaminophen (TYLENOL) 325 MG tablet Take 2 tablets (650 mg) by mouth every 4 hours as needed for other (For optimal non-opioid multimodal pain management to improve pain control.) 6/8/22   Herberth Blanton MD   acyclovir (ZOVIRAX) 400 MG tablet Take 1 tablet by mouth daily 10/12/21   Reported, Patient   albuterol (PROAIR HFA/PROVENTIL HFA/VENTOLIN HFA) 108 (90 Base) MCG/ACT inhaler Inhale 2 puffs into the lungs every 4 hours as needed for shortness of breath / dyspnea or wheezing 5/21/18   Reported, Patient   cetirizine (ZYRTEC) 10 MG tablet Take 10 mg by mouth daily    Unknown, Entered By History   Cholecalciferol (VITAMIN D) 1000 UNIT capsule Take 2 capsules by mouth daily     Reported, Patient   clobetasol (TEMOVATE) 0.05 % ointment Apply topically 2 times daily as needed (eczema)  7/22/14   Reported, Patient   ipratropium (ATROVENT) 0.06 % spray Spray 2 sprays in nostril daily     Reported, Patient   ketoconazole (NIZORAL) 2 % shampoo Apply topically daily as needed (eczema)  6/30/18   Reported, Patient   LENalidomide (REVLIMID) 20 MG CAPS capsule Take 1 capsule (20 mg) by mouth daily for 21 days , Days 1 through 21, then off for 7 days. 5/30/22 6/20/22  Rafa Gutierrez MD   levETIRAcetam (KEPPRA) 500 MG tablet 1 tablet (500 mg) by Oral or NG Tube route 2 times daily for 7 days 6/5/22 6/12/22  Herberth Blanton MD   methylPREDNISolone (MEDROL) 32 MG tablet Take one tablet (32mg) by mouth 12 hours prior to scheduled CT scan.  Repeat above dose 2 hours prior to CT scan  Patient taking differently: Take one tablet (32mg) by mouth 12 hours prior to scheduled CT scan.  Repeat above dose 2 hours prior to CT scan 4/5/22   Colette Flores PA-C   mometasone (ELOCON) 0.1 % ointment Apply topically  daily as needed (eczema)  12/10/15   Reported, Patient   predniSONE (DELTASONE) 1 MG tablet Take 7 mg by mouth daily  10/3/19   Cornell Malagon MD   VIIBRYD 20 MG TABS tablet Take 20 mg by mouth daily  3/23/20   Reported, Patient       Scheduled Meds    levETIRAcetam  500 mg Oral or NG Tube BID     methylPREDNISolone  40 mg Intravenous Once     methylPREDNISolone  40 mg Intravenous Once       Infusion Meds    sodium chloride         PRN Meds  acetaminophen, ondansetron **OR** ondansetron, prochlorperazine **OR** prochlorperazine **OR** prochlorperazine    Allergies   Allergies   Allergen Reactions     Diagnostic X-Ray Materials Hives     ct     Diatrizoate Rash     Dye [Contrast Dye] Rash and Hives     Ioxaglate Hives and Rash     Vortioxetine Nausea and Vomiting     Revlimid [Lenalidomide] Rash     Family History   Family History   Problem Relation Age of Onset     Depression Mother      Mental Illness Mother      Cancer Mother         breast     Other Cancer Mother      Diabetes Father      Cancer Father         lung ca, was a smoker     Other Cancer Father      Abdominal Aortic Aneurysm Father      Asthma Sister      Depression Sister      Mental Illness Sister      Asthma Sister      Depression Sister      Breast Cancer Sister      Liver Disease Sister      Social History   Social History     Tobacco Use     Smoking status: Never Smoker     Smokeless tobacco: Never Used   Substance Use Topics     Alcohol use: Not Currently     Alcohol/week: 5.8 standard drinks     Drug use: No       Review of Systems   The 10 point Review of Systems is negative other than noted in the HPI or here.        PHYSICAL EXAMINATION  Temp:  [97.7  F (36.5  C)-98.3  F (36.8  C)] 98.1  F (36.7  C)  Pulse:  [80-85] 80  Resp:  [16-18] 18  BP: (102-123)/(37-65) 123/65  SpO2:  [98 %-100 %] 99 %    General: very pleasant, sitting up in bed, NAD  Head: NC/AT  Eyes: no icterus, op pink and moist  Cardiac: regular rate   Respiratory:  non-labored on RA  GI: non-tender  Skin: scattered bruised on upper extremities  Psych: Mood pleasant, affect congruent  Neuro:  Mental status: Awake, alert, attentive, oriented to self, time, place, and circumstance. Language is slow but fluent and coherent with intact comprehension of complex commands, naming and repetition.  Cranial nerves: VFF, PERRL, conjugate gaze, EOMI, facial sensation intact, face symmetric, shoulder shrug strong, tongue/uvula midline, no dysarthria.   Motor: Normal bulk and tone. No abnormal movements. 5/5 strength bilaterally in deltoids, biceps, triceps, hand , hip flexors, hip extensors, knee flexion, knee extension, plantarflexion, dorsiflexion.   Reflexes: Normo-reflexic and symmetric biceps, brachioradialis, triceps, patellae, and achilles. Negative Aaron, no clonus, toes down-going.  Sensory: Intact to light touch in proximal and distal aspects of all 4 extremities   Coordination: FNF and HS without ataxia or dysmetria. Rapid alternating movements intact.   Gait: Normal width, stride length, turn, and arm swing. Unable to walk in tandem due to unsteadiness, veering most often to the left side.    Dysphagia Screen  Passed screening, no dysarthria - Regular Diet with thin liquids  06/07/2022     Stroke Scales    NIHSS  1a. Level of Consciousness 0-->Alert, keenly responsive   1b. LOC Questions 0-->Answers both questions correctly   1c. LOC Commands 0-->Performs both tasks correctly   2.   Best Gaze 0-->Normal   3.   Visual 0-->No visual loss   4.   Facial Palsy 0-->Normal symmetrical movements   5a. Motor Arm, Left 0-->No drift, limb holds 90 (or 45) degrees for full 10 secs   5b. Motor Arm, Right 0-->No drift, limb holds 90 (or 45) degrees for full 10 secs   6a. Motor Leg, Left 0-->No drift, leg holds 30 degree position for full 5 secs   6b. Motor Leg, right 0-->No drift, leg holds 30 degree position for full 5 secs   7.   Limb Ataxia 0-->Absent   8.   Sensory 0-->Normal, no  sensory loss   9.   Best Language 0-->No aphasia, normal   10. Dysarthria 0-->Normal   11. Extinction and Inattention  0-->No abnormality   Total 0 (06/07/22 0523)       Modified Rachel Score (Pre-morbid)  0-No deficits    Imaging  I personally reviewed all imaging; relevant findings per the HPI.    Lab Results Data   CBC  Recent Labs   Lab 06/06/22  1230 06/05/22  0601 06/04/22 2352   WBC 6.6 5.6 6.3   RBC 3.50* 3.28* 3.71*   HGB 10.8* 10.2* 11.7   HCT 34.0* 31.5* 36.0    194 219     Basic Metabolic Panel   Recent Labs   Lab 06/06/22  1230 06/05/22  0601 06/04/22 2353 06/04/22 2352    142  --  136   POTASSIUM 4.1 4.2  --  4.3   CHLORIDE 102 109  --  103   CO2 32 28  --  29   BUN 12 14  --  15   CR 0.80 0.79 0.8 0.80   * 92  --  119*   FLORENCIA 9.2 8.8  --  9.1     Liver Panel  Recent Labs   Lab 06/06/22  1230 06/04/22  2352 06/01/22  0844   PROTTOTAL 6.2* 6.9 6.5*   ALBUMIN 3.5 3.9 3.7   BILITOTAL 0.4 0.3 0.3   ALKPHOS 64 79 70   AST 18 31 25   ALT 32 44 34     INR    Recent Labs   Lab Test 06/06/22  1230 06/04/22  2352 10/21/16  0829   INR 1.01 0.94 1.15*      Lipid Profile  No lab results found.  A1C  No lab results found.  Troponin I  No results for input(s): TROPONINIS, TROPONINI, GHTROP in the last 168 hours.       Stroke Code / Stroke Consult Data Data    Not a stroke code

## 2022-06-07 NOTE — PROGRESS NOTES
Textpage to neurology--->Obs 9 LAURO Wooten needs an order for benedryl to premedicate for contrast administration.  Thanks.

## 2022-06-07 NOTE — PLAN OF CARE
Arrived from:  OBS  Belongings/meds: Back pack and clothes, cell phone  2 RN Skin Assessment Completed by: Maria Isabel Fay   Non-intact findings documented (yes/no/NA): no

## 2022-06-07 NOTE — PHARMACY-ADMISSION MEDICATION HISTORY
Admission Medication History Completed by Pharmacy    See [facility] Admission Navigator for allergy information, preferred outpatient pharmacy, prior to admission medications and immunization status.     Medication History Sources:     Patient    Dispense History    Changes made to PTA medication list (reason):    Added: None    Deleted: None    Changed: None    Additional Information:    Patient was a good historian of medications and reported the following  o Takes acyclovir on kandace days to prevent cold sores  o Rarely uses albuterol inhaler  o Has not started taking lenalidomide   o Only takes methylprednisolone for CT with contrast    Prior to Admission medications    Medication Sig Last Dose Taking? Auth Provider   acetaminophen (TYLENOL) 325 MG tablet Take 2 tablets (650 mg) by mouth every 4 hours as needed for other (For optimal non-opioid multimodal pain management to improve pain control.)   Herberth Blanton MD   acyclovir (ZOVIRAX) 400 MG tablet Take 1 tablet by mouth as needed (kandace day to prevent cold sores)   Reported, Patient   albuterol (PROAIR HFA/PROVENTIL HFA/VENTOLIN HFA) 108 (90 Base) MCG/ACT inhaler Inhale 2 puffs into the lungs every 4 hours as needed for shortness of breath / dyspnea or wheezing   Reported, Patient   cetirizine (ZYRTEC) 10 MG tablet Take 10 mg by mouth daily   Unknown, Entered By History   Cholecalciferol (VITAMIN D) 1000 UNIT capsule Take 2 capsules by mouth daily    Reported, Patient   clobetasol (TEMOVATE) 0.05 % ointment Apply topically 2 times daily as needed (eczema)    Reported, Patient   ipratropium (ATROVENT) 0.06 % spray Spray 2 sprays in nostril daily    Reported, Patient   ketoconazole (NIZORAL) 2 % shampoo Apply topically daily as needed (eczema)    Reported, Patient   LENalidomide (REVLIMID) 20 MG CAPS capsule Take 1 capsule (20 mg) by mouth daily for 21 days , Days 1 through 21, then off for 7 days.   Rafa Gutierrez MD   levETIRAcetam (KEPPRA) 500 MG tablet 1  tablet (500 mg) by Oral or NG Tube route 2 times daily for 7 days   Herberth Blanton MD   methylPREDNISolone (MEDROL) 32 MG tablet Take one tablet (32mg) by mouth 12 hours prior to scheduled CT scan.  Repeat above dose 2 hours prior to CT scan  Patient taking differently: Take one tablet (32mg) by mouth 12 hours prior to scheduled CT scan.  Repeat above dose 2 hours prior to CT scan   Colette Flores PA-C   mometasone (ELOCON) 0.1 % ointment Apply topically daily as needed (eczema)    Reported, Patient   predniSONE (DELTASONE) 1 MG tablet Take 7 mg by mouth daily    Cornell Malagon MD   VIIBRYD 20 MG TABS tablet Take 20 mg by mouth daily    Reported, Patient       Date completed: 06/07/22    Medication history completed by: RICK BURNS [facility]

## 2022-06-07 NOTE — PROGRESS NOTES
6A Modified DGI     06/07/22 1600   Signing Clinician's Name / Credentials   Signing clinician's name / credentials Fortunato Cody PT, DPT   Dynamic Gait Index ([employer] [place], 1995)   Gait Level Surface 2   Change in Gait Speed 1   Gait and Horizontal Head Turns 1   Gait with Vertical Head Turns 1   Gait and Pivot Turns 1   Step Over Obstacle 2  (modified to large step over line in hallway)   Step Around Obstacles 1   Steps 1   Total Dynamic Gait Index Score  (A score of 19 or less has been correlated to an increased risk of falls in community dwelling older adults, patients with vestibular disorders, and patients with MS.)   Total Score (out of 24) 10     Fortnuato Cody PT, DPT  Pager #[phone]

## 2022-06-07 NOTE — PLAN OF CARE
"Observation goals - trauma closed head injury        - Occupational Therapy Traumatic Brain Injury Screen with outpatient treatment plan if needed: In processing. MRI completed     - Vital Signs normal or at patient baseline: Met  . /68 (BP Location: Right arm)   Pulse 80   Temp 98.7  F (37.1  C) (Oral)   Resp 16   Ht 1.702 m (5' 7\")   Wt 56.7 kg (125 lb)   SpO2 98%   BMI 19.58 kg/m      - Adequate pain control on oral analgesia: Complained HA, but declined pain medication  - Tolerating oral intake to maintain hydration. In progress  - Diagnostic tests and consults completed and resulted: Not met  - Cleared for discharge from consultants' standpoint if involved in care:  Not met   - Return to baseline functional status: Not met   - Safe disposition plan has been identified: Not met   .                 "

## 2022-06-07 NOTE — PROGRESS NOTES
Observation Unit Transfer Summary     Patient ID:  Lilly Babcock  MRN: [id number]  72 year old  YOB: 1950    Observation Admit Date: 6/6/2022    ED Admitting Attending: LING Galvez CNP    Transfer Date and Time: June 7, 2022 at 6:51 AM     Transferring Observation Provider: LING Fitch CNP    Admission Diagnoses:     1. Subdural hematoma (H)        Transfer Diagnoses:    SDH and new MRI findings of diffusion restriction,    Emergency Department and Observation Course:      Lilly Babcock is a 72 year old female admitted on 6/6/2022. She has a history of follicular grade 1 through 2 (on active palliative chemotherapy), recent diagnosis of a subdural hematoma and history of migraines with Aura presents to the ED for the second time      ## Subdural Hematoma (diagnosed 6/4/22)    ## Loss of Balance and Clumsiness  ## Frontal headache and behind the eye pain bilateral worse with head movement and eyes moving side to side.  ## Migraines with Aura  ## Confusion  Initially presented on 6/4 with the worst headache of her life. Reports this does not feel like vertigo. She does have has a history of unilateral aura-type migraines, does not typically get pain. She had made a rich chocolate cake with espresso and thought that may of caused her headache, but then her headache persisted. She presented to the ED on 6/4 and was found to have a subdural hematoma. A repeat CT 6 hours later showed the hematoma was stable.  Patient had numbness in the fingers of her left hand, but this has since resolved. . Son reports she was confused on how to put on her sweater this morning. She and he have noted increased clumsiness whereby she has been running into furniture. This is unusual for her. Patient had a fall today due to losing her balance while trying to transitioning to upright position, however did not hit her head or experience any trauma.   She denies any peripheral neuropathy. Prior to discharge  on 6/5/22 the patient was assessed by PT and OT who felt she was at her functional baseline and save to discharge home.She denies any nausea, vomiting, weakness, vision changes, somnolence.       CT from 6/5 shows: Acute right-sided subdural hematoma diffusely over the right cerebral convexity and extending along the interhemispheric falx. The maximal thickness is 8 mm in the right parietal region.  2.  Associated intracranial mass effect with 3 mm of right to left shift of midline structures.  3.  Underlying mild to moderate presumed chronic small vessel ischemic changes.     Patient was seen by neurosurgery this afternoon 6/6/22. Repeat head CT remains stable, ok to hold on repeat imaging at this time unless exam changes. Recommended further workup for dizziness as may not be related to subdural hematoma. Patient was seen by PT. Patient had leftsided visual neglect.Some high level confusion. Neurology consulted and recommended MRI. MRI brain was completed overnight and demonstrated right occipital lobe IPH with surrounding subarachnoid blood. Upon further review this appears to also be present on CT head completed on arrival. In any event, patient will be admitted to Vascular Neurology service for further work up and management     ## Follicular lymphoma, advanced stage, FLIPI 2:  She is tolerating lenalidomide and rituximab fairly well.   She has had 3 episodes of lenalidomide-induced rash well-controlled with Zyrtec and triamcinolone. In the protocol, for 6 cycles everyone received lenalidomide at 20 mg daily.  After 6 cycles patients with complete response could reduce the  lenalidomide dose to 10 mg daily, and the rituximab could be switched to every 8 weeks.   -PET CT after 3 cycles shows complete response of all previously seen sites of disease. They comment on 2 indeterminate sites (right 7th rib, and a left pelvis lesion) which will be followed on subsequent imaging. Will plan for repeat PET CT at end of  "treatment.   -will continue with Cycle 4 of Rituxan and Revlimid. Plan for repeat PET/CT after completion of 6 cycles. Completed cycle 4 day 1 rapid rituxan on 6/1/22. Patient has not started Revlomid.  - Hematology consult.       At this time the patient has failed observation management due to need for continued care with MRI findings and will be transferred to inpatient status.    Consults: Neurosurgery, Neurology, hematology     DATA:    Transfer Exam:    /68 (BP Location: Right arm)   Pulse 80   Temp 98.7  F (37.1  C) (Oral)   Resp 16   Ht 1.702 m (5' 7\")   Wt 56.7 kg (125 lb)   SpO2 98%   BMI 19.58 kg/m    Constitutional: healthy, alert and no distress   Head: Normocephalic. No masses, lesions, tenderness or abnormalities   Neck: Neck supple. No adenopathy. Thyroid symmetric, normal size,, Carotids without bruits.   ENT: ENT exam normal, no neck nodes or sinus tenderness   Cardiovascular: RRR. No murmurs, clicks gallops or rub   Respiratory: . Good diaphragmatic excursion. Lungs clear   Gastrointestinal: Abdomen soft,. BS normal. No masses, organomegaly.   : Deferred   Musculoskeletal: extremities normal- no gross deformities noted, gait normal and normal muscle tone   Skin: no suspicious lesions or rashes   Neurologic: CV: BP and HR as noted above  PULM: breathing comfortably on room air  ABD: soft, non-distended  NEUROLOGIC:  -- Awake; Alert; oriented x 3  -- need to repeat commands,   -- Speech fluent, spontaneous. No aphasia or dysarthria.  Cranial Nerves:  -- visual fields appears to neglect left side., PERRL 3-2mm bilat and brisk, extraocular movements intact, pain with eye movement   -- face symmetrical, tongue midline  -- sensory V1-V3 intact bilaterally  -- palate elevates symmetrically, uvula midline  -- hearing grossly intact bilat  -- Trapezii 5/5 strength bilat symmetric  -- Cerebellar: Finger nose finger without dysmetria, intact rapid alternating motions bilaterally. "   Psychiatric: mentation appears normal and affect normal/bright   Current Medications:    No current outpatient medications on file.       Medications Prior to Admission:    Medications Prior to Admission   Medication Sig Dispense Refill Last Dose     [START ON 6/8/2022] acetaminophen (TYLENOL) 325 MG tablet Take 2 tablets (650 mg) by mouth every 4 hours as needed for other (For optimal non-opioid multimodal pain management to improve pain control.) 60 tablet 0      acyclovir (ZOVIRAX) 400 MG tablet Take 1 tablet by mouth daily        albuterol (PROAIR HFA/PROVENTIL HFA/VENTOLIN HFA) 108 (90 Base) MCG/ACT inhaler Inhale 2 puffs into the lungs every 4 hours as needed for shortness of breath / dyspnea or wheezing        cetirizine (ZYRTEC) 10 MG tablet Take 10 mg by mouth daily        Cholecalciferol (VITAMIN D) 1000 UNIT capsule Take 2 capsules by mouth daily         clobetasol (TEMOVATE) 0.05 % ointment Apply topically 2 times daily as needed (eczema)         ipratropium (ATROVENT) 0.06 % spray Spray 2 sprays in nostril daily         ketoconazole (NIZORAL) 2 % shampoo Apply topically daily as needed (eczema)         LENalidomide (REVLIMID) 20 MG CAPS capsule Take 1 capsule (20 mg) by mouth daily for 21 days , Days 1 through 21, then off for 7 days. 21 capsule 0      levETIRAcetam (KEPPRA) 500 MG tablet 1 tablet (500 mg) by Oral or NG Tube route 2 times daily for 7 days 14 tablet 0      methylPREDNISolone (MEDROL) 32 MG tablet Take one tablet (32mg) by mouth 12 hours prior to scheduled CT scan.  Repeat above dose 2 hours prior to CT scan (Patient taking differently: Take one tablet (32mg) by mouth 12 hours prior to scheduled CT scan.  Repeat above dose 2 hours prior to CT scan) 2 tablet 1      mometasone (ELOCON) 0.1 % ointment Apply topically daily as needed (eczema)         predniSONE (DELTASONE) 1 MG tablet Take 7 mg by mouth daily         VIIBRYD 20 MG TABS tablet Take 20 mg by mouth daily           Significant  Diagnostic Studies:     Results for orders placed or performed during the hospital encounter of 06/06/22   CT Head w/o Contrast     Status: None    Narrative    Exam: CT HEAD W/O CONTRAST  6/6/2022    History: Headache, intracranial hemorrhage suspected; Headache, new or  worsening (Age >= 50y).    Comparison:  CT head exams dated 6/5/2022.    Technique: Using multidetector thin collimation helical acquisition  technique, axial, coronal and sagittal CT images from the skull base  to the vertex were obtained without intravenous contrast.     Findings:    Unchanged subdural hemorrhage over the right cerebral convexity  measuring up to 9 mm in maximum thickness with extension along the  falx and tentorial leaflets, right greater than left. Unchanged mass  effect on the right cerebral hemisphere with partial effacement of the  right cerebral sulci and 3 mm leftward midline shift at the septum  pellucidum. No new intracranial hemorrhage. Unchanged leukoaraiosis.  The gray-white differentiation of the cerebral hemispheres is  preserved. Basal cisterns are patent.    No acute osseous change. The paranasal sinuses and mastoid air cells  are clear.      Impression    Impression:   Unchanged right convexity subdural hemorrhage measuring up to 9 mm  thick with partial effacement of the right cerebral sulci and 3 mm  leftward midline shift. No new intracranial findings.    I have personally reviewed the examination and initial interpretation  and I agree with the findings.    AMANDO ASHLEY MD         SYSTEM ID:  [id number]   MR Brain w/o & w Contrast     Status: None (Preliminary result)    Impression    RESIDENT PRELIMINARY INTERPRETATION  Impression:   1. Focus of diffusion restriction in the posterior right pericaval  gyrus compatible with tiny acute infarct.  2. Right occipital lobe parenchymal hemorrhage/contusion measuring up  to 1.8 cm with surrounding edema and subarachnoid blood.  3. Stable subdural hematoma along the  right cerebral convexity  measuring up to 8 mm posteriorly.    [Critical Result: Acute intracranial hemorrhage]    Finding was identified on 6/7/2022 3:46 AM.     Saw Anguiano was contacted by Dr. Blanton on 6/7/2022 3:59 AM and  verbalized understanding of the critical result.      Comprehensive metabolic panel     Status: Abnormal   Result Value Ref Range    Sodium 137 133 - 144 mmol/L    Potassium 4.1 3.4 - 5.3 mmol/L    Chloride 102 94 - 109 mmol/L    Carbon Dioxide (CO2) 32 20 - 32 mmol/L    Anion Gap 3 3 - 14 mmol/L    Urea Nitrogen 12 7 - 30 mg/dL    Creatinine 0.80 0.52 - 1.04 mg/dL    Calcium 9.2 8.5 - 10.1 mg/dL    Glucose 103 (H) 70 - 99 mg/dL    Alkaline Phosphatase 64 40 - 150 U/L    AST 18 0 - 45 U/L    ALT 32 0 - 50 U/L    Protein Total 6.2 (L) 6.8 - 8.8 g/dL    Albumin 3.5 3.4 - 5.0 g/dL    Bilirubin Total 0.4 0.2 - 1.3 mg/dL    GFR Estimate 77 >60 mL/min/1.73m2   INR     Status: Normal   Result Value Ref Range    INR 1.01 0.85 - 1.15   Partial thromboplastin time     Status: Normal   Result Value Ref Range    aPTT 26 22 - 38 Seconds   [place] [facility]     Status: None    Narrative    The following orders were created for panel order [place] [facility].  Procedure                               Abnormality         Status                     ---------                               -----------         ------                     Extra Red Top Tube[[id number]]                               Final result               Extra Green Top (Lithium...[[id number]]                      Final result               Extra Purple Top Tube[[id number]]                            Final result                 Please view results for these tests on the individual orders.   CBC with platelets and differential     Status: Abnormal   Result Value Ref Range    WBC Count 6.6 4.0 - 11.0 10e3/uL    RBC Count 3.50 (L) 3.80 - 5.20 10e6/uL    Hemoglobin 10.8 (L) 11.7 - 15.7 g/dL    Hematocrit 34.0 (L) 35.0 - 47.0 %    MCV 97 78 - 100 fL    MCH  30.9 26.5 - 33.0 pg    MCHC 31.8 31.5 - 36.5 g/dL    RDW 16.0 (H) 10.0 - 15.0 %    Platelet Count 198 150 - 450 10e3/uL    % Neutrophils 49 %    % Lymphocytes 31 %    % Monocytes 17 %    % Eosinophils 1 %    % Basophils 2 %    % Immature Granulocytes 0 %    NRBCs per 100 WBC 0 <1 /100    Absolute Neutrophils 3.2 1.6 - 8.3 10e3/uL    Absolute Lymphocytes 2.0 0.8 - 5.3 10e3/uL    Absolute Monocytes 1.1 0.0 - 1.3 10e3/uL    Absolute Eosinophils 0.1 0.0 - 0.7 10e3/uL    Absolute Basophils 0.2 0.0 - 0.2 10e3/uL    Absolute Immature Granulocytes 0.0 <=0.4 10e3/uL    Absolute NRBCs 0.0 10e3/uL   Extra Red Top Tube     Status: None   Result Value Ref Range    Hold Specimen JIC    Extra Green Top (Lithium Heparin) Tube     Status: None   Result Value Ref Range    Hold Specimen JIC    Extra Purple Top Tube     Status: None   Result Value Ref Range    Hold Specimen JIC    UA with Microscopic reflex to Culture     Status: Normal    Specimen: Urine, Clean Catch   Result Value Ref Range    Color Urine Straw Colorless, Straw, Light Yellow, Yellow    Appearance Urine Clear Clear    Glucose Urine Negative Negative mg/dL    Bilirubin Urine Negative Negative    Ketones Urine Negative Negative mg/dL    Specific Gravity Urine 1.004 1.003 - 1.035    Blood Urine Negative Negative    pH Urine 7.0 5.0 - 7.0    Protein Albumin Urine Negative Negative mg/dL    Urobilinogen Urine Normal Normal, 2.0 mg/dL    Nitrite Urine Negative Negative    Leukocyte Esterase Urine Negative Negative    RBC Urine <1 <=2 /HPF    WBC Urine 1 <=5 /HPF    Transitional Epithelials Urine <1 <=1 /HPF    Narrative    Urine Culture not indicated   Asymptomatic COVID-19 Virus (Coronavirus) by PCR Nose     Status: Normal    Specimen: Nose; Swab   Result Value Ref Range    SARS CoV2 PCR Negative Negative, Testing sent to reference lab. Results will be returned via unsolicited result    Narrative    Testing was performed using the Xpert Xpress SARS-CoV-2 Assay on  the  [employer]. Additional information about  this Emergency Use Authorization (EUA) assay can be found via the Lab  Guide. This test should be ordered for the detection of SARS-CoV-2 in  individuals who meet SARS-CoV-2 clinical and/or epidemiological  criteria. Test performance is unknown in asymptomatic patients. This  test is for in vitro diagnostic use under the FDA EUA for  laboratories certified under CLIA to perform high complexity testing.  This test has not been FDA cleared or approved. A negative result  does not rule out the presence of PCR inhibitors in the specimen or  target RNA in concentration below the limit of detection for the  assay. The possibility of a false negative should be considered if  the patient's recent exposure or clinical presentation suggests  COVID-19. This test was validated by the [facility]. This laboratory is certified under  the Clinical Laboratory Improvement Amendments of 1988 (CLIA-88) as  qualified to perform high complexity laboratory testing.     Vitamin B12     Status: Normal   Result Value Ref Range    Vitamin B12 781 193 - 986 pg/mL   Comprehensive metabolic panel     Status: Abnormal   Result Value Ref Range    Sodium 138 133 - 144 mmol/L    Potassium 4.0 3.4 - 5.3 mmol/L    Chloride 104 94 - 109 mmol/L    Carbon Dioxide (CO2) 28 20 - 32 mmol/L    Anion Gap 6 3 - 14 mmol/L    Urea Nitrogen 10 7 - 30 mg/dL    Creatinine 0.77 0.52 - 1.04 mg/dL    Calcium 9.2 8.5 - 10.1 mg/dL    Glucose 92 70 - 99 mg/dL    Alkaline Phosphatase 66 40 - 150 U/L    AST 17 0 - 45 U/L    ALT 29 0 - 50 U/L    Protein Total 6.0 (L) 6.8 - 8.8 g/dL    Albumin 3.3 (L) 3.4 - 5.0 g/dL    Bilirubin Total 0.5 0.2 - 1.3 mg/dL    GFR Estimate 81 >60 mL/min/1.73m2   Magnesium     Status: Normal   Result Value Ref Range    Magnesium 1.9 1.6 - 2.3 mg/dL   Phosphorus     Status: Normal   Result Value Ref Range    Phosphorus 3.1  2.5 - 4.5 mg/dL   INR     Status: Normal   Result Value Ref Range    INR 1.00 0.85 - 1.15   Lactic acid whole blood     Status: Abnormal   Result Value Ref Range    Lactic Acid 0.6 (L) 0.7 - 2.0 mmol/L   Lipid Profile     Status: Abnormal   Result Value Ref Range    Cholesterol 219 (H) <200 mg/dL    Triglycerides 75 <150 mg/dL    Direct Measure HDL 77 >=50 mg/dL    LDL Cholesterol Calculated 127 (H) <=100 mg/dL    Non HDL Cholesterol 142 (H) <130 mg/dL    Narrative    Cholesterol  Desirable:  <200 mg/dL    Triglycerides  Normal:  Less than 150 mg/dL  Borderline High:  150-199 mg/dL  High:  200-499 mg/dL  Very High:  Greater than or equal to 500 mg/dL    Direct Measure HDL  Female:  Greater than or equal to 50 mg/dL   Male:  Greater than or equal to 40 mg/dL    LDL Cholesterol  Desirable:  <100mg/dL  Above Desirable:  100-129 mg/dL   Borderline High:  130-159 mg/dL   High:  160-189 mg/dL   Very High:  >= 190 mg/dL    Non HDL Cholesterol  Desirable:  130 mg/dL  Above Desirable:  130-159 mg/dL  Borderline High:  160-189 mg/dL  High:  190-219 mg/dL  Very High:  Greater than or equal to 220 mg/dL   Hemoglobin A1c     Status: Normal   Result Value Ref Range    Hemoglobin A1C 5.5 0.0 - 5.6 %   CBC with platelets and differential     Status: Abnormal   Result Value Ref Range    WBC Count 5.5 4.0 - 11.0 10e3/uL    RBC Count 3.59 (L) 3.80 - 5.20 10e6/uL    Hemoglobin 11.0 (L) 11.7 - 15.7 g/dL    Hematocrit 34.1 (L) 35.0 - 47.0 %    MCV 95 78 - 100 fL    MCH 30.6 26.5 - 33.0 pg    MCHC 32.3 31.5 - 36.5 g/dL    RDW 15.9 (H) 10.0 - 15.0 %    Platelet Count 201 150 - 450 10e3/uL    % Neutrophils 44 %    % Lymphocytes 34 %    % Monocytes 19 %    % Eosinophils 1 %    % Basophils 2 %    % Immature Granulocytes 0 %    NRBCs per 100 WBC 0 <1 /100    Absolute Neutrophils 2.4 1.6 - 8.3 10e3/uL    Absolute Lymphocytes 1.9 0.8 - 5.3 10e3/uL    Absolute Monocytes 1.0 0.0 - 1.3 10e3/uL    Absolute Eosinophils 0.1 0.0 - 0.7 10e3/uL     Absolute Basophils 0.1 0.0 - 0.2 10e3/uL    Absolute Immature Granulocytes 0.0 <=0.4 10e3/uL    Absolute NRBCs 0.0 10e3/uL   CBC with platelets differential     Status: Abnormal    Narrative    The following orders were created for panel order CBC with platelets differential.  Procedure                               Abnormality         Status                     ---------                               -----------         ------                     CBC with platelets and d...[[id number]]  Abnormal            Final result                 Please view results for these tests on the individual orders.   CBC with Platelets & Differential     Status: Abnormal    Narrative    The following orders were created for panel order CBC with Platelets & Differential.  Procedure                               Abnormality         Status                     ---------                               -----------         ------                     CBC with platelets and d...[[id number]]  Abnormal            Final result                 Please view results for these tests on the individual orders.       Signed:  LING Fitch CNP  June 7, 2022 at 6:51 AM

## 2022-06-07 NOTE — PROGRESS NOTES
"Shift: 0700 to 1500  VS: /69 (BP Location: Right arm, Patient Position: Semi-Harman's, Cuff Size: Adult Regular)   Pulse 84   Temp 98.3  F (36.8  C) (Oral)   Resp 18   Ht 1.702 m (5' 7\")   Wt 56.7 kg (125 lb)   SpO2 95%   BMI 19.58 kg/m    Pain: HA 5/10, using Tylenol  Neuro: \"Tiny acute infarct\"  Cardiac: WNL  Respiratory: WNL  Diet/Appetite: WNL   /GI: WNL  LDA's: PIV in rt AC   Skin: Intact   Activity: Independent baseline, SBA with walker presently  Tests/Procedures: CTA done at 1400     Plan: Possible TCU per PT  "

## 2022-06-07 NOTE — PROGRESS NOTES
Emergency Medicine Observation Attending note    The patient was independently seen and examined by me. The chart, vital signs, and labs were reviewed. The patient's findings were discussed with the SHERYL on the observation unit, and I agree with the findings of the note and the plan.    72 year old female who has a history of metastatic follicular non-Hodgkin's lymphoma who was diagnosed with a subdural hematoma 2 days prior to presentation, presents with complaint of some increased balance issue since discharging. She reports falling but denies any injury. Head CT in the ED was stable. She was admitted to ED OBS for further monitoring/eval. Neuro saw and recommended MRI, which was ordered. MRI showed acute stroke as well as small intraparynchymal bleed. Neuro contacted, and pt will be transferred to stroke service.

## 2022-06-07 NOTE — PROGRESS NOTES
[facility], [place]   Neurosurgery Progress Note:    Date of service: 6/7/2022    Assessment: 72 year old female presenting with ongoing headache, dizziness, and confusion with known right convexity subdural hematoma, now with occipital IPH and surrounding SAH.     Clinically Significant Risk Factors Present on Admission            # Hypoalbuminemia: Albumin = 3.3 g/dL (Ref range: 3.4 - 5.0 g/dL) on admission, will monitor as appropriate          Neurology: # Compression of brain          Plan:  - No neurosurgical intervention warranted at this time.  Will follow-up on CTA head/neck  - Remainder of cares per primary team, neurosurgery will continue to follow.         LING Murphy, CNP  6/7/2022  Department of Neurosurgery  Pager: [phone]    I have spent a total of 25 minutes total time counseling, coordination, and chart review, over 50% of the time was spent in direct patient care.       Interval History:  Head CT obtained yesterday originally read as stable, MRI completed overnight demonstrated right occipital lobe IPH with surrounding SAH.  Patient awaiting vessel imaging.  Evaluated by PT and OT who are recommending ARU.  Continues to note frontal headache.           Objective:   Temp:  [97.7  F (36.5  C)-98.7  F (37.1  C)] 98.3  F (36.8  C)  Pulse:  [80-85] 84  Resp:  [16-18] 18  BP: (102-124)/(37-69) 124/69  SpO2:  [95 %-100 %] 95 %  No intake/output data recorded.    Gen: Appears comfortable, NAD  Neurologic:  - Alert & Oriented to person, place, time, and situation  - Follows commands briskly  - Speech fluent, spontaneous. No aphasia or dysarthria.  - No gaze preference. No apparent hemineglect.  - PERRL, EOMI  - Strong eye closure, jaw clench, and cheek puff  - Face symmetric with sensation intact to light touch  - Palate elevates symmetrically, uvula midline, tongue protrudes midline  - Trapezii and sternocleidomastoid muscles 5/5 bilaterally  - No pronator drift      Del Tr Bi WE WF Gr   R 5 5 5 5 5 5   L 5 5 5 5 5 5    HF KE KF DF PF EHL   R 5 5 5 5 5 5   L 5 5 5 5 5 5     Reflexes 2+ throughout    Sensation intact and symmetric to light touch throughout    LABS  Recent Labs   Lab Test 06/07/22  0541 06/06/22  1230 06/05/22  0601   WBC 5.5 6.6 5.6   HGB 11.0* 10.8* 10.2*   MCV 95 97 96    198 194       Recent Labs   Lab Test 06/07/22  0802 06/07/22  0541 06/06/22  1230 06/05/22  0601   NA  --  138 137 142   POTASSIUM  --  4.0 4.1 4.2   CHLORIDE  --  104 102 109   CO2  --  28 32 28   BUN  --  10 12 14   CR  --  0.77 0.80 0.79   ANIONGAP  --  6 3 5   FLORENCIA  --  9.2 9.2 8.8   GLC 89 92 103* 92       IMAGING    Recent Results (from the past 24 hour(s))   CT Head w/o Contrast    Narrative    Exam: CT HEAD W/O CONTRAST  6/6/2022    History: Headache, intracranial hemorrhage suspected; Headache, new or  worsening (Age >= 50y).    Comparison:  CT head exams dated 6/5/2022.    Technique: Using multidetector thin collimation helical acquisition  technique, axial, coronal and sagittal CT images from the skull base  to the vertex were obtained without intravenous contrast.     Findings:    Unchanged subdural hemorrhage over the right cerebral convexity  measuring up to 9 mm in maximum thickness with extension along the  falx and tentorial leaflets, right greater than left. Unchanged mass  effect on the right cerebral hemisphere with partial effacement of the  right cerebral sulci and 3 mm leftward midline shift at the septum  pellucidum. No new intracranial hemorrhage. Unchanged leukoaraiosis.  The gray-white differentiation of the cerebral hemispheres is  preserved. Basal cisterns are patent.    No acute osseous change. The paranasal sinuses and mastoid air cells  are clear.      Impression    Impression:   Unchanged right convexity subdural hemorrhage measuring up to 9 mm  thick with partial effacement of the right cerebral sulci and 3 mm  leftward midline shift. No new intracranial  findings.    I have personally reviewed the examination and initial interpretation  and I agree with the findings.    AMANDO ASHLEY MD         SYSTEM ID:  [id number]   MR Brain w/o & w Contrast   Result Value    Radiologist flags Acute intracranial hemorrhage (AA)    Narrative    MR BRAIN W/O & W CONTRAST 6/7/2022 2:59 AM    Provided History: Dizziness, non-specific    Comparison:  CT head dated 6/6/2022. MRI dated 8/6/2016.     Technique: Sagittal T1-weighted, coronal T2-weighted, axial T2 FLAIR,  axial susceptibility weighted, and axial diffusion-weighted with ADC  map images of the brain were obtained without intravenous contrast.  Postcontrast T1 axial and coronal of the brain were also obtained.    Findings:     Acute focus of increased signal on DWI with corresponding decreased  signal on ADC in the right parahippocampal gyrus compatible with acute  infarction (series 3, image 53). No other convincing foci of diffusion  restriction. No definite corresponding FLAIR change or enhancement on  postcontrast images.    Subdural hematoma along the right cerebral convexity measures up to 8  cm posteriorly, similar to prior head CT. 1.8 cm area of  susceptibility artifact in the right occipital lobe representing a  focal intraparenchymal hemorrhage. Increased FLAIR signal along the  cerebral sulci in this region representing subarachnoid blood. Trace  leftward shift at the level of the septum pellucidum.    Moderate diffuse periventricular T2/FLAIR hyperintensities  representing chronic small vessel ischemic disease.    The ventricles and sulci are normal for age. No abnormality of reduced  diffusion.  Normal intravascular flow voids.      Impression    Impression:   1. Focus of diffusion restriction in the posterior right  parahippocampal gyrus compatible with tiny acute infarct.  2. Right occipital lobe parenchymal hemorrhage/contusion measuring up  to 1.8 cm with surrounding edema and subarachnoid blood.  3. Stable  subdural hematoma along the right cerebral convexity  measuring up to 8 mm posteriorly.    [Critical Result: Acute intracranial hemorrhage]    Finding was identified on 2022 3:46 AM.     Cari Anguiano was contacted by Dr. Blanton on 2022 3:59 AM and  verbalized understanding of the critical result.    I have personally reviewed the examination and initial interpretation  and I agree with the findings.    LUIS MIGUEL SCHWARTZ MD         SYSTEM ID:  [id number]   Echocardiogram Complete   Result Value    LVEF  60-65%    Narrative    [id number]  [id number]  [id number]  [id number]^CLEM^CARI^GILSON     [facility],[place]  Echocardiography Laboratory  [address] [place] [address]     Name: RUBY ANG  MRN: [id number]  : 1950  Study Date: 2022 09:41 AM  Age: 72 yrs  Gender: Female  Patient Location: [facility]  Reason For Study: CVA, Syncope and Collapse  Ordering Physician: CARI ANGUIANO  Performed By: Johana Song RDCS     BSA: 1.7 m2  Height: 67 in  Weight: 126 lb  BP: 124/69 mmHg  ______________________________________________________________________________  Procedure  Echocardiogram with two-dimensional, color and spectral Doppler performed.  ______________________________________________________________________________  Interpretation Summary  Global and regional left ventricular function is normal with an EF of 60-65%.  The right ventricle is normal size. Global right ventricular function is  normal.  Aortic valve sclerosis is present. Mild to moderate aortic insufficiency is  present.  IVC diameter and respiratory changes fall into an intermediate range  suggesting an RA pressure of 8 mmHg.     There is no prior study for direct comparison.  ______________________________________________________________________________  Left Ventricle  Left ventricular wall thickness is normal. Left ventricular size is normal.  Global and regional left ventricular  function is normal with an EF of 60-65%.  Left ventricular diastolic function is indeterminate. No regional wall motion  abnormalities are seen.     Right Ventricle  The right ventricle is normal size. Global right ventricular function is  normal.     Atria  Both atria appear normal.     Mitral Valve  Mild mitral annular calcification is present. Trace mitral insufficiency is  present.     Aortic Valve  Aortic valve sclerosis is present. Mild to moderate aortic insufficiency is  present.     Tricuspid Valve  The tricuspid valve is normal. Trace tricuspid insufficiency is present. Right  ventricular systolic pressure is 16mmHg above the right atrial pressure.  Pulmonary artery systolic pressure is normal.     Pulmonic Valve  The valve leaflets are not well visualized. Trace pulmonic insufficiency is  present.     Vessels  IVC diameter and respiratory changes fall into an intermediate range  suggesting an RA pressure of 8 mmHg.     Pericardium  No pericardial effusion is present.     Compared to Previous Study  There is no prior study for direct comparison.  ______________________________________________________________________________  MMode/2D Measurements & Calculations  IVSd: 0.81 cm  LVIDd: 4.3 cm  LVIDs: 3.0 cm  LVPWd: 0.92 cm  FS: 29.8 %  LV mass(C)d: 117.5 grams  LV mass(C)dI: 70.7 grams/m2  LVOT diam: 2.1 cm  LVOT area: 3.4 cm2  LA Volume Index (BP): 23.2 ml/m2  RWT: 0.42     Doppler Measurements & Calculations  MV E max kaz: 80.7 cm/sec  MV A max kaz: 82.9 cm/sec  MV E/A: 0.97  MV dec slope: 424.7 cm/sec2  MV dec time: 0.19 sec  Ao V2 max: 160.6 cm/sec  Ao max PG: 10.3 mmHg  Ao V2 mean: 110.6 cm/sec  Ao mean P.6 mmHg  Ao V2 VTI: 35.4 cm  AISHA(I,D): 2.3 cm2  AISHA(V,D): 2.3 cm2  LV V1 max P.8 mmHg  LV V1 max: 109.9 cm/sec  LV V1 VTI: 24.0 cm  SV(LVOT): 81.8 ml  SI(LVOT): 49.3 ml/m2  TR max kaz: 204.2 cm/sec  TR max P.7 mmHg  AV Kaz Ratio (DI): 0.68  AISHA Index (cm2/m2): 1.4  Medial E/e': 16.3      ______________________________________________________________________________  Report approved by: rBayan Caceres 06/07/2022 11:07 AM

## 2022-06-07 NOTE — PLAN OF CARE
Status: Pt transferred from OBS,  R occiptal lobe IPH with surrounding subarachnoid blood. Hx of breast CA.  Vitals: WNL  Neuros: Oriented x3-4. MAEE. Pt looses track to directions. Forgetfull  IV: IV SL.  Labs/Electrolytes: WNL  Resp/trach: Sats 96% on room air.   Diet: Regular, Pt passed bedside swallow.   Bowel status: voiding spont  Skin: bruising throughout.   Pain: HA relieved with tylenol  Activity: SBA of one, unsteady.   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  Plan: continue POC.  Updates this shift: IV SL, advanced diet. PT worked with Pt. Cont POC.

## 2022-06-07 NOTE — PLAN OF CARE
"Observation goals - trauma closed head injury        - Occupational Therapy Traumatic Brain Injury Screen with outpatient treatment plan if needed: Not met   - Vital Signs normal or at patient baseline:   . /65 (BP Location: Right arm, Patient Position: Supine, Cuff Size: Adult Regular)   Pulse 80   Temp 98.1  F (36.7  C) (Oral)   Resp 18   Ht 1.702 m (5' 7\")   Wt 56.7 kg (125 lb)   SpO2 99%   BMI 19.58 kg/m      - Adequate pain control on oral analgesia: Denies pain   - Tolerating oral intake to maintain hydration. In progress  - Diagnostic tests and consults completed and resulted: Not met  - Cleared for discharge from consultants' standpoint if involved in care:  Not met   - Return to baseline functional status: Not met   - Safe disposition plan has been identified: Not met   .                 "

## 2022-06-07 NOTE — CONSULTS
"[facility]  Neurology Consultation    Patient Name:  Lilly Babcock  MRN:  [id number]    :  1950  Date of Service:  2022  Primary care provider:  Talya Rodas      Neurology consultation service was asked to see Lilly Babcock by Penelope Rees APRN CNP to evaluate \"SDH, left sided neglect, increased clumsiness, confusion.\"    Chief Complaint:  unsteadiness    History of Present Illness:   Lilly Babcock is a 72 year old female with history of metastatic follicular non-Hodgkin's lymphoma currently on lenalidomide and rituximab, recent traumatic right sided subdural hematoma admitted for observation for ~9 hours who presents with headache and continued dizziness since discharge yesterday. Neurology was consulted to rule out other etiology to explain symptoms.     Patient reports that she has been experiencing symptoms of \"unsteadiness\" and bifrontal headache since her initial presentation on 22, and it was present on discharge yesterday. It was suspected yesterday that she sustained a traumatic subdural hematoma after hitting her head on a cabinet in the bathroom. Notes that when she was leaving the ED she was bumping into things and having to use the wall as a guide to maintain her gait. When she got home symptoms persisted. She did try Tylenol for headache, but notes no improvement.  Again headache is described as bifrontal tense sensation, associated with soreness with eye movements.  Denies any nausea, vomiting, photophobia, phonophobia.  Does endorse having an aura prior to onset of headache, described as squiggly lines in her vision.  Feels as though headache may be similar to prior migraine history, but duration is longer than usual.     Regarding unsteadiness, patient reports that she is clumsy at baseline however this seems to be worse since hitting her head.  An example she provides is leaning down with a water bottle for her cat and she " tipped over due to inability to maintain her balance.  Denies sensation of lightheadedness, vertigo, world spinning.  Also denies any diplopia or blurry vision.  Denies unilateral weakness, sensory changes, tinnitus.    ROS  A comprehensive ROS was performed and pertinent findings were included in HPI.     PMH  Past Medical History:   Diagnosis Date     Asthma      Degenerative joint disease      Depression      Eczema      Lymphoma, follicular (H) 8/2008 diagnosed    Stage YOUSUF - bone mets to L greater trochanter     Nasal congestion      Seasonal allergies      Past Surgical History:   Procedure Laterality Date     ARTHROPLASTY HIP Left 10/18/2016    Procedure: ARTHROPLASTY HIP;  Surgeon: Chepe Peterson MD;  Location: [facility] OR     BIOPSY BONE CLAVICLE  3/1/2012    Procedure:BIOPSY BONE CLAVICLE; Biopsy Left Clavicle, Bilateral Hip Injections; Surgeon:JENNY BELTRAN; Location:[facility] OR     BREAST BIOPSY, RT/LT      left     DILATION AND CURETTAGE       HAND SURGERY      right flexor tendon laceration repair     INJECT STEROID (LOCATION)  3/1/2012    Procedure:INJECT STEROID (LOCATION); Surgeon:JENNY BELTRAN; Location:[facility] OR     LAPAROSCOPY DIAGNOSTIC (GYN)         Medications   I have personally reviewed the patient's medication list.     Allergies  I have personally reviewed the patient's allergy list.     Social History  Social History     Socioeconomic History     Marital status: Single     Spouse name: None     Number of children: None     Years of education: None     Highest education level: None   Tobacco Use     Smoking status: Never Smoker     Smokeless tobacco: Never Used   Substance and Sexual Activity     Alcohol use: Not Currently     Alcohol/week: 5.8 standard drinks     Drug use: No     Sexual activity: Not Currently     Birth control/protection: None     Social Determinants of Health     Intimate Partner Violence: Not At Risk     Fear of Current or Ex-Partner: No     Emotionally Abused: No      "Physically Abused: No     Sexually Abused: No     Family History    Family History   Problem Relation Age of Onset     Depression Mother      Mental Illness Mother      Cancer Mother         breast     Other Cancer Mother      Diabetes Father      Cancer Father         lung ca, was a smoker     Other Cancer Father      Abdominal Aortic Aneurysm Father      Asthma Sister      Depression Sister      Mental Illness Sister      Asthma Sister      Depression Sister      Breast Cancer Sister      Liver Disease Sister        Physical Examination   Vitals: /62 (BP Location: Right arm, Patient Position: Supine, Cuff Size: Adult Regular)   Pulse 81   Temp 97.7  F (36.5  C) (Oral)   Resp 16   Ht 1.702 m (5' 7\")   Wt 56.7 kg (125 lb)   SpO2 98%   BMI 19.58 kg/m    General: very pleasant, sitting up in bed, NAD  Head: NC/AT  Eyes: no icterus, op pink and moist  Cardiac: regular rate   Respiratory: non-labored on RA  GI: non-tender  Skin: scattered bruised on upper extremities  Psych: Mood pleasant, affect congruent  Neuro:  Mental status: Awake, alert, attentive, oriented to self, time, place, and circumstance. Language is slow but fluent and coherent with intact comprehension of complex commands, naming and repetition.  Cranial nerves: VFF, PERRL, conjugate gaze, EOMI, facial sensation intact, face symmetric, shoulder shrug strong, tongue/uvula midline, no dysarthria.   Motor: Normal bulk and tone. No abnormal movements. 5/5 strength bilaterally in deltoids, biceps, triceps, hand , hip flexors, hip extensors, knee flexion, knee extension, plantarflexion, dorsiflexion.   Reflexes: Normo-reflexic and symmetric biceps, brachioradialis, triceps, patellae, and achilles. Negative Aaron, no clonus, toes down-going.  Sensory: Intact to light touch in proximal and distal aspects of all 4 extremities   Coordination: FNF and HS without ataxia or dysmetria. Rapid alternating movements intact.   Gait: Normal width, stride " length, turn, and arm swing. Unable to walk in tandem due to unsteadiness, veering most often to the left side.    Investigations   I have personally reviewed pertinent labs, tests, and radiological imaging. Discussion of notable findings is included under Impression.     CT HEAD W/O CONTRAST  6/6/2022  Impression:   Unchanged right convexity subdural hemorrhage measuring up to 9 mm  thick with partial effacement of the right cerebral sulci and 3 mm  leftward midline shift. No new intracranial findings.    Was patient transferred from outside hospital?   No    Impression  Lilly Babcock is a 72 year old female with history of metastatic follicular non-Hodgkin's lymphoma currently on lenalidomide and rituximab who presents to the ED today with chief complaint of headache and unsteadiness.  Of note patient was admitted yesterday for a period of observation after found to have traumatic subdural hematoma on the right hemisphere.  Patient notes when she was discharged home she continued to have symptoms, which prompted her re-presentation.  Neurologic exam today is surprisingly unremarkable.  There is no ataxia or dysmetria on finger-nose-finger or heel-to-shin testing, strength is equal throughout, gait is steady although unable to perform tandem walking.  This is not unexpected for someone of her age.     Differentials at this time include residual deficits from known subdural hematoma, postconcussive symptoms, complex migraine, orthostasis or presyncope, posterior circulation stroke, nutritional deficiency. Will recommend obtaining an MRI brain to rule out a posterior circulation stroke that was possibly not well visualized on CT head.  If this is negative for acute pathology, recommend treating patient symptomatically for migraine. Also recommend additional work up as below.     Recommendations   - Obtain MRI brain with and without contrast (ordered for you)  - If negative, recommend treating headache  symptomatically with IVF, compazine and magnesium.  - Serum vitamin B12 level (ordered for you)  - Orthostatic vitals  - Syncope work up per medicine team  - PT/OT    Thank you for involving Neurology in the care of Lilly Babcock.  Please do not hesitate to call with questions/concerns (consult pager [phone]).      Patient was discussed with Dr. Roldan.    Dayan Fraser MD  Neurology Resident

## 2022-06-08 NOTE — PROGRESS NOTES
[facility]    Stroke Progress Note    Interval EventsNo acute events overnight.     HPI Summary  Lilly Babcock is a 72 year old female with hx of metastatic follicular non-Hodgkin's lymphoma on Lenalidomide and rituximab initially presented on 6/4/2022 to [facility] ED with bilateral visual aura and excruciating headache which prompted ED visit. During evaluation she was found to have acute R SDH and was admitted under NSG service and was discharged home on 6/5/2022 after stability radiologically. Pt reports  that she hit her head on a cabinet while trying to flush toilet but did not loose consciousness. She was discharged home with recommendation to follow up in clinic in 1 week.        She came to ER again on 6/6/2022 with complains of worsening headache and balance issues at home. She has been having difficulty keeping her balance and has been bumping into things.        On talking to sister who is a physician reports that she witnessed the episode of trauma and she reports it was a light fall and hit. I showed images to pt and sister Dr Babcock. I explained to her about the next step of management. Dr Babcock inquired about surgery. I have asked her that we will get NSG to come and talk to her about their plan. Dr Babcock did mention  that pt has been seeing a neurologist as outpatient and mentioned something about abnormal vessels.        CT head 6/6/2022 showed   Unchanged right convexity subdural hemorrhage measuring up to 9 mm   thick with partial effacement of the right cerebral sulci and 3 mm   leftward midline shift. No new intracranial findings. It also showed a small punctate hyperdenisity in R occipital lobe.      Impression   72 year old female with hx of metastatic follicular non-Hodgkin's lymphoma on Lenalidomide and rituximab admitted with traumatic R cerebral convexity SDH c/b 2-3mm midline shift, R posterior parahippocampal gyrus punctate  infarct, and R occipital lobe IPH.     Plan  #Traumatic R SDH  #R occipital lobe IPH.   #Cerebral edema  - NSGY consulted already for SDH, appreciate recommendations  - Neurochecks Q 4 hours  - Systolic BP Goal: < 140  - Avoid hypotonic IV fluids  - Head of bed elevated  - Continue Keppra 500 mg BID for 7 day course    #R parahippocampal gyrus punctate infarct  Unclear etiology in the setting of traumatic SDH. L PCA territory appears to be intact on CTA. . A1c 5.5. Echo w/ LVEF 55% and no intracardiac thrombus/valvular disease. No afib detected. Considering possibility that R occipital IPH is a hemorraghic transformation of a L PCA infarct. CTA completed w/o evidence of focal stenosis or pLVO.   - Telemetry  - Bedside Glucose Monitoring  - Atorvastatin 40mg daily, for LDL goal <70  - PT/OT  - Stroke Education  - Euthermia, Euglycemia    #Follicular lymphoma  -on lenalidomide, rituxumab/revlimid  -hematology consulted     #Migraine w/ aura    Checklist  Diet: Regular diet  DVT Prophylaxis: Low Risk/Ambulatory with no VTE prophylaxis indicated  Feldman Catheter: Not present  Code Status: Full    The patient was discussed with Stroke Staff Dr. Reese.    Damian Hall MD  Neurology Resident  ______________________________________________________    Clinically Significant Risk Factors Present on Admission             # Hypoalbuminemia: Albumin = 3.3 g/dL (Ref range: 3.4 - 5.0 g/dL) on admission, will monitor as appropriate         Medications   Scheduled Meds    levETIRAcetam  500 mg Oral or NG Tube BID     sodium chloride (PF)  3 mL Intracatheter Q8H       Infusion Meds    - MEDICATION INSTRUCTIONS -       - MEDICATION INSTRUCTIONS -         PRN Meds  acetaminophen, hydrALAZINE, labetalol, lidocaine 4%, lidocaine (buffered or not buffered), - MEDICATION INSTRUCTIONS -, - MEDICATION INSTRUCTIONS -, ondansetron **OR** ondansetron, prochlorperazine **OR** prochlorperazine **OR** prochlorperazine, sodium  chloride (PF)       PHYSICAL EXAMINATION  Temp:  [97.9  F (36.6  C)-98.8  F (37.1  C)] 98.2  F (36.8  C)  Pulse:  [68-86] 78  Resp:  [12-18] 14  BP: (110-131)/(55-72) 119/71  SpO2:  [95 %-99 %] 99 %      General: very pleasant, sitting up in bed, NAD  Head: NC/AT  Eyes: no icterus, op pink and moist  Cardiac: regular rate   Respiratory: non-labored on RA  GI: non-tender  Skin: scattered bruised on upper extremities  Psych: Mood pleasant, affect congruent  Neuro:  Mental status: Awake, alert, attentive, oriented to self, time, place, and circumstance. Language is slow but fluent and coherent with intact comprehension of complex commands, naming and repetition.  Cranial nerves: VFF, PERRL, conjugate gaze, EOMI, facial sensation intact, face symmetric, shoulder shrug strong, tongue/uvula midline, no dysarthria.   Motor: Normal bulk and tone. No abnormal movements. 5/5 strength bilaterally in deltoids, biceps, triceps, hand , hip flexors, hip extensors, knee flexion, knee extension, plantarflexion, dorsiflexion.   Reflexes: Normo-reflexic and symmetric biceps, brachioradialis, triceps, patellae, and achilles. Negative Aaron, no clonus, toes down-going.  Sensory: Intact to light touch in proximal and distal aspects of all 4 extremities   Coordination: FNF and HS without ataxia or dysmetria. Rapid alternating movements intact.   Gait: Normal width, stride length, turn, and arm swing. Unable to walk in tandem due to unsteadiness, veering most often to the left side.     Dysphagia Screen  Passed screening, no dysarthria - Regular Diet with thin liquids  06/07/2022      Stroke Scales     NIHSS  1a. Level of Consciousness 0-->Alert, keenly responsive   1b. LOC Questions 0-->Answers both questions correctly   1c. LOC Commands 0-->Performs both tasks correctly   2.   Best Gaze 0-->Normal   3.   Visual 0-->No visual loss   4.   Facial Palsy 0-->Normal symmetrical movements   5a. Motor Arm, Left 0-->No drift, limb holds 90  (or 45) degrees for full 10 secs   5b. Motor Arm, Right 0-->No drift, limb holds 90 (or 45) degrees for full 10 secs   6a. Motor Leg, Left 0-->No drift, leg holds 30 degree position for full 5 secs   6b. Motor Leg, right 0-->No drift, leg holds 30 degree position for full 5 secs   7.   Limb Ataxia 0-->Absent   8.   Sensory 0-->Normal, no sensory loss   9.   Best Language 0-->No aphasia, normal   10. Dysarthria 0-->Normal   11. Extinction and Inattention  0-->No abnormality   Total 0 (06/07/22 0523)     Imaging  I personally reviewed all imaging; relevant findings per HPI.     Lab Results Data   CBC  Recent Labs   Lab 06/07/22  0541 06/06/22 1230 06/05/22  0601   WBC 5.5 6.6 5.6   RBC 3.59* 3.50* 3.28*   HGB 11.0* 10.8* 10.2*   HCT 34.1* 34.0* 31.5*    198 194     Basic Metabolic Panel    Recent Labs   Lab 06/07/22  2337 06/07/22 2013 06/07/22  1811 06/07/22  0802 06/07/22  0541 06/06/22  1230 06/05/22  0601   NA  --   --   --   --  138 137 142   POTASSIUM  --   --   --   --  4.0 4.1 4.2   CHLORIDE  --   --   --   --  104 102 109   CO2  --   --   --   --  28 32 28   BUN  --   --   --   --  10 12 14   CR  --   --   --   --  0.77 0.80 0.79   * 137* 243*   < > 92 103* 92   FLORENCIA  --   --   --   --  9.2 9.2 8.8    < > = values in this interval not displayed.     Liver Panel  Recent Labs   Lab 06/07/22 0541 06/06/22 1230 06/04/22  2352   PROTTOTAL 6.0* 6.2* 6.9   ALBUMIN 3.3* 3.5 3.9   BILITOTAL 0.5 0.4 0.3   ALKPHOS 66 64 79   AST 17 18 31   ALT 29 32 44     INR    Recent Labs   Lab Test 06/07/22  0541 06/06/22  1230 06/04/22  2352   INR 1.00 1.01 0.94      Lipid Profile    Recent Labs   Lab Test 06/06/22  1230   CHOL 219*   HDL 77   *   TRIG 75     A1C    Recent Labs   Lab Test 06/06/22  1230   A1C 5.5     Troponin I  No results for input(s): TROPONINIS, TROPONINI, GHTROP in the last 168 hours.

## 2022-06-08 NOTE — PROGRESS NOTES
06/08/22 1147   Quick Adds   Type of Visit Initial Occupational Therapy Evaluation   Living Environment   People in Home child(obdulio), adult   Current Living Arrangements house   Home Accessibility stairs to enter home;stairs within home   Number of Stairs, Main Entrance 3   Stair Railings, Main Entrance none   Number of Stairs, Within Home, Primary three;other (see comments)  (has stairs to upstairs but doesn't have to go, a couple stairs in house)   Transportation Anticipated family or friend will provide   Living Environment Comments Pt lives alone, son is home at this time, normally stays on main level. sister is able to assist some   Self-Care   Usual Activity Tolerance excellent   Current Activity Tolerance moderate   Regular Exercise Yes   Activity/Exercise Type   (pilates)   Equipment Currently Used at Home none  (has equipment from previous [facility]ER[employer]DA)   Fall history within last six months yes   Number of times patient has fallen within last six months 1   Activity/Exercise/Self-Care Comment Pt reports being clumsy and some falls per baseline, has some equipment from previous [facility]ERNA[employer]DA   Instrumental Activities of Daily Living (IADL)   Previous Responsibilities meal prep;laundry;housekeeping   IADL Comments sister reports that she will take over financial mgmt as Pt with difficulty with this at this time   General Information   Onset of Illness/Injury or Date of Surgery 06/06/22   Referring Physician Saw Anguiano   Patient/Family Therapy Goal Statement (OT) To get home, get stronger   Additional Occupational Profile Info/Pertinent History of Current Problem 72 year old female presenting with ongoing headache, dizziness, and confusion with known right convexity subdural hematoma, now with occipital IPH and surrounding SAH.   Existing Precautions/Restrictions fall   Limitations/Impairments safety/cognitive   Left Upper Extremity (Weight-bearing Status) full weight-bearing (FWB)   Right Upper Extremity (Weight-bearing  Status) full weight-bearing (FWB)   Left Lower Extremity (Weight-bearing Status) full weight-bearing (FWB)   Right Lower Extremity (Weight-bearing Status) full weight-bearing (FWB)   Cognitive Status Examination   Orientation Status orientation to person, place and time   Affect/Mental Status (Cognitive) WFL   Follows Commands WFL;increased processing time needed;verbal cues/prompting required   Safety Deficit ability to follow commands;impulsivity;problem-solving   Memory Deficit procedural memory   Attention Deficit distractible in quiet environment;concentration;focused/sustained attention;perseverates on recent conversation;requires cues/redirection to task   Cognitive Status Comments able to recall 2/3 words after 5 minutes   Visual Perception   Visual Impairment/Limitations WFL   Sensory   Sensory Quick Adds No deficits were identified   Pain Assessment   Patient Currently in Pain No   Integumentary/Edema   Integumentary/Edema no deficits were identifed   Range of Motion Comprehensive   General Range of Motion no range of motion deficits identified;bilateral upper extremity ROM WFL   Strength Comprehensive (MMT)   General Manual Muscle Testing (MMT) Assessment no strength deficits identified   Coordination   Upper Extremity Coordination No deficits were identified   Bed Mobility   Bed Mobility supine-sit   Supine-Sit [place] (Bed Mobility) supervision   Activities of Daily Living   BADL Assessment/Intervention upper body dressing;bathing;lower body dressing;grooming   Bathing Assessment/Intervention   Comment, (Bathing) Pt with some unsteadiness, difficulty with standing   Lower Body Dressing Assessment/Training   Comment, (Lower Body Dressing) Anticipate requiring min A due to Pt reporting headache with leaning forward   Grooming Assessment/Training   Comment, (Grooming) anticipate requiring cueing due to attention   OT Discharge Planning   OT Discharge Recommendation (DC Rec) Acute Rehab  Center-Motivated patient will benefit from intensive, interdisciplinary therapy.  Anticipate will be able to tolerate 3 hours of therapy per day   OT Rationale for DC Rec Pt with left sided neglect, cognitive deficits would benefit from skilled OT services and rehab stay to promote improved safety with ADL and IADL   Total Evaluation Time (Minutes)   Total Evaluation Time (Minutes) 10   OT Goals   Therapy Frequency (OT) Daily   OT Predicted Duration/Target Date for Goal Attainment 06/17/22   OT Goals Lower Body Dressing;Lower Body Bathing;Home Management;Meal Preparation;Toilet Transfer/Toileting;Cognition   OT: Lower Body Dressing Supervision/stand-by assist;within precautions   OT: Lower Body Bathing Supervision/stand-by assist;with precautions   OT: Toilet Transfer/Toileting Modified independent;cleaning and garment management;toilet transfer   OT: Meal Preparation Supervision/stand-by assist;ambulatory level;with simple meal preparation   OT: Home Management Supervision/stand-by assist;with light demand household tasks;ambulatory level   OT: Cognitive Patient/caregiver will verbalize understanding of cognitive assessment results/recommendations as needed for safe discharge planning

## 2022-06-08 NOTE — PROGRESS NOTES
[facility], [place]   Neurosurgery Progress Note:    Date of service: 6/8/2022    Assessment: 72 year old female presenting with ongoing headache, dizziness, and confusion with known right convexity subdural hematoma, now with occipital IPH and surrounding SAH.     Clinically Significant Risk Factors Present on Admission            # Hypoalbuminemia: Albumin = 3.3 g/dL (Ref range: 3.4 - 5.0 g/dL) on admission, will monitor as appropriate          Neurology: # Compression of brain          Plan:  - No neurosurgical intervention warranted at this time.    - follow-up in one week with repeat head CT in Neurosurgery clinic  - Remainder of cares per primary team, neurosurgery will follow peripherally at this time      LING Murphy, CNP  6/7/2022  Department of Neurosurgery  Pager: [phone]    I have spent a total of 25 minutes total time counseling, coordination, and chart review, over 50% of the time was spent in direct patient care.       Interval History:  CTA head and neck obtained yesterday, no vascular abnormalities noted.  Patient continues to note mild frontal headache, however otherwise feels quite well.          Objective:   Temp:  [97.9  F (36.6  C)-98.8  F (37.1  C)] 98.2  F (36.8  C)  Pulse:  [68-86] 78  Resp:  [12-18] 14  BP: (110-131)/(55-72) 119/71  SpO2:  [95 %-99 %] 99 %  No intake/output data recorded.    Gen: Appears comfortable, NAD  Neurologic:  - Alert & Oriented to person, place, time, and situation  - Follows commands briskly  - Speech fluent, spontaneous. No aphasia or dysarthria.  - No gaze preference. No apparent hemineglect.  - PERRL, EOMI  - Strong eye closure, jaw clench, and cheek puff  - Face symmetric with sensation intact to light touch  - Palate elevates symmetrically, uvula midline, tongue protrudes midline  - Trapezii and sternocleidomastoid muscles 5/5 bilaterally  - No pronator drift     Del Tr Bi WE WF Gr   R 5 5 5 5 5 5   L 5 5 5 5 5 5    HF  KE KF DF PF EHL   R 5 5 5 5 5 5   L 5 5 5 5 5 5     Reflexes 2+ throughout    Sensation intact and symmetric to light touch throughout    LABS  Recent Labs   Lab Test 06/08/22  0701 06/07/22  0541 06/06/22  1230   WBC 8.8 5.5 6.6   HGB 10.9* 11.0* 10.8*   MCV 94 95 97    201 198       Recent Labs   Lab Test 06/08/22  0804 06/08/22  0702 06/07/22  2337 06/07/22  0802 06/07/22  0541 06/06/22  1230   NA  --  139  --   --  138 137   POTASSIUM  --  3.8  --   --  4.0 4.1   CHLORIDE  --  106  --   --  104 102   CO2  --  26  --   --  28 32   BUN  --  12  --   --  10 12   CR  --  0.74  --   --  0.77 0.80   ANIONGAP  --  7  --   --  6 3   FLORENCIA  --  9.2  --   --  9.2 9.2   * 100* 175*   < > 92 103*    < > = values in this interval not displayed.       IMAGING    Recent Results (from the past 24 hour(s))   CT Head w/o Contrast    Narrative    Exam: CT HEAD W/O CONTRAST  6/6/2022    History: Headache, intracranial hemorrhage suspected; Headache, new or  worsening (Age >= 50y).    Comparison:  CT head exams dated 6/5/2022.    Technique: Using multidetector thin collimation helical acquisition  technique, axial, coronal and sagittal CT images from the skull base  to the vertex were obtained without intravenous contrast.     Findings:    Unchanged subdural hemorrhage over the right cerebral convexity  measuring up to 9 mm in maximum thickness with extension along the  falx and tentorial leaflets, right greater than left. Unchanged mass  effect on the right cerebral hemisphere with partial effacement of the  right cerebral sulci and 3 mm leftward midline shift at the septum  pellucidum. No new intracranial hemorrhage. Unchanged leukoaraiosis.  The gray-white differentiation of the cerebral hemispheres is  preserved. Basal cisterns are patent.    No acute osseous change. The paranasal sinuses and mastoid air cells  are clear.      Impression    Impression:   Unchanged right convexity subdural hemorrhage measuring up to 9  mm  thick with partial effacement of the right cerebral sulci and 3 mm  leftward midline shift. No new intracranial findings.    I have personally reviewed the examination and initial interpretation  and I agree with the findings.    AMANDO ASHLEY MD         SYSTEM ID:  [id number]   MR Brain w/o & w Contrast   Result Value    Radiologist flags Acute intracranial hemorrhage (AA)    Narrative    MR BRAIN W/O & W CONTRAST 6/7/2022 2:59 AM    Provided History: Dizziness, non-specific    Comparison:  CT head dated 6/6/2022. MRI dated 8/6/2016.     Technique: Sagittal T1-weighted, coronal T2-weighted, axial T2 FLAIR,  axial susceptibility weighted, and axial diffusion-weighted with ADC  map images of the brain were obtained without intravenous contrast.  Postcontrast T1 axial and coronal of the brain were also obtained.    Findings:     Acute focus of increased signal on DWI with corresponding decreased  signal on ADC in the right parahippocampal gyrus compatible with acute  infarction (series 3, image 53). No other convincing foci of diffusion  restriction. No definite corresponding FLAIR change or enhancement on  postcontrast images.    Subdural hematoma along the right cerebral convexity measures up to 8  cm posteriorly, similar to prior head CT. 1.8 cm area of  susceptibility artifact in the right occipital lobe representing a  focal intraparenchymal hemorrhage. Increased FLAIR signal along the  cerebral sulci in this region representing subarachnoid blood. Trace  leftward shift at the level of the septum pellucidum.    Moderate diffuse periventricular T2/FLAIR hyperintensities  representing chronic small vessel ischemic disease.    The ventricles and sulci are normal for age. No abnormality of reduced  diffusion.  Normal intravascular flow voids.      Impression    Impression:   1. Focus of diffusion restriction in the posterior right  parahippocampal gyrus compatible with tiny acute infarct.  2. Right occipital  lobe parenchymal hemorrhage/contusion measuring up  to 1.8 cm with surrounding edema and subarachnoid blood.  3. Stable subdural hematoma along the right cerebral convexity  measuring up to 8 mm posteriorly.    [Critical Result: Acute intracranial hemorrhage]    Finding was identified on 2022 3:46 AM.     Cari Anguiano was contacted by Dr. Blanton on 2022 3:59 AM and  verbalized understanding of the critical result.    I have personally reviewed the examination and initial interpretation  and I agree with the findings.    LUIS MIGUEL SCHWARTZ MD         SYSTEM ID:  [id number]   Echocardiogram Complete   Result Value    LVEF  60-65%    Narrative    [id number]  [id number]  [id number]  [id number]^CLEM^CARI^GILSON     [facility],[place]  Echocardiography Laboratory  [address] [place] [id number]     Name: RUBY ANG  MRN: [id number]  : 1950  Study Date: 2022 09:41 AM  Age: 72 yrs  Gender: Female  Patient Location: [facility]  Reason For Study: CVA, Syncope and Collapse  Ordering Physician: CARI ANGUIANO  Performed By: Johana Song RDCS     BSA: 1.7 m2  Height: 67 in  Weight: 126 lb  BP: 124/69 mmHg  ______________________________________________________________________________  Procedure  Echocardiogram with two-dimensional, color and spectral Doppler performed.  ______________________________________________________________________________  Interpretation Summary  Global and regional left ventricular function is normal with an EF of 60-65%.  The right ventricle is normal size. Global right ventricular function is  normal.  Aortic valve sclerosis is present. Mild to moderate aortic insufficiency is  present.  IVC diameter and respiratory changes fall into an intermediate range  suggesting an RA pressure of 8 mmHg.     There is no prior study for direct comparison.  ______________________________________________________________________________  Left  Ventricle  Left ventricular wall thickness is normal. Left ventricular size is normal.  Global and regional left ventricular function is normal with an EF of 60-65%.  Left ventricular diastolic function is indeterminate. No regional wall motion  abnormalities are seen.     Right Ventricle  The right ventricle is normal size. Global right ventricular function is  normal.     Atria  Both atria appear normal.     Mitral Valve  Mild mitral annular calcification is present. Trace mitral insufficiency is  present.     Aortic Valve  Aortic valve sclerosis is present. Mild to moderate aortic insufficiency is  present.     Tricuspid Valve  The tricuspid valve is normal. Trace tricuspid insufficiency is present. Right  ventricular systolic pressure is 16mmHg above the right atrial pressure.  Pulmonary artery systolic pressure is normal.     Pulmonic Valve  The valve leaflets are not well visualized. Trace pulmonic insufficiency is  present.     Vessels  IVC diameter and respiratory changes fall into an intermediate range  suggesting an RA pressure of 8 mmHg.     Pericardium  No pericardial effusion is present.     Compared to Previous Study  There is no prior study for direct comparison.  ______________________________________________________________________________  MMode/2D Measurements & Calculations  IVSd: 0.81 cm  LVIDd: 4.3 cm  LVIDs: 3.0 cm  LVPWd: 0.92 cm  FS: 29.8 %  LV mass(C)d: 117.5 grams  LV mass(C)dI: 70.7 grams/m2  LVOT diam: 2.1 cm  LVOT area: 3.4 cm2  LA Volume Index (BP): 23.2 ml/m2  RWT: 0.42     Doppler Measurements & Calculations  MV E max milton: 80.7 cm/sec  MV A max milton: 82.9 cm/sec  MV E/A: 0.97  MV dec slope: 424.7 cm/sec2  MV dec time: 0.19 sec  Ao V2 max: 160.6 cm/sec  Ao max PG: 10.3 mmHg  Ao V2 mean: 110.6 cm/sec  Ao mean P.6 mmHg  Ao V2 VTI: 35.4 cm  AISHA(I,D): 2.3 cm2  AISHA(V,D): 2.3 cm2  LV V1 max P.8 mmHg  LV V1 max: 109.9 cm/sec  LV V1 VTI: 24.0 cm  SV(LVOT): 81.8 ml  SI(LVOT): 49.3  ml/m2  TR max kaz: 204.2 cm/sec  TR max P.7 mmHg  AV Kaz Ratio (DI): 0.68  AISHA Index (cm2/m2): 1.4  Medial E/e': 16.3     ______________________________________________________________________________  Report approved by: Brayan Caceres 2022 11:07 AM

## 2022-06-08 NOTE — PLAN OF CARE
Status: Pt transferred from OBS, R occipital lobe IPH w/ surrounding subarachnoid bleed. Hx of breast CA.  Vitals: WNL, BP below parameter. MD aware said okay as long as asymptomatic.   Neuros: Oriented x 3-4 waxes and wanes. Pt looses track to directions. Forgetful.   IV: PIV SL   Resp/trach: WNL  Diet: Regular  Bowel status: BS+  : Voiding spont  Skin: Bruising throughout.   Pain: HA given tylenol w/ relief.   Activity: SBA, GB, unsteady.   Plan:Continue with current POC.

## 2022-06-08 NOTE — TELEPHONE ENCOUNTER
Vanessa is calling on behalf of pt,   Pt is currently admitted to hospital r/t Stroke. Being discharged today 6/8 or tomorrow 6/9. Has not consulted with inpatient providers but is doing own investigated.   Vanessa is concerned that the REVLIMID therapy contributed to the stroke and wishes to consult with Dr. Gutierrez about whether pt should continue on REVLIMID or if okay to discontinue the REVLIMID.     Best call back number  For Vanessa is [phone]    This writer informed Vanessa would send question to Dr. Gutierrez/Care Team.     On 6/8/22 at 2020: Per Marlene Gutierrez met with the patient yesterday and has discontinued the medication lenalidomide.

## 2022-06-08 NOTE — PROGRESS NOTES
Care Management Follow Up    Length of Stay (days): 1    Expected Discharge Date:       Concerns to be Addressed:       Patient plan of care discussed at interdisciplinary rounds: Yes    Anticipated Discharge Disposition:       Anticipated Discharge Services:    Anticipated Discharge DME:      Patient/family educated on Medicare website which has current facility and service quality ratings:    Education Provided on the Discharge Plan:    Patient/Family in Agreement with the Plan:      Referrals Placed by CM/FATOU:    Private pay costs discussed: private room/amenity fees    Additional Information:  SW spoke with [facility].         OMAR Lockett

## 2022-06-08 NOTE — CONSULTS
Care Management Follow Up    Length of Stay (days): 1    Expected Discharge Date:  TBD     Concerns to be Addressed:       Patient plan of care discussed at interdisciplinary rounds: Yes    Anticipated Discharge Disposition:  [facility]     Anticipated Discharge Services:  Therapies  Anticipated Discharge DME:  TBD    Patient/family educated on Medicare website which has current facility and service quality ratings:  Yes  Education Provided on the Discharge Plan:  Yes  Patient/Family in Agreement with the Plan:  Yes    Referrals Placed by CM/FATOU:  [facility]  Private pay costs discussed: Not applicable    Additional Information:  SW met with Pt and Pt's sister. SW provided a Health Care Directive in long and short form. SW identified that he did not have a Power of  document and that this needs to be completed with a staff or community support who's scope of practice it falls more in line with then SW. SW identified that he would inform [facility] that Pt would not be receiving Chemo therapy while at [facility]. SW identified that he would keep Pt up to date with discharge date.     ________________    OMAR Barrientos, Dorothe[facility] Dix Psychiatric CenterSW  6D   [facility]  Phone: [phone]  Pager: [phone]  Fax: [phone]

## 2022-06-09 PROBLEM — R53.1 WEAKNESS GENERALIZED: Status: ACTIVE | Noted: 2022-01-01

## 2022-06-09 NOTE — PLAN OF CARE
Patient is a 72 year old female  admitted to room 430 via wheechair.  Patient is alert and oriented X 3. See Epic for VS and assessment.  Patient is able to transfer assist of 1 using no assistive device, just wants GB. Patient was settled into their room, shown call light, tv, mealtimes etc. Skin is intact, has multiple bruises scattered throughout her body especially BUE and BLE. Poor appetite, ate 25% of dinner. Pt is pleasant and cooperative. PIV removed, pt reported its uncomfortable causing her to have slight pain. C/o headache, requested for PRN tylenol, administered with relief.  Has valuables that needs to be kept with security. Items collected and counted by two RNs ( 4 $20 bills, 1 $10 bill, 3 $1 bills, 1 [place] bank Visa card, 1 [place] bank Flex perks Travel rewards visa signature card, 1 [employer] visa signature card, 1 [employer] AAdvantage card, 1 [employer] card, 1 American express card, 1 [employer] prime card, 1 sunglasses, 1 prescribed glasses said she doesn't use them, 1 [employer] Car key, on  Licence card unf=saurabh her name, 1 house key separate, another house key that has two keys (1 for house and 1 for back door)). All written on form kept in pt chart, security called, came and picked envelope. Pt also has laptop and cell phone, kept with her, reported her son Fabien will take the laptop home. Came with several home medications, says she takes them as needed others prescribed. Pharmacy notified, reported that pt should not be taking home medications not prescribed to her while she is in the hospital, either to have them labeled with pharmacy or for pt to send them home, pt notified, agreeable to leave meds with staff till pharmacy and primary provider assess tomorrow. Medications locked in cabinet at nursing station, charge nurse aware.Will continue monitoring pain level and VS. Notifying MD with any concerns.  Follow MD orders for cares and medications.    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

## 2022-06-09 NOTE — CARE PLAN
Physical Therapy Discharge Summary    Reason for therapy discharge:    Discharged to transitional care facility.    Progress towards therapy goal(s). See [facility] on Care [facility] for goal details.  Goals partially met.  Barriers to achieving goals:   discharge from facility.    Therapy recommendation(s):    Continued therapy is recommended.  Rationale/Recommendations:  continued rehab to address deficits in balance and safety, Pt continues to score at falls risk.

## 2022-06-09 NOTE — PLAN OF CARE
Discharge time/date: Pt to leave at 1500 with [facility] transport to  TCU-[facility]  Walked or Wheelchair: Wheelchair  Reviewed AVS with patient: yes  Verbalized understanding of discharge with teachback: yes  Supplies sent home: supplies with patient to TCU    Report given to TCU RN to assume care.

## 2022-06-09 NOTE — PROGRESS NOTES
06/09/22 1103   Signing Clinician's Name / Credentials   Signing clinician's name / credentials ERNST Tenorio   Dynamic Gait Index (Slim and Rosado English, 1995)   Gait Level Surface 2   Change in Gait Speed 3   Gait and Horizontal Head Turns 2   Gait with Vertical Head Turns 2   Gait and Pivot Turns 1   Step Over Obstacle 3   Step Around Obstacles 2   Steps 1   Total Dynamic Gait Index Score  (A score of 19 or less has been correlated to an increased risk of falls in community dwelling older adults, patients with vestibular disorders, and patients with MS.)   Total Score (out of 24) 16     Dynamic Gait Index (DGI):The DGI is a measure of balance during gait that is reliable and valid for the elderly and individuals with Parkinson's disease, MS, vestibular disorders, or s/p stroke. Gait assistive device used: None    Scores ?19 /24 indicate an increased risk for falls according to Jayshree et al 2000  Minimal Detectable Change = 2.9 in community dwelling elderly according to Vega et al 2011    Assessment: Pt completed DGI to reassess balance and falls risk. Pt still demonstrating  impairments with gait with pivot turns requiring verbal cues and step to pattern on stairs. Pt balance impairments appear to be primarily related to distractibility. Pt educated that she still struggles with higher level balance and was recommended to continue PT after discharge. DGI score improved from initial assessment but pt still presents as fall risk at this time. Ability to maintain attention seems to severely impact balance.     Physical Performance Test: 15 minutes

## 2022-06-09 NOTE — PROGRESS NOTES
Care Management Discharge Note    Discharge Date: 06/09/2022       Discharge Disposition:  Acute Rehab    Discharge Services:  Therapies    Discharge DME:  TBD    Discharge Transportation: family or friend will provide    Private pay costs discussed: insurance costs out of pocket expenses    PAS Confirmation Code:  [id number]  Patient/family educated on Medicare website which has current facility and service quality ratings:  Yes.    Education Provided on the Discharge Plan:  Yes.  Persons Notified of Discharge Plans: Yes.  Patient/Family in Agreement with the Plan:  Yes    Handoff Referral Completed: Yes    Additional Information:  SW met with Pt and Family. SW identified the rationale for the IMM. SW identified that the plan had changed from ARU to TCU. SW identified that the rationale for TCU vs ARU was that pt was improving well. FATOU scheduled ride, informed providers of discharge informed charge nurse and bedside nurse and completed PAS and handoff.       ________________    OMAR Barrientos, LICSW  6D   [facility]  Phone: [phone]  Pager: [phone]  Fax: [phone]

## 2022-06-09 NOTE — PLAN OF CARE
Time 9977-6943    Vitals: VSS on RA  Activity: SBA w/GB, unsteady, bed alarm  Pain: Denies  Neuro: Oriented X3-4, waxes/wanes, L neglect, Slight L drift, forgetful, needs cues    Cardiac:  CCM, NSR  Respiratory:  Denies cough/SOB  GI/: Voids spontaneously, LBM 6/8, Denies N/V  Diet: Regular  Lines: R PIV SL  Skin/Wounds: Scattered bruising  Labs/imaging: Reviewed     New changes this shift:  Stable overnight.     Plan: Discharge once medically stable.     Continue to monitor and follow POC     Goal Outcome Evaluation:    Plan of Care Reviewed With: patient     Overall Patient Progress: no change    Outcome Evaluation: Oriented X 3-4. waxes/wanes. Denies pain

## 2022-06-09 NOTE — DISCHARGE SUMMARY
[facility]    Neurology Stroke Discharge Summary    Date of Admission: 6/6/2022  Date of Discharge: 06/09/2022    Disposition: ARU  Primary Care Physician: Talya Rodas      Admission Diagnosis:   Headache due to intracranial hemorrhages     Discharge Diagnosis:   -Traumatic right cerebral convexity subdural hemorrhage c/b 2-3mm midline shift  - Right occipital lobe intraparenchymal hemorrhage    - Right posterior parahippocampal gyrus punctate infarct  - Gait instability     Problem Leading to Hospitalization (from HPI):   Lilly Babcock is a 72 year old female history of metastatic follicular non-Hodgkin's lymphoma currently on lenalidomide and rituximab, recent traumatic right sided subdural hematoma admitted for observation for ~9 hours who presents with headache and continued dizziness since discharge yesterday. General neurology was initially consulted for dizziness and recommended MRI brain with and without contrast to rule out posterior circulation acute ischemic stroke. MRI brain was completed overnight and demonstrated right occipital lobe IPH with surrounding subarachnoid blood. Upon further review this appears to also be present on CT head completed on arrival. In any event, patient will be admitted to Vascular Neurology service for further work up and management.     Please see H&P dated 6/6/2022 for further details about presentation.    Brief Hospital Course:   Lilly Babcock is a 72 year old female with hx of metastatic follicular non-Hodgkin's lymphoma on Lenalidomide and rituximab initially presented on 6/4/2022 to [facility] ED with bilateral visual aura and excruciating headache which prompted ED visit. During evaluation she was found to have acute R SDH and was admitted under NSG service and was discharged home on 6/5/2022 after stability radiologically. Pt reports  that she hit her head on a cabinet while trying to flush toilet but  did not loose consciousness. She was discharged home with recommendation to follow up in clinic in 1 week.  She represented to the ED on 6/6/2022 with complains of worsening headache and balance issues at home. On talking to sister who is a physician reports that she witnessed the episode of trauma and she reports it was a light fall and hit. Imaging revealed right cerebral convexity SDH c/b 2-3mm midline shift which was felt to be stable. Presence of a right occipital lobe IPH was also identified as new, but seems to have actually been present previously as well. However, there was new right posterior parahippocampal gyrus punctate infarct.     Etiology for this new ischemic stroke is unclear especially in the setting of traumatic SDH. We are considering CAA. L PCA territory appears to be intact on CTA. . A1c 5.5. Echo w/ LVEF 55% and no intracardiac thrombus/valvular disease. No afib detected. Considering possibility that R occipital IPH is a hemorraghic transformation of a L PCA infarct. CTA completed w/o evidence of focal stenosis or pLVO. Most likely etiology is hypercoagulable effect of lenalidomide, which after discussion with hematology decision was made to stop Lenalidomide. She was started on a statin for preventative stroke management. She can return to taking ASA 81mg once she is 14 days post bleed event.      #Traumatic R SDH  #R occipital lobe IPH.   #Cerebral edema  - Systolic BP Goal: < 140  - Continue Keppra 500 mg BID until 6/14/22 (7 days)  - Ok to resume PTA ASA 81mg once cleared by neurosurgery  - F/u with both Neurosurgery and Neurology as outpatient with interval imaging  - Patient should specifically follow up with Dr. Reese (Vascular neurology)   - Discharge to ARU      #R parahippocampal gyrus punctate infarct  - Atorvastatin 40mg daily, for LDL goal <70  - F/u with Neurology as outpatient    #Follicular lymphoma, in remission  - Stop lenalidomide per discussion with hematology   -  F/u with Heme/onc as outpatient     Found to have:   -Traumatic R cerebral convexity SDH c/b 2-3mm midline shift  - R posterior parahippocampal gyrus punctate infarct  - R occipital lobe IPH     IV thrombolysis was deferred given bleeding.       Work-up as stated below under Pertinent Investigations.    Etiology for ischemic stroke is thought to be hypercoagulability due to Lenalidomide. Intracranial bleed    Rehab evaluation:   PT DC Recommendations [facility]-Motivated patient will benefit from intensive, interdisciplinary therapy. Anticipate will be able to tolerate 3 hours of therapy per day   OT DC Recommendations [facility]-Motivated patient will benefit from intensive, interdisciplinary therapy. Anticipate will be able to tolerate 3 hours of therapy per day       Smoking Cessation: already quit smoking  (remote)  BP Long-term Goal: 140/90 or less  Antithrombotic/Anticoagulant Agent: Can restart 81mg ASA once cleared by neurosurgery  Statins: Started on Atorvastatin 40mg      Hgb A1C Goal: < 7.0  Complications: None.     Other problems addressed during this hospitalization:  NA     PERTINENT INVESTIGATIONS    Labs  Lipid Panel: Recent Labs   Lab Test 06/06/22  1230   CHOL 219*   HDL 77   *   TRIG 75     A1C:   Lab Results   Component Value Date    A1C 5.5 06/06/2022     INR:   Recent Labs   Lab 06/07/22  0541 06/06/22  1230 06/04/22  2352   INR 1.00 1.01 0.94      Coag Panel / Hypercoag Workup: Not indicated  Pending test results: NA    Imaging  Echo (6/7/2022)  Interpretation Summary  Global and regional left ventricular function is normal with an EF of 60-65%.  The right ventricle is normal size. Global right ventricular function is normal.  Aortic valve sclerosis is present. Mild to moderate aortic insufficiency is present.  IVC diameter and respiratory changes fall into an intermediate range suggesting an RA pressure of 8 mmHg.     There is no prior study for direct  comparison.    CTA Head/Neck (6/7/2022)  Impression:   1. Unchanged subdural hemorrhages over the right cerebral convexity, falx and right tentorium. No new intracranial hemorrhage. Right occipital hemorrhagic contusion.  2. Subtle increased right cerebral mass effect or sulcal effacement.  3. Moderate leukoaraiosis.  4. No aneurysm, other vascular malformation or hemodynamically significant stenosis of the major intracranial arteries.  5. No hemodynamically significant stenosis of the cervical arterial structures.    MRI Brain (6/7/2022)  Impression:   1. Focus of diffusion restriction in the posterior right parahippocampal gyrus compatible with tiny acute infarct.  2. Right occipital lobe parenchymal hemorrhage/contusion measuring up to 1.8 cm with surrounding edema and subarachnoid blood.  3. Stable subdural hematoma along the right cerebral convexity measuring up to 8 mm posteriorly.    CT Head (6/6/2022)  Impression:   Unchanged right convexity subdural hemorrhage measuring up to 9 mm thick with partial effacement of the right cerebral sulci and 3 mm leftward midline shift. No new intracranial findings.    CT Head (6/5/2022)  IMPRESSION:  1.  Unchanged acute right hemispheric subdural hematoma measuring up to 8 mm over the posterior right parietal convexity with stable thin diffuse extension along the right lateral margin of the interhemispheric falx and right tentorium.  2.  Unchanged associated mass effect, again with 2-3 mm leftward midline shift.  3.  No interval acute intracranial abnormality.    CT Head (6/5/2022)  IMPRESSION:  1.  Acute right-sided subdural hematoma diffusely over the right cerebral convexity and extending along the interhemispheric falx. The maximal thickness is 8 mm in the right parietal region.  2.  Associated intracranial mass effect with 3 mm of right to left shift of midline structures.  3.  Underlying mild to moderate presumed chronic small vessel ischemic changes.    Endovascular  procedure: None     Cardiac Monitoring: Patient had > 24 hrs of cardiac monitor while in hospital.    Findings: No atrial fibrillation was found.    Sleep Apnea Screen:   Questions/Answers      1. Prior to your stroke, have you been told that you snore? No.    2. Prior to your stroke, have you been told that you struggle to breath while you are sleeping? No.    3. Prior to your stroke, do you feel tired and sleepy even after getting a normal night of sleep? Yes.    Sleep Apnea Screen Findings: Patient has 0-1 symptoms of sleep apnea.  Further sleep study is not recommended at this time.    PHQ-9 Depression Screen Score: 4    Education discussed with: patient and spouse on blood pressure management, cholesterol management, medical management and 911 call if warning signs of stroke.  During daily rounds, the plan of care was discussed and developed with patient and spouse.      PHYSICAL EXAMINATION  Vital Signs:  B/P: 110/64, T: 97.9, P: 94, R: 15    General: very pleasant, sitting up in bed, NAD  Head: NC/AT  Eyes: no icterus, op pink and moist  Cardiac: regular rate   Respiratory: non-labored on RA  GI: non-tender  Skin: scattered bruised on upper extremities  Psych: Mood pleasant, affect congruent  Neuro:  Mental status: Awake, alert, attentive, oriented to self, time, place, and circumstance. Language is slow but fluent and coherent with intact comprehension of complex commands, naming and repetition.  Cranial nerves: VFF, PERRL, conjugate gaze, EOMI, facial sensation intact, face symmetric, shoulder shrug strong, tongue/uvula midline, no dysarthria.   Motor: Normal bulk and tone. No abnormal movements. 5/5 strength bilaterally in deltoids, biceps, triceps, hand , hip flexors, hip extensors, knee flexion, knee extension, plantarflexion, dorsiflexion.   Reflexes: Normo-reflexic and symmetric biceps, brachioradialis, triceps, patellae, and achilles. Negative Aaron, no clonus, toes down-going.  Sensory: Intact  to light touch in proximal and distal aspects of all 4 extremities   Coordination: FNF and HS without ataxia or dysmetria. Rapid alternating movements intact.   Gait: Normal width, stride length, turn, and arm swing. Unable to walk in tandem due to unsteadiness, veering most often to the left side.       National Institutes of [employer] Stroke Scale (on day of discharge)  NIHSS Total Score: 0    NIHSS  1a. Level of Consciousness 0-->Alert, keenly responsive   1b. LOC Questions 0-->Answers both questions correctly   1c. LOC Commands 0-->Performs both tasks correctly   2.   Best Gaze 0-->Normal   3.   Visual 0-->No visual loss   4.   Facial Palsy 0-->Normal symmetrical movements   5a. Motor Arm, Left 0-->No drift, limb holds 90 (or 45) degrees for full 10 secs   5b. Motor Arm, Right 0-->No drift, limb holds 90 (or 45) degrees for full 10 secs   6a. Motor Leg, Left 0-->No drift, leg holds 30 degree position for full 5 secs   6b. Motor Leg, right 0-->No drift, leg holds 30 degree position for full 5 secs   7.   Limb Ataxia 0-->Absent   8.   Sensory 0-->Normal, no sensory loss   9.   Best Language 0-->No aphasia, normal   10. Dysarthria 0-->Normal   11. Extinction and Inattention  0-->No abnormality   Total 0 (06/07/22 0523)            Modified Rachel Score (Discharge)    -      Medications    Current Discharge Medication List      START taking these medications    Details   atorvastatin (LIPITOR) 40 MG tablet Take 1 tablet (40 mg) by mouth every evening  Qty: 90 tablet, Refills: 1    Associated Diagnoses: Cerebrovascular accident (CVA), unspecified mechanism (H)         CONTINUE these medications which have CHANGED    Details   levETIRAcetam (KEPPRA) 500 MG tablet 1 tablet (500 mg) by Oral or NG Tube route 2 times daily  Qty: 14 tablet, Refills: 0    Associated Diagnoses: Subdural hematoma (H)         CONTINUE these medications which have NOT CHANGED    Details   acetaminophen (TYLENOL) 325 MG tablet Take 2 tablets (650  mg) by mouth every 4 hours as needed for other (For optimal non-opioid multimodal pain management to improve pain control.)  Qty: 60 tablet, Refills: 0    Associated Diagnoses: Subdural hematoma (H)      acyclovir (ZOVIRAX) 400 MG tablet Take 1 tablet by mouth as needed (kandace day to prevent cold sores)      albuterol (PROAIR HFA/PROVENTIL HFA/VENTOLIN HFA) 108 (90 Base) MCG/ACT inhaler Inhale 2 puffs into the lungs every 4 hours as needed for shortness of breath / dyspnea or wheezing      cetirizine (ZYRTEC) 10 MG tablet Take 10 mg by mouth daily      Cholecalciferol (VITAMIN D) 1000 UNIT capsule Take 2 capsules by mouth daily     Associated Diagnoses: 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  Qty: 21 capsule, Refills: 0    Comments: Adult [facility] NOT of Reproductive Capacity [id number]  Associated Diagnoses: Follicular non-Hodgkin's lymphoma (H)      methylPREDNISolone (MEDROL) 32 MG tablet Take one tablet (32mg) by mouth 12 hours prior to scheduled CT scan.  Repeat above dose 2 hours prior to CT scan  Qty: 2 tablet, Refills: 1    Associated Diagnoses: Contrast media allergy; Nodular lymphoma of extranodal and/or solid organ site (H)      mometasone (ELOCON) 0.1 % ointment Apply topically daily as needed (eczema)       predniSONE (DELTASONE) 1 MG tablet Take 7 mg by mouth daily     Associated Diagnoses: Nodular lymphoma of extranodal and/or solid organ site (H); Polymyalgia  rheumatica (H)      VIIBRYD 20 MG TABS tablet Take 20 mg by mouth daily              Additional recommendations and follow up:     CT Head w/o contrast*     Adult Neurology  Referral      Neurosurgery Referral      Follow Up ([facility])    Follow-up with Neurosurgery in one week with repeat head CT    Appointments on [place] and/or [facility] (with [facility] or [facility] provider or service). Call [phone] if you haven't heard regarding these appointments within 7 days of discharge.     General info for SNF    Length of Stay Estimate: Short Term Care: Estimated # of Days <30  Condition at Discharge: Improving  Level of care:skilled   Rehabilitation Potential: Excellent  Admission H&P remains valid and up-to-date: Yes  Recent Chemotherapy: N/A  Use Nursing Home Standing Orders: Yes     Mantoux instructions    Give two-step Mantoux (PPD) Per Facility Policy Yes     Reason for your hospital stay    You were in the hospital recovering after a head bleed.     Activity - Up with assistive device     Diet    Follow this diet upon discharge: Orders Placed This Encounter      Regular Diet Adult       Patient was seen and discussed with the Attending, Dr. Reese.    Damian Hall MD

## 2022-06-09 NOTE — PLAN OF CARE
Status: R occipital lobe IPH w/ surrounding subarachnoid bleed.   Vitals: Stable, RA.    Neuros: Oriented x3-4 waxes and wanes. L neglect. Slight L drift. Forgetful. Needs cues.   IV: PIV SL   Resp/trach: WNL  Diet: Regular  Bowel status: BS+  : Voiding spontaneously  Skin: Bruising throughout.   Pain: HA tylenol w/ relief.   Activity: SBA, GB, unsteady.   Plan: Continue to monitor and follow current POC.

## 2022-06-09 NOTE — PROGRESS NOTES
[facility]    Stroke Progress Note    Interval EventsPatient reports doing well overnight. No acute complaints. Per nursing, waxes and wanes in terms of her degree of alertness.     HPI Summary  Lilly Babcock is a 72 year old female history of metastatic follicular non-Hodgkin's lymphoma currently on lenalidomide and rituximab, recent traumatic right sided subdural hematoma admitted for observation for ~9 hours who presents with headache and continued dizziness since discharge yesterday. General neurology was initially consulted for dizziness and recommended MRI brain with and without contrast to rule out posterior circulation acute ischemic stroke.      MRI brain was completed overnight and demonstrated right occipital lobe IPH with surrounding subarachnoid blood. Upon further review this appears to also be present on CT head completed on arrival. In any event, patient will be admitted to Vascular Neurology service for further work up and management.         Impression   72 year old female with hx of metastatic follicular non-Hodgkin's lymphoma on Lenalidomide and rituximab admitted with traumatic R cerebral convexity SDH c/b 2-3mm midline shift, R posterior parahippocampal gyrus punctate infarct, and R occipital lobe IPH.     Plan  #Traumatic R SDH  #R occipital lobe IPH.   #Cerebral edema  - NSGY consulted already for SDH, appreciate recommendations  - Neurochecks Q 4 hours  - Systolic BP Goal: < 140  - Continue Keppra 500 mg BID until 6/14    #R parahippocampal gyrus punctate infarct  Unclear etiology in the setting of traumatic SDH. L PCA territory appears to be intact on CTA. . A1c 5.5. Echo w/ LVEF 55% and no intracardiac thrombus/valvular disease. No afib detected. Considering possibility that R occipital IPH is a hemorraghic transformation of a L PCA infarct. CTA completed w/o evidence of focal stenosis or pLVO. Most likely etiology is  hypercoagulable effect of lenalidomide, which after discussion with hematology will stop.   - Telemetry  - Bedside Glucose Monitoring  - Atorvastatin 40mg daily, for LDL goal <70  - PT/OT  - Stroke Education  - Euthermia, Euglycemia    #Follicular lymphoma, in remission  -on lenalidomide, rituxumab/revlimid  -will stop lenalidomide per discussion with hematology   -hematology consulted     #Migraine w/ aura    Checklist  Diet: Regular diet  DVT Prophylaxis: Low Risk/Ambulatory with no VTE prophylaxis indicated  Feldman Catheter: Not present  Code Status: Full    The patient was discussed with Stroke Staff Dr. Reese.    Damian Hall MD  Neurology Resident  ______________________________________________________    Clinically Significant Risk Factors Present on Admission                 Medications   Scheduled Meds    atorvastatin  40 mg Oral QPM     levETIRAcetam  500 mg Oral or NG Tube BID     sodium chloride (PF)  3 mL Intracatheter Q8H       Infusion Meds    - MEDICATION INSTRUCTIONS -       - MEDICATION INSTRUCTIONS -         PRN Meds  acetaminophen, hydrALAZINE, labetalol, lidocaine 4%, lidocaine (buffered or not buffered), - MEDICATION INSTRUCTIONS -, - MEDICATION INSTRUCTIONS -, ondansetron **OR** ondansetron, prochlorperazine **OR** prochlorperazine **OR** prochlorperazine, sodium chloride (PF)       PHYSICAL EXAMINATION  Temp:  [97.9  F (36.6  C)-98.3  F (36.8  C)] (P) 97.9  F (36.6  C)  Pulse:  [72-92] (P) 72  Resp:  [15-16] (P) 15  BP: (104-144)/(42-78) (P) 122/64  SpO2:  [94 %-100 %] (P) 99 %      General: very pleasant, sitting up in bed, NAD  Head: NC/AT  Eyes: no icterus, op pink and moist  Cardiac: regular rate   Respiratory: non-labored on RA  GI: non-tender  Skin: scattered bruised on upper extremities  Psych: Mood pleasant, affect congruent  Neuro:  Mental status: Awake, alert, attentive, oriented to self, time, place, and circumstance. Language is slow but fluent and coherent with intact  comprehension of complex commands, naming and repetition.  Cranial nerves: VFF, PERRL, conjugate gaze, EOMI, facial sensation intact, face symmetric, shoulder shrug strong, tongue/uvula midline, no dysarthria.   Motor: Normal bulk and tone. No abnormal movements. 5/5 strength bilaterally in deltoids, biceps, triceps, hand , hip flexors, hip extensors, knee flexion, knee extension, plantarflexion, dorsiflexion.   Reflexes: Normo-reflexic and symmetric biceps, brachioradialis, triceps, patellae, and achilles. Negative Aaron, no clonus, toes down-going.  Sensory: Intact to light touch in proximal and distal aspects of all 4 extremities   Coordination: FNF and HS without ataxia or dysmetria. Rapid alternating movements intact.   Gait: Normal width, stride length, turn, and arm swing. Unable to walk in tandem due to unsteadiness, veering most often to the left side.     Dysphagia Screen  Passed screening, no dysarthria - Regular Diet with thin liquids  06/07/2022      Stroke Scales     NIHSS  1a. Level of Consciousness 0-->Alert, keenly responsive   1b. LOC Questions 0-->Answers both questions correctly   1c. LOC Commands 0-->Performs both tasks correctly   2.   Best Gaze 0-->Normal   3.   Visual 0-->No visual loss   4.   Facial Palsy 0-->Normal symmetrical movements   5a. Motor Arm, Left 0-->No drift, limb holds 90 (or 45) degrees for full 10 secs   5b. Motor Arm, Right 0-->No drift, limb holds 90 (or 45) degrees for full 10 secs   6a. Motor Leg, Left 0-->No drift, leg holds 30 degree position for full 5 secs   6b. Motor Leg, right 0-->No drift, leg holds 30 degree position for full 5 secs   7.   Limb Ataxia 0-->Absent   8.   Sensory 0-->Normal, no sensory loss   9.   Best Language 0-->No aphasia, normal   10. Dysarthria 0-->Normal   11. Extinction and Inattention  0-->No abnormality   Total 0 (06/07/22 5575)     Imaging  I personally reviewed all imaging; relevant findings per HPI.     Lab Results Data    CBC  Recent Labs   Lab 06/08/22  0701 06/07/22  0541 06/06/22  1230   WBC 8.8 5.5 6.6   RBC 3.54* 3.59* 3.50*   HGB 10.9* 11.0* 10.8*   HCT 33.4* 34.1* 34.0*    201 198     Basic Metabolic Panel    Recent Labs   Lab 06/09/22  0601 06/09/22  0302 06/08/22  2253 06/08/22  0804 06/08/22  0702 06/07/22  0802 06/07/22  0541 06/06/22  1230   NA  --   --   --   --  139  --  138 137   POTASSIUM  --   --   --   --  3.8  --  4.0 4.1   CHLORIDE  --   --   --   --  106  --  104 102   CO2  --   --   --   --  26  --  28 32   BUN  --   --   --   --  12  --  10 12   CR  --   --   --   --  0.74  --  0.77 0.80   GLC 97 96 109*   < > 100*   < > 92 103*   FLORENCIA  --   --   --   --  9.2  --  9.2 9.2    < > = values in this interval not displayed.     Liver Panel  Recent Labs   Lab 06/07/22  0541 06/06/22  1230 06/04/22  2352   PROTTOTAL 6.0* 6.2* 6.9   ALBUMIN 3.3* 3.5 3.9   BILITOTAL 0.5 0.4 0.3   ALKPHOS 66 64 79   AST 17 18 31   ALT 29 32 44     INR    Recent Labs   Lab Test 06/07/22  0541 06/06/22  1230 06/04/22  2352   INR 1.00 1.01 0.94      Lipid Profile    Recent Labs   Lab Test 06/06/22  1230   CHOL 219*   HDL 77   *   TRIG 75     A1C    Recent Labs   Lab Test 06/06/22  1230   A1C 5.5     Troponin I  No results for input(s): TROPONINIS, TROPONINI, GHTROP in the last 168 hours.

## 2022-06-09 NOTE — INTERIM SUMMARY
[facility] Pre-Admission Screen    Referral Source:  [facility] [address] [place] 6210[address]01  Admit date to referring facility: 6/6/2022    Physical Medicine and Rehab Consult Completed: No    Rehab Diagnosis:    Stroke Vascular Hemorrhagic 01.4 No Paresis:     Justification for Acute Inpatient Rehabilitation  Lilly Babcock is a 72 year old female with hx of metastatic follicular non-Hodgkin's lymphoma on Lenalidomide and rituximab initially presented on 6/4/2022 to [facility] ED with bilateral visual aura and excruciating headache which prompted ED visit. During evaluation she was found to have acute R SDH and was admitted under NSG service and was discharged home on 6/5/2022 after stability radiologically. She represented on 6/6/22 with c/o worsening headaches and balance impairments.  Upon further assessment, pt's sister reports pt did have an episode of trauma where she fell and hit her head. CT revealed unchanged R SDH w/ 2-3 mm leftward midline shift.  She was also noted to have R occipital lobe ICH and acute infarct affecting right posterior parahippocampal gyrus. The patient requires an intensive inpatient rehab program to address the following acute impairments: impaired cognition, L neglect, impulsivity, impaired balance, and impaired safety awareness. These impairments are impacting her ability to safely and independently perform transfers, gait, stairs, ADLs, and IADLs. She is now medically stable and ready to discharge to acute inpatient rehab.     Current Active Medical Management Needs/Risks for Clinical Complications  The patient requires the high level of rehabilitation physician supervision that accompanies the provision of intensive rehabilitation therapy.  The patient needs the services of the rehabilitation physician to assess the patient medically and functionally and to modify the course of treatment as needed to maximize the patient's capacity to  benefit from the rehabilitation process.  The patient requires physician oversight at least 3x/wk to medically manage and assess:  Neurologic status in setting of traumatic R SDH, as well as R occipital lobe IPH, and R parahippocampal gyrus punctate infarct: assess for neurologic recovery; provide stroke education and risk factor reduction strategies: SBP goal <160 mmHg, avoid hypotonic IV fluids, keep HOB elevated; initiated on Keppra 500 mg BID for 7 day course as she is at risk for seizures in setting of R SDH, as well as Atorvastatin 40 mg daily for LDL goal <70. Pt is at risk for falls with or without injury in setting of balance impairments, L neglect, and impaired safety awareness. She is at risk for further strokes. She is also at risk for mood and sleep disorders in setting of stroke, and at risk for cognitive deficits in setting of traumatic R SDH. Pt will require ZioPatch at discharge.  Metastatic Follicular Non-Hodgkin's lymphoma: on Lenalidomide and Rituximab. No plan for chemo while admitted to acute inpt rehab. Pt w/ cancer related fatigue - would benefit from further instruction in how to modify activities to assist w/ energy conservation, work simplication, as well as how to pace self.   Pain: on Tylenol. Pt at risk for pain in setting of stroke. Will need ongoing assessment of pain for optimal participation in rehab therapies.     Past Medical/Surgical History  Surgery in the past 100 days: No  Additional relevant past medical history: Follicular non-Hodgkin's lymphoma (diagnosed 10/15/2009), hx of radiation therapy to L hip/prxomal femur in 2012; initiated palliative chemotherapy w/ Rituximab and Lenoalidomide on 3/8/22; attention deficit disorder, L hip avascular necrosis, s/p L FERNANDA 10/18/2016, s/p R FERNANDA 5/2017 for DJD, basal cell carcinoma (s/p L forehead Moh's), B sensorineural hearing loss, CAD, dysthymia, expressive aphasia, HLD, polymyalgia rheumatica, seasonal affective disorder, vitamin  "B12 deficiency, B cataract removal w/ implant    Level of Functioning Prior to Admission:    LIVING ENVIRONMENT  Pt lives alone, son is home at this time, normally stays on main level. Sister is able to assist some  Number of Stairs, Main Entrance: 3  Stair Railings, Main Entrance: none  Number of Stairs, Within Home, Primary: three, also has stairs to upstairs but doesn't have to go  Transportation Anticipated: family or friend will provide    SELF-CARE  Usual Activity Tolerance: excellent  Regular Exercise: Yes  Activity/Exercise Type: pilates  Equipment Currently Used at Home: none (has equipment from previous FERNANDA)  Activity/Exercise/Self-Care Comment: Pt reports being clumsy and some falls per baseline, has some equipment from previous FERNANDA    Level of Function: GG Scale (Section GG Functional Ability and Goals; CMS's GUERIN Version 3.0 Manual effective 10.1.2019):  PT Current Function Goals for Rehab   Bed Rolling {GG Scale:040099::\"6 Independent\"} 6 Independent   Supine to Sit {GG Scale:885727::\"6 Independent\"} 6 Independent   Sit to Stand {GG Scale:459924::\"6 Independent\"} 6 Independent   Transfer {GG Scale:663839::\"6 Independent\"} 6 Independent   Ambulation {GG Scale:603364::\"6 Independent\"} 6 Independent   Stairs {GG Scale:897924::\"6 Independent\"} 6 Independent     OT Current Function Goals for Rehab   Feeding {GG Scale:102450::\"6 Independent\"} 6 Independent   Grooming {GG Scale:231312::\"6 Independent\"} 6 Independent   Bathing {GG Scale:526506::\"6 Independent\"} 6 Independent   Upper Body Dressing {GG Scale:485672::\"6 Independent\"} 6 Independent   Lower Body Dressing {GG Scale:513515::\"6 Independent\"} 6 Independent   Toileting {GG Scale:133032::\"6 Independent\"} 6 Independent   Toilet Transfer {GG Scale:531242::\"6 Independent\"} 6 Independent   Tub/Shower Transfer {GG Scale:222695::\"6 Independent\"} 6 Independent   Cognition {ARC Impaired Not NA:490155} {ARC COGNITION GOAL:949206}     SLP Current Function Goals for " Rehab   Swallow Not Impaired Not applicable   Communication Not Impaired Not applicable     Current Diet:  0-Thin and 7-Regular/easy to chew    Summary Statement:  The pt is at risk for falls in setting of balance deficits, left neglect, and impaired cognition and safety awareness. She scored 10/24 on dynamic gait index indicating she is at risk for falls. She performed 4 stairs w/ min A.     Expected Therapies and Services Required During Inpatient Rehab Admission  Intensity of Therapy: Patient requires intensive therapies not available in a lesser level of care. Patient is motivated, making gains, and can tolerate 3 hours of therapy a day.  Physical Therapy: *** minutes per day, 7 days a week for *** days  Occupational Therapy: *** minutes per day, 7 days a week for *** days  Speech and Language Therapy: *** minutes per day, 7 days a week for *** days  Rehabilitation Nursing Needs: Patient requires 24 hour Rehab Nursing to manage {ARC Nursing Needs:061130}.    Precautions/restrictions/special needs:  Precautions: fall precautions  Restrictions: none  Special Needs: B hearing loss    Expected Level of Improvement: Pt will   Expected Length of time to achieve: ***    Anticipated Discharge Needs:  Anticipated Discharge Destination: {ARC Discharge Destination:308949}  Anticipated Discharge Support: {ARC Discharge Support:692272}  24/7 support available : {Yes-No:876813}  Identified caregiver(s):  ***  Anticipated Discharge Needs: {ARC Discharge Needs:997230}    Identified challenges/barriers:  ***        Physician statement of review and agreement:  I have reviewed and am in agreement of the need for IRF stay to address above functional and medical needs. In addition to above statements address, Patient requires intensive active and ongoing therapeutic intervention and multiple therapies; Patient requires medical supervision; Expected to actively participate in the intensive rehab program; Sufficiently stable to  actively participate; Expectation for measurable improvement in functional capacity or adaption to impairments.

## 2022-06-10 NOTE — PLAN OF CARE
Goal Outcome Evaluation:      Pt is alert and oriented x 3 to 4. Impulsive and does not call for help when in need. Pt requires SBA with GB due to unsteadiness. Very hard of hearing. Denied CP, SOB, cough, N/V.  Night uneventful. All safety measures in place. Will continue with POC.        Patient's most recent vital signs are:     Vital signs:  BP: Data Unavailable  Temp: Data Unavailable  HR: Data Unavailable  RR: Data Unavailable  SpO2: 98 %     Patient does not have new respiratory symptoms.  Patient does not have new sore throat.  Patient does not have a fever greater than 99.5.

## 2022-06-10 NOTE — PROGRESS NOTES
Social Work: Initial Assessment with Discharge Plan    Patient Name: Lilly Babcock  : 1950  Age: 72 year old  MRN: [id number]  Completed assessment with: Chart review and interview with pt; pt's son and daughter at bedside to confirm information provided by pt.  Admitted to TCU: 2022    Presenting Information   Date of SW assessment: Carmela 10, 2022  Health Care Directive: Will bring in copy  Primary Health Care Agent: Patient/self  Secondary Health Care Agent: Adult children, Fabien and Franci.  Living Situation: Pt typically lives alone in a mult-level house; 3 SADA from back entrance. Pt has been staying on the main level.  Previous Functional Status: IND with all ADLs and IADLs; drove self.  DME available: Pt reports having a broken FWW from when she had her hip replacement several years ago.  Patient and family understanding of hospitalization: Pleasant and appropriate.  Cultural/Language/Spiritual Considerations: Pt is a 72 y.o.  female, single, English-speaking, does not identify with any Catholic practices.  Abuse concerns: None reported  BIMS: Pt scored 15/15 on BIMS indicating cognition intact.  PHQ-9: Pt scored 4/27 on PHQ-9 indicating minimal depressive symptoms  PAS: confirmation number- [id number]  Has there been a level II screen?  No  Were there any recommendations in the screen? No  If yes, will the recommendations we incorporated into the Plan of Care?  N/A  Physical Health  Reason for admission:  Traumatic right cerebral cortex subdural hemorrhage  Lilly Babcock is a 72 year old female with past medical history significant for  Follicular non-Hodgkins lymphoma she initially presented to ER  on  with severe headache and visual aura.  she was found to have  Right subdural hematoma. She was seen and evaluated by neurosurgery an no surgical interventions were recommended. She hit her head on the cabinert when was standing up form toilet. She was discharged home   but  "returned  6/6 with worsening headache and balance issues. Repeat imaging again showed stable  SDH but now also right occipital lobe IPH that per notes was there before but showed a new  Right posterior parahippocampal gyrus punctate infarct      Provider Information   Primary Care Physician:Talya Rodas   [address]   [facility] [address]  : None reported    Mental Health:   Diagnosis: Pt reports a hx of depression  Current Support/Services: Medication management. Pt sees MH therapist, Estrella Blanton, in the community. Pt requested to see Health Psychologist/Estrella Blanton while at TCU.  Previous Services: Hx with working with Estrella Blanton.  Services Needed/Recommended: Health psychology and spiritual health services available in TCU by consult and referral if needed.     Substance Use:  Diagnosis: No Hx or current concerns; pt reported that she does not drink anymore.  Current Support/Services: None reported  Previous Services: None reported  Services Needed/Recommended: Not applicable.    Support System  Marital Status: Single/  Family support: 2 adult children:  Dtr, Franci, lives in [place], [place]  Son, Fabien, is currently staying at home with pt for the summer; will be providing 24 hr supervision/support for pt upon discharge.  Other support available: Yvonne is a very close friend. Pt has multiple close friends and social groups that she's a part of.  Gaps in support system: None identified.    Community Resources  Current in home services:  2x/month  Previous services: Home Health services from hip replacement; pt does not recall name of agency.    Financial/Employment/Education  Employment Status: Retired MD  Income Source: prison & SSI  Education: Ph.D.  Financial Concerns:  None reported  Insurance: Medicare and [employer]      Discharge Plan   Patient and family discharge goal: \"To be myself again.\" Pt plans to return home with " son.  Provided Education on discharge plan: YES  Patient agreeable to discharge plan:  YES  A list of Medicare Certified Facilities was provided to the patient and/or family to encourage patient choice. Based on location and rating, patient would like referrals made to: Not applicable at this time.  General information regarding anticipated insurance coverage and possible out of pocket cost was discussed. Patient and patient's family are aware patient may incur the cost of transportation to the facility, pending insurance payment: YES  Barriers to discharge: TBD    Discharge Recommendations   Disposition: Home  Transportation Needs: friend or family will provide  Name of Transportation Company and Phone: N/A    Additional comments   Pt's son, Fabien, is home from [place] for the summer and plans to be providing 24 hr supervision for his mother while he's home (he will not be working). Pt also has a daughter who lives in [place], but is currently here with pt. Both children seem very competent in healthcare are have a firm understanding of their mother's cognitive deficits. Pt's children are interested in additional support/services available, as pt will most likely need continued support and supervision when it is time for Fabien to return to college. SW made [facility] referral, with pt and family's permission.   Fabien is primary contact: [phone]         YODIT Cordero, LSW  [facility]   Pager: [phone]

## 2022-06-10 NOTE — PROGRESS NOTES
06/10/22 1500   Name of Certified Therapeutic Rec Specialist   Name of Certified Therapeutic Rec Specialist PARRIS Keane   Appointment Type   Type of Therapeutic Rec Session Therapeutic Rec Assessment   General Information   Patient Profile Review See Profile for full history and prior level of function   Daily Contact with Relatives or Friends Phone call;Visit   Pets Dog   Community Involvement   Community Involvement Retired   Spiritual Practice Not connected/interested   Outings Dinner;Shopping;Travel;Other (comments)  (Happy Hour with friends)   Hobbies/Interests   Craft/Art Other (comments)  (sewing)   Music   Music Preferences Popular;Rock   Listens to Recorded Music Yes   Brought Own Equipment Yes   Media   Newspapers Daily   Computer Has own at home;Will use tablet/phone   Reading Magazines;Books   Reading Preferences Fiction;Other (see comments)  (Belongs to 2 book clubs)   Sports / Physical Activities   Sports/Exercise Walks   Sports Fan Baseball;Basketball   Impression   Open to Socializing with Others Independent   Patient, family and / or staff in agreement with Plan of Care Yes   Treatment Plan   Interested in Unit [place]? No   Type of Intervention Independent with activity   Equipment and Supplies While on Unit None needed   Assessment Assessment completed, pt was provided leisure resource list, pt declined offers of leisure materials, but did have copy of newspaper in room. Will provide check in for materials as needed, and offer daily newspaper.

## 2022-06-10 NOTE — PROGRESS NOTES
Discharge Planner Post-Acute Rehab OT:     Discharge Plan: home with 24/7 A from [facility] for summer, HHOT    Precautions: falls, seizure, L inattention, alarms    Current Status:  ADLs:    Mobility: SBA with gb.    Grooming: NT    Dressing: UB - NT. LB - NT. Feet - SBA, vcs for thoroughness/safety.    Bathing: NT    Toileting: SBA  IADLs: Previous IND, A at discharge.  Vision/Cognition: Glasses prn. L inattention. SLUMS (6/10/22) 16/30 within norm range for dementia. Functional cognition deficits in attention, memory, problem solving, visuo-spatial skills, insight into deficits and safety awareness. Recommend additional cognitive testing (e.g. CPT, EFPT) and visual preceptual assessment (e.g. biVABA, Pawel Perceptual).      Assessment: Pt evaluated in IP TCU s/p hematoma and IPH. Previously lived alone and IND high-level ADL/IADLs, currently requiring significant assist. Performance impacted by significant functional cognition deficits, L inattention, and variable activity tolerance impacting safety. Recommend SLP cognition eval for further discharge recommendations and treatment. Goal to progress SBA ADLs and A for IADLs with 24/7 supervision at this time. ELOS 2 weeks for safe discharge planning, caregiver training, and DME.    Other Barriers to Discharge (DME, Family Training, etc): Safe discharge planning d/t pt cognition. Contact [facility] for details on home environment as pt poor historian to assist with DME recommendations.    Family training - not scheduled as of 6/10.    DME: TBD.

## 2022-06-10 NOTE — PROGRESS NOTES
"   06/10/22 1100   Quick Adds   Type of Visit Initial Occupational Therapy Evaluation   Living Environment   People in Home child(obdulio), adult;alone  (son, Fabien)   Current Living Arrangements house   Home Accessibility stairs to enter home   Stair Railings, Main Entrance none   Transportation Anticipated family or friend will provide   Living Environment Comments Per PT, pt poor historian. Reports typically lives alone, 30 year old son has been living with her temporarily the past ~week in between living arrangements.   Self-Care   Usual Activity Tolerance excellent   Current Activity Tolerance moderate   Regular Exercise Yes   Equipment Currently Used at Home none   Fall history within last six months yes   Number of times patient has fallen within last six months 1   Activity/Exercise/Self-Care Comment Per chart and pt - PTA independent ADLs, no equipment or device.   Instrumental Activities of Daily Living (IADL)   Previous Responsibilities driving   IADL Comments Per chart review and pt report- Pt does Pilates at a gym \"[employer]\" and walks dog daily. Pt is a retired internal medicine MD. Pt endorses previous IND IADLs including driving, intermittent A from son to mow lawn.   Previous Level of Function/Home Environm   Bathing/Grooming, Premorbid Functional Level independent   Dressing, Premorbid Functional Level independent   Eating/Feeding, Premorbid Functional Level independent   Toileting, Premorbid Functional Level independent   BADLs, Premorbid Functional Level independent   IADLs, Premorbid Functional Level independent   Bed Mobility, Premorbid Functional Level independent   Transfers, Premorbid Functional Level independent   Household Ambulation, Premorbid Functional Level independent   Stairs, Premorbid Functional Level independent   Community Ambulation, Premorbid Functional Level independent   Functional Cognition, Premorbid Functional Level IND PTA   Previous Level of Function, Premorbid IND PTA "   General Information   Onset of Illness/Injury or Date of Surgery 06/04/22   Referring Physician Altagracia Rojas MD   Additional Occupational Profile Info/Pertinent History of Current Problem Per EMR - Lilly Babcock is a 72 year old female with past medical history significant for  Follicular non-Hodgkins lymphoma she initially presented to ER  on 6/4 with severe headache and visual aura.  she was found to have  Right subdural hematoma. She was seen and evaluated by neurosurgery an no surgical interventions were recommended. She hit her head on the cabinert when was standing up form toilet. She was discharged home 6/5  but returned  6/6 with worsening headache and balance issues. Repeat imaging again showed stable  SDH but now also right occipital lobe IPH that per notes was there before but showed a new  Right posterior parahippocampal gyrus punctate infarct   Performance Patterns (Routines, Roles, Habits) Retired,   Existing Precautions/Restrictions fall;seizures;immunosuppressed   Limitations/Impairments safety/cognitive;hearing   Left Upper Extremity (Weight-bearing Status) full weight-bearing (FWB)   Right Upper Extremity (Weight-bearing Status) full weight-bearing (FWB)   Left Lower Extremity (Weight-bearing Status) full weight-bearing (FWB)   Right Lower Extremity (Weight-bearing Status) full weight-bearing (FWB)   Heart Disease Risk Factors Age;Gender   Cognitive Status Examination   Orientation Status orientation to person, place and time  (extra time required for answers)   Follows Commands increased processing time needed;repetition of directions required;follows one-step commands;50-74% accuracy   Safety Deficit ability to follow commands;awareness of need for assistance;impulsivity;insight into deficits/self-awareness;judgment;problem-solving   Memory Deficit short-term memory;long-term memory;moderate deficit   Attention Deficit concentration;distractible in noisy environment;focused/sustained  attention;requires cues/redirection to task;moderate deficit   Executive Function Deficit information processing;insight/awareness of deficits;problem-solving/reasoning;organization/sequencing   Cognitive Status Comments OT: Significant functional cognition deficits noted functionally and SLUMS screen score 16/30. L inattention noted in clock drawing task, deficits sequencing and orienting clothing indicate need for additional visual-perceptual assessment. Significant difficulties functional problem solving. Deficits impacting safety as pt STS from bed with only one shoe on and blankets on lap presenting a fall hazard. Pt requiring frequent redirection during pathfinding activity, distractable and inattentive to environment. Pt reporting little difficulty after cognitive screen, indicating low level of self-awareness and insight into deficits.   Visual Perception   Impact of Vision Impairment on Function (Vision) OT: Pt endorses glasses prn but rarely.No deficits noted in screen, pt will benefit from more indepth visual assessment (Pawel Chaudhari).   Sensory   Sensory Comments [place] - wears hearing aides. No sensory deficits noted in screen.   Pain Assessment   Patient Currently in Pain No   Posture   Posture not impaired   Range of Motion Comprehensive   Comment, General Range of Motion WFL   Strength Comprehensive (MMT)   Comment, General Manual Muscle Testing (MMT) Assessment WFL   Muscle Tone Assessment   Muscle Tone Quick Adds No deficits were identified   Coordination   Coordination Comments No deficits noted functionally during writing and clothing mgmt tasks   Clinical Impression   Criteria for Skilled Therapeutic Interventions Met (OT) Yes, treatment indicated   OT Diagnosis Impaired I/ADLs, functional cognition, and functional transfers   Influenced by the following impairments Cognitive deficits, impaired hearing, left inattention, fatigue   OT Problem List-Impairments impacting ADL problems related  to;activity tolerance impaired;balance;cognition;inability to direct their own care;mobility;range of motion (ROM);sensation;strength;vision;motor control   Assessment of Occupational Performance 5 or more Performance Deficits   Identified Performance Deficits dressing, toileting, bathing, functional mobility, meal prep   Planned Therapy Interventions (OT) ADL retraining;IADL retraining;cognition;balance training;neuromuscular re-education;E-stim;ROM;strengthening;stretching;transfer training;visual perception;progressive activity/exercise;home program guidelines;risk factor education   Clinical Decision Making Complexity (OT) high complexity   Anticipated Equipment Needs Upon Discharge (OT) gait belt   Clinical Impression Comments OT: Pt evaluated in IP TCU s/p hematoma and IPH. Previously lived alone and IND high-level ADL/IADLs, currently requiring significant assist. Performance impacted by significant functional cognition deficits, L inattention, and variable activity tolerance impacting safety. Recommend SLP cognition eval for further discharge recommendations and treatment. Goal to progress SBA ADLs and A for IADLs with 24/7 supervision at this time. ELOS 2 weeks for safe discharge planning, caregiver training, and DME.   Therapy Certification   Start of Care Date 06/10/22   Certification date from 06/10/22   Certification date to 07/09/22   Medical Diagnosis Generalized weakness; Subdural hematoma (H)   Total Evaluation Time (Minutes)   Total Evaluation Time (Minutes) 30   OT Goals   Therapy Frequency (OT) 6 times/wk   OT Predicted Duration/Target Date for Goal Attainment 06/24/22   OT: Hygiene/Grooming supervision/stand-by assist   OT: Upper Body Dressing Supervision/stand-by assist   OT: Lower Body Dressing Supervision/stand-by assist   OT: Upper Body Bathing Supervision/stand-by assist;using adaptive equipment   OT: Lower Body Bathing Supervision/stand-by assist;using adaptive equipment   OT: Bed Mobility  Supervision/stand-by assist   OT: Transfer Supervision/stand-by assist   OT: Toilet Transfer/Toileting Supervision/stand-by assist   OT: Meal Preparation Supervision/stand-by assist;with simple meal preparation   OT: Home Management Supervision/stand-by assist;with light demand household tasks   OT: Cognitive Patient/caregiver will verbalize understanding of cognitive assessment results/recommendations as needed for safe discharge planning

## 2022-06-10 NOTE — TELEPHONE ENCOUNTER
Spoke to patient and offered to schedule follow-up with Dr. Cruz's SHERYL in one week  from 6/8/22, with imaging prior. The patient declined, because she is currently in the hospital, she will give the spine and brain clinic a call when she discharges to schedule.

## 2022-06-10 NOTE — PLAN OF CARE
Occupational Therapy Discharge Summary    Reason for therapy discharge:    Discharged to acute rehabilitation facility.    Progress towards therapy goal(s). See goals on Care Plan in [facility] electronic health record for goal details.  Goals partially met.  Barriers to achieving goals:   discharge from facility.    Therapy recommendation(s):    Continued therapy is recommended.  Rationale/Recommendations:  Per last OT who worked with pt, recommending ARU to progress ADL/IADL IND/safety, functional mobility, overall activity tolerance.

## 2022-06-10 NOTE — PLAN OF CARE
Goal Outcome Evaluation:      Patient is alert and oriented x3. Regular diet, takes medication whole with thin liquids. SBA with gait belt. Patient can be hard of hearing. Scattered bruising on bilateral upper extremity. Continent of bowel and bladder. Patient of seizure precautions. Prn tylenol was given per patient request. Tuberculosis test administered to right upper forearm. No complaints of chest pain or shortness of breath. Able to make needs known, call light left within reach.     Patient's most recent vital signs are:     Vital signs:  BP: 95/56  Temp: 96.2  HR: 65  RR: 20  SpO2: 93 %     Patient does not have new respiratory symptoms.  Patient does not have new sore throat.  Patient does not have a fever greater than 99.5.

## 2022-06-10 NOTE — PROGRESS NOTES
"   06/10/22 0805   Quick Adds   Quick Adds Certification   Type of Visit Initial PT Evaluation   Living Environment   People in Home child(obdulio), adult   Current Living Arrangements house   Home Accessibility stairs to enter home   Number of Stairs, Main Entrance other (see comments)   Stair Railings, Main Entrance none   Transportation Anticipated family or friend will provide   Living Environment Comments PT: Pt having difficulty giving details about home.  Pt eventually stated 8 steps in front with a rail, 3 SADA in back.  She ussually lal car in garage and uses back entrance.   Self-Care   Usual Activity Tolerance excellent   Current Activity Tolerance moderate   Regular Exercise Yes   Activity/Exercise Type other (see comments)   Exercise Amount/Frequency other (see comments)   Equipment Currently Used at Home none   Fall history within last six months yes   Number of times patient has fallen within last six months 1   Activity/Exercise/Self-Care Comment PT; Pt does Pilates at a gym \"[employer]\" and walks dog daily. Pt was living alone, [place], no AD with ambulation. Pt is a retired internal medicine MD   Post-Acute Assessment Only   Post-Acute Functional Assessment See IP Rehab Daily Documentation Flowsheet for Functional Mobility/ADL Assessment   General Information   Onset of Illness/Injury or Date of Surgery 06/04/22   Referring Physician Dr Rojas   Patient/Family Therapy Goals Statement (PT) Pt likes to walk the dog, Pt goes to Pilates 2 x a week, called \" [employer]. \"  Pt with a headache this am, states she feels \"foggy\".  Pt wants to get home pretty soon.   Pertinent History of Current Problem (include personal factors and/or comorbidities that impact the POC) Lilly Babcock is a 72 year old female history of metastatic follicular non-Hodgkin's lymphoma currently on lenalidomide and rituximab, recent traumatic right sided subdural hematoma admitted for observation for ~9 hours who presents with headache " and continued dizziness since discharge yesterday. General neurology was initially consulted for dizziness and recommended MRI brain with and without contrast to rule out posterior circulation acute ischemic stroke. MRI brain was completed overnight and demonstrated right occipital lobe IPH with surrounding subarachnoid blood   Existing Precautions/Restrictions fall   General Observations PT; Pt in bed, gets distracted easily.   Cognition   Cognitive Status Comments PT: Pt unable to state what this place is, or where her son goes to college, or state year.  Pt has two kids , Fabien (boy ) , and Franci (daughter) .  Dtr lives in [place], [place]   Pain Assessment   Patient Currently in Pain Yes, see Vital Sign flowsheet   Integumentary/Edema   Integumentary/Edema no deficits were identifed   Range of Motion (ROM)   Range of Motion ROM is WFL   Strength (Manual Muscle Testing)   Strength (Manual Muscle Testing) strength is WFL   Balance   Balance other (describe)   Balance Comments PT; foraml assessment not completed due to time constraints, pt can perform SLS briefly with close sba, path deviations with head turns, moves slowly with gait, no ad.   Sensory Examination   Sensory Perception Comments L inattention, tends to gaze to R, notice things onR side of [place] with ambulation..  Intact to light touch and great toe propriocpetion R.   Coordination   Coordination other (see comments)   Coordination Comments mild incoordination L UE with FTN.   Muscle Tone   Muscle Tone no deficits were identified   Clinical Impression   Criteria for Skilled Therapeutic Intervention Yes, treatment indicated   PT Diagnosis (PT) PT: Pt with impaired mobility,SDH, stroke   Influenced by the following impairments PT; Pt with headache, impaired cognition, impaired balance and activity tolerance, L inattention.   Functional limitations due to impairments PT: Pt with difficulty with gait, community distance, stairs.   Clinical Presentation (PT  "Evaluation Complexity) Stable/Uncomplicated   Clinical Presentation Rationale PT: pt functioning below baseline, has stairs, lives alone, now with cognitive deficits and higher level mobility deficits.   Clinical Decision Making (Complexity) low complexity   Planned Therapy Interventions (PT) balance training;gait training;home exercise program;motor coordination training;neuromuscular re-education;patient/family education;home program guidelines;risk factor education;progressive activity/exercise;transfer training;strengthening;stair training   Risk & Benefits of therapy have been explained evaluation/treatment results reviewed;care plan/treatment goals reviewed;risks/benefits reviewed;current/potential barriers reviewed;participants voiced agreement with care plan;patient;participants included   Clinical Impression Comments PT: Pt with recent SDH, then fall and found to have R occipital lobe stroke, and R parahippocampal stroke. Pt was previously I , living alone.  Pt now with \"foggy\" thinking per pt, slower, distracted, and needing cues for initiation, L side attention and safety at times.  Pt will likely need supervision at home for safety and to ensure pt's self care and medical management.   Therapy Certification   Start of care date 06/10/22   Certification date from 06/10/22   Certification date to 07/10/22   Medical Diagnosis right subdural hematoma w/ occipital IPH, Weakness generalized   Total Evaluation Time   Total Evaluation Time (Minutes) 25   Physical Therapy Goals   PT Frequency 6x/week   PT Predicted Duration/Target Date for Goal Attainment 06/14/22   PT: Transfers Independent   PT: Gait Independent;Greater than 200 feet   PT: Stairs Supervision/stand-by assist;8 stairs;Rail on left   PT: Perform aerobic activity with stable cardiovascular response continuous activity;10 minutes;ambulation   PT: Goal 1 PT able to perform car transfer with I.   PT: Goal 2 Pt able to state 3 fall prevention " strategies after fall prevention training for improved safety at home.   PT: Goal 3 Pt able to perform fall recovery transfer with furniture S.

## 2022-06-10 NOTE — PHARMACY-MEDICATION REGIMEN REVIEW
Pharmacy Medication Regimen Review  Lilly Babcock is a 72 year old female who is currently in the Transitional Care Unit.    Assessment: All medications have an appropriate indications, durations and no unnecessary use was found    Plan:   Continue medications as ordered.  Attending provider will be sent this note for review.  If there are any emergent issues noted above, pharmacist will contact provider directly by phone.      Pharmacy will periodically review the resident's medication regimen for any PRN medications not administered in > 72 hours and discontinue them. The pharmacist will discuss gradual dose reductions of psychopharmacologic medications with interdisciplinary team on a regular basis.    Please contact pharmacy if the above does not answer specific medication questions/concerns.    Background:  A pharmacist has reviewed all medications and pertinent medical history today.  Medications were reviewed for appropriate use and any irregularities found are listed with recommendations.      Current Facility-Administered Medications:      [START ON 6/12/2022] - Skin Test Reading -, , Does not apply, Q21 Days, Altagracia Rojas MD     acetaminophen (TYLENOL) Suppository 650 mg, 650 mg, Rectal, Q4H PRN, Altagracia Rojas MD     acetaminophen (TYLENOL) tablet 650 mg, 650 mg, Oral, Q4H PRN, Altagracia Rojas MD, 650 mg at 06/10/22 1038     atorvastatin (LIPITOR) tablet 40 mg, 40 mg, Oral, QPM, Altagracia Rojas MD, 40 mg at 06/09/22 2053     calcium carbonate (TUMS) chewable tablet 1,000 mg, 1,000 mg, Oral, 4x Daily PRN, Altagracia Rojas MD     levETIRAcetam (KEPPRA) tablet 500 mg, 500 mg, Oral, BID, Altagracia Rojas MD, 500 mg at 06/10/22 0952     Nurse may request from Pharmacy a change of form of medication (e.g. Liquid to tablet)., , Does not apply, Continuous PRN, Altagracia Rojas MD     ondansetron (ZOFRAN ODT) ODT tab 4 mg, 4 mg, Oral, Q6H PRN **OR** ondansetron (ZOFRAN) injection 4 mg, 4 mg,  Intravenous, Q6H PRN, Altagracia Rojas MD     sennosides (SENOKOT) tablet 1-2 tablet, 1-2 tablet, Oral, BID PRN, Altagracia Rojas MD     tuberculin injection 5 Units, 5 Units, Intradermal, Q21 Days, Altagracia Rojas MD, 5 Units at 06/10/22 1033

## 2022-06-10 NOTE — PROGRESS NOTES
Discharge Planner Post-Acute Rehab PT:     Discharge Plan: Home, 8 stairs front entry, 3 stairs back entry.  Pt will need S at home due to cognitive deficits.     Precautions: falls, cognition, recent brain bleed and cva . decreased attention L, decreased safety      Current Status:  Bed Mobility: supervision  Transfer: sba   Gait: cga to sba, about 300 ft today, zarco by pt having Headache and time constraints.  Amb up to 600 ft in inpatient care.   Stairs: 6 . 2 HR close sba   Balance: NT formally, but cga at times with gait, no assistive device  with gait.     Assessment:  Pt with SDH but now also right occipital lobe IPH that per notes was there before but showed a new  Right posterior parahippocampal gyrus punctate infarct.  Pt was previously I, living alone in [facility].  Pt is a retired MD.  Pt now with cognitive deficits, decreased attention to L side, mild decreased coordination L UE and decreased balance and safety with gait.    Pt will likely need supervision at home due to her cognitive deficits.  Pt will benefit from mobility training to improve balance, L side awareness , and safety for safe mobility at home and to reduce her fall risk.  ELOS 5 days , depending on help at home.     Other Barriers to Discharge (DME, Family Training, etc):   Cognition  Lives alone

## 2022-06-10 NOTE — H&P
[facility]    History and Physical - Hospitalist Service       Date of Admission: 6/9/2022     Assessment & Plan      Lilly Babcock is a 72 year old female with past medical history significant for  Follicular non-Hodgkins lymphoma she initially presented to ER  on 6/4 with severe headache and visual aura.  she was found to have  Right subdural hematoma. She was seen and evaluated by neurosurgery an no surgical interventions were recommended. She hit her head on the cabinert when was standing up form toilet. She was discharged home 6/5  but returned  6/6 with worsening headache and balance issues. Repeat imaging again showed stable  SDH but now also right occipital lobe IPH that per notes was there before but showed a new  Right posterior parahippocampal gyrus punctate infarct      She was now transferred to TCU 6/9 for further cares     Traumatic right cerebral cortex subdural hemorrhage  - causing 2-3 mm midline shift     -  Follow up  With neurosurgery in 1 week with CT head   -on 7 day seizure prophylaxis with  keppra 500 mg bid till 6/14    -restart aspirin once cleared by neurosurgery   - neuro checks q 6 hr for now       Small right occipital infarct  -  hold aspirin till follow up  With neurosurgery   - cause of stroke was felt to be due to lenalidomide  and now stopped   follow up  Neurology in 6-8    - she has history prior right basal ganglia hemorrhage and right temporal lobe   - echo  6/6 showed EF 60-65% , no cardiac source if embolism identified   -no atrial fibrillation  Noted , has 30 day event monitor on discharge  Not on her so will need on discharge     - started on atorvastatin 40 mg daily     Migraine headache   - try to avoid trypans  And no NSAIDS  - use tylenol     Follicular lymphoma, stage IV, FLIPI 2 diagnosed in 2008   - had left femur involvement and had left femur fracture status post  Hip replacement and radiation , then in 2/2022 had progressive diffuse  skeletal lesions and was treated wit rituximab q 3 months and  due at end of this month howevere per my discussion with hematology and now they are  discontinuing rituximab for now as well .    - seen by heme onc 6/7. lenalidomide stopped as has been associated with venous thromboembolism and arterial ischemic stroke. She was on aspirin while was on this for prophylaxis  - bleeding diathesis work up started  ,  Thrombin time  Normal , von Willebrand factor Ag 236 ()  and activity  At 194 ( )and platelet function 611  , Factor XIII 102 ( %),  I discussed with  With hematology reg von Willebrand results  ad nothing further needs to be done ( not low so no bleeding issues due to this)    - is to follow up  With heme onc and heme onc office will set up the follow up  Appointment for July       Diet: Regular Diet Adult  DVT Prophylaxis: mechanical  Feldman Catheter: Not present  Central Lines: None  Cardiac Monitoring: None  Code Status: Full Codefull  Discussed with  Patient      Clinically Significant Risk Factors Present on Admission                      Disposition Plan   Expected Discharge: TBD         The patient's care was discussed with hematology and  the care team  .    Altagracia Rojas MD  Hospitalist Service  [facility]  Securely message with the Vocera Web Console (learn more here)  Text page via [employer] ([employer]) Paging/Directory         ______________________________________________________________________    Chief Complaint   Headache   History is obtained from the patient and reviewing records in Epic     History of Present Illness     Lilly Babcock is a 72 year old female with past medical history significant for  Follicular non-Hodgkins lymphoma she initially presented to ER  on 6/4 with severe headache and visual aura.  she was found to have  Right subdural hematoma. She was seen and evaluated by neurosurgery an no surgical interventions were recommended. She hit her  "head on the cabinert when was standing up form toilet. She was discharged home 6/5  but returned  6/6 with worsening headache and balance issues. Repeat imaging again showed stable  SDH but now also right occipital lobe IPH that per notes was there before but showed a new  Right posterior parahippocampal gyrus punctate infarct      She was now transferred to TCU 6/9 for further cares       Seen patient  6/10, she is doing ok, no worsening headache but feels a bit \" foggy\" , denies any new weakness or numbness, no chest pain  No shortness of breath  No palliations ,no nausea vomiting no diarrhea       Review of Systems    The 10 point Review of Systems is negative other than noted in the HPI or here.     Past Medical History    I have reviewed this patient's medical history and updated it with pertinent information if needed.   Past Medical History:   Diagnosis Date     Asthma      Degenerative joint disease      Depression      Eczema      Lymphoma, follicular (H) 8/2008 diagnosed    Stage YOUSUF - bone mets to L greater trochanter     Nasal congestion      Seasonal allergies        Past Surgical History   I have reviewed this patient's surgical history and updated it with pertinent information if needed.  Past Surgical History:   Procedure Laterality Date     ARTHROPLASTY HIP Left 10/18/2016    Procedure: ARTHROPLASTY HIP;  Surgeon: Chepe Peterson MD;  Location: [facility] OR     BIOPSY BONE CLAVICLE  3/1/2012    Procedure:BIOPSY BONE CLAVICLE; Biopsy Left Clavicle, Bilateral Hip Injections; Surgeon:JENNY BELTRAN; Location:[facility] OR     BREAST BIOPSY, RT/LT      left     DILATION AND CURETTAGE       HAND SURGERY      right flexor tendon laceration repair     INJECT STEROID (LOCATION)  3/1/2012    Procedure:INJECT STEROID (LOCATION); Surgeon:JENNY BELTRAN; Location:[facility] OR     LAPAROSCOPY DIAGNOSTIC (GYN)         Social History   I have reviewed this patient's social history and updated it with pertinent information " if needed.  Social History     Tobacco Use     Smoking status: Never Smoker     Smokeless tobacco: Never Used   Substance Use Topics     Alcohol use: Not Currently     Alcohol/week: 5.8 standard drinks     Drug use: No       Family History   I have reviewed this patient's family history and updated it with pertinent information if needed.  Family History   Problem Relation Age of Onset     Depression Mother      Mental Illness Mother      Cancer Mother         breast     Other Cancer Mother      Diabetes Father      Cancer Father         lung ca, was a smoker     Other Cancer Father      Abdominal Aortic Aneurysm Father      Asthma Sister      Depression Sister      Mental Illness Sister      Asthma Sister      Depression Sister      Breast Cancer Sister      Liver Disease Sister        Prior to Admission Medications   Prior to Admission Medications   Prescriptions Last Dose Informant Patient Reported? Taking?   Cholecalciferol (VITAMIN D) 1000 UNIT capsule  Self Yes No   Sig: Take 2 capsules by mouth daily    LENalidomide (REVLIMID) 20 MG CAPS capsule  Self No No   Sig: Take 1 capsule (20 mg) by mouth daily for 21 days , Days 1 through 21, then off for 7 days.   VIIBRYD 20 MG TABS tablet  Self Yes No   Sig: Take 20 mg by mouth daily    acetaminophen (TYLENOL) 325 MG tablet   No No   Sig: Take 2 tablets (650 mg) by mouth every 4 hours as needed for other (For optimal non-opioid multimodal pain management to improve pain control.)   acyclovir (ZOVIRAX) 400 MG tablet  Self Yes No   Sig: Take 1 tablet by mouth as needed (kandace day to prevent cold sores)   albuterol (PROAIR HFA/PROVENTIL HFA/VENTOLIN HFA) 108 (90 Base) MCG/ACT inhaler  Self Yes No   Sig: Inhale 2 puffs into the lungs every 4 hours as needed for shortness of breath / dyspnea or wheezing   atorvastatin (LIPITOR) 40 MG tablet   No No   Sig: Take 1 tablet (40 mg) by mouth every evening   cetirizine (ZYRTEC) 10 MG tablet  Self Yes No   Sig: Take 10 mg by  mouth daily   clobetasol (TEMOVATE) 0.05 % ointment  Self Yes No   Sig: Apply topically 2 times daily as needed (eczema)    ipratropium (ATROVENT) 0.06 % spray  Self Yes No   Sig: Spray 2 sprays in nostril daily    ketoconazole (NIZORAL) 2 % shampoo  Self Yes No   Sig: Apply topically daily as needed (eczema)    levETIRAcetam (KEPPRA) 500 MG tablet   No No   Si tablet (500 mg) by Oral or NG Tube route 2 times daily   methylPREDNISolone (MEDROL) 32 MG tablet  Self No No   Sig: Take one tablet (32mg) by mouth 12 hours prior to scheduled CT scan.  Repeat above dose 2 hours prior to CT scan   Patient taking differently: Take one tablet (32mg) by mouth 12 hours prior to scheduled CT scan.  Repeat above dose 2 hours prior to CT scan   mometasone (ELOCON) 0.1 % ointment  Self Yes No   Sig: Apply topically daily as needed (eczema)    predniSONE (DELTASONE) 1 MG tablet  Self Yes No   Sig: Take 7 mg by mouth daily       Facility-Administered Medications: None     Allergies   Allergies   Allergen Reactions     Diagnostic X-[employer] Materials Hives     ct     Diatrizoate Rash     Dye [Contrast Dye] Rash and Hives     Ioxaglate Hives and Rash     Vortioxetine Nausea and Vomiting     Revlimid [Lenalidomide] Rash       Physical Exam   Vital Signs:     BP: 104/48 Pulse: 76     SpO2: 98 % O2 Device: None (Room air)    Weight: 0 lbs 0 oz    General appearance: awake alert in  no apparent distress     HEENT: EOMI and PEARLA, sclera nonicteric, moist mucus membranes, head atraumatic  NECK: supple  RESPIRATORY: lungs are clear to ausculation   CARDIOVASCULAR: normal S1S2 regular rate and rhythm, no rubs gallops or murmurs appreciated, good pedal pulses  GASTROINTESTINAL: abdomen is  obese, soft,  non-distended, non-tender with normal bowel sounds. No masses felt, no hepatosplenomegaly noted    MUSCULOSKELETAL: normal muscle bulk and tone  SKIN: warm and dry, no rashes, no mottling noted  Has bruising over lower extremities   NEUROLOGIC:  awake alert and oriented, pupils = reactive, has 5/5 strength in all extremities, slight miss on finger to nose on left , heal to shin normal both sides, Babinsky equivocal bilateral    EXTREMITIES: no clubbing cyanosis or edema noted  moves all extremities       Data   Data reviewed today: I reviewed all medications, new labs and imaging results over the last 24 hours. .    Recent Labs   Lab 06/09/22  0847 06/09/22  0601 06/09/22  0302 06/08/22  0804 06/08/22  0702 06/08/22  0701 06/07/22  0802 06/07/22  0541 06/06/22  1230 06/04/22  2353 06/04/22  2352   WBC 6.1  --   --   --   --  8.8  --  5.5 6.6   < > 6.3   HGB 11.7  --   --   --   --  10.9*  --  11.0* 10.8*   < > 11.7   MCV 95  --   --   --   --  94  --  95 97   < > 97     --   --   --   --  215  --  201 198   < > 219   INR  --   --   --   --   --   --   --  1.00 1.01  --  0.94     --   --   --  139  --   --  138 137   < > 136   POTASSIUM 3.9  --   --   --  3.8  --   --  4.0 4.1   < > 4.3   CHLORIDE 106  --   --   --  106  --   --  104 102   < > 103   CO2 29  --   --   --  26  --   --  28 32   < > 29   BUN 12  --   --   --  12  --   --  10 12   < > 15   CR 0.66  --   --   --  0.74  --   --  0.77 0.80   < > 0.80   ANIONGAP 5  --   --   --  7  --   --  6 3   < > 4   FLORENCIA 9.3  --   --   --  9.2  --   --  9.2 9.2   < > 9.1   GLC 93 97 96   < > 100*  --    < > 92 103*   < > 119*   ALBUMIN  --   --   --   --   --   --   --  3.3* 3.5  --  3.9   PROTTOTAL  --   --   --   --   --   --   --  6.0* 6.2*  --  6.9   BILITOTAL  --   --   --   --   --   --   --  0.5 0.4  --  0.3   ALKPHOS  --   --   --   --   --   --   --  66 64  --  79   ALT  --   --   --   --   --   --   --  29 32  --  44   AST  --   --   --   --   --   --   --  17 18  --  31    < > = values in this interval not displayed.     No results found for this or any previous visit (from the past 24 hour(s)).

## 2022-06-11 NOTE — PLAN OF CARE
Dr. Rojas paged to inform her about the patient's son Fabien -[phone] requesting to be updated on the result of the CT and plan. DR. Rojas called and spoke to Fabien about the result.

## 2022-06-11 NOTE — PROGRESS NOTES
[facility]    Medicine Progress Note - Hospitalist Service    Date of Admission:  6/9/2022    Assessment & Plan        Lilly Babcock is a 72 year old female with past medical history significant for  Follicular non-Hodgkins lymphoma she initially presented to ER  on 6/4 with severe headache and visual aura.  she was found to have  Right subdural hematoma. She was seen and evaluated by neurosurgery and no surgical interventions were recommended. She hit her head on the cabinet when was standing up form toilet. She was discharged home 6/5  but returned  6/6 with worsening headache and balance issues. Repeat imaging again showed stable  SDH but now also right occipital lobe IPH that per notes was there before but showed a new  Right posterior parahippocampal gyrus punctate infarct       She was now transferred to TCU 6/9 for further cares      Traumatic right cerebral cortex subdural hemorrhage  - causing 2-3 mm midline shift       -  Follow up  With neurosurgery in 1 week with CT head   -on 7 day seizure prophylaxis with  keppra 500 mg bid till 6/14    -restart aspirin once cleared by neurosurgery   - neuro checks q 6 hr for now       Small right occipital infarct  -  hold aspirin till follow up  With neurosurgery   - cause of stroke was felt to be due to lenalidomide  and now stopped   follow up  Neurology in 6-8    - she has history prior right basal ganglia hemorrhage and right temporal lobe   - echo  6/6 showed EF 60-65% , no cardiac source if embolism identified   -no atrial fibrillation  Noted , has 30 day event monitor on discharge  Not on her so will need on discharge     - started on atorvastatin 40 mg daily     Left hand numbness  -- 6/11 AM nursing reported that patient  Had left hand numbness  6/10 night, to me patient  Report had left hand numbness mainly in left thumb and in corner of  Left mouth, does not report any numbness or tingling anywhere else and tells  me it has  pretty much resolved , I spoke with her sister who is a doctor and told me patient  Did slurr her speech last night   -- saw patient  6/11 AM, just woke her up and answers little slower but speech clear   - I spoke with neurology and did order stats noncon CT of head and will go form there and neurology will see patient         Addendum:   STAT head CT with increased size of SDH and also minimally increased midline shift, discussed with  Neurology and recommended to discussed with  NS, repeat CT in 6 hr and if worse then needs readmission . I have paged neurosurgery for discussions and awaiting call back .  keppra dose was increased to 100 mg bid ( giving extra 500 mg as received 500 mg in AM)  Checked patient , no worsening of neuro status at this point . Stepped up neuro checked to q 2 hr for now   Spoke  with Dr Ramos from neurosurgery, agrees with plan and will see the patient  Today and check on repeat CT     Migraine headache   - try to avoid trypans  And no NSAIDS  - use tylenol      Follicular lymphoma, stage IV, FLIPI 2 diagnosed in 2008   - had left femur involvement and had left femur fracture status post  Hip replacement and radiation , then in 2/2022 had progressive diffuse skeletal lesions and was treated wit rituximab q 3 months and  due at end of this month howevere per my discussion with hematology and now they are  discontinuing rituximab for now as well .    - seen by heme onc 6/7. lenalidomide stopped as has been associated with venous thromboembolism and arterial ischemic stroke. She was on aspirin while was on this for prophylaxis  - bleeding diathesis work up started  ,  Thrombin time  Normal , von Willebrand factor Ag 236 ()  and activity  At 194 ( )and platelet function 611  , Factor XIII 102 ( %),  I discussed with  With hematology reg von Willebrand results  ad nothing further needs to be done ( not low so no bleeding issues due to this)    - is to follow up  With [place]  onc and heme onc office will set up the follow up  Appointment for July        Diet: Regular Diet Adult  DVT Prophylaxis: mechanical  Feldman Catheter: Not present  Central Lines: None  Cardiac Monitoring: None  Code Status: Full Codefull  Discussed with  Patient        6/11 , I spoke with neurology also discussed with  With  Her sister Dr Babcock (GYN onc) patient  Sister        Disposition Plan   Expected Discharge: TBD   Anticipated discharge location: home with family      Altagracia Rojas MD  Hospitalist Service  [facility]  Securely message with the Vocera Web Console (learn more here)  Text page via [employer] Paging/Directory         Clinically Significant Risk Factors Present on Admission                 ______________________________________________________________________    Interval History      Patient  Had numbness in left hand and corner  of mouth and apparently had slurred speech.  Patient  Now states it has resolved.   I saw her in AM and woke her up, she is a bit slower from yesterday but just woke up, no other change sin neuro exam at this point     Data reviewed today: I reviewed all medications, new labs and imaging results over the last 24 hours.     Physical Exam   Vital Signs: Temp: 97  F (36.1  C) Temp src: Oral BP: 115/45 Pulse: 76   Resp: 18 SpO2: 96 % O2 Device: None (Room air)    Weight: 0 lbs 0 oz     General appearence: in  no apparent distress    HEENT: EOMI, PEARLA, sclera nonicteric, moist,  mucus membranes,   NECK : supple  RESPIRATORY: lungs clear to auscultation bilateral,    CARDIOVASCULAR:S1 S2 regular rate and rhythm, no rubs gallops or murmurs appreciated  GASTROINTESTINAL:soft, non-distended , non-tender , + bowel sounds, no masses felt   SKIN: warm and dry, no mottling noted   NEUROLOGIC; woke her up form sleep, speech little slower from yesterday but clear. strength 5/5 all extremities, no pronator drift, again slight miss on finger to nose on left side as  yesterday, heal to shin ok bilateral  , oriented but did ask if her son was here outside , Nanetteophelia still equivocal    EXTREMITIES: no clubbing, cyanosis or edema , moves all extremity, good pedal pulses , bruising over lower extremities   MUSCULOSKELETAL: without deformity     Data   Recent Labs   Lab 06/09/22  0847 06/09/22  0601 06/09/22  0302 06/08/22  0804 06/08/22  0702 06/08/22  0701 06/07/22  0802 06/07/22  0541 06/06/22  1230 06/04/22  2353 06/04/22  2352   WBC 6.1  --   --   --   --  8.8  --  5.5 6.6   < > 6.3   HGB 11.7  --   --   --   --  10.9*  --  11.0* 10.8*   < > 11.7   MCV 95  --   --   --   --  94  --  95 97   < > 97     --   --   --   --  215  --  201 198   < > 219   INR  --   --   --   --   --   --   --  1.00 1.01  --  0.94     --   --   --  139  --   --  138 137   < > 136   POTASSIUM 3.9  --   --   --  3.8  --   --  4.0 4.1   < > 4.3   CHLORIDE 106  --   --   --  106  --   --  104 102   < > 103   CO2 29  --   --   --  26  --   --  28 32   < > 29   BUN 12  --   --   --  12  --   --  10 12   < > 15   CR 0.66  --   --   --  0.74  --   --  0.77 0.80   < > 0.80   ANIONGAP 5  --   --   --  7  --   --  6 3   < > 4   FLORENCIA 9.3  --   --   --  9.2  --   --  9.2 9.2   < > 9.1   GLC 93 97 96   < > 100*  --    < > 92 103*   < > 119*   ALBUMIN  --   --   --   --   --   --   --  3.3* 3.5  --  3.9   PROTTOTAL  --   --   --   --   --   --   --  6.0* 6.2*  --  6.9   BILITOTAL  --   --   --   --   --   --   --  0.5 0.4  --  0.3   ALKPHOS  --   --   --   --   --   --   --  66 64  --  79   ALT  --   --   --   --   --   --   --  29 32  --  44   AST  --   --   --   --   --   --   --  17 18  --  31    < > = values in this interval not displayed.     No results found for this or any previous visit (from the past 24 hour(s)).

## 2022-06-11 NOTE — PLAN OF CARE
The patient is alert and oriented x 3. VSS. Medicated with Tylenol x 1 for c/o ongoing HA. Denies SOB, chest pain, N/V. Ambulated in the hallway with with family SBA. Patient had a shower tonight. Appetite is fair. Son c/o patient having numbness on left fingers and side of face and would like to see a neurologist. Patient stated that she has had the numbness of whole fingers for some time during hospitalization but has gotten better. Stated that she felt the intermittent numbness on her left thumb only tonight and involuntary mouth movement. Face with normal symmetrical movement, A &O, ambulates independently, coherent and answers questions appropriately. Patient is requesting to discuss with the provider and see a neurologist for follow up of last CT 6/7/22 per discharged information prior to discharge to TCU. Sticky note left for the provider re c/o ongoing  intermittent numbness left thumb, face, discharge note on CT and neurology follow up. Per family's request valuable envelope requested from security and returned to patient's son who will take it home.          Patient's most recent vital signs are:     Vital signs:  BP: 114/52  Temp: 96.5  HR: 72  RR: 18  SpO2: 98 %     Patient does not have new respiratory symptoms.  Patient does not have new sore throat.  Patient does not have a fever greater than 99.5.

## 2022-06-11 NOTE — PLAN OF CARE
Goal Outcome Evaluation:       Patient is alert and oriented x3. Regular diet, takes medication whole with thin liquids. SBA with gait belt. Patient can be hard of hearing. Scattered bruising on bilateral upper extremity. Continent of bowel and bladder. Patient of seizure precautions. Prn tylenol was given per patient request. Patient went to a CT scan around 9 am and plans to go to another one around 3 pm. Patient takes some time to complete tasks and gets distracted easily. Patient was expecting son to come visit in the morning. Patient heard a males voice in the hallway and was sure it was her son. Patient went to the hallway and called out for son multiple times until  staff assured her that her son had not yet arrived. No complaints of chest pain or shortness of breath. Able to make needs known, call light left within reach.   Patient's most recent vital signs are:     Vital signs:  BP: 95/56  Temp: 96.2  HR: 65  RR: 20  SpO2: 93 %     Patient does not have new respiratory symptoms.  Patient does not have new sore throat.  Patient does not have a fever greater than 99.5.

## 2022-06-11 NOTE — PLAN OF CARE
Pt sleeps well all night. No s/s of SOB and chest pain noted. Independent bed mobility. Call light within reach. Bed alarm on for safety. Seizure precaution maintained. Will continue POC.        Patient's most recent vital signs are:     Vital signs:  BP: 112/59  Temp: 96.5  HR: 70  RR: 18  SpO2: 98 %     Patient does not have new respiratory symptoms.  Patient does not have new sore throat.  Patient does not have a fever greater than 99.5.

## 2022-06-11 NOTE — PROGRESS NOTES
I was asked to follow up CT head. CT head preliminary read as follows:    1. No new intracranial hemorrhage or other new intracranial pathology.  2. Unchanged mixed density subdural hematoma overlying the right  cerebral convexity.  3. Unchanged right to left midline shift.  4. No significant change in the right occipital hemorrhagic contusion    Per read, findings are stable    Paged Dr. Viraj Grewal from Neurosurgery. Awaiting to discuss the findings    Addendum: Discussed with Dr. Grewal. CT reviewed. No further intervention at this time. Dr. Grewal will attempt to see patient this evening.     Roney Castellon MD  [facility]  Contact information available via [facility] Paging/Directory

## 2022-06-11 NOTE — PROGRESS NOTES
Attempted to see patient his AM but was in imaging.  Called about transient L hand numbness.  Recommended head CT which shows very mild increase in size of subdural.  Apparently is clinically stable per hospitalist with improvement in numbness. Differential is cortical spreading depression 2/2 mass effect vs focal seizures.  Will increase Keppra and get EEG.  Recommend discussing with neurosurgery but at this time based on description of stable exam would recommend repeat Head CT in about 6 hours.  If worsening on repeat scan will certainly need to transfer at that time.  Will see patient tomorrow AM.   Discussed with hospitalist

## 2022-06-12 NOTE — PLAN OF CARE
Goal Outcome Evaluation:       Patient is alert and oriented x3. Can be impulsive. Regular diet, takes medication whole with thin liquids. SBA with gait belt. Patient can be hard of hearing. Scattered bruising on bilateral upper extremity. Continent of bowel and bladder. Patient of seizure precautions. Patient complained of headache. Prn tylenol was given twice per patient request. Lights dimmed and noise reduced. Patient takes some time to complete tasks and gets distracted easily. Patient can be confused at times. Writer was informed that patient wandered into another patients room through shared bathroom overnight. Patient also left room to get news paper without asking staff for assistance. While writer was walking patient back to room, the patient tried to enter another patients room. Patient was redirected and reminded to use the call light. Bed and chair alarm on. No complaints of chest pain or shortness of breath. Able to make needs known, call light left within reach.   Patient's most recent vital signs are:     Vital signs:  BP: 95/56  Temp: 96.2  HR: 65  RR: 20  SpO2: 93 %     Patient does not have new respiratory symptoms.  Patient does not have new sore throat.  Patient does not have a fever greater than 99.5.

## 2022-06-12 NOTE — PLAN OF CARE
The patient is alert and oriented x 3 with intermittent forgetfulness. Face symmetrical, sensation intact, SBA with ambulation and answers questions appropriately. Medicated with Tylenol x 1 for ongoing HA. Denies changes in HA intensity from baseline. No seizure symptoms noted. Neuro check is intact and has been within her baseline. Seen by the resident neurosurgeon Dr. Ramos for follow up CT X 2 today for comparison to prior CT done 6/7/22. Patient stated that Dr. Ramos explained the result and the plan henceforth and she understood. Appetite is poor this evening.  Continent of bowel and bladder. No acute changes noted at this time. Continue to monitor for comfort.

## 2022-06-12 NOTE — PROGRESS NOTES
[facility]    Medicine Progress Note - Hospitalist Service    Date of Admission:  6/9/2022    Assessment & Plan        Lilly Babcock is a 72 year old female with past medical history significant for  Follicular non-Hodgkins lymphoma she initially presented to ER  on 6/4 with severe headache and visual aura.  she was found to have  Right subdural hematoma. She was seen and evaluated by neurosurgery and no surgical interventions were recommended. She hit her head on the cabinet when was standing up form toilet. She was discharged home 6/5  but returned  6/6 with worsening headache and balance issues. Repeat imaging again showed stable  SDH but now also right occipital lobe IPH that per notes was there before but showed a new  Right posterior parahippocampal gyrus punctate infarct       She was now transferred to TCU 6/9 for further cares      Traumatic right cerebral cortex subdural hemorrhage  - causing 2-3 mm midline shift     -  follow up  With neurosurgery  clinic in 2 weeks with repeat head CT in 2 weeks ( unless has new changes)    - plan was for  7 day seizure prophylaxis with  keppra 500 mg bid till 6/14, dose now up to 1000 mg bid and continue keppra   for now till neurology, NS follow up   , EEG Monday , see bellow   -restart aspirin once cleared by neurosurgery   - neuro checks q 2 hr for now   - will gilberto neurology follow up  , one set  for October but will need to bring it forward       Small right occipital infarct  -  hold aspirin till ok to restart by neurosurgery   - cause of stroke was felt to be due to lenalidomide  and now stopped   follow up  Neurology in 6-8    - she has history prior right basal ganglia hemorrhage and right temporal lobe   - echo  6/6 showed EF 60-65% , no cardiac source if embolism identified   -no atrial fibrillation  Noted , has 30 day event monitor on discharge  Not on her so will need on discharge     - started on atorvastatin 40 mg daily     Left  hand numbness  -- 6/11 AM nursing reported that patient  Had left hand numbness  6/10 night, to me patient  Report had left hand numbness mainly in left thumb and in corner of  Left mouth, does not report any numbness or tingling anywhere else and tells  me it has pretty much resolved , I spoke with her sister who is a doctor and told me patient  Did slur her speech last night   -- saw patient  6/11 AM, just woke her up and answers little slower but speech clear   --6/11 STAT head CT with increased size of SDH and also minimally increased midline shift, discussed with  Neurology and neurosurgery and patient  Was seen by Dr Ramos 6/11 . Had repeat CT scan in 6 hrs, showed no changes , no neurosurgical intervention recommended   -- neurology will also see   -- keppra dose was increased to 1000 mg bid , EEG on Monday ( cannot be done on weekend at [facility] )    -- repeat head CT if has neuro changes      Confusion  -- has had confusion over past few days, patient walked into other patient 's room 6/12 when I asked she did remember this, continue to monitor      Migraine headache   - try to avoid trypans  And no NSAIDS  - use tylenol      Follicular lymphoma, stage IV, FLIPI 2 diagnosed in 2008   - had left femur involvement and had left femur fracture status post  Hip replacement and radiation , then in 2/2022 had progressive diffuse skeletal lesions and was treated wit rituximab q 3 months and  due at end of this month howevere per my discussion with hematology and now they are  discontinuing rituximab for now as well .    - seen by heme onc 6/7. lenalidomide stopped as has been associated with venous thromboembolism and arterial ischemic stroke. She was on aspirin while was on this for prophylaxis  - bleeding diathesis work up started  ,  Thrombin time  Normal , von Willebrand factor Ag 236 ()  and activity  At 194 ( )and platelet function 611  , Factor XIII 102 ( %),  I discussed with  With hematology  reg von Willebrand results  ad nothing further needs to be done ( not low so no bleeding issues due to this)    - is to follow up  With heme onc and heme onc office will set up the follow up  Appointment for July          COVID screen negative 6/6       Diet: Regular Diet Adult  DVT Prophylaxis: mechanical  Feldman Catheter: Not present  Central Lines: None  Cardiac Monitoring: None  Code Status: Full Codefull  Discussed with  Patient        6/11 , I spoke with neurology also discussed with  With  Her sister Dr Babcock (GYN onc) patient  Sister   6/11 also updated her son  Patient  Is ok with us talking with her son        Disposition Plan   Expected Discharge: TBD   Anticipated discharge location: home with family      Altagracia Rojas MD  Hospitalist Service  [facility]  Securely message with the Vocera Web Console (learn more here)  Text page via [employer] Paging/Directory         Clinically Significant Risk Factors Present on Admission                 ______________________________________________________________________    Interval History      Denies feeling any numbness, remembers that walked into wrong room this AM, denies chest pain  No shortness of breath        Data reviewed today: I reviewed all medications, new labs and imaging results over the last 24 hours.     Physical Exam   Vital Signs: Temp: 97.3  F (36.3  C) Temp src: Oral BP: 121/42 Pulse: 71   Resp: 18 SpO2: 98 % O2 Device: None (Room air)    Weight: 0 lbs 0 oz     General appearence: in  no apparent distress    HEENT: EOMI, PEARLA, sclera nonicteric, moist,  mucus membranes,   NECK : supple  RESPIRATORY: lungs clear to auscultation bilateral,    CARDIOVASCULAR:S1 S2 regular rate and rhythm, no rubs gallops or murmurs appreciated  GASTROINTESTINAL:soft, non-distended , non-tender , + bowel sounds, no masses felt   SKIN: warm and dry, no mottling noted   NEUROLOGIC; awake alert, she was michelle to tell me where r she is , her age,  did have to think about what year this is but was able to tell me it 2022 also had to think about the month but picked out rhe right one, also knows who the president is, good motor strength, no pronator drift , slight miss with finger t nose on left ( same as before)   EXTREMITIES: no clubbing, cyanosis or edema , moves all extremity, good pedal pulses , bruising over lower extremities   MUSCULOSKELETAL: without deformity     Data   Recent Labs   Lab 06/09/22  0847 06/09/22  0601 06/09/22  0302 06/08/22  0804 06/08/22  0702 06/08/22  0701 06/07/22  0802 06/07/22  0541 06/06/22  1230   WBC 6.1  --   --   --   --  8.8  --  5.5 6.6   HGB 11.7  --   --   --   --  10.9*  --  11.0* 10.8*   MCV 95  --   --   --   --  94  --  95 97     --   --   --   --  215  --  201 198   INR  --   --   --   --   --   --   --  1.00 1.01     --   --   --  139  --   --  138 137   POTASSIUM 3.9  --   --   --  3.8  --   --  4.0 4.1   CHLORIDE 106  --   --   --  106  --   --  104 102   CO2 29  --   --   --  26  --   --  28 32   BUN 12  --   --   --  12  --   --  10 12   CR 0.66  --   --   --  0.74  --   --  0.77 0.80   ANIONGAP 5  --   --   --  7  --   --  6 3   FLORENCIA 9.3  --   --   --  9.2  --   --  9.2 9.2   GLC 93 97 96   < > 100*  --    < > 92 103*   ALBUMIN  --   --   --   --   --   --   --  3.3* 3.5   PROTTOTAL  --   --   --   --   --   --   --  6.0* 6.2*   BILITOTAL  --   --   --   --   --   --   --  0.5 0.4   ALKPHOS  --   --   --   --   --   --   --  66 64   ALT  --   --   --   --   --   --   --  29 32   AST  --   --   --   --   --   --   --  17 18    < > = values in this interval not displayed.     Recent Results (from the past 24 hour(s))   CT Head w/o Contrast    Narrative    CT HEAD W/O CONTRAST 6/11/2022 9:20 AM    History: Recent traumatic right cerebral convexity subdural hemorrhage  and small right occipital infarct     Comparison: CT head 6/7/2022, 6/6/2022    Technique: Using multidetector thin collimation helical  acquisition  technique, axial, coronal and sagittal CT images from the skull base  to the vertex were obtained without intravenous contrast.     Findings: Minimally increased size of mixed density subdural  collection over the right convexity, falx, and right tentorium,  measures up to 9 mm, previously 8 mm. Slightly increased leftward  midline shift, measures 5 mm, previously 4 mm. No new acute  intracranial hemorrhage. Evolving right occipital hemorrhagic  contusion. The ventricles are proportionate to the cerebral sulci.  Scattered patchy foci of hypodensity in the periventricular and  supraventricular white matter, which is nonspecific, likely related to  chronic small vessel ischemic disease given the patient's age. Mild to  moderate general parenchymal volume loss. No acute loss of gray-white  matter differentiation.    No acute osseous abnormality. No paranasal sinus mucosal thickening.  Mastoid air cells are clear. The orbits are grossly unremarkable.       Impression    Impression:   1.  Minimally increased size of mixed density subdural collection over  the right convexity, measures up to 9 mm, previously 8 mm. No new  acute intracranial hemorrhage.  2.  Minimally increased leftward midline shift, measures 5 mm,  previously 4 mm.  3.  Expected appearance of evolving right occipital hemorrhagic  contusion.    I have personally reviewed the examination and initial interpretation  and I agree with the findings.    BRODIE ARRIAGA MD         SYSTEM ID:  [id number]   CT Head w/o Contrast    Narrative    CT HEAD W/O CONTRAST 6/11/2022 3:38 PM    History: Increased subdural hematoma noted on CT this morning.  Six-hour interval CT.    Comparison: Head CT 6/11/2022    Technique: Using multidetector thin collimation helical acquisition  technique, axial, coronal and sagittal CT images from the skull base  to the vertex were obtained without intravenous contrast.    Findings: No significant change in mixed density  subdural fluid  collection overlying the right cerebral convexity falx and right  tentorium. There is no significant change in right occipital  hemorrhagic contusion. Unchanged 5 mm of right-to-left midline shift.  No new intracranial hemorrhage. Scattered periventricular and  subcortical white matter hypodensities, likely sequela of chronic  small vessel ischemic disease. No evidence of acute infarction. No  evidence of hydrocephalus. Bilateral pseudophakia.    The bony calvaria and the bones of the skull base are normal. The  visualized portions of the paranasal sinuses and mastoid air cells are  clear.       Impression    Impression:  1. No new intracranial hemorrhage or other new intracranial pathology.  2. Unchanged mixed density subdural hematoma overlying the right  cerebral convexity.  3. Unchanged right to left midline shift.  4. No significant change in the right occipital hemorrhagic contusion.    I have personally reviewed the examination and initial interpretation  and I agree with the findings.    BRODIE ARRIAGA MD         SYSTEM ID:  [id number]

## 2022-06-12 NOTE — PROGRESS NOTES
I was called for complain of left hand numbness, and abnormal movement of her left upper lip, and some slurred speech noticed by daughter around 4 PM. It lasted for few minutes.   On my arrival, these symptoms were resolved    Per chart review, patient has been having ongoing investigation for similar symptoms. She has been seen by Neurosurgery on 06/11, and Neurology on 06/12. CT scan on 06/11 x 2 looked reassuring      On exam,   Alert, awake, and oriented  No tongue deviation, or facial droop  Facial sensation intact  Strength 5/5 on both upper and lower extremities. Subtle weakness on left upper extremity (unchanged from neuro exam this AM)  Hand eye coordination and heel shin coordination normal      A/p    Left hand numbness, abnormal left upper lip,and transient slurred speech  On going evaluation  Neuro exam unchanged from prior  Will obtain stat CT head without contrast  If stable, will continue to monitor  Patient has EEG to be done tomorrow    Addendum 06/12/2022 6:42 PM    Prelim read on CT head  Impression:  1. Unchanged 8 mm mixed density subdural hemorrhage overlying the  right cerebral convexity. Trace layering along the right anterior  falx.  2. Unchanged associated sulcal effacement, right ventricular  effacement, and up to 4 mm right to left midline shift.  3. Unchanged right cerebral hemorrhagic contusion.   This result has not been signed. Information might be incomplete.     Sister Vanessa Babcock updated via Phone      Roney Castellon MD  [facility]  Contact information available via [facility] Paging/Directory

## 2022-06-12 NOTE — PROGRESS NOTES
Discharge Planner Post-Acute Rehab PT:     Discharge Plan: Home, 8 stairs front entry, 3 stairs back entry.  Pt will need S at home due to cognitive deficits.     Precautions: falls, cognition, recent brain bleed and cva . decreased attention L, decreased safety      Current Status:  Bed Mobility: supervision  Transfer: sba   Gait: cga to sba, about 300 ft today, zarco by pt having Headache and time constraints.  Amb up to 600 ft in inpatient care.   Stairs: 6 . 2 HR close sba   Balance: NT formally, but cga at times with gait, no assistive device  with gait.     Assessment: Focus of session dynamic balance in // bars. Challenges to dynamic balance with fading UE support. Pt requires repeated verbal and visual cueing to remain on task.  Pt's daughter became concerned that pt had stroke symptoms so amb back to room while daughter spoke with nursing. RN did stroke screen but no symptoms seen. Daughter stated that she thought mom was slurring speech and had L facial droop. Left pt with daughter, RN following up with provider.    Plan to perform FGA    Other Barriers to Discharge (DME, Family Training, etc):   Cognition  Lives alone

## 2022-06-12 NOTE — PLAN OF CARE
Pt alert and oriented. Denies pain.  No s/s of SOB and chest pain noted. Independent bed mobility. Neuro checks WNL. Call light within reach. Bed alarm on for safety. Seizure precaution maintained. Will continue POC.        Patient's most recent vital signs are:     Vital signs:  BP: 114/50  Temp: 96.6  HR: 81  RR: 16  SpO2: 100 %     Patient does not have new respiratory symptoms.  Patient does not have new sore throat.  Patient does not have a fever greater than 99.5.

## 2022-06-12 NOTE — CONSULTS
[facility]  Neurology Consultation    Patient Name:  Lilly Babcock  MRN:  [id number]    :  1950  Date of Service:  2022  Primary care provider:  Talya Rodas      Neurology consultation service was asked to see Lilly Babcock by Dr. Rojas to evaluate transient left hand numbness.    Chief Complaint: Left hand numbness    History of Present Illness:   Lilly Babcock is a 72 year old female with history of metastatic follicular lymphoma, recent traumatic versus nontraumatic related to CAA right-sided subdural hematoma, small punctate ischemic stroke who presents after these events to TCU.  While in TCU she was noted to have a 15-minute episode of left hand numbness.  She did not feel she was more weak and was able to move it.  She felt it affected the lateral more than the medial part of the left hand.  It did not involve her face, lower extremities, or more proximal arms.  She states this is similar to her presentation when she read presented to the hospital last time.   She was recently discharged from the stroke service to his present dose of right subdural hematoma, right occipital lobe IPH, and a new right posterior parahippocampal gyrus infarct.  Etiology of the stroke was not clear, stroke team was considering CAA.  They are also considering hypercoagulable effects from her cancers and chemotherapy.  They are holding aspirin in the setting of a bleed.    Currently, patient feels well.  She states her numbness resolved after 15 minutes 2 days ago.  She states she has had no recurrence in the interim.  She had a head CT that showed mild worsening, although it is not entirely clear if this is worsening or just evolution.  Subsequent head CT was stable.  Her Keppra was also increased.  She denies ever having history of seizures.  Denies family history of seizures.    Reports chills, denies headache, vision changes, weakness, numbness, chest pain,  shortness of breath.    ROS  A comprehensive ROS was performed and pertinent findings were included in HPI.     PMH  Past Medical History:   Diagnosis Date     Asthma      Degenerative joint disease      Depression      Eczema      Lymphoma, follicular (H) 8/2008 diagnosed    Stage YOUSUF - bone mets to L greater trochanter     Nasal congestion      Seasonal allergies      Past Surgical History:   Procedure Laterality Date     ARTHROPLASTY HIP Left 10/18/2016    Procedure: ARTHROPLASTY HIP;  Surgeon: Chepe Peterson MD;  Location: [facility] OR     BIOPSY BONE CLAVICLE  3/1/2012    Procedure:BIOPSY BONE CLAVICLE; Biopsy Left Clavicle, Bilateral Hip Injections; Surgeon:JENNY BELTRAN; Location:[facility] OR     BREAST BIOPSY, RT/LT      left     DILATION AND CURETTAGE       HAND SURGERY      right flexor tendon laceration repair     INJECT STEROID (LOCATION)  3/1/2012    Procedure:INJECT STEROID (LOCATION); Surgeon:JENNY BELTRAN; Location:[facility] OR     LAPAROSCOPY DIAGNOSTIC (GYN)         Medications   I have personally reviewed the patient's medication list.     Allergies  I have personally reviewed the patient's allergy list.     Social History  Non-smoker, no drug use, minimal alcohol use.    Family History    No family history of seizures reported      Physical Examination   Vitals: /42 (BP Location: Right arm)   Pulse 71   Temp 97.3  F (36.3  C) (Oral)   Resp 18   SpO2 98%   General: Lying in bed, NAD, has multiple blankets on  Head: Normocephalic  Eyes: no icterus, op pink and moist  Cardiac: RRR. Extremities warm, no edema.  No murmur  Respiratory: non-labored on RA, no wheezing  GI: S/NT/ND  Skin: No rash or lesion on exposed skin  Psych: Mood pleasant, affect congruent  Extremities: No cyanosis  Neuro:  Mental status: Awake, alert, attentive, oriented to self, time, place, and circumstance.  Language is fluent and she is able to follow three-step cross commands without difficulty.  She did have some naming  low-frequency words like knuckle.  1 out of 3 on delayed recall, 3 out of 3 on immediate recall.  She is able to name past 3 presidents without difficulty.    Cranial nerves:  PERRL, conjugate gaze, EOMI, facial sensation intact, face symmetric, shoulder shrug strong, tongue/uvula midline, no dysarthria.   Motor: Normal bulk and tone. No abnormal movements.  On manual muscle testing she appears to have 5 out of 5 strength throughout her upper and lower extremities.  That being said she does have a clear left pronator drift and is satelliting with rapid arm roll on her left   reflexes: Normoreflexic, but mildly asymmetric with left biceps and brachioradialis greater than right in upper extremities.  Patella only 1+ I could not elicit Achilles.  No clonus.  She has a brisk withdrawal response to Babinski.    Sensory: Sensation to light touch intact throughout.  She reports bilaterally.  No sensory extinction.  Coordination: Finger-nose-finger without dysmetria  Gait: Normal width, stride length, turn, and arm swing.  Patient is able to tandem without assistance  Investigations   I have personally reviewed pertinent labs, tests, and radiological imaging. Discussion of notable findings is included under Impression.     Was patient transferred from outside hospital?   No    Impression  #L hand numbness  #Possible seizure  #Subdural hematoma  #Brain compression  #Recent ischemic stroke    Ms. Babcock is a 72-year-old female with complicated medical history outlined above, including recent neurologic history of right subdural hematoma, intraparenchymal hemorrhage, and punctate ischemic stroke who neurology was consulted for left hand numbness.  This is transient lasting only 15 minutes.  Per chart she may have had some face numbness as well but patient denies that.  Patient denied any twitching or weakness.  Her neurologic exam is relatively reassuring at this time.  She does have very subtle left upper extremity weakness  and hyperreflexia that I suspect is related to her subdural on the right.  Etiology of transient symptoms is likely either cortical spreading depression from recent subdural, which would be expected to improve with time and improvement in the subdural versus a small focal aware seizure.  At this time given her known structural lesion would recommend increasing Keppra.  EEG is not available on weekend but given her stability I think he can wait until tomorrow.  Would plan on continuing Keppra until neurology or neurosurgery follow-up.  If develops new focal symptoms would recommend stat head CT, otherwise think can be monitored clinically with planned already outlined by neurosurgery with repeat head CT in 2 weeks    Recommendations  -Increase Keppra to 1000 mg twice daily  -EEG tomorrow  -Repeat head CT stat if develops any new focal neurologic symptoms  -Continue Keppra until neurology or neurosurgery follow-up        Thank you for involving Neurology in the care of Lilly Babcock.      Ann Arevalo,

## 2022-06-12 NOTE — PROVIDER NOTIFICATION
Patient's daughter reported that she noted slurred speech and left thumb numbness in patient's during  PT session. Patient denies any twitching or slurred speech but acknowledged the intermittent left hand numbness. VSS. Neuro check and physical assessment seem wnl. Dr. Sim was notified of daughter's request for the patient to be seen. Dr. Sim saw the patient, assessed, spoke to daughter, sister  Sadiq and noted that EEG is planned for tomorrow per neurology recommendation. STAT CT ordered and next plan is dependent on the result. No acute changes noted currently. Continue to monitor and notify the provider if acute changes noted.         Patient's most recent vital signs are:     Vital signs:  BP: 115/50  Temp: 96.5  HR: 89  RR: 18  SpO2: 98 %     Patient does not have new respiratory symptoms.  Patient does not have new sore throat.  Patient does not have a fever greater than 99.5.

## 2022-06-12 NOTE — CONSULTS
[facility]       NEUROSURGERY CONSULTATION NOTE    This consultation was requested by Dr. Rojas from the Hospitalist service.    Reason for Consultation  Right intracranial subdural hematoma followup    HPI:  Lilly Babcock is a 72 year old female with history of follicular Hodgkin's lymphoma who was recently hospitalized after she was found to have an acute 8 mm right convexity subdural hematoma.  She was hospitalized from 6/6/22-6/10 and discharged to [facility] yesterday for further rehab.  She has been progressing well in rehab.  Today, she underwent her 1-week followup head CT, which demonstrated 1 mm increase in thickness of her right convexity subdural.  Neurosurgery was consulted for recommendations.    On interview, Ms. Babcock reports that she has overall felt well during her time thus far in TCU.  She reports continued intermittent headache and denies double or blurry vision, weakness, numbness, tingling, loss of consciousness, seizure-like episodes.    She denies any acute concerns today and asks many appropriate questions about her future neurologic prognosis.  She repeatedly endorses that she would not wish to undergo surgery for her subdural hematoma if at all avoidable.    PAST MEDICAL HISTORY:   Past Medical History:   Diagnosis Date     Asthma      Degenerative joint disease      Depression      Eczema      Lymphoma, follicular (H) 8/2008 diagnosed    Stage YOUSUF - bone mets to L greater trochanter     Nasal congestion      Seasonal allergies        PAST SURGICAL HISTORY:   Past Surgical History:   Procedure Laterality Date     ARTHROPLASTY HIP Left 10/18/2016    Procedure: ARTHROPLASTY HIP;  Surgeon: Chepe Peterson MD;  Location: [facility] OR     BIOPSY BONE CLAVICLE  3/1/2012    Procedure:BIOPSY BONE CLAVICLE; Biopsy Left Clavicle, Bilateral Hip Injections; Surgeon:JENNY BELTRAN; Location:[facility] OR     BREAST BIOPSY, RT/LT      left     DILATION AND  CURETTAGE       HAND SURGERY      right flexor tendon laceration repair     INJECT STEROID (LOCATION)  3/1/2012    Procedure:INJECT STEROID (LOCATION); Surgeon:JENNY BELTRAN; Location:[facility] OR     LAPAROSCOPY DIAGNOSTIC (GYN)         FAMILY HISTORY:   Family History   Problem Relation Age of Onset     Depression Mother      Mental Illness Mother      Cancer Mother         breast     Other Cancer Mother      Diabetes Father      Cancer Father         lung ca, was a smoker     Other Cancer Father      Abdominal Aortic Aneurysm Father      Asthma Sister      Depression Sister      Mental Illness Sister      Asthma Sister      Depression Sister      Breast Cancer Sister      Liver Disease Sister        SOCIAL HISTORY:   Social History     Tobacco Use     Smoking status: Never Smoker     Smokeless tobacco: Never Used   Substance Use Topics     Alcohol use: Not Currently     Alcohol/week: 5.8 standard drinks       MEDICATIONS:  No current outpatient medications on file.       Allergies:  Allergies   Allergen Reactions     Diagnostic X-Ray Materials Hives     ct     Diatrizoate Rash     Dye [Contrast Dye] Rash and Hives     Ioxaglate Hives and Rash     Vortioxetine Nausea and Vomiting     Revlimid [Lenalidomide] Rash       ROS: 10 point ROS of systems including Constitutional, Eyes, Respiratory, Cardiovascular, Gastroenterology, Genitourinary, Integumentary, Muscularskeletal, Psychiatric were all negative except for pertinent positives noted in my HPI.    Physical exam:   Blood pressure 112/43, pulse 89, temperature (!) 95  F (35  C), temperature source Oral, resp. rate 16, SpO2 100 %, not currently breastfeeding.  CV: RRR on telemetry  PULM: breathing comfortably on room air  ABD: soft, non-distended  NEUROLOGIC:  -- Awake; Alert; oriented x 3  -- Follows commands briskly  -- Speech fluent, spontaneous. No aphasia or dysarthria.  -- no gaze preference. No apparent hemineglect.  Cranial Nerves:  -- visual fields full  to confrontation, PERRL 3-2mm bilat and brisk, extraocular movements intact  -- face symmetrical, tongue midline  -- sensory V1-V3 intact bilaterally  -- palate elevates symmetrically, uvula midline  -- hearing grossly intact bilat  -- Trapezii 5/5 strength bilat symmetric    Motor:  Normal bulk / tone; no tremor, rigidity, or bradykinesia.  No muscle wasting or fasciculations  No pronator drift     Delt Bi Tri Hand Flexion/  Extension Iliopsoas Quadriceps Hamstrings Tibialis Anterior Gastroc    C5 C6 C7 C8/T1 L2 L3 L4-S1 L4 S1   R 5 5 5 5 5 5 5 5 5   L 5 5 5 5 5 5 5 5 5   Sensory:  intact to LT x 4 extremities     Reflexes:     Bi BR Young Pat Clonus    C5-6 C6 UMN L2-4 UMN   R 2+ 2+ Norm 2+ Neg   L 2+ 2+ Norm 2+ Neg     IMAGING:  CT head 6/11/22:  1.  Minimally increased size of mixed density subdural collection over  the right convexity, measures up to 9 mm, previously 8 mm. No new  acute intracranial hemorrhage.  2.  Minimally increased leftward midline shift, measures 5 mm,  previously 4 mm.  3.  Expected appearance of evolving right occipital hemorrhagic  Contusion.    CT head 6/11/22 (repeat):  1. No new intracranial hemorrhage or other new intracranial pathology.  2. Unchanged mixed density subdural hematoma overlying the right  cerebral convexity.  3. Unchanged right to left midline shift.  4. No significant change in the right occipital hemorrhagic contusion.    LABS:   Lab Results   Component Value Date    WBC 6.1 06/09/2022    WBC 8.2 10/03/2019     Lab Results   Component Value Date    RBC 3.68 06/09/2022    RBC 3.98 10/03/2019     Lab Results   Component Value Date    HGB 11.7 06/09/2022    HGB 12.3 10/03/2019     Lab Results   Component Value Date    HCT 35.1 06/09/2022    HCT 37.4 10/03/2019     Lab Results   Component Value Date    MCV 95 06/09/2022    MCV 94 10/03/2019     Lab Results   Component Value Date    MCH 31.8 06/09/2022    MCH 30.9 10/03/2019     Lab Results   Component Value Date    [facility]  33.3 06/09/2022    MCHC 32.9 10/03/2019     Lab Results   Component Value Date    RDW 16.0 06/09/2022    RDW 13.9 10/03/2019     Lab Results   Component Value Date     06/09/2022     10/03/2019       Last Comprehensive Metabolic Panel:  Sodium   Date Value Ref Range Status   06/09/2022 140 133 - 144 mmol/L Final   10/03/2019 135 133 - 144 mmol/L Final     Potassium   Date Value Ref Range Status   06/09/2022 3.9 3.4 - 5.3 mmol/L Final   10/03/2019 4.4 3.4 - 5.3 mmol/L Final     Chloride   Date Value Ref Range Status   06/09/2022 106 94 - 109 mmol/L Final   10/03/2019 102 94 - 109 mmol/L Final     Carbon Dioxide   Date Value Ref Range Status   10/03/2019 28 20 - 32 mmol/L Final     Carbon Dioxide (CO2)   Date Value Ref Range Status   06/09/2022 29 20 - 32 mmol/L Final     Anion Gap   Date Value Ref Range Status   06/09/2022 5 3 - 14 mmol/L Final   10/03/2019 5 3 - 14 mmol/L Final     Glucose   Date Value Ref Range Status   06/09/2022 93 70 - 99 mg/dL Final   10/03/2019 100 (H) 70 - 99 mg/dL Final     GLUCOSE BY METER POCT   Date Value Ref Range Status   06/09/2022 97 70 - 99 mg/dL Final     Urea Nitrogen   Date Value Ref Range Status   06/09/2022 12 7 - 30 mg/dL Final   10/03/2019 12 7 - 30 mg/dL Final     Creatinine   Date Value Ref Range Status   06/09/2022 0.66 0.52 - 1.04 mg/dL Final   10/03/2019 0.67 0.52 - 1.04 mg/dL Final     GFR Estimate   Date Value Ref Range Status   06/09/2022 >90 >60 mL/min/1.73m2 Final     Comment:     Effective December 21, 2021 eGFRcr in adults is calculated using the 2021 CKD-EPI creatinine equation which includes age and gender (Juan turner al., NEJM, DOI: 10.1056/JDTTzf0345995)   10/03/2019 89 >60 mL/min/[1.73_m2] Final     Comment:     Non  GFR Calc  Starting 12/18/2018, serum creatinine based estimated GFR (eGFR) will be   calculated using the Chronic Kidney Disease Epidemiology Collaboration   (CKD-EPI) equation.       GFR, ESTIMATED POCT   Date Value  Ref Range Status   06/04/2022 >60 >60 mL/min/1.73m2 Final     Calcium   Date Value Ref Range Status   06/09/2022 9.3 8.5 - 10.1 mg/dL Final   10/03/2019 9.1 8.5 - 10.1 mg/dL Final       INR   Date Value Ref Range Status   06/07/2022 1.00 0.85 - 1.15 Final   10/21/2016 1.15 (H) 0.86 - 1.14 Final      aPTT   Date Value Ref Range Status   06/06/2022 26 22 - 38 Seconds Final        ASSESSMENT:  72 year old female with stable right convexity subdural hematoma, progressing as expected from time of last head CT.  No new neurologic changes, and patient progressing well with therapies in TCU.    RECOMMENDATIONS:  - No neurosurgical intervention indicated at this time   - Follow up in Neurosurgery clinic with repeat head CT in 2 weeks    The patient was discussed with Dr. Matson, neurosurgery chief resident, and he agrees with the above.    Lonny Ramos M.D.  Neurosurgery Resident, PGY-3    Please contact neurosurgery resident on call with questions.    Dial * [phone], enter [phone] when prompted.       Comorbid conditions:  Clinically Significant Risk Factors Present on Admission      I have reviewed the history above and agree with the resident's assessment and plan.  SHEA Arnold MD

## 2022-06-12 NOTE — PROGRESS NOTES
"Discharge Planner Post-Acute Rehab OT:     Discharge Plan: home with 24/7 A from [facility] for summer, HHOT    Precautions: falls, seizure, L inattention, alarms    Current Status:  ADLs:    Mobility: SBA with gb.    Grooming: Standing EOS with setup and SBA    Dressing: UB - NT. LB - NT. Feet - SBA, vcs for thoroughness/safety.    Bathing: NT    Toileting: SBA  IADLs: Previous IND, A at discharge.  Vision/Cognition: Glasses prn. L inattention. SLUMS (6/10/22) 16/30 within norm range for dementia. Functional cognition deficits in attention, memory, problem solving, visuo-spatial skills, insight into deficits and safety awareness. Recommend additional cognitive testing (e.g. CPT, EFPT) and visual preceptual assessment (e.g. biVABA, [facility] Perceptual).    Assessment: Pt supine in bed upon arrival. Pt slow to initiate bed mobility, requires increased processing time to follow commands/answer questions, generally appears confused/disoriented. Attempted to orient patient to OT and educated pt on OT goals. Pt highly distractible throughout session, inconsistent with following cues, at times unable to redirect. Pt impulsive with mobility (without AD) despite cues to wait for SBA-CGA for safety. Pt reports \"my head feels weird\" does not explain further, denies dizziness. L neglect evident with UB dressing. Pt setup in armchair at end of session with breakfast tray. Plan for shower assessment tomorrow.    Other Barriers to Discharge (DME, Family Training, etc): Safe discharge planning d/t pt cognition. Contact [facility] for details on home environment as pt poor historian to assist with DME recommendations.    Family training - not scheduled as of 6/10.    DME: TBD.    "

## 2022-06-13 NOTE — PROGRESS NOTES
Discharge Planner Post-Acute Rehab OT:     Discharge Plan: home with 24/7 A from [facility] for summer, HHOT    Precautions: falls, seizure, L inattention, alarms    Current Status:  ADLs:    Mobility: SBA with gb.    Grooming: Standing EOS with setup and SBA    Dressing: UB - NT. LB - NT. Feet - SBA, vcs for thoroughness/safety.    Bathing: NT    Toileting: SBA  IADLs: Previous IND, A at discharge.  Vision/Cognition: Glasses prn. L inattention. SLUMS (6/10/22) 16/30 within norm range for dementia. Functional cognition deficits in attention, memory, problem solving, visuo-spatial skills, insight into deficits and safety awareness. Recommend additional cognitive testing (e.g. CPT, EFPT) and visual preceptual assessment (e.g. biVABA, [facility] Perceptual).    Assessment: Goal of session to complete shower assessment. Pt highly distractible, reports fatigue from EEG (sleeping upon arrival), and demos impaired cognition. Pt required initiation cues, consistent redirection to task, and assist for sequencing items. Pt impulsive with mobility, gets up and moves around room looking at personal items, sits, stands, lays down. Son and daughter arrived during session. Daughter asked to shower with patient, has done before. Discussed shower task and provided SBA while patient assisted with gathering items. Pt became highly distracted when more than one person speaking. Limited to one conversation at a time. MD arrived to complete assessment. Updated son and daughter on level of supervision necessary for safe discharge. Discussed potential discharge date. Cont with current POC.    Other Barriers to Discharge (DME, Family Training, etc): Safe discharge planning d/t pt cognition. Contact [facility] for details on home environment as pt poor historian to assist with DME recommendations.    Family training - not scheduled as of 6/10.    DME: TBD.

## 2022-06-13 NOTE — PROGRESS NOTES
Discharge Planner Post-Acute Rehab PT:     Discharge Plan: Home, 8 stairs front entry, 3 stairs back entry.  Pt will need S at home due to cognitive deficits.     Precautions: falls, cognition, recent brain bleed and cva . decreased attention L, decreased safety      Current Status:  Bed Mobility: supervision  Transfer: sba   Gait: cga to sba, about 300 ft today, zarco by pt having Headache and time constraints.  Amb up to 600 ft in inpatient care.   Stairs: 6 . 2 HR close sba   Balance: NT formally, but cga at times with gait, no assistive device  with gait.   FGA on (6/14): 16/30    Assessment: Focus of session dynamic balance activities. Pt very fatigued today sitting down when she sees a chair in the [place], laying on the mat in the gym. Easily distracted. Benefits from short, one step commands to initiate activity.    Other Barriers to Discharge (DME, Family Training, etc):   Cognition  Lives alone

## 2022-06-13 NOTE — PLAN OF CARE
Goal Outcome Evaluation:        Patient is alert and oriented x3. Can be impulsive. Regular diet, takes medication whole with thin liquids. SBA with gait belt. Patient can be hard of hearing. Scattered bruising on bilateral upper extremity. Continent of bowel and bladder. Patient of seizure precautions. Patient complained of headache. Prn tylenol was given per patient request. Lights dimmed and noise reduced. Patient takes some time to complete tasks and gets distracted easily. Patient can be confused at times. Bed and chair alarm on. Patient went for a EEG around 10:30 am. No complaints of chest pain or shortness of breath. Able to make needs known, call light left within reach.   Patient's most recent vital signs are:     Vital signs:  BP: 95/56  Temp: 96.2  HR: 65  RR: 20  SpO2: 93 %     Patient does not have new respiratory symptoms.  Patient does not have new sore throat.  Patient does not have a fever greater than 99.5.

## 2022-06-13 NOTE — PROGRESS NOTES
Herberth Blanton MD requesting follow up in 2 weeks with repeat head CT with Dr. Cruz's SHERYL    Order placed. Routed to scheduling team.

## 2022-06-13 NOTE — PROGRESS NOTES
FATOU received a call from Bella from [employer].  She received the referral and will send it on to a specialist.         BRENDA Zuriat   [facility], Transitional Care Unit   Social Work   [address] [place] [address], [address] [place] [address]  (PH) [phone]

## 2022-06-13 NOTE — PROGRESS NOTES
[facility]    Medicine Progress Note - Hospitalist Service    Date of Admission:  6/9/2022    Assessment & Plan        Lilly Babcock is a 72 year old female with past medical history significant for  Follicular non-Hodgkins lymphoma she initially presented to ER  on 6/4 with severe headache and visual aura.  she was found to have  Right subdural hematoma. She was seen and evaluated by neurosurgery and no surgical interventions were recommended. She hit her head on the cabinet when was standing up form toilet. She was discharged home 6/5  but returned  6/6 with worsening headache and balance issues. Repeat imaging again showed stable  SDH but now also right occipital lobe IPH that per notes was there before but showed a new  Right posterior parahippocampal gyrus punctate infarct       She was now transferred to TCU 6/9 for further cares      Traumatic right cerebral cortex subdural hemorrhage  - causing 2-3 mm midline shift     -  follow up  With neurosurgery  clinic in 2 weeks with repeat head CT in 2 weeks ( unless has new changes)    - plan was for  7 day seizure prophylaxis with  keppra 500 mg bid till 6/14, dose now up to 1000 mg bid and continue keppra   for now till neurology, NS follow up   , EEG Monday , see bellow   -restart aspirin once cleared by neurosurgery   - neuro checks q 2 hr for now   - will [facility]il[facility] neurology follow up  , one set  for October but will need to bring it forward       Small right occipital infarct  -  hold aspirin till ok to restart by neurosurgery   - cause of stroke was felt to be due to lenalidomide  and now stopped   follow up  Neurology in 6-8    - she has history prior right basal ganglia hemorrhage and right temporal lobe   - echo  6/6 showed EF 60-65% , no cardiac source if embolism identified   -no atrial fibrillation  Noted , has 30 day event monitor on discharge  Not on her so will need on discharge     - started on atorvastatin 40 mg daily     Left  hand numbness  -- 6/11 AM nursing reported that patient  Had left hand numbness  6/10 night, to me patient  Report had left hand numbness mainly in left thumb and in corner of  Left mouth, does not report any numbness or tingling anywhere else and tells  me it has pretty much resolved , I spoke with her sister who is a doctor and told me patient  Did slur her speech last night   -- saw patient  6/11 AM, just woke her up and answers little slower but speech clear   --6/11 STAT head CT with increased size of SDH and also minimally increased midline shift, discussed with  Neurology and neurosurgery and patient  Was seen by Dr Ramos 6/11 . Had repeat CT scan in 6 hrs, showed no changes , no neurosurgical intervention recommended   -- 6/12 had repeat episode of hand numbness STAT head CT without any new changes   -- keppra dose was increased to 1000 mg bid , EEG on Monday ( cannot be done on weekend at TCU )    -- repeat head CT if has neuro changes  --  EEG 6/12 swatting results       Confusion  -- has had confusion over past few days, patient walked into other patient 's room 6/12 when I asked she did remember this, continue to monitor      Migraine headache   - try to avoid trypans  And no NSAIDS  - use tylenol      Follicular lymphoma, stage IV, FLIPI 2 diagnosed in 2008   - had left femur involvement and had left femur fracture status post  Hip replacement and radiation , then in 2/2022 had progressive diffuse skeletal lesions and was treated wit rituximab q 3 months and  due at end of this month howevere per my discussion with hematology and now they are  discontinuing rituximab for now as well .    - seen by heme onc 6/7. lenalidomide stopped as has been associated with venous thromboembolism and arterial ischemic stroke. She was on aspirin while was on this for prophylaxis  - bleeding diathesis work up started  ,  Thrombin time  Normal , von Willebrand factor Ag 236 ()  and activity  At 194 ( )and  platelet function 611  , Factor XIII 102 ( %),  I discussed with  With hematology reg von Willebrand results  ad nothing further needs to be done ( not low so no bleeding issues due to this)    - is to follow up  With heme onc and heme onc office will set up the follow up  Appointment for July          Depression  - patient  Had been on Viibryd pre 1st admission, not sure why but was not restarted, will hold off on restarting as can potentially cause seizure , had EEG today     COVID screen negative 6/6       Diet: Regular Diet Adult  DVT Prophylaxis: mechanical  Feldman Catheter: Not present  Central Lines: None  Cardiac Monitoring: None  Code Status: Full Codefull  Discussed with  Patient        6/11 , I spoke with neurology also discussed with  With  Her sister Dr Babcock (GYN onc) patient  Sister   6/11 also updated her son  Patient  Is ok with us talking with her son    6/13 updated son and daughter by bedside and they will update pt's sister     Disposition Plan   Expected Discharge: TBD   Anticipated discharge location: home with family      Altagracia Rojas MD  Hospitalist Service  [facility]  Securely message with the Vocera Web Console (learn more here)  Text page via [employer] Paging/Directory         Clinically Significant Risk Factors Present on Admission                 ______________________________________________________________________    Interval History      Again had episode of hand numbness yestetday, no wok, feels tired , her children are visiting, no chest pain  No shortness of breath         Data reviewed today: I reviewed all medications, new labs and imaging results over the last 24 hours.     Physical Exam   Vital Signs: Temp: (!) 96.4  F (35.8  C) Temp src: Oral BP: 105/59 Pulse: 79   Resp: 16 SpO2: 98 % O2 Device: None (Room air)    Weight: 0 lbs 0 oz     General appearence: in  no apparent distress    HEENT: EOMI, PEARLA, sclera nonicteric, moist,  mucus  membranes,   NECK : supple  RESPIRATORY: lungs clear to auscultation bilateral,    CARDIOVASCULAR:S1 S2 regular rate and rhythm, no rubs gallops or murmurs appreciated  GASTROINTESTINAL:soft, non-distended , non-tender , + bowel sounds, no masses felt   SKIN: warm and dry, no mottling noted   NEUROLOGIC; awake alert, can tell me where she is , told me what year it is, had to think about the month but got it right and able to tell me who the president is s good motor strength, no pronator drift , slight miss with finger t nose on left , same as before   EXTREMITIES: no clubbing, cyanosis or edema , moves all extremity, good pedal pulses , bruising over lower extremities   MUSCULOSKELETAL: without deformity     Data   Recent Labs   Lab 06/13/22  0737 06/09/22  0847 06/09/22  0601 06/08/22  0804 06/08/22  0702 06/08/22  0701 06/07/22  0802 06/07/22  0541   WBC 5.2 6.1  --   --   --  8.8  --  5.5   HGB 12.3 11.7  --   --   --  10.9*  --  11.0*   MCV 92 95  --   --   --  94  --  95    213  --   --   --  215  --  201   INR  --   --   --   --   --   --   --  1.00    140  --   --  139  --   --  138   POTASSIUM 4.1 3.9  --   --  3.8  --   --  4.0   CHLORIDE 103 106  --   --  106  --   --  104   CO2 30 29  --   --  26  --   --  28   BUN 11 12  --   --  12  --   --  10   CR 0.66 0.66  --   --  0.74  --   --  0.77   ANIONGAP 4 5  --   --  7  --   --  6   FLORENCIA 9.3 9.3  --   --  9.2  --   --  9.2   GLC 96 93 97   < > 100*  --    < > 92   ALBUMIN  --   --   --   --   --   --   --  3.3*   PROTTOTAL  --   --   --   --   --   --   --  6.0*   BILITOTAL  --   --   --   --   --   --   --  0.5   ALKPHOS  --   --   --   --   --   --   --  66   ALT  --   --   --   --   --   --   --  29   AST  --   --   --   --   --   --   --  17    < > = values in this interval not displayed.     Recent Results (from the past 24 hour(s))   CT Head w/o Contrast    Narrative    CT HEAD W/O CONTRAST 6/12/2022 6:25 PM    History: Follow up CT head  for follow up on hematoma   ICD-10:    Comparison: 6/11/2022    Technique: Using multidetector thin collimation helical acquisition  technique, axial, coronal and sagittal CT images from the skull base  to the vertex were obtained without intravenous contrast.   (topogram) image(s) also obtained and reviewed.    Findings: Redemonstrated mixed density subdural hemorrhage overlying  the right cerebral convexity with unchanged maximal width of 8 mm.  Continue to adjacent sulcal effacement. Unchanged 4 mm of  right-to-left midline shift, and mild effacement of the right lateral  ventricle. No definite herniation. Trace extension along the right  anterior falx. No new or worsening intracranial hemorrhage. No acute  loss of gray-white matter differentiation. Scattered periventricular  and subcortical white matter hypodensities. Unchanged right occipital  hemorrhagic contusion.    The bony calvaria and the bones of the skull base are normal. The  visualized portions of the paranasal sinuses and mastoid air cells are  clear.      Impression    Impression:  1. Unchanged 8 mm mixed density subdural hemorrhage overlying the  right cerebral convexity. Trace layering along the right anterior  falx.  2. Unchanged associated sulcal effacement, right ventricular  effacement, and up to 4 mm right to left midline shift.  3. Unchanged right cerebral hemorrhagic contusion.    I have personally reviewed the examination and initial interpretation  and I agree with the findings.    BRODIE ARRIAGA MD         SYSTEM ID:  [id number]

## 2022-06-14 NOTE — PROGRESS NOTES
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 She initially presented to [facility] on 6/4/22 with complaints of  excruciating HA and bilateral visual aura. She apparently hit her head with a cabinet while trying to flush the toilet 3 days before the onset of her HA.  CNS imaging studies revealed right SDH.  She was seen and evaluated by neurosurgery and no surgical interventions was recommended.  She was discharged to home 6/5/22 but returned to the ED on 6/6/22 with worsening HA, balance issues and may have also fallen but no injury. Imaging revealed right cerebral convexity SDH c/b 2-3 mm midline shift which was felt to be stable, presence of a right occipital lobe IPH was also identified as new, but seems to have actually been present previously as well.  However, there was a new right posterior parahippocampal gyrus punctate infarct which was attributed to the hypercoagulable state caused by Lenalidomide chemotherapy which she takes for NHL.  Lenalidomide d/c'd.  Transferred to TCU 6/9/22 for further cares      Traumatic right cerebral cortex subdural hemorrhage  ---   Causing 2-3 mm midline shift    ---   Follow up with neurosurgery clinic in 2 weeks with repeat head CT (unless has new changes)    ---   Initial plan to be on Keppra 500 mg po x 7 day for seizure prophylaxis, stop date was 6/14/22,   ---   Keppra increased to 1000 mg bid on 6/12/22 because of concern of seizure activities.   ---   Neurology now are recommending Keppra 750 mg (dose decreased per pt's sister request) bid until pt follows up at Neurology clinic       Small right occipital infarct  ---   Probably caused by Lenalidomide, which was d/c'd  ---    Hold aspirin till ok to restart by neurosurgery  ---   Follow up w/ Neurology in 6-8    ---   She has a prior h/o right basal ganglia hemorrhage and right temporal lobe   ---   TTE 6/6/22 showed EF 60-65%, no cardiac source if embolism identified   ---   No atrial fibrillation noted on tele  ---   She will need 30 days of event monitor on discharge    ---   Started on atorvastatin 40 mg daily     Left hand and left mouth numbness  ---   initially noted night of 6/10/22.    ---   CT head w/o contrast 6/11/22 with increased size of SDH and also minimally increased midline shift  ---   Repeat CT head 6 hrs later showed no changes  ---   Imaging studies reviewed by Neurosurg service and rec was no surgical intervention recommended   ---   She was then seen by Neurology consult team on 6/12/22 who was concerned of seizure activities.  neurology will also see   ---   EEG 6/13/22 revealed findings consistent with a moderate to diffuse encephalopathy.  Epileptiform discharges or seizures were not seen at any point.  No persistent focal changes were noted either.  ---   Continue Keppra 750 mg po bid until pt f/u w/ neurology clinic in 6-8 weeks.    Confusion  ---   Increased confusions somnolence late last week  ---   Etiology unclear but pt's sister thinks it may be due to Keppra.  ---   Pt is alert and oriented today    ---   Will decrease Keppra dose to 750 mg po bid    Migraine headache   ---   Chronic issue  ---   Try to avoid trypans and no NSAIDS  ---   Use tylenol      Follicular lymphoma, stage IV, FLIPI 2 diagnosed in 2008   ---   Had left femur involvement and had left femur fracture s/p hip replacement and radiation  ---   In 2/2022 had progressive diffuse skeletal lesions and was treated with rituximab q3 months and due at end of this month.    ---   Seen by Heme onc 6/7/22, Lenalidomide stopped as has been associated with VTE and arterial ischemic stroke.   ---   She was on ASA prophylaxis  ---   Bleeding  diathesis work up started,  Thrombin time  Normal, Von Willebrand factor Ag 236 () and activity at 194 ( ) and platelet function 611, Factor XIII 102 ( %),  Dr. Rojas discussed with hematology regarding Von Willebrand results.  Nothing further needs to be done ( not low so no bleeding issues due to this)    ---   F/u with heme onc and heme onc office will set up the follow up appointment for 7/2022       Polymyalgia rheumatica  ---   reintroduced PTA prednisone 7 mg daily     Universal COVID-19 screening   ---   Fully vaccinated and boosted x2   ---   SARS-CoV-2 PCR was negative on 6/6/22     Family communications:   I updated patient's sister Vanessa twice      Diet: Regular Diet Adult  DVT Prophylaxis: mechanical  Feldman Catheter: Not present  Central Lines: None  Cardiac Monitoring: None  Code Status: Full Codefull  Discussed with  Patient      Expected Discharge:  TBD   Anticipated discharge location:  Home with family        Terence Caro MD  Hospitalist Service  [facility]  Securely message with the Vocera Web Console (learn more here)  Text page via [employer] Paging/Directory       ______________________________________________________________________    Interval History      Patient is awake, alert, follows command appropriately.  Speech is fluent.  No focal neurologic weakness.  No numbness or tingling in her hands or mouth.  Uneventful night.  Per therapy services, patient is more alert today.      Data reviewed today: I reviewed all medications, new labs and imaging results over the last 24 hours.     Physical Exam   Vital Signs: Temp: (!) 96.6  F (35.9  C) Temp src: Oral BP: 104/57 Pulse: 74   Resp: 16 SpO2: 100 % O2 Device: None (Room air)    Weight: 0 lbs 0 oz   General: Awake, alert, follows command appropriately, NAD.  HEENT:  NC/AT, neck supple,  CVS:  NL s 1 and s2, no m/r/g.  Lungs:  CTA B/L.   Abd:  Soft, + bs, NT, no rebound or gaurding, no fluid shift.  Ext:   No c/c.  Lymph:  No edema.  Neuro:  Nonfocal.  Musculoskeletal: No calf tenderness to palpation.    Skin:  No rash.  Psychiatry:  Mood and affect appropriate.    Data   Recent Labs   Lab 06/13/22  0737 06/09/22  0847 06/09/22  0601 06/08/22  0804 06/08/22  0702 06/08/22  0701   WBC 5.2 6.1  --   --   --  8.8   HGB 12.3 11.7  --   --   --  10.9*   MCV 92 95  --   --   --  94    213  --   --   --  215    140  --   --  139  --    POTASSIUM 4.1 3.9  --   --  3.8  --    CHLORIDE 103 106  --   --  106  --    CO2 30 29  --   --  26  --    BUN 11 12  --   --  12  --    CR 0.66 0.66  --   --  0.74  --    ANIONGAP 4 5  --   --  7  --    FLORENCIA 9.3 9.3  --   --  9.2  --    GLC 96 93 97   < > 100*  --     < > = values in this interval not displayed.     No results found for this or any previous visit (from the past 24 hour(s)).  Medications     - MEDICATION INSTRUCTIONS -         - Skin Test Reading (tuberculin) -   Does not apply Q21 Days     atorvastatin  40 mg Oral QPM     levETIRAcetam  750 mg Oral BID     predniSONE  7 mg Oral Daily     tuberculin  5 Units Intradermal Q21 Days

## 2022-06-14 NOTE — PROGRESS NOTES
06/14/22 1100   Signing Clinician's Name / Credentials   Signing clinician's name / credentials Padmini Waddell DPT   Functional Gait Assessment (UMESH Rose, PRASHANTH Turner, et al. (2004))   1. GAIT LEVEL SURFACE 2   2. CHANGE IN GAIT SPEED 1   3. GAIT WITH HORIZONTAL HEAD TURNS 1   4. GAIT WITH VERTICAL HEAD TURNS 2   5. GAIT AND PIVOT TURN 3   6. STEP OVER OBSTACLE 2   7. GAIT WITH NARROW BASE OF SUPPORT 0   8. GAIT WITH EYES CLOSED 2   9. AMBULATING BACKWARDS 1   10. STEPS 2   Total Functional Gait Assessment Score   TOTAL SCORE: (MAXIMUM SCORE 30) 16     Functional Gait Assessment (FGA): The FGA assesses postural stability during various walking tasks.   Gait assistive device used: None    Scores of <22 /30 have been correlated with predicting falls in community-dwelling older adults according to Rose & Pato 2010.   Scores of <18 /30 have been correlated with increased risk for falls in patients with Parkinsons Disease according to Jeffery Cordova Zhou et al 2014.  Minimal Detectable Change for patients with acute/chronic stroke = 4.2 according to Thidevon & Ritschel 2009  Minimal Detectable Change for patients with vestibular disorder = 8 according to Rose & Pato 2010    Assessment: Pt's score of 16/30 indicate a high falls and pt should continue to perform mobility, misbah community mobility with skilled supervision from staff or family. Pt's scores were complicated by cognition, distractibility in cue following, improved with simple demands. Pt's performance indicates specific difficulty in the area of eyes closed and tandem walking, pt should continue to be challenged with balance tasks that demand narrow stance or decreased visual input.    (Minutes billed as physical performance test):30

## 2022-06-14 NOTE — PLAN OF CARE
Goal Outcome Evaluation:    Patient is alert and oriented x4, able to make her needs known. No s/s of CP, SOB or n/v. Call light within reach, refused the vital at midnight. Seizure precaution maintained. Will continue POC.      Patient's most recent vital signs are:     Vital signs:  BP: 111/54  Temp: 97  HR: 71  RR: 16  SpO2: 99 %     Patient does not have new respiratory symptoms.  Patient does not have new sore throat.  Patient does not have a fever greater than 99.5.

## 2022-06-14 NOTE — PROGRESS NOTES
Discharge Planner Post-Acute Rehab PT:     Discharge Plan: Home, 8 stairs front entry, 3 stairs back entry.  Pt will need S at home due to cognitive deficits.     Precautions: falls, cognition, recent brain bleed and cva . decreased attention L, decreased safety      Current Status:  Bed Mobility: supervision  Transfer: sba   Gait: cga to sba, about 300 ft today, zarco by pt having Headache and time constraints.  Amb up to 600 ft in inpatient care.   Stairs: 6 . 2 HR close sba   Balance: NT formally, but cga at times with gait, no assistive device  with gait.   FGA on (6/14): 16/30    Assessment: PT: Increased time needed for skilled redirection to task given pt's significant distractibility and difficulty maintaining attention. Pt performed FGA and scored 16/30. See additional note.    Other Barriers to Discharge (DME, Family Training, etc):   Cognition  Lives alone

## 2022-06-14 NOTE — PLAN OF CARE
Alert and oriented x 3 intermittently forgetful and can be impulsive. Bed alarm active at all times. Needs cueing with cares. SBA with GB & walker. Continent of Bowel & bladder. Tylenol given x 2 for headache.Neuro checks done every 4 hrs, no change.         Patient's most recent vital signs are:     Vital signs:  BP: 111/54  Temp: 97  HR: 71  RR: 16  SpO2: 99 %     Patient does not have new respiratory symptoms.  Patient does not have new sore throat.  Patient does not have a fever greater than 99.5.         .

## 2022-06-14 NOTE — PROGRESS NOTES
I spoke with sister (Dr. Vanessa Babcock) via phone at   She was worried about not being on Prednisone 7 mg daily. Patient used to take that for polymyalgia.   I have suggested that primary team will touch base with neurology to make sure it is reasonable to continue prednisone at this time  Please address in AM, and give a call to sister.       Roney Castellon MD  [facility]  Contact information available via [facility] Paging/Directory

## 2022-06-14 NOTE — PLAN OF CARE
Received a call from Vanessa grant/[phone], she reported that pt being on 7 mg Prednisone for years, asking why pt is not getting the prednisone  and other selective serotonin reuptake inhibitor medication. Caller was informed to call in AM and discus with MD, she insisted to have someone paged to start pridinisone. JOSE paged and informed family's request, I asked the MD if he can give her a call. Vanessa called by the JOSE/MD.

## 2022-06-14 NOTE — PROGRESS NOTES
Discharge Planner Post-Acute Rehab OT:     Discharge Plan: home with 24/7 A from [facility] for summer, HHOT    Precautions: falls, seizure, L inattention, alarms    Current Status:  ADLs:    Mobility: SBA with gb.    Grooming: Standing EOS with setup and SBA    Dressing: UB - NT. LB - NT. Feet - SBA, vcs for thoroughness/safety.    Bathing: NT    Toileting: SBA  IADLs: Previous IND, A at discharge.  Vision/Cognition: Glasses prn. L inattention. SLUMS (6/10/22) 16/30 within norm range for dementia. Functional cognition deficits in attention, memory, problem solving, visuo-spatial skills, insight into deficits and safety awareness. Recommend additional cognitive testing (e.g. CPT, EFPT) and visual preceptual assessment (e.g. biVABA, Pawel Perceptual).    Assessment: Cognition/confusion slightly improved from previous sessions. Continues to be highly distracted requiring one step commands and limited distractions when completing tasks. Pt OK for IND in room with alarm at doorway to prevent hallway wandering. No chair alarm d/t causes patient to panic. RN updated. Cont with current POC.    Other Barriers to Discharge (DME, Family Training, etc): Safe discharge planning d/t pt cognition. Contact [facility] for details on home environment as pt poor historian to assist with DME recommendations.    Family training - not scheduled as of 6/10.    DME: TBD.

## 2022-06-14 NOTE — CARE PLAN
RN: door alarm on in room, therapy states chair and bed alarm will be more harmful for pt as it startles her, which would increase her fall risk, also pt declines bed and chair alarms.   Pt forgetful at times and will get up without using call light. Continue hourly checks.   Using tylenol this morning for headache and effective pain control. 7/10 to 2/10 45 minutes after taking tylenol. Headache on top of head. No further complaints. Declined skin check, is fully dressed and reports no problems, repositions self every 2 hours and more without problems.   Pt son here to visit later afternoon.   Keppra dose decreased to 750 mg from 1000 mg. Prednisone at 7 mg.   No numbness or tingling complaints this shift. Neuro checks negative findings. Door alarm on.     Patient's most recent vital signs are:     Vital signs:  BP: 104/57  Temp: 96.6  HR: 74  RR: 16  SpO2: 100 %     Patient does not have new respiratory symptoms.  Patient does not have new sore throat.  Patient does not have a fever greater than 99.5.

## 2022-06-15 NOTE — PROGRESS NOTES
Pt is confused and disoriented today    A/p:    1.   Check UA/UC  2.   CT w/o contrast  3.    Bedside attendant      Terence Caro MD.   Hospitalist.  [phone], pager.

## 2022-06-15 NOTE — PROGRESS NOTES
MDS Pain Assessment    The following is the pain interview as conducted by the TCU RN caring for the patient on June / 13 / 2022. This assessment is required by the [facility] for all patients in [place] SNF (Skilled Nursing Facilities).       1. Have you had pain or hurting at any time in the last 5 days? Yes    2. How much of the time have you experienced pain or hurting over the last 5 days? frequently    3. Over the past 5 days, has pain made it hard for you to sleep at night? No    4. Over the past 5 days, have you limited your day-to-day activities because of pain? No    5. Pain intensity (Numeric scale used first-if patient unable to answer, verbal scale to be used.)    Numeric Scale: Please rate your worst pain over the last 5 days on a zero to ten scale, with zero being no pain and ten being the worst pain you can imagine.     not applicable    Verbal Scale: USED ONLY if unable to give numeric, otherwise N/A  Please rate the intensity of your worst pain over the last 5 days.     moderate-severe

## 2022-06-15 NOTE — CARE PLAN
Patient's most recent vital signs are:     Vital signs:  BP: 106/54  Temp: 95.5  HR: 80  RR: 18  SpO2: 98 %     Patient does not have new respiratory symptoms.no  Patient does not have new sore throat. no  Patient does not have a fever greater than 99.5. no       RN: Denies pain.  Declined breakfast meal from kitchen, I made her 2 toast with butter and jelly and coffee with cream and sugar at her request and she had 75%  No numbness or tingling.  Neuro check negative results. Door alarm on, pt declines chair alarm/bed alarm.Bed and chair Alarms startle pt when she stands up and not recommended by therapy.   Room closer to desk for closer monitoring.   Pt more unsteady on her feet according to therapy,  declines assistive device. Wandering out of her room and door alarm set off. Pt not redirectable.  1:1 initiated to ensure pt safety from fall, MD updated via Padmini RN PCS. Family also updated by Padmini.  UA/UC ordered, CT head without contrast ordered.

## 2022-06-15 NOTE — PLAN OF CARE
Discharge Planner Post-Acute Rehab SLP:     Discharge Plan: home with family support    Precautions: confusion    Current Status:  Communication: no impairments noted  Cognition: moderate-severe cognitive impairment  Swallow: regular diet and thin liquid.    Assessment: Started CL QT formal cognitive assessment but unable to fully finish due to patient's distractibility.  Patient having very limited insight into her impairments though having very obvious cognitive impairments and deficits.  While not fully scored, anticipating moderate to severe impairments in cognition.    Other Barriers to Discharge (Family Training, etc): adequate family support

## 2022-06-15 NOTE — PROGRESS NOTES
Discharge Planner Post-Acute Rehab OT:     Discharge Plan: home with 24/7 A from [facility] for summer, HHOT    Precautions: falls, seizure, L inattention, alarms    Current Status:  ADLs:    Mobility: SBA with gb.    Grooming: Standing EOS with setup and SBA    Dressing: UB - NT. LB - NT. Feet - SBA, vcs for thoroughness/safety.    Bathing: NT    Toileting: SBA  IADLs: Previous IND, A at discharge.  Vision/Cognition: Glasses prn. L inattention. SLUMS (6/10/22) 16/30 within norm range for dementia. Functional cognition deficits in attention, memory, problem solving, visuo-spatial skills, insight into deficits and safety awareness. Recommend additional cognitive testing (e.g. CPT, EFPT) and visual preceptual assessment (e.g. biVABA, Pawel Perceptual).    Assessment: Pt. Easily distracted this session and needing continuous redirection to stay on task. Increased time pedro shoes/socks and began tying both shoes together once on feet needing min assist to manage tying safely as very distractible this session.         Pt OK for IND in room with alarm at doorway to prevent hallway wandering. No chair alarm d/t causes patient to panic. RN updated. Cont with current POC.    Other Barriers to Discharge (DME, Family Training, etc): Safe discharge planning d/t pt cognition. Contact [facility] for details on home environment as pt poor historian to assist with DME recommendations.    Family training - not scheduled as of 6/10.    DME: TBD.

## 2022-06-15 NOTE — PLAN OF CARE
Goal Outcome Evaluation    Patient is alert and oriented x4, intermittent confusion noted. Pt  is able to make her needs known. Denies CP, Sob, or n/v . Bed  alarm on for patient safety. Patient is on regular diet, thin fluids. Safety rounds completed.     Patient's most recent vital signs are:     Vital signs:  BP: 129/57  Temp: 95.2  HR: 85  RR: 18  SpO2: 98 %     Patient does not have new respiratory symptoms.  Patient does not have new sore throat.  Patient does not have a fever greater than 99.5.

## 2022-06-15 NOTE — PLAN OF CARE
The patient is alert with intermittent confusion and forge fullness. VSS. Able to make needs known. Medicated with Tylenol x 1 for HA. Denies SOB, chests pain, dizziness, N/V. SBA with ambulation. Patient gets up and go without using the call light. Door alarm on for safety. Continue to monitor and notify the provider if increased change in mental status.      Patient's most recent vital signs are:     Vital signs:  BP: 129/57  Temp: 95.2  HR: 85  RR: 18  SpO2: 98 %     Patient does not have new respiratory symptoms.  Patient does not have new sore throat.  Patient does have a fever greater than 99.5.

## 2022-06-15 NOTE — PROGRESS NOTES
Discharge Planner Post-Acute Rehab PT:     Discharge Plan: Home, 8 stairs front entry, 3 stairs back entry.  Pt will need S at home due to cognitive deficits.     Precautions: falls, cognition, recent brain bleed and cva . decreased attention L, decreased safety      Current Status:  Bed Mobility: supervision  Transfer: sba   Gait: cga to sba, about 300 ft today, zarco by pt having Headache and time constraints.  Amb up to 600 ft in inpatient care.   Stairs: 6 . 2 HR close sba   Balance: NT formally, but cga at times with gait, no assistive device  with gait.   FGA on (6/14): 16/30    Assessment: focus on functional activities. see GG. Attempted gait activity to simulate home environment in therapy gym by navigating around cones for object avoidance. Pt unable to comply with cues and repeatedly knocked over cones. Reported increased difficulty with following cues and sequencing to Dr for evaluation.    Other Barriers to Discharge (DME, Family Training, etc):   Cognition  Lives alone

## 2022-06-16 NOTE — ED PROVIDER NOTES
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      RECENT HISTORY REVIEW  She was recently admitted for recent traumatic right-sided subdural hematoma in setting of striking her head against a cabinet while trying to flush the toilet.  She underwent CT evaluation on 6/4/2022 showing right sided subdural hematoma.  She was seen and evaluated by neurosurgery, no neurosurgical interventions were recommended at that time and she was discharged home the following day.  She then returned to the ED on 6/6/2022 with worsening headache and balance issues and possible fall.  She was observed for 9 hours for persistent headache and dizziness.  She had MRI brain done showing right occipital lobe IPH with surrounding subarachnoid blood.  She was admitted to vascular neurology from 6/6-6/9/2022 and then transferred to acute rehab unit over on [facility].      CURRENT PRESENTATION  Patient has had worsening confusion, left-sided neglect, left-sided weakness and a progressively worsening headache over the past 2 days.  She had repeat head CT done yesterday showing slight increased size of the mixed density subdural collection up to 11 mm in maximal thickness with mildly increased left midline shift up to 8 mm no transtentorial herniation as well as new right posterior parahippocampal gyrus punctate infarct which was attributed to hypercoagulable state secondary to lenalidomide chemotherapy.  Repeat head CT several hours later showed stable subdural hematoma and midline shift .  The lenalidomide was  discontinued. Neurosurgery team was consulted and plan was discussed with patient and her sister regarding bur hole placements, they were hesitant for this procedure but it does appear that this was scheduled for the future.  She was then transferred from [facility] to [place] neurosurgery unit.  Paramedics attempted to bring her up to the neurosurgery floor for further management including anticipated loy hole surgery.  However due to staffing issues they are unable to monitor her and her increased symptoms and advised that patient be brought here to the emergency department for further evaluation and monitoring. Patient was going to go to the OR but when EMS arrived w/ patient PACU told them they were not ready to accept patient so the EMS crew brought the patient to the ED as they did not know what else to do.     Here patient notes increased head and neck pain when they went over bumps in the ambulance.  She feels very tired, feels as if her head is bouncing.  No numbness, weakness or tingling.  No nausea or vomiting.  She has not had any recent neck imaging, did have a CTA of her head and neck previously but they did not .  No changes in appetite.  She is maintaining oral nutrition and hydration. No new traumas or falls. Denies fevers. Denies pain elsewhere. No CP or breathing symptoms. No other symptoms or complaints at this time. Please see ROS for further details.    Patient's sister also later shows up and corroborates history.     I have reviewed the Medications, Allergies, Past Medical and Surgical History, and Social History in the [employer] system.  Past Medical History:   Diagnosis Date     Asthma      Degenerative joint disease      Depression      Eczema      Lymphoma, follicular (H) 8/2008 diagnosed    Stage YOUUSF - bone mets to L greater trochanter     Nasal congestion      Seasonal allergies        Past Surgical History:   Procedure Laterality Date     ARTHROPLASTY HIP Left 10/18/2016    Procedure:  ARTHROPLASTY HIP;  Surgeon: Chepe Peterson MD;  Location: [facility] OR     BIOPSY BONE CLAVICLE  3/1/2012    Procedure:BIOPSY BONE CLAVICLE; Biopsy Left Clavicle, Bilateral Hip Injections; Surgeon:JENNY BELTRAN; Location:[facility] OR     BREAST BIOPSY, RT/LT      left     DILATION AND CURETTAGE       HAND SURGERY      right flexor tendon laceration repair     INJECT STEROID (LOCATION)  3/1/2012    Procedure:INJECT STEROID (LOCATION); Surgeon:JENNY BELTRAN; Location:[facility] OR     LAPAROSCOPY DIAGNOSTIC (GYN)       Past medical history, past surgical history, medications, and allergies were reviewed with the patient.     Family History   Problem Relation Age of Onset     Depression Mother      Mental Illness Mother      Cancer Mother         breast     Other Cancer Mother      Diabetes Father      Cancer Father         lung ca, was a smoker     Other Cancer Father      Abdominal Aortic Aneurysm Father      Asthma Sister      Depression Sister      Mental Illness Sister      Asthma Sister      Depression Sister      Breast Cancer Sister      Liver Disease Sister        Social History     Tobacco Use     Smoking status: Never Smoker     Smokeless tobacco: Never Used   Substance Use Topics     Alcohol use: Not Currently     Alcohol/week: 5.8 standard drinks      Social history was reviewed with the patient.     Review of Systems   Constitutional: Negative.    Respiratory: Negative for cough and shortness of breath.    Cardiovascular: Negative for chest pain.   Gastrointestinal: Negative for nausea and vomiting.   Genitourinary: Negative.    Musculoskeletal: Negative for back pain, neck pain (no pain at rest (did have some on exam)) and neck stiffness.   Skin: Negative.    Allergic/Immunologic: Negative for immunocompromised state.   Neurological: Positive for headaches. Negative for syncope, weakness, light-headedness and numbness.   Psychiatric/Behavioral: Negative for suicidal ideas.   All other systems reviewed and are  negative.       A complete review of systems was performed with pertinent positives and negatives noted in the HPI, and all other systems negative.    Physical Exam   BP: 99/59  Pulse: 84  Resp: 20  SpO2: 100 %      CONSTITUTIONAL: Well-developed and well-nourished. Awake and alert. Non-toxic appearance. No acute distress.   HENT:   - Head: Normocephalic and atraumatic.   - Ears: Hearing and external ear grossly normal.   - Nose: Nose normal. No rhinorrhea. No epistaxis.   - Mouth/Throat: MMM  EYES: Conjunctivae and lids are normal. No scleral icterus.   NECK: Phonation normal. Neck supple.  No tracheal deviation, no stridor. No edema or erythema noted. Does have midline/bony discomfort to palpation. No stepoffs /deformities. ROM not assessed.   CARDIOVASCULAR: Normal rate, regular rhythm and no appreciable abnormal heart sounds.  PULMONARY/CHEST: Normal work of breathing. No accessory muscle usage or stridor. No respiratory distress.  No appreciable abnormal breath sounds.  ABDOMEN: Soft, non-distended. No tenderness. No rigidity, rebound or guarding.   MUSCULOSKELETAL: Extremities warm and seemingly well perfused. No edema or calf tenderness.  NEUROLOGIC: Awake, alert. Not disoriented. She displays no atrophy and no tremor. Normal tone. No seizure activity. GCS 15. Perhaps some subtle left sided weakness.   SKIN: Skin is warm and dry. No rash noted. No diaphoresis. No pallor.   PSYCHIATRIC: Normal mood and affect. Speech and behavior normal. Thought processes linear. Cognition and memory are normal.      ED Course     ED Course as of 06/16/22 1830   Thu Jun 16, 2022   1828 Neurosurgery is seen and evaluated the patient here in the ED.  They would like patient to get a C-spine CT prior to going to the OR today.  They report that the preop team is now ready and the patient can go directly from radiology to preop, and then they will admit to the neuro ICU thereafter. Patient/Patient's sister here and in agreement.   Neurosurgery had no other additional requests or recommendations for the standpoint.  They did not want a new head CT currently, given her current presentation seems to be consistent with the last NS evaluation at 12, and they are already planning on going to the OR.             EKG Interpretation:      Interpreted by Penelope Chnug MD   Time reviewed: 1827  Symptoms at time of EKG: Fatigue, recent SDH and infarct  Rhythm: normal sinus   Rate: 84  Axis: normal  Ectopy: none  Conduction: normal  ST Segments/ T Waves: No ST-T wave changes  Comparison to prior: No old EKG available  Clinical Impression: normal EKG    CRITICAL CARE TIME:  Critical care time for this patient, excluding teaching/procedures, was 30 minutes for management, observation, coordination w/ Neuro surgery, OR, etc. 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  Clinically, patient appears nontoxic, NAD. Normal alertness, protecting airway, no obvious focal findings.     PLAN: CT c-spine, Neurosurgery consult, to go to the OR  - Risks/benefits of pursuing imaging review and accepted.     RESULTS:  - Labs: Pending  - Imaging: C-spine pending    INTERVENTIONS:   - C-collar  - Neurosurgery consult    RE-EVALUATION:  - Pt otherwise continues to do well here in the ED, no acute issues or apparent concerning changes in vitals or clinical appearance.    DISCUSSIONS:  - w/ Neurosurgery: They have seen and evaluated the patient here in the ED. They were aware of  patient prior to arrival. Reviewed patient/presentation, current state of workup/any pending studies here in ED. They agree w/ plan for c-spine imaging prior to OR, and then will admit for further evaluation/management, F/U pending studies as needed, coordinate w/ consulting services as needed. No additional requests/recommendations for workup/management for in the ED at this time.   - w/ Patient: I have reviewed the available findings, plan with the patient and her daughter. They expressed understanding and agreement with this plan. All questions answered to the best of our ability at this time.      DISPOSITION/PLANNING:  - IMPRESSION:   --- Follicular non-Hodgkins lymphoma, polymyalgia rheumatica   --- Right-sided traumatic acute subdural hematoma (6/4/2022) w/ increased midline shift  - DISPOSITION: to OR then admit to Neurosurgery/Neuro ICU  - PENDING: Labs, CT c-spine      ______________________________________________________________________       I, Sana Evans, am serving as a trained medical scribe to document services personally performed by Penelope Chung MD based on the provider's statements to me on June 16, 2022.  This document has been checked and approved by the attending provider.    I, Penelope Chung MD, was physically present and have reviewed and verified the accuracy of this note documented by Sana Evans, medical scribe.      Penelope Chung MD     6/16/2022   [facility] EMERGENCY DEPARTMENT     Penelope Chung MD  06/18/22 0352

## 2022-06-16 NOTE — CONSULTS
[facility]       NEUROSURGERY CONSULTATION NOTE    This consultation was requested by Dr. Castellano from the Medicine service.    Reason for Consultation: SDH      HPI: Lilly Babcock is a 72 year old female with a previous medical history of lymphoma, ASA 81 mg (discontinued since 6/4), right sided traumatic SDH first diagnosed on 6/4/22 after developing severe headaches and transient left fingertip numbness. At that time, given her lack of neurological deficits and stable SDH, it was deemed appropriate to follow in clinic with repeat imaging. She then returned to the hospital on 6/6 after worsened headaches and dizziness, found to have a stable SDH but developing right occipital IPH and right posterior parahippocampal gyrus punctate infarct, she was discharged to [place] [facility] on 6/9. She is currently on keppra 750 BID. She has not progressed for discharge home and SLP/OT notes document cognitive impairment, distractibility, left sided inattention, and need for frequent supervision. Due to ongoing confusion and impulsivity, a UA and head CT were obtained. UA does not show evidence of infection. Head CT demonstrates slight increase in SDH size and slightly increased midline shift. On my evaluation, patient endorses continued bifrontal headaches and little insight to her current state. She denies any falls while at rehab.           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    Stage YOUSUF - bone mets to L greater trochanter    Nasal congestion     Seasonal allergies        PAST SURGICAL HISTORY:   Past Surgical History:   Procedure Laterality Date    ARTHROPLASTY HIP Left 10/18/2016    Procedure: ARTHROPLASTY HIP;  Surgeon: Chepe Peterson MD;  Location: [facility] OR    BIOPSY BONE CLAVICLE  3/1/2012    Procedure:BIOPSY BONE CLAVICLE; Biopsy Left  Clavicle, Bilateral Hip Injections; Surgeon:JENNY BELTRAN; Location:[facility] OR    BREAST BIOPSY, RT/LT      left    DILATION AND CURETTAGE      HAND SURGERY      right flexor tendon laceration repair    INJECT STEROID (LOCATION)  3/1/2012    Procedure:INJECT STEROID (LOCATION); Surgeon:JENNY BELTRAN; Location:[facility] OR    LAPAROSCOPY DIAGNOSTIC (GYN)         FAMILY HISTORY:   Family History   Problem Relation Age of Onset    Depression Mother     Mental Illness Mother     Cancer Mother         breast    Other Cancer Mother     Diabetes Father     Cancer Father         lung ca, was a smoker    Other Cancer Father     Abdominal Aortic Aneurysm Father     Asthma Sister     Depression Sister     Mental Illness Sister     Asthma Sister     Depression Sister     Breast Cancer Sister     Liver Disease Sister        SOCIAL HISTORY:   Social History     Tobacco Use    Smoking status: Never Smoker    Smokeless tobacco: Never Used   Substance Use Topics    Alcohol use: Not Currently     Alcohol/week: 5.8 standard drinks       MEDICATIONS:  Medications Prior to Admission   Medication Sig Dispense Refill Last Dose    acetaminophen (TYLENOL) 325 MG tablet Take 2 tablets (650 mg) by mouth every 4 hours as needed for other (For optimal non-opioid multimodal pain management to improve pain control.) 60 tablet 0     acyclovir (ZOVIRAX) 400 MG tablet Take 1 tablet by mouth as needed (sunny day to prevent cold sores)       albuterol (PROAIR HFA/PROVENTIL HFA/VENTOLIN HFA) 108 (90 Base) MCG/ACT inhaler Inhale 2 puffs into the lungs every 4 hours as needed for shortness of breath / dyspnea or wheezing       atorvastatin (LIPITOR) 40 MG tablet Take 1 tablet (40 mg) by mouth every evening 90 tablet 1     cetirizine (ZYRTEC) 10 MG tablet Take 10 mg by mouth daily       Cholecalciferol (VITAMIN D) 1000 UNIT capsule Take 2 capsules by mouth daily        clobetasol (TEMOVATE) 0.05 % ointment Apply topically 2 times daily as needed  "(eczema)        ipratropium (ATROVENT) 0.06 % spray Spray 2 sprays in nostril daily        ketoconazole (NIZORAL) 2 % shampoo Apply topically daily as needed (eczema)        LENalidomide (REVLIMID) 20 MG CAPS capsule Take 1 capsule (20 mg) by mouth daily for 21 days , Days 1 through 21, then off for 7 days. 21 capsule 0     levETIRAcetam (KEPPRA) 500 MG tablet 1 tablet (500 mg) by Oral or NG Tube route 2 times daily 14 tablet 0     methylPREDNISolone (MEDROL) 32 MG tablet Take one tablet (32mg) by mouth 12 hours prior to scheduled CT scan.  Repeat above dose 2 hours prior to CT scan (Patient taking differently: Take one tablet (32mg) by mouth 12 hours prior to scheduled CT scan.  Repeat above dose 2 hours prior to CT scan) 2 tablet 1     mometasone (ELOCON) 0.1 % ointment Apply topically daily as needed (eczema)        predniSONE (DELTASONE) 1 MG tablet Take 7 mg by mouth daily        VIIBRYD 20 MG TABS tablet Take 20 mg by mouth daily           Allergies:  Allergies   Allergen Reactions    Diagnostic X-Ray Materials Hives     ct    Diatrizoate Rash    Dye [Contrast Dye] Rash and Hives    Ioxaglate Hives and Rash    Vortioxetine Nausea and Vomiting    Revlimid [Lenalidomide] Rash       ROS: 10 point ROS of systems including Constitutional, Eyes, Respiratory, Cardiovascular, Gastroenterology, Genitourinary, Integumentary, Muscularskeletal, Psychiatric were all negative except for pertinent positives noted in my HPI.    Physical exam:   Blood pressure 115/63, pulse 77, temperature 97.2  F (36.2  C), temperature source Oral, resp. rate 16, height 1.702 m (5' 7\"), SpO2 100 %, not currently breastfeeding.  Gen: Appears disheveled  CV: BP and HR as noted above  PULM: breathing comfortably on room air  ABD: soft, non-distended, BS+  NEUROLOGIC:  -- Awake; Alert; oriented x 3 (looks at written date in room board to state the date)  -- Follows commands   -- +repetition, calculation, and naming  -- Speech fluent, spontaneous. " No aphasia or dysarthria.  -- Easily distracted, fidgeting, adjusting multiple objects in room. Inability to have a fluent conversation, with quick shifts in attention and relaying stories that are unrelated to current conversation  -- no gaze preference. Left sided hemineglect  Cranial Nerves:  -- visual fields full to confrontation, PERRL 3-2mm bilat and brisk, extraocular movements intact  -- face symmetrical, tongue midline  -- sensory V1-V3 intact bilaterally  -- palate elevates symmetrically, uvula midline  -- hearing grossly intact bilat  -- Trapezii 5/5 strength bilat symmetric  -- Cerebellar: Finger nose finger without dysmetria, intact rapid alternating motions bilaterally    Motor:  Normal bulk / tone; no tremor, rigidity, or bradykinesia.  No muscle wasting or fasciculations  No Pronator Drift     Delt Bi Tri Hand Flexion/  Extension Iliopsoas Quadriceps Hamstrings Tibialis Anterior Gastroc    C5 C6 C7 C8/T1 L2 L3 L4-S1 L4 S1   R 5 5 5 5 5 5 5 5 5   L 5 4+ 5 4+ 5 5 5 5 5   Sensory:  intact to LT x 4 extremities     Reflexes:     Bi Tri BR Young Pat Ach Bab    C5-6 C7-8 C6 UMN L2-4 S1 UMN   R 2+ 2+ 2+ Norm 2+ 2+ Norm   L 2+ 2+ 2+ Norm 2+ 2+ Norm     Gait: Deferred      LABS:  Last Comprehensive Metabolic Panel:  Sodium   Date Value Ref Range Status   06/13/2022 137 133 - 144 mmol/L Final   10/03/2019 135 133 - 144 mmol/L Final     Potassium   Date Value Ref Range Status   06/13/2022 4.1 3.4 - 5.3 mmol/L Final   10/03/2019 4.4 3.4 - 5.3 mmol/L Final     Chloride   Date Value Ref Range Status   06/13/2022 103 94 - 109 mmol/L Final   10/03/2019 102 94 - 109 mmol/L Final     Carbon Dioxide   Date Value Ref Range Status   10/03/2019 28 20 - 32 mmol/L Final     Carbon Dioxide (CO2)   Date Value Ref Range Status   06/13/2022 30 20 - 32 mmol/L Final     Anion Gap   Date Value Ref Range Status   06/13/2022 4 3 - 14 mmol/L Final   10/03/2019 5 3 - 14 mmol/L Final     Glucose   Date Value Ref Range Status    06/13/2022 96 70 - 99 mg/dL Final   10/03/2019 100 (H) 70 - 99 mg/dL Final     Urea Nitrogen   Date Value Ref Range Status   06/13/2022 11 7 - 30 mg/dL Final   10/03/2019 12 7 - 30 mg/dL Final     Creatinine   Date Value Ref Range Status   06/13/2022 0.66 0.52 - 1.04 mg/dL Final   10/03/2019 0.67 0.52 - 1.04 mg/dL Final     GFR Estimate   Date Value Ref Range Status   06/13/2022 >90 >60 mL/min/1.73m2 Final     Comment:     Effective December 21, 2021 eGFRcr in adults is calculated using the 2021 CKD-EPI creatinine equation which includes age and gender (Juan et al., NE, DOI: 10.1056/EDRRba8385548)   10/03/2019 89 >60 mL/min/[1.73_m2] Final     Comment:     Non  GFR Calc  Starting 12/18/2018, serum creatinine based estimated GFR (eGFR) will be   calculated using the Chronic Kidney Disease Epidemiology Collaboration   (CKD-EPI) equation.       GFR, ESTIMATED POCT   Date Value Ref Range Status   06/04/2022 >60 >60 mL/min/1.73m2 Final     Calcium   Date Value Ref Range Status   06/13/2022 9.3 8.5 - 10.1 mg/dL Final   10/03/2019 9.1 8.5 - 10.1 mg/dL Final     Lab Results   Component Value Date    WBC 5.2 06/13/2022    WBC 8.2 10/03/2019     Lab Results   Component Value Date    RBC 3.96 06/13/2022    RBC 3.98 10/03/2019     Lab Results   Component Value Date    HGB 12.3 06/13/2022    HGB 12.3 10/03/2019     Lab Results   Component Value Date    HCT 36.4 06/13/2022    HCT 37.4 10/03/2019     Lab Results   Component Value Date    MCV 92 06/13/2022    MCV 94 10/03/2019     Lab Results   Component Value Date    MCH 31.1 06/13/2022    MCH 30.9 10/03/2019     Lab Results   Component Value Date    MCHC 33.8 06/13/2022    MCHC 32.9 10/03/2019     Lab Results   Component Value Date    RDW 14.6 06/13/2022    RDW 13.9 10/03/2019     Lab Results   Component Value Date     06/13/2022     10/03/2019       IMAGING:  CT head: Impression:  Slight increased size of mixed density subdural collection along  the  right cerebral convexity measuring up to 11 mm in maximal thickness.   Mildly increased leftward midline shift up to 8 mm. No transtentorial  herniation    ASSESSMENT: 71yo female with worsened acute on chronic right SDH, with progression of symptoms (left hemineglect, mild left sided hand weakness, inattention and confusion).   After repeated discussion with the patient about the likely need for surgical intervention due to progression of symptoms, patient continues to emphasize her reluctance to surgery. However, patient does not demonstrate decision-making capacity.       In addition to the assessment of diagnoses detailed above, this 72 year old female  patient admitted has the following conditions contributing to the complexity of their medical care:    Brain compression which was evident on the CT/MRI.,  Metastatic cancer,    Clinically Significant Risk Factors Present on Admission                     RECOMMENDATIONS:  No need for emergent intervention  Repeat head CT in 6 hours from the time of first acquisition  Continue Keppra  Will need discussion with patient's decision maker in am about need for surgery      The patient was discussed with Dr. Somers, neurosurgery staff, and they agree with the above.    Aziza Lawrence MD  Neurosurgery Resident, PGY-2  I have seen this patient with the resident and formulated a plan and agree with this note.  AMP

## 2022-06-16 NOTE — DISCHARGE SUMMARY
[facility]  Hospitalist Discharge Summary      Date of Admission:  6/9/2022  Date of Discharge:  6/16/2022  Discharging Provider: Terence Caro MD  Discharge Service: Hospitalist Service, GOLD TEAM     Discharge Diagnoses   Acute traumatic right cerebral cortex 11 mm SDH c/b 8 mm midline shift  Small right occipital infarct, 6/10/22    Follow-ups Needed After Discharge      Admit to neurosurg at the [facility]       Unresulted Labs Ordered in the Past 30 Days of this Admission     Date and Time Order Name Status Description    6/16/2022 11:54 AM Asymptomatic COVID-19 Virus (Coronavirus) by PCR Nasopharyngeal In process       These results will be followed up by Neurosurg team    Discharge Disposition   Transferred to Neurosurg service for SDH evacuation  Condition at discharge: Stable    Hospital Course    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 She initially presented to [facility] on 6/4/22 with complaints of excruciating HA and bilateral visual aura. She apparently hit her head with a cabinet while trying to flush the toilet 3 days before the onset of her HA.  CNS imaging studies revealed right SDH.  She was seen and evaluated by neurosurgery and no surgical interventions was recommended.  She was discharged to home on 6/5/22 but returned to the ED on 6/6/22 with worsening HA, balance issues and may have also fallen but no injury. Imaging revealed right cerebral convexity SDH c/b 2-3 mm midline shift which was felt to be stable, presence of a right occipital lobe IPH was also identified as new, but seems to have actually been present previously as well.  However, there was a new right posterior parahippocampal gyrus punctate infarct which was attributed to the hypercoagulable state caused by Lenalidomide chemotherapy which she takes  for NHL.  Lenalidomide d/c'd. Transferred to TCU 6/9/22 for further cares.  Over the past 2 days, pt's confusion, left side neglect, left sided weakness and HA progressively got worse.  Stat CT head w/o contrast 6/15/22 revealed Slight increased size of mixed density subdural collection along the right cerebral convexity measuring up to 11 mm in maximal thickness, mildly increased leftward midline shift up to 8 mm but no transtentorial herniation.  Repeat CT hrs later showed stable SDH and midline shift.  Seen by neurosurg consult team and rec to transfer pt to [place] for SDH evacuation later today     Traumatic right cerebral cortex subdural hemorrhage  ---   11 mm right SDH causing 8 mm midline shift    ---   Initial plan was for pt to be on Keppra 500 mg po x 7 day for seizure prophylaxis, stop date was 6/14/22,   ---   Keppra increased to 1000 mg bid on 6/12/22 because of concern of seizure activities.   ---   Keppra decreased to 750 mg bid (per pt's sister request) on 6/13/22      Small right occipital infarct  ---   Probably caused by Lenalidomide, which was d/c'd  ---   Continue holding aspirin   ---   She has a prior h/o right basal ganglia hemorrhage and right temporal lobe   ---   TTE 6/6/22 showed EF 60-65%, no cardiac source if embolism identified   ---   No atrial fibrillation noted on tele  ---   She will need 30 days of event monitor on discharge    ---   Continue atorvastatin 40 mg daily      Left hand and left mouth numbness  ---   initially noted night of 6/10/22.    ---   CT head w/o contrast 6/11/22 with increased size of SDH and also minimally increased midline shift  ---   Repeat CT head 6 hrs later showed no changes  ---   Imaging studies reviewed by Neurosurg service and rec was no surgical intervention recommended   ---   She was then seen by Neurology consult team on 6/12/22 who was concerned of seizure activities.  neurology will also see   ---   EEG 6/13/22 revealed findings consistent  with a moderate to diffuse encephalopathy.  Epileptiform discharges or seizures were not seen at any point.  No persistent focal changes were noted either.  ---   Continue Keppra 750 mg po bid until pt f/u w/ neurology clinic in 6-8 weeks.     Migraine headache   ---   Chronic issue  ---   Try to avoid trypans and no NSAIDS  ---   Use tylenol      Follicular lymphoma, stage IV, FLIPI 2 diagnosed in 2008   ---   Had left femur involvement and had left femur fracture s/p hip replacement and radiation  ---   In 2/2022 had progressive diffuse skeletal lesions and was treated with rituximab q3 months and due at end of this month.    ---   Seen by Heme onc 6/7/22, Lenalidomide stopped as has been associated with VTE and arterial ischemic stroke.   ---   She was on ASA prophylaxis  ---   Bleeding diathesis work up started,  Thrombin time  Normal, Von Willebrand factor Ag 236 () and activity at 194 ( ) and platelet function 611, Factor XIII 102 ( %),  Dr. Rojas discussed with hematology regarding Von Willebrand results.  Nothing further needs to be done ( not low so no bleeding issues due to this)    ---   F/u with heme onc and heme onc office will set up the follow up appointment for 7/2022        Polymyalgia rheumatica  ---   Reintroduced PTA prednisone 7 mg daily on 6/13/22     Universal COVID-19 screening   ---   Fully vaccinated and boosted x2   ---   SARS-CoV-2 PCR was negative on 6/6/22      Family communications:   I updated patient's sister Vanessa multiple times today      Consultations This Hospital Stay   PHYSICAL THERAPY ADULT IP CONSULT  OCCUPATIONAL THERAPY ADULT IP CONSULT  ADVANCE DIRECTIVE IP CONSULT  NEUROLOGY GENERAL ADULT IP CONSULT  NEUROSURGERY ADULT IP CONSULT  SPEECH LANGUAGE PATH ADULT IP CONSULT    Code Status   Full Code    Time Spent on this Encounter   I, Terence Caro MD, personally saw the patient today and spent greater than 30 minutes discharging this patient.       Terence  VENKATA Caro MD  [facility] [address] [address]  Phone: [phone]  ______________________________________________________________________    Physical Exam   Vital Signs: Temp: 97.8  F (36.6  C) Temp src: Oral BP: 104/53 Pulse: 84   Resp: 20 SpO2: 100 % O2 Device: None (Room air)    Weight: 0 lbs 0 oz  General: intermittently confused, NAD.  HEENT:  NC/AT, PERRL, EOMI, neck supple, no thyromegaly, op clear, mmm.  CVS:  NL s 1 and s2, no m/r/g.  Lungs:  CTA B/L.   Abd:  Soft, + bs, NT, no rebound or gaurding, no fluid shift.  Ext:  No c/c.  Lymph:  No edema.  Neuro:  Some left sided weakness present.  Musculoskeletal: No calf tenderness to palpation.    Skin:  No rash.  Psychiatry:  Mood and affect appropriate.         Primary Care Physician   Talya Rodas    Discharge Orders      CT Head w/o Contrast     Reason for your hospital stay    SDH w/ 9 mm midline shift     Activity    Your activity upon discharge: activity as tolerated     Follow Up and recommended labs and tests    Admit to neurosurg at the [facility]     Diet    Follow this diet upon discharge:  NPO       Discharge Medications   Current Discharge Medication List      CONTINUE these medications which have CHANGED    Details   levETIRAcetam (KEPPRA) 750 MG tablet Take 1 tablet (750 mg) by mouth 2 times daily    Associated Diagnoses: Seizure prophylaxis         CONTINUE these medications which have NOT CHANGED    Details   acetaminophen (TYLENOL) 325 MG tablet Take 2 tablets (650 mg) by mouth every 4 hours as needed for other (For optimal non-opioid multimodal pain management to improve pain control.)  Qty: 60 tablet, Refills: 0    Associated Diagnoses: Subdural hematoma (H)      acyclovir (ZOVIRAX) 400 MG tablet Take 1 tablet by mouth as needed (kandace day to prevent cold sores)      albuterol (PROAIR HFA/PROVENTIL HFA/VENTOLIN HFA) 108 (90 Base) MCG/ACT inhaler Inhale 2 puffs into  the lungs every 4 hours as needed for shortness of breath / dyspnea or wheezing      atorvastatin (LIPITOR) 40 MG tablet Take 1 tablet (40 mg) by mouth every evening  Qty: 90 tablet, Refills: 1    Associated Diagnoses: Cerebrovascular accident (CVA), unspecified mechanism (H)      cetirizine (ZYRTEC) 10 MG tablet Take 10 mg by mouth daily      Cholecalciferol (VITAMIN D) 1000 UNIT capsule Take 2 capsules by mouth daily     Associated Diagnoses: Nodular lymphoma of lymph nodes of multiple sites (H)      clobetasol (TEMOVATE) 0.05 % ointment Apply topically 2 times daily as needed (eczema)       ipratropium (ATROVENT) 0.06 % spray Spray 2 sprays in nostril daily       ketoconazole (NIZORAL) 2 % shampoo Apply topically daily as needed (eczema)     Associated Diagnoses: Follicular non-Hodgkin's lymphoma (H); 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  Repeat above dose 2 hours prior to CT scan  Qty: 2 tablet, Refills: 1    Associated Diagnoses: Contrast media allergy; Nodular lymphoma of extranodal and/or solid organ site (H)      mometasone (ELOCON) 0.1 % ointment Apply topically daily as needed (eczema)       predniSONE (DELTASONE) 1 MG tablet Take 7 mg by mouth daily     Associated Diagnoses: Nodular lymphoma of extranodal and/or solid organ site (H); Polymyalgia rheumatica (H)      VIIBRYD 20 MG TABS tablet Take 20 mg by mouth daily          STOP taking these medications       LENalidomide (REVLIMID) 20 MG CAPS capsule Comments:   Reason for Stopping:             Allergies   Allergies   Allergen Reactions     Diagnostic X-Ray Materials Hives     ct     Diatrizoate Rash     Dye [Contrast Dye] Rash and Hives     Ioxaglate Hives and Rash     Vortioxetine Nausea and Vomiting     Revlimid [Lenalidomide] Rash

## 2022-06-16 NOTE — PROGRESS NOTES
06/16/22 1500   General Information   Onset of Illness/Injury or Date of Surgery 06/04/22   Referring Physician Dr. Caro   Pertinent History of Current Problem Lilly Babcock is a 72 year old female with past medical history significant for  Follicular non-Hodgkins lymphoma she initially presented to ER  on 6/4 with severe headache and visual aura.  she was found to have  Right subdural hematoma. She was seen and evaluated by neurosurgery an no surgical interventions were recommended. She hit her head on the cabinert when was standing up form toilet. She was discharged home 6/5  but returned  6/6 with worsening headache and balance issues. Repeat imaging again showed stable  SDH but now also right occipital lobe IPH that per notes was there before but showed a new  Right posterior parahippocampal gyrus punctate infarct   General Observations Referred to speech therapy in setting of confusion being noted likely due to subdural hematoma.  Patient feels as though cognitively she is at or near her baseline level of function but upon casual interactions and throughout assessment very apparent that patient is significantly below baseline   Type of Evaluation   Type of Evaluation Speech, Language, Cognition   Speech   Comment, Motor Speech Assessment Fully intelligible speech   Auditory Comprehension   Comment, Assessment (Auditory Comprehension) Able to answer conversational questions fully appropriately.  During more structured cognitive assessment having difficulties consistently following directions but seems due to cognitive impairments versus any language deficits   Verbal Expression   Comment, Assesment (Verbal Expression) No word finding difficulties evident in casual conversation   Reading Comprehension   Comment, Assessment (Reading Comprehension) Not assessed   Written Language   Comment, Assessment (Written Language) Not assessed   Cognition   Cognitive Function attention deficit;executive function  deficit;memory deficit   Additional cognitive-linguistic evaluation indicated  Recommended   Cognitive Status Exam Comments Patient started CL QT but was unable to finish during the evaluation session.  Plan to continue in future sessions.  Portions completed thus far showing moderate to severe impairments   Affect/Mental Status (Cognition) confused   Follows Commands (Cognition) WFL   General Therapy Interventions   Planned Therapy Interventions Cognitive Treatment   Clinical Impression   Criteria for Skilled Therapeutic Interventions Met (SLP Eval) Yes, treatment indicated   SLP Diagnosis Moderate to severe cognitive linguistic impairments   Problem List (SLP) Noting increased confusion.  Limited insights into impairments and deficits.   Functional Limitations Related to Problem List (SLP) Decreased level of independence with IADL tasks   Risks & Benefits of therapy have been explained evaluation/treatment results reviewed;participants voiced agreement with care plan;participants included;patient;care plan/treatment goals reviewed   Clinical Impression Comments Patient showing significant cognitive impairment with significant difficulties being able to attend to tasks and complete them correctly.  Patient very easily distracted and requiring frequent redirections back to task.  Patient not aware of changes in her cognition though able to state that others have told her that she seems confused.  Patient denies these feelings for self.  Formal cognitive assessment of CL QT was initiated but only able to complete about half of the test due to distractibility and transitions between individual subtests.  Performance on tasks are significantly below expected baseline performance.  Current level of function is significantly below her baseline and patient would benefit from skilled intervention to maximize cognitive function.  We will plan to continue with formal cognitive assessment to obtain current baseline scores.    Therapy Certification   Start of Care Date 06/15/22   Certification date from 06/15/22   Certification date to 07/15/22    Total Evaluation Time   Total Evaluation Time (Minutes) 45   SLP Goals   Therapy Frequency (SLP Eval) 5 times/wk   SLP Predicted Duration/Target Date for Goal Attainment 06/29/22   SLP Goals SLP Goal 1;SLP Goal 2   SLP: Goal 1 Patient will complete moderate level reasoning/problem solving tasks with 80% accuracy without need for redirection   SLP: Goal 2 Patient will independently answer orientation questions with 90% accuracy

## 2022-06-16 NOTE — DISCHARGE SUMMARY
Occupational Therapy Discharge Summary    Reason for therapy discharge:    Pt discharging to acute hospital.     Progress towards therapy goal(s). See goals on Care Plan in [facility] electronic health record for goal details.  Goals not met.  Barriers to achieving goals:   limited tolerance for therapy. Change in medical condition.     Therapy recommendation(s):    Continued therapy is recommended.  Rationale/Recommendations:  When pt stable medically, she is a good candidate to con't ADL and mobility training to increase IND and safety. .

## 2022-06-16 NOTE — PLAN OF CARE
Goal Outcome Evaluation:  Patient is alert and oriented with intermittent confusion, Patient is on 1:1 sitter for safety. Neuro checks Q2hr.  Head CT scan done at 0200, prelim christie results indicated stable side of subdural hematoma. NPO since 2300.       Patient's most recent vital signs are:     Vital signs:  BP: 105/46  Temp: 97.3  HR: 77  RR: 20  SpO2: 100 %     Patient does not have new respiratory symptoms.  Patient does not have new sore throat.  Patient does not have a fever greater than 99.5.

## 2022-06-16 NOTE — CARE PLAN
RN: pt continues on 1:1 attendant, family here (2 sisters) in pt room and attendant is on stand by to resume after family not present. Dr Caro has been discussing with pt and pt family need for neurosurgery.  Pt has accepted surgery, Dr Caro is setting up pt to transfer as neurosurgery is available and bed [facility] or ED space available. Pt remains NPO in preparation for surgery.  Denies pain, talkative and cognitive deficits continue. Pt had morning medications with sips of water.   1230 neoro check (every 2 hours) shows no change, neuro intact, PERRL 3 mm to 1-2 mm.   Pt remains NPO except morning meds, awaiting neurosurgery and pt transfer today to neurosurgery pre op holding area. Pt 2 sisters remain in room with pt, 1:1 attendant remains on stand by.    Neuro checks unchanged this shift.  family with pt and awaiting transfer to pre holding area of neuro surgey.  Covid test sent, labs drawn, clotting and type and cross.    Pt complain of headache, no po meds to be given.  No narcotics and no meds to induce brain bleed.  Pt denies tylenol supp. Cool cloth on pt forehead given without relief. Pt has been NPO since midnight and only had morning keppra and prednisone scheduled meds with small water sips.     Patient's most recent vital signs are:     Vital signs:  BP: 104/53  Temp: 97.8  HR: 84  RR: 20  SpO2: 100 %     Patient does not have new respiratory symptoms.  Patient does not have new sore throat.  Patient does not have a fever greater than 99.5.

## 2022-06-16 NOTE — PROGRESS NOTES
"  Cross cover note:      Rehab provider Dr Caro and radiology resident called me about patient having increased confusion earlier today, follow-up CT ordered and now CT with--Slight increased size of mixed density subdural collection along the right cerebral convexity measuring up to 11 mm in maximal thickness. Mildly increased leftward midline shift up to 8 mm. No transtentorial herniation.     Patient seen and evaluated.  Patient not known to me before this.  Per chart review, nursing staff: Patient more confused today, slightly disoriented-patient thought she was in [place], slow to respond, required increased cues with therapy earlier today.   Patient says she feels fine.  Ongoing headache mostly Bl frontotemporal.  No new visual complaint.  No new focal neurological deficit.     On exam:    Vitals:    06/14/22 1500 06/15/22 0900 06/15/22 1510 06/15/22 2318   BP: 129/57 106/54 100/57 115/63   BP Location: Right arm Right arm Right arm Right arm   Pulse: 85 80 81 77   Resp: 18 18 16 16   Temp: (!) 95.2  F (35.1  C) (!) 95.5  F (35.3  C) 97.2  F (36.2  C) 97.2  F (36.2  C)   TempSrc: Oral Oral Oral Oral   SpO2: 98%   100%   Height:   1.702 m (5' 7\")      General Appearance: Awake, interactive, NAD, sitting up in bed.   HEENT: AT/NC,  Moist MM, PERRL, EOMI  Respiratory: Normal work of breathing. RA   Cardiovascular: RRR  Extremities: Distally wwp. No pedal edema.  Neuro: AO x 3, Grossly non focal.       Reviewed meds, labs, imaging.         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 She initially presented to [facility] on 6/4/22 with complaints of  excruciating HA and bilateral visual aura. She apparently hit her head with a cabinet while trying to flush the toilet 3 days before the onset of her HA.  CNS imaging studies revealed right SDH.  " She was seen and evaluated by neurosurgery and no surgical interventions was recommended.  She was discharged to home 6/5/22 but returned to the ED on 6/6/22 with worsening HA, balance issues and may have also fallen but no injury. Imaging revealed right cerebral convexity SDH c/b 2-3 mm midline shift which was felt to be stable, presence of a right occipital lobe IPH was also identified as new, but seems to have actually been present previously as well.  However, there was a new right posterior parahippocampal gyrus punctate infarct which was attributed to the hypercoagulable state caused by Lenalidomide chemotherapy which she takes for NHL.  Lenalidomide d/c'd.  Transferred to TCU 6/9/22 for further cares     6/15/2022  #Increased confusion.  # Headache.   # CT Head: Increased subdural hematoma w/ mildly increased midline shift..   # h/o Traumatic right cerebral cortex subdural hemorrhage  # h/o Small right occipital infarct    Neuro: Otherwise grossly intact.   UA neg.   VSS.   Labs: 6.13 unremarkable.     dw neurosurgery attending Dr Somers.  Neurosurgery team will assess patient tonight in TCU and let us know if symptoms surgical evacuation.     - NPO  -Follow-up CT head, noncontrast, at 2 am, 6 hours from last CT.   -Neurochecks every 2 hours  -Continue 1:1, fall precautions.   - Continue current meds.   -Follow-up neurosurgery recommendation for final plan and possible transfer to the [place].  -Page on call MD for any interval concern.     Patient, RN updated in details.  Signed out to Night MD Juloi Cesar Castellano MD   Hospitalist (Internal Medicine)

## 2022-06-16 NOTE — PLAN OF CARE
Physical Therapy Discharge Summary    Reason for therapy discharge:    Discharged to hospital acutely    Progress towards therapy goal(s). See goals on Care Plan in [facility] electronic health record for goal details.  Goals not met.  Barriers to achieving goals:   Patient's cognition made participation in therapy challenging. She demonstrates difficulty with following directions and is easily distractible. She will suddenly lay down stating she is very tired.    Therapy recommendation(s):    Continued therapy is recommended.  Rationale/Recommendations:  Once patient is medically stable she would benefit from physical therapy to address tolerance for functional activity and balance to reduce fall risk.

## 2022-06-16 NOTE — PLAN OF CARE
Patient does answer orientation questions correctly but is very forgetful and impulsive. Pt is confused intermittently. At one time pt asked if we can take the food menu to upstairs. Re-orientation and direction provided.  Pt can be not redirectable at time, refused bed and chair alarm. Remains on 1:1 for safety.  Went out side with attendant to visit her daughter and their dog. Up with assist of 1 and gait belt. Her gait is not steady.  VSS, On RA stable, denies SOB, denies cough, denies any respiratory distress, clear LS,  denies CP. Pt denies N/V, denies numbness or tingling.  Reported generalized body ache and headache. Pt received prn Tylenol x1  with good effect. skin is wnl for age, dry. LBM 6/15/22, BS+, passing flatus per pt. Voiding without difficulty. Fair appetite. Pt is going a great job drinking her water through out this shift. Pt had shower this shift per her request. UA- urine was collected and sent to lab. Head CT- showed increase in bleeding. No neuro deficit noted or reported. No visual problem reported or noted.  evaluated pt at bedside. Pt will be transferred to [facility] per . Pt will have another CT at 0200. NPO starting at 2300. Neuro checks every 2 hrs.  Neurology will be here at bedside to evaluate pt at bedside per Dr. Castellano. continue with monitoring and POC.

## 2022-06-16 NOTE — PLAN OF CARE
Goal Outcome Evaluation:      Pt is alert and oriented x 3 but with forgetfulness. Went for CT scan. Can transfer self with 2 assist steady gait. Per report pt been NPO since midnight. Neuro checks done, unchanged. PERRLA . Pt can be redirected and can follow instructions.PIV is at L upper arm.Pt was discharged at 1745 with [facility] [place] via stretcher. Bed hold instructions given.Called family to informed that pt already transferred to .

## 2022-06-16 NOTE — PLAN OF CARE
Speech Language Therapy Discharge Summary    Reason for therapy discharge:    Discharged to acute care hospital    Progress towards therapy goal(s). See goals on Care Plan in Epic electronic health record for goal details.  Goals not met.  Barriers to achieving goals:   discharge from facility.    Therapy recommendation(s):    Continued therapy is recommended.  Rationale/Recommendations:  Continue to assess and address cognitive impairments.    CLQT was initiated but only half way completed. Significant impairments in completed portions with very limited insights into her deficits. Patient also very easily distracted and unable to progress through the test fully despite adequate amount of time set aside to complete. Patient's discharging to acute care hospital to further assessment and intervention for advancing brain bleed.

## 2022-06-17 NOTE — ANESTHESIA PROCEDURE NOTES
Airway       Patient location during procedure: OR       Procedure Start/Stop Times: 6/16/2022 8:13 PM  Staff -        CRNA: Talya Nickerson APRN CRNA       Performed By: CRNA  Consent for Airway        Urgency: elective  Indications and Patient Condition       Indications for airway management: elana-procedural       Induction type:intravenous       Mask difficulty assessment: 1 - vent by mask    Final Airway Details       Final airway type: endotracheal airway       Successful airway: ETT - single  Endotracheal Airway Details        ETT size (mm): 7.0       Cuffed: yes       Successful intubation technique: direct laryngoscopy       DL Blade Type: MAC 4       Grade View of Cords: 1       Adjucts: stylet       Measured from: lips       Bite block used: None    Post intubation assessment        Placement verified by: capnometry, equal breath sounds and chest rise        Number of attempts at approach: 1       Secured with: pink tape       Ease of procedure: easy       Dentition: Intact and Unchanged    Medication(s) Administered   Medication Administration Time: 6/16/2022 8:13 PM

## 2022-06-17 NOTE — PLAN OF CARE
Problem: Plan of Care - These are the overarching goals to be used throughout the patient stay.    Goal: Plan of Care Review/Shift Note  Description: The Plan of Care Review/Shift note should be completed every shift.  The Outcome Evaluation is a brief statement about your assessment that the patient is improving, declining, or no change.  This information will be displayed automatically on your shift note.  Outcome: Ongoing, Progressing   Goal Outcome Evaluation:        Major Shift Events:  Alert and oriented to self and place. At times oriented to situation and time. Follows simple commands. Slow to respond. Slight left sided facial droop. High flow 100% FIO2. SR. VSS. Afebrile. Feldman. Flat bedrest 24 hrs. NS. JUANITO drain.   Plan: Continue with POC. Notify team of any changes or concerns.   For vital signs and complete assessments, please see documentation flowsheets.

## 2022-06-17 NOTE — PLAN OF CARE
Goal Outcome Evaluation:            Admitted/transferred from: PACU  Reason for admission/transfer: Subdural hematoma  2 RN skin assessment: completed by Kristy ALFORD RN  Result of skin assessment and interventions/actions: N/A  Height, weight, drug calc weight: Done  Patient belongings (see Flowsheet)

## 2022-06-17 NOTE — H&P
[facility]         NEUROSURGERY CONSULTATION      This consultation was requested by Dr. Chung from the ED service    GCS: 15 (E4, V5, M6)      HPI:      72-year-old female, on aspirin 81 mg (held since 6/4 ), known history of right-sided likely traumatic acute subdural hematoma for seconds on 6/4/2022 after presenting with severe headaches and intermittent fingertip numbness on left hand.  She was seen by neurosurgery on 6/5/2022.  After repeat scan showed stability, the plan was to continue to continue Keppra for 7 days, hold aspirin and follow-up in 1 week with a repeat head CT.  Subsequent imaging later that week showed a small right occipital intraparenchymal hemorrhage and surrounding subarachnoid hemorrhage-although patient remained clinically intact.  She received a 1 week repeat head CT on 6/11/2022 which showed a 1 mm increase in thickness of her right convexity subdural.  Repeat stability scan at that time did indeed show stability and neurosurgery was reconsulted to review these findings.  At that time she continued to endorse intermittent headaches however was denying double or blurry vision or weakness or any transient like episodes.    Most recently, neurosurgery was again reconsulted on 6/16/2022 in early morning by [facility] physicians due to pronounced cognitive impairment, distractibility, left-sided inattention, and frequent need for supervision.  Repeat scan at that time showed a 3 mm increase from 8 mm subdural to 11 mm.  Repeat scan was again obtained 6/16/2022 in the afternoon showing a further 3 mm.  She was taken to the operating room on 6/16/2022 for a right-sided loy holes for evacuation of subdural hematoma.  Currently, she denies headache, denies nausea or vomiting.    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     Nasal congestion      Seasonal allergies        PAST SURGICAL HISTORY:   Past Surgical History:   Procedure Laterality Date     ARTHROPLASTY HIP Left 10/18/2016    Procedure: ARTHROPLASTY HIP;  Surgeon: Chepe Peterson MD;  Location: [facility] OR     BIOPSY BONE CLAVICLE  3/1/2012    Procedure:BIOPSY BONE CLAVICLE; Biopsy Left Clavicle, Bilateral Hip Injections; Surgeon:JENNY BELTRAN; Location:[facility] OR     BREAST BIOPSY, RT/LT      left     DILATION AND CURETTAGE       HAND SURGERY      right flexor tendon laceration repair     INJECT STEROID (LOCATION)  3/1/2012    Procedure:INJECT STEROID (LOCATION); Surgeon:JENNY BELTRAN; Location:[facility] OR     LAPAROSCOPY DIAGNOSTIC (GYN)         FAMILY HISTORY:   Family History   Problem Relation Age of Onset     Depression Mother      Mental Illness Mother      Cancer Mother         breast     Other Cancer Mother      Diabetes Father      Cancer Father         lung ca, was a smoker     Other Cancer Father      Abdominal Aortic Aneurysm Father      Asthma Sister      Depression Sister      Mental Illness Sister      Asthma Sister      Depression Sister      Breast Cancer Sister      Liver Disease Sister        SOCIAL HISTORY:   Social History     Tobacco Use     Smoking status: Never Smoker     Smokeless tobacco: Never Used   Substance Use Topics     Alcohol use: Not Currently     Alcohol/week: 5.8 standard drinks       MEDICATIONS:  No current outpatient medications on file.       Allergies:  Allergies   Allergen Reactions     Diagnostic X-Ray Materials Hives     ct     Diatrizoate Rash     Dye [Contrast Dye] Rash and Hives     Ioxaglate Hives and Rash     Vortioxetine Nausea and Vomiting     Revlimid [Lenalidomide] Rash       ROS: 10 point ROS of systems including Constitutional, Eyes, Respiratory, Cardiovascular, Gastroenterology, Genitourinary, Integumentary, Muscularskeletal, Psychiatric were all negative  except for pertinent positives noted in my HPI.    Physical exam:   Blood pressure 103/45, pulse 77, temperature 98.8  F (37.1  C), temperature source Oral, resp. rate 18, SpO2 100 %, not currently breastfeeding.  General: Not in acute distress, appearing stated age, sister at bedside  HEENT: Atraumatic, normocephalic, mildly dilated scalp veins, Aspen collar in place  PULM: Breathing comfortably on room air  MSK: Unremarkable  NEUROLOGIC:  -- Awake; Alert; oriented x 3, although clearly confused about situation, mild forgetfulness about conversation topics  -- Follows commands briskly  -- +repetition, calculation, and naming  -- Speech fluent, spontaneous. No aphasia or dysarthria.  -- no gaze preference. No clear, noticeable neglect on left side.   Cranial Nerves:  -- visual fields full to confrontation, PERRL 3mm bilat and brisk, extraocular movements intact  -- left facial droop mild, tongue midline  -- sensory V1-V3 intact bilaterally  -- palate elevates symmetrically, uvula midline  -- hearing grossly intact bilat  -- Trapezii 5/5 strength bilat symmetric  -- Cerebellar: Finger nose finger without dysmetria, intact rapid alternating motions bilaterally    Motor:  Normal bulk / tone; no tremor, rigidity, or bradykinesia.  No muscle wasting or fasciculations  No Pronator Drift     Delt Bi Tri Hand Flexion/  Extension Iliopsoas Quadriceps Hamstrings Tibialis Anterior Gastroc    C5 C6 C7 C8/T1 L2 L3 L4-S1 L4 S1   R 5 5 5 5 5 5 5 5 5   L 5 5 5 5 5 5 5 5 5     Sensory:  intact to LT x 4 extremities      Reflexes: no ankle clonus       Bi Tri BR Young Pat Ach Bab     C5-6 C7-8 C6 UMN L2-4 S1 UMN   R 2+ 2+ 2+ Norm 2+ 2+ Norm   L 2+ 2+ 2+ Norm 2+ 2+ Norm      Gait: deferred       IMAGING:  Recent Results (from the past 24 hour(s))   CT Head w/o Contrast    Narrative    CT HEAD W/O CONTRAST 6/16/2022 3:03 AM    History: Cerebral hemorrhage suspected   ICD-10:    Comparison: CT of the head dated 6/15/2022,  6/12/2022    Technique: Using multidetector thin collimation helical acquisition  technique, axial, coronal and sagittal CT images from the skull base  to the vertex were obtained without intravenous contrast.   (topogram) image(s) also obtained and reviewed.    Findings: Stable size of the heterogeneous subdural collection  overlying the right cerebral convexity, measuring up to 11 mm coronal  images, previously 11 mm. No new intra-axial or extra-axial  hemorrhage. Stable mass effect with 7 mm leftward midline shift,  previously 7 mm, when measured in similar fashion. Stable effacement  of the lateral ventricles. Gray/white matter differentiation in both  cerebral hemispheres is preserved. Stable ventricular configuration.  Patchy hypodensities in the periventricular white matter, likely  secondary to chronic ischemic small vessel disease. The basal cisterns  are clear.    The bony calvaria and the bones of the skull base are normal. The  visualized portions of the paranasal sinuses and mastoid air cells are  clear.      Impression    Impression:  1. Stable size of the subdural hematoma overlying the right cerebral  convexity.  2. Stable leftward midline shift. No transtentorial herniation.    I have personally reviewed the examination and initial interpretation  and I agree with the findings.    LUIS MIGUEL SCHWARTZ MD         SYSTEM ID:  [id number]   CT Head w/o Contrast    Narrative    CT HEAD W/O CONTRAST 6/16/2022 4:21 PM    History: Headache, intracranial hemorrhage suspected   ICD-10:    Comparison: 6/16/2020 02/02/1946    Technique: Using multidetector thin collimation helical acquisition  technique, axial, coronal and sagittal CT images from the skull base  to the vertex were obtained without intravenous contrast.   (topogram) image(s) also obtained and reviewed.    Findings: Slightly increased size of the heterogeneous subdural fluid  collection overlying the right cerebral convexity measuring up to  1.6  cm compared to 1.3 cm when measured in a similar location on the prior  study. There is new subtle subfalcine herniation. The right to left  midline shift has slightly increased to 8 mm at the level of the  septum pellucidum. The right uncus is medially deviated but there is  no evidence for matt uncal herniation. Gray/white matter  differentiation in both cerebral hemispheres is preserved. Ventricles  are proportionate to the cerebral sulci. The basal cisterns are clear.    The bony calvaria and the bones of the skull base are normal. The  visualized portions of the paranasal sinuses and mastoid air cells are  clear.      Impression    Impression:    1. Slightly increased size of the heterogeneous subdural fluid  collection overlying the right cerebral convexity with increased mass  effect causing new subtle subfalcine herniation and slightly increased  right to left midline shift.  2. Medial deviation the right uncus without matt uncal herniation.     MARISSA BOO MD         SYSTEM ID:  [id number]   Cervical spine CT w/o contrast    Narrative    CT CERVICAL SPINE W/O CONTRAST 6/16/2022 6:51 PM    Provided History: neck pain    Comparison: None.    Technique: Using multidetector thin collimation helical acquisition  technique, axial, coronal and sagittal CT images through the cervical  spine were obtained without intravenous contrast.     Findings:  There is trace degenerative anterolisthesis of C2 on C3 related to  bulky facet hypertrophy. Trace anterolisthesis of C3 on C4 and C4 on  C5 is also seen, again related to bulky facet hypertrophy. The lateral  masses of C1 are well aligned on C2. Straightening of physiologic  cervical lordosis. No acute fracture or subluxation. No prevertebral  edema. There is moderate disc height narrowing at C6-7.     There is extensive multilevel facet hypertrophy with ankylosis on the  right at C2-3 and on the left at C3-4 and C4-5. Uncovertebral spurring  at C5-6 and C6-7.  No high-grade spinal canal narrowing at any level.  Mild to moderate right neural foraminal narrowing at C5-6. Mild  bilateral neural foraminal narrowing at C6-7.     No abnormality of the paraspinous soft tissues. Biapical scarring.  Moderate atherosclerotic calcifications at the carotid bifurcation.      Impression    Impression: No acute fracture or traumatic subluxation.    I have personally reviewed the examination and initial interpretation  and I agree with the findings.    LUIS MIGUEL SCHWARTZ MD         SYSTEM ID:  [id number]           LABS:   Last Comprehensive Metabolic Panel:  Sodium   Date Value Ref Range Status   06/16/2022 137 133 - 144 mmol/L Final   10/03/2019 135 133 - 144 mmol/L Final     Potassium   Date Value Ref Range Status   06/16/2022 3.4 3.4 - 5.3 mmol/L Final   10/03/2019 4.4 3.4 - 5.3 mmol/L Final     Chloride   Date Value Ref Range Status   06/16/2022 104 94 - 109 mmol/L Final   10/03/2019 102 94 - 109 mmol/L Final     Carbon Dioxide   Date Value Ref Range Status   10/03/2019 28 20 - 32 mmol/L Final     Carbon Dioxide (CO2)   Date Value Ref Range Status   06/16/2022 27 20 - 32 mmol/L Final     Anion Gap   Date Value Ref Range Status   06/16/2022 6 3 - 14 mmol/L Final   10/03/2019 5 3 - 14 mmol/L Final     Glucose   Date Value Ref Range Status   06/16/2022 96 70 - 99 mg/dL Final   10/03/2019 100 (H) 70 - 99 mg/dL Final     GLUCOSE BY METER POCT   Date Value Ref Range Status   06/16/2022 87 70 - 99 mg/dL Final     Urea Nitrogen   Date Value Ref Range Status   06/16/2022 10 7 - 30 mg/dL Final   10/03/2019 12 7 - 30 mg/dL Final     Creatinine   Date Value Ref Range Status   06/16/2022 0.62 0.52 - 1.04 mg/dL Final   10/03/2019 0.67 0.52 - 1.04 mg/dL Final     GFR Estimate   Date Value Ref Range Status   06/16/2022 >90 >60 mL/min/1.73m2 Final     Comment:     Effective December 21, 2021 eGFRcr in adults is calculated using the 2021 CKD-EPI creatinine equation which includes age and gender (Juan et  al., [place], DOI: 10.1056/NAKCsl1367627)   10/03/2019 89 >60 mL/min/[1.73_m2] Final     Comment:     Non  GFR Calc  Starting 12/18/2018, serum creatinine based estimated GFR (eGFR) will be   calculated using the Chronic Kidney Disease Epidemiology Collaboration   (CKD-EPI) equation.       GFR, ESTIMATED POCT   Date Value Ref Range Status   06/04/2022 >60 >60 mL/min/1.73m2 Final     Calcium   Date Value Ref Range Status   06/16/2022 9.4 8.5 - 10.1 mg/dL Final   10/03/2019 9.1 8.5 - 10.1 mg/dL Final     Lab Results   Component Value Date    WBC 5.1 06/16/2022    WBC 8.2 10/03/2019     Lab Results   Component Value Date    RBC 3.67 06/16/2022    RBC 3.98 10/03/2019     Lab Results   Component Value Date    HGB 11.5 06/16/2022    HGB 12.3 10/03/2019     Lab Results   Component Value Date    HCT 33.8 06/16/2022    HCT 37.4 10/03/2019     Lab Results   Component Value Date    MCV 92 06/16/2022    MCV 94 10/03/2019     Lab Results   Component Value Date    MCH 31.3 06/16/2022    MCH 30.9 10/03/2019     Lab Results   Component Value Date    MCHC 34.0 06/16/2022    MCHC 32.9 10/03/2019     Lab Results   Component Value Date    RDW 14.8 06/16/2022    RDW 13.9 10/03/2019     Lab Results   Component Value Date     06/16/2022     10/03/2019     INR   Date Value Ref Range Status   06/16/2022 0.97 0.85 - 1.15 Final     Comment:     Some International Normalized Ratio (INR) results performed at the [facility] for patients 6 months and older reported between 07/11/2021 and 5/17/2022 were evaluated against an outdated reference interval of 0.86-1.14 rather than the intended reference interval of 0.85-1.15. The INR value itself was accurate, but may not have been flagged correctly due to the outdated reference interval.   10/21/2016 1.15 (H) 0.86 - 1.14 Final    a  aPTT   Date Value Ref Range Status   06/16/2022 26 22 - 38 Seconds Final      @Fibrinogen1@    ASSESSMENT: 72F with  enlarging, known R chronic subdural hematoma, presenting with worsening confusion, distractibility. Plan for evacuation given symptomatic nature after discussion with family.     Clinically Significant Risk Factors Present on Admission                  # Compression of brain     RECOMMENDATIONS:  No neurosurgical intervention indicated at this time   OR for evacuation  Flat bedrest for 24 hours  HFNC for 24 hours  SBP < 140  Continue Keppra 750 mg BID  Follow subgaleal JUANITO drain output (to gravity)  Feldman to remain in place while flat  Continue glucose checks  Platelets > 100,000  INR < 1.5  Hemoglobin > 8  DVT: SCDs while in bed  Disposition: ICU   PT/OT consulted        Tran Isaac MD  Neurosurgery Resident, PGY-2    The patient was discussed with Dr. Lemus, neurosurgery chief resident, and Dr. Cruz, neurosurgery staff.

## 2022-06-17 NOTE — BRIEF OP NOTE
[facility]    Brief Operative Note    Pre-operative diagnosis: * No pre-op diagnosis entered *  Post-operative diagnosis Same as pre-operative diagnosis    Procedure: Procedure(s):  RIGHT SIDED CREATION CRANIAL LASHON HOLE, WITH SUBDURAL HEMATOMA  Surgeon: Surgeon(s) and Role:     * Jarrod Crzu MD - Primary     * Tran Isaac MD - Resident - Assisting  Anesthesia: General   Estimated Blood Loss: 5cc    Drains: None  Specimens: * No specimens in log *  Findings:   Dark blood came out under pressure. Subgaleal drain overlying posterior lashon hole placed. Questionable ST elevations on intraop telemetry.   Complications: None.  Implants: * No implants in log *      Plan:   Closed with 3-0 vicryls for galea and staples  SBP < 140, PRN hydralazine and labetalol available  Pain control - oxycodone PRN and IV dilaudid for breakthrough pain  Flat for 24 hours  Keep Feldman  HFNC 100% for 24 hours  No CT unless concerned for exam  Admit to 4A  Q1h neuro checks  Sister notified.  Postoperative EKG/Troponins for questionable ST segment elevations (previously T wave inversions)    Preoperative exam notable for:  Awake, alert, follows commands, oriented x 3  Persistent confusion regarding situation, mild memory loss, inappropriate sentences  L mild facial droop  Full strength/sensation bilaterally  No pronator drift

## 2022-06-17 NOTE — PROGRESS NOTES
Pt placed on HFNC 25L 100% as requested by bedside Nurse. Pt will be on HFNC with 100% O2 for 24 hours per bedside nurse. Will continue to follow and monitor as needed.     Rolan Marcus, RT

## 2022-06-17 NOTE — CONSULTS
Neurocritical Care Consultation    Reason for critical care admission: Subdural hematoma   Admitting Team: GUERRERO  Date of Service:  06/17/2022  Date of Admission:  6/16/2022  Hospital Day: 2    Assessment/Plan  Lilly Babcock is a 72 year old female with a past medical history significant for follicular non-Hodgkins lymphoma, polymyalgia rheumatica, and more recently a right-sided traumatic acute subdural hematoma (6/4/2022) admitted on 6/16/2022 with increase in thickness with midline shift of her previous right subdural hematoma now s/p right-sided loy holes for evacuation of subdural hematoma on 6/16.     24 hour events:  S/p R-sided loy holes for SDH evacuation.    Neuro  #Increase in thickness w/midline shift of her previous right-sided subdural hematoma  #POD1 for right-sided loy holes for evacuation of subdural hematoma  #Right-sided traumatic acute subdural hematoma (6/4/2022)  -->No surgical interventions were done at that time  -Flat bedrest for 24hr postoperatively  -HFNC for 24hr postoperatively  -Neurochecks every 1 hrs  -Keppra 750 mg BID  -HOB > 30   -SBP goal < 140 mmHg  -PT/OT    #Analgesics & sedation  -Scheduled Tylenol  -Dilaudid PRN  -Oxycodone PRN    CV  -Cardiac monitoring  -SBP goal < 140 mmHg  -PRN Labetalol and Hydralazine    #HLD  -Atorvastatin 40 mg every evening     Resp  Oxygen/vent: HFNC for 24hr postoperatively  -Continuous pulse ox  -Maintain O2 saturations greater than 92%    GI  Diet: Advance as tolerated  Last BM: 6/15  GI prophylaxis: Not indicated  -Bowel regimen: Scheduled Senna-docusate and Miralax    Renal/  #Hypokalemia; 3.3  -Daily BMP  -IV fluids: None  -Electrolyte replacement protocol    Endo  -6/6 Hgb A1c; 5.5  -Monitor glucose levels    Heme  #  -Daily CBC  -Hgb goal >7, plt goal >50k  -Transfuse to meet Hgb and plt goals    Rheum  #Polymyalgia rheumatica  -Continue Prednisone 7 mg daily     ID  #Afebrile   -Daily CBC  -Monitor temperature curve    ICU Check  List  Lines/tubes/drains: PIV x2, reilly, subgaleal drain  FEN: Advance as tolerated   PPX: DVT - SCDs; GI - Not indicated.  Code: Full  Dispo: ICU - NCC    Clinically Significant Risk Factors Present on Admission      # Compression of brain and # Cerebral edema       TIME SPENT ON THIS ENCOUNTER  I spent 38 minutes of critical care time on the unit/floor managing the care of Lilly Babcock. Upon evaluation, this patient had a high probability of imminent or life-threatening deterioration due to a right subdural hematoma with brain compression and midline shift s/p right sided loy holes for SDH evacuation, which required my direct attention, intervention, and personal management. Greater than 50% of my time was spent at the bedside counseling the patient and/or coordinating care regarding services listed in this note.    The patient was seen and discussed with the NCC attending, Dr. Flores.    Bren DUBON CNP  NeuroCritical Care Nurse Practitioner  Pager: [phone]  Ascom: [phone] available M-[facility] [phone] to [phone]    History of Present Illness:  Lilly Babcock is a 72 year old female with a past medical history significant for follicular non-Hodgkins lymphoma and more recently a right-sided traumatic acute subdural hematoma (6/4/2022) admitted on 6/16/2022 from TCU due to pronounced cognitive impairment, distractibility, left-sided inattention, and frequent need for supervision with repeat CT head showing increase in thickness with midline shift of her previous right subdural hematoma.    She was taken to the operating room on 6/16/2022 for right-sided loy holes for evacuation of subdural hematoma per Neurosurgery.       Allergies   Allergen Reactions     Diagnostic X-Ray Materials Hives     ct     Diatrizoate Rash     Dye [Contrast Dye] Rash and Hives     Ioxaglate Hives and Rash     Vortioxetine Nausea and Vomiting     Revlimid [Lenalidomide] Rash       PAST MEDICAL HISTORY:   Past Medical History:    Diagnosis Date     Asthma      Degenerative joint disease      Depression      Eczema      Lymphoma, follicular (H) 8/2008 diagnosed    Stage YOUSUF - bone mets to L greater trochanter     Nasal congestion      Seasonal allergies        PAST SURGICAL HISTORY:   Past Surgical History:   Procedure Laterality Date     ARTHROPLASTY HIP Left 10/18/2016    Procedure: ARTHROPLASTY HIP;  Surgeon: Chepe Peterson MD;  Location: [facility]     BIOPSY BONE CLAVICLE  3/1/2012    Procedure:BIOPSY BONE CLAVICLE; Biopsy Left Clavicle, Bilateral Hip Injections; Surgeon:JENNY BELTRAN; Location:[facility] OR     BREAST BIOPSY, RT/LT      left     LASHON HOLE(S) EVACUATE HEMATOMA SUBDURAL Right 6/16/2022    Procedure: RIGHT SIDED CREATION CRANIAL LASHON HOLE, WITH SUBDURAL HEMATOMA;  Surgeon: Jarrod Cruz MD;  Location: [facility]     DILATION AND CURETTAGE       HAND SURGERY      right flexor tendon laceration repair     INJECT STEROID (LOCATION)  3/1/2012    Procedure:INJECT STEROID (LOCATION); Surgeon:JENNY BELTRAN; Location:[facility]     LAPAROSCOPY DIAGNOSTIC (GYN)         FAMILY HISTORY:   I have reviewed this patient's family history and updated it with pertinent information if needed.  Family History   Problem Relation Age of Onset     Depression Mother      Mental Illness Mother      Cancer Mother         breast     Other Cancer Mother      Diabetes Father      Cancer Father         lung ca, was a smoker     Other Cancer Father      Abdominal Aortic Aneurysm Father      Asthma Sister      Depression Sister      Mental Illness Sister      Asthma Sister      Depression Sister      Breast Cancer Sister      Liver Disease Sister          SOCIAL HISTORY:   Social History     Tobacco Use     Smoking status: Never Smoker     Smokeless tobacco: Never Used   Substance Use Topics     Alcohol use: Not Currently     Alcohol/week: 5.8 standard drinks       ROS: The 10 point Review of Systems is negative other than noted in the HPI.    Current  Medications:    acetaminophen  975 mg Oral Q8H     atorvastatin  40 mg Oral QPM     levETIRAcetam  750 mg Oral BID     polyethylene glycol  17 g Oral Daily     predniSONE  7 mg Oral Daily     senna-docusate  1 tablet Oral BID       PRN Medications:  [START ON 6/19/2022] acetaminophen, albuterol, bisacodyl, hydrALAZINE, HYDROmorphone **OR** HYDROmorphone, labetalol, lidocaine 4%, lidocaine (buffered or not buffered), magnesium hydroxide, naloxone **OR** naloxone **OR** naloxone **OR** naloxone, ondansetron **OR** ondansetron, oxyCODONE **OR** oxyCODONE, prochlorperazine **OR** prochlorperazine, sodium chloride (PF), sodium chloride (PF)    Infusions:  None    Physical Examination:  Vitals: /51   Pulse 73   Temp 97.8  F (36.6  C) (Oral)   Resp 16   Wt 52.6 kg (115 lb 15.4 oz)   SpO2 100%   BMI 18.16 kg/m    General: Adult female patient, lying in bed, NAD  HEENT: Normocephalic, atraumatic, no icterus, oral cavity/oropharynx pink and moist  Cardiac: RRR, s1/s2 auscultated without murmur, S3/S4  Chest: CTAB, unlabored, expansion symmetric, no retractions or use of accessory muscles  Abdomen: Soft, non-distended, bowel sounds present  Extremities: Warm, no edema, distal pulses +2, well perfused  Skin: No rash or lesion  Psych: Calm   Neuro:  Mental status: Awake, oriented to self, time, place, and circumstance. Speech clear, slow to respond.  Cranial nerves: PERRL, conjugate gaze, EOMI, pupils +4 round and reactive, facial sensation intact, left-sided facial droop, shoulder shrug strong, tongue midline, no dysarthria.   Motor: Normal bulk and tone. No abnormal movements. 4/5 strength in 4/4 extremities.   Sensory: Intact to light touch x 4 extremities  Coordination: FNF without ataxia or dysmetria.   Gait: CATHY, deferred.    NIHSS  Interval 24 hrs post treatment (06/17/22 1310)   1a. Level of Consciousness 1-->Not alert, but arousable by minor stimulation to obey, answer, or respond   1b. LOC Questions  0-->Answers both questions correctly   1c. LOC Commands 0-->Performs both tasks correctly   2.   Best Gaze 0-->Normal   3.   Visual 0-->No visual loss   4.   Facial Palsy 1-->Minor paralysis (flattened nasolabial fold, asymmetry on smiling) (left facial droop)   5a. Motor Arm, Left 0-->No drift, limb holds 90 (or 45) degrees for full 10 secs   5b. Motor Arm, Right 0-->No drift, limb holds 90 (or 45) degrees for full 10 secs   6a. Motor Leg, Left 0-->No drift, leg holds 30 degree position for full 5 secs   6b. Motor Leg, right 0-->No drift, leg holds 30 degree position for full 5 secs   7.   Limb Ataxia 0-->Absent   8.   Sensory 0-->Normal, no sensory loss   9.   Best Language 0-->No aphasia, normal   10. Dysarthria 0-->Normal   11. Extinction and Inattention  0-->No abnormality   Total 2 (22 1310)       Labs:  Recent Labs   Lab 22  1030 22  0431 22  0633 22  0737   NA  --  136 137 137   POTASSIUM 4.2 3.3* 3.4 4.1   CHLORIDE  --  106 104 103   CO2  --  21 27 30   BUN  --  13 10 11   CR  --  0.59 0.62 0.66   FLORENCIA  --  8.9 9.4 9.3       Recent Labs   Lab 22  0431 22  0633 22  0737   WBC 7.9 5.1 5.2   HGB 11.4* 11.5* 12.3    295 287       Imagin. CT Cervical Spine w/o contrast on 2022 at 1851  Impression:   1. No acute fracture or traumatic subluxation.    2. CT Head w/o contrast on 2022 at 1621  Impression:  1. Slightly increased size of the heterogeneous subdural fluid  collection overlying the right cerebral convexity with increased mass  effect causing new subtle subfalcine herniation and slightly increased  right to left midline shift.  2. Medial deviation the right uncus without matt uncal herniation.

## 2022-06-17 NOTE — ANESTHESIA PREPROCEDURE EVALUATION
Anesthesia Pre-Procedure Evaluation    Patient: Lilly Babcock   MRN: [id number] : 1950        Procedure : Procedure(s):  RIGHT SIDED CREATION CRANIAL LASHON HOLE, WITH SUBDURAL HEMATOMA EVACUATION,POSSIBLE CRANIOTOMY          Past Medical History:   Diagnosis Date     Asthma      Degenerative joint disease      Depression      Eczema      Lymphoma, follicular (H) 2008 diagnosed    Stage YOUSUF - bone mets to L greater trochanter     Nasal congestion      Seasonal allergies       Past Surgical History:   Procedure Laterality Date     ARTHROPLASTY HIP Left 10/18/2016    Procedure: ARTHROPLASTY HIP;  Surgeon: Chepe Peterson MD;  [facility]     BIOPSY BONE CLAVICLE  3/1/2012    Procedure:BIOPSY BONE CLAVICLE; Biopsy Left Clavicle, Bilateral Hip Injections; Surgeon:JENNY BELTRAN; [facility]     BREAST BIOPSY, RT/LT      left     DILATION AND CURETTAGE       HAND SURGERY      right flexor tendon laceration repair     INJECT STEROID (LOCATION)  3/1/2012    Procedure:INJECT STEROID (LOCATION); Surgeon:JENNY BELTRAN; [facility]     LAPAROSCOPY DIAGNOSTIC (GYN)        Allergies   Allergen Reactions     Diagnostic X-Ray Materials Hives     ct     Diatrizoate Rash     Dye [Contrast Dye] Rash and Hives     Ioxaglate Hives and Rash     Vortioxetine Nausea and Vomiting     Revlimid [Lenalidomide] Rash      Social History     Tobacco Use     Smoking status: Never Smoker     Smokeless tobacco: Never Used   Substance Use Topics     Alcohol use: Not Currently     Alcohol/week: 5.8 standard drinks      Wt Readings from Last 1 Encounters:   22 56.7 kg (125 lb)        Anesthesia Evaluation   Pt has had prior anesthetic. Type: MAC and General.        ROS/MED HX  ENT/Pulmonary:     (+) asthma     Neurologic: Comment: Subdural hematoma with increasing midline shift and herniation    (+) migraines, CVA,     Cardiovascular:     (+) Dyslipidemia --CAD ---    METS/Exercise Tolerance:     Hematologic:        Musculoskeletal:       GI/Hepatic:       Renal/Genitourinary:       Endo:       Psychiatric/Substance Use:       Infectious Disease:       Malignancy:   (+) Malignancy (Non-Hodgkin's lymphoma), History of Lymphoma/Leukemia.    Other:            Physical Exam    Airway        Mallampati: I   TM distance: > 3 FB   Neck ROM: full   Mouth opening: > 3 cm    Respiratory Devices and Support         Dental           Cardiovascular             Pulmonary                   OUTSIDE LABS:  CBC:   Lab Results   Component Value Date    WBC 5.1 06/16/2022    WBC 5.2 06/13/2022    HGB 11.5 (L) 06/16/2022    HGB 12.3 06/13/2022    HCT 33.8 (L) 06/16/2022    HCT 36.4 06/13/2022     06/16/2022     06/13/2022     BMP:   Lab Results   Component Value Date     06/16/2022     06/13/2022    POTASSIUM 3.4 06/16/2022    POTASSIUM 4.1 06/13/2022    CHLORIDE 104 06/16/2022    CHLORIDE 103 06/13/2022    CO2 27 06/16/2022    CO2 30 06/13/2022    BUN 10 06/16/2022    BUN 11 06/13/2022    CR 0.62 06/16/2022    CR 0.66 06/13/2022    GLC 87 06/16/2022    GLC 96 06/16/2022     COAGS:   Lab Results   Component Value Date    PTT 26 06/16/2022    INR 0.97 06/16/2022     POC:   Lab Results   Component Value Date     (H) 08/04/2018     HEPATIC:   Lab Results   Component Value Date    ALBUMIN 3.3 (L) 06/07/2022    PROTTOTAL 6.0 (L) 06/07/2022    ALT 29 06/07/2022    AST 17 06/07/2022    ALKPHOS 66 06/07/2022    BILITOTAL 0.5 06/07/2022     OTHER:   Lab Results   Component Value Date    LACT 0.6 (L) 06/07/2022    A1C 5.5 06/06/2022    FLORENCIA 9.4 06/16/2022    PHOS 3.1 06/07/2022    MAG 1.9 06/07/2022    TSH 1.56 06/16/2016    CRP <2.9 12/23/2021    SED 7 12/23/2021       Anesthesia Plan    ASA Status:  3   NPO Status:  NPO Appropriate    Anesthesia Type: General.     - Airway: ETT   Induction: Intravenous.   Maintenance: Inhalation.   Techniques and Equipment:     - Lines/Monitors: 2nd IV, Arterial Line     - Blood: T&S      Consents    Anesthesia Plan(s) and associated risks, benefits, and realistic alternatives discussed. Questions answered and patient/representative(s) expressed understanding.    - Discussed:     - Discussed with:  Patient (sister)      - Extended Intubation/Ventilatory Support Discussed: Yes.      - Patient is DNR/DNI Status: No    Use of blood products discussed: Yes.     - Discussed with: Patient (sister).     - Consented: consented to blood products            Reason for refusal: other.     Postoperative Care    Pain management: IV analgesics.   PONV prophylaxis: Ondansetron (or other 5HT-3), Dexamethasone or Solumedrol     Comments:                LING Lennon CRNA

## 2022-06-17 NOTE — ANESTHESIA POSTPROCEDURE EVALUATION
Patient: Lilly Babcock    Procedure: Procedure(s):  RIGHT SIDED CREATION CRANIAL LASHON HOLE, WITH SUBDURAL HEMATOMA       Anesthesia Type:  General    Note:  Anesthesia Post Evaluation    Last vitals:  Vitals Value Taken Time   /62 06/16/22 2300   Temp 36.6  C (97.8  F) 06/16/22 2300   Pulse 75 06/16/22 2302   Resp 15 06/16/22 2300   SpO2 100 % 06/16/22 2302   Vitals shown include unvalidated device data.    Electronically Signed By: JULEE GONSALES MD  June 16, 2022  11:03 PM

## 2022-06-17 NOTE — PROGRESS NOTES
[facility], [place]   06/17/2022  Neurosurgery Progress Note:    Assessment:  Lilly Babcock is a 72 year old female with a history of right convexity subdural hematoma initially diagnosed on 6/4, with found to have enlarged SDH, worsened midline shift and symptoms related to this change (confusion, decreased attention, left neglect). She underwent right sided loy holes for SDH evacuation on 6/16, admitted to the ICU postoperatively.     Other diagnoses include:  #Brain compression    Plan:  - Serial neuro exams  - Pain control  - Flat bedrest for 24hr postoperatively  - HFNC for 24hr postoperatively  - JUANITO subgaleal drain to gravity  - Advance diet as tolerated  - Bowel regimen  - PRN antiemetics  - IVF until taking adequate PO  - PT/OT  - SCDs for DVT proph    -----------------------------------  Aziza Lawrence MD  Neurosurgery PGY2    Please contact neurosurgery resident on call with questions.    Dial * * [phone], enter [phone] when prompted.  -----------------------------------    Interval History: OR yesterday for SDH evacuation. Endorsing incisional pain.     Objective:   Temp:  [97.2  F (36.2  C)-98.8  F (37.1  C)] 97.7  F (36.5  C)  Pulse:  [68-86] 69  Resp:  [10-20] 16  BP: ()/(45-76) 127/60  FiO2 (%):  [100 %] 100 %  SpO2:  [99 %-100 %] 100 %  I/O last 3 completed shifts:  In: 1176.25 [I.V.:1176.25]  Out: 453 [Urine:330; Drains:123]    Gen: Appears comfortable, NAD  Wound: dressing clean, dry, intact, JUANITO in place  Neurologic:  - Alert & Oriented to person and place  - Follows commands  - Limited spontaneous speech. Able to name, repeat. No dysarthria.  - No gaze preference.   - PERRL, EOMI  - Face symmetric with sensation intact to light touch  - Tongue protrudes midline  - No pronator drift  - 4+ throughout all extremities   Reflexes 2+ throughout    Sensation intact and symmetric to light touch throughout    LABS:  Recent Labs   Lab 06/17/22  0431 06/16/22  5907  06/16/22  1926 06/16/22  0633 06/13/22  0737     --   --  137 137   POTASSIUM 3.3*  --   --  3.4 4.1   CHLORIDE 106  --   --  104 103   CO2 21  --   --  27 30   ANIONGAP 9  --   --  6 4   GLC 81 84 87 96 96   BUN 13  --   --  10 11   CR 0.59  --   --  0.62 0.66   FLOERNCIA 8.9  --   --  9.4 9.3       Recent Labs   Lab 06/17/22  0431   WBC 7.9   RBC 3.57*   HGB 11.4*   HCT 34.8*   MCV 98   MCH 31.9   MCHC 32.8   RDW 14.6          IMAGING:  Recent Results (from the past 24 hour(s))   CT Head w/o Contrast    Narrative    CT HEAD W/O CONTRAST 6/16/2022 4:21 PM    History: Headache, intracranial hemorrhage suspected   ICD-10:    Comparison: 6/16/2020 02/[birth year]/1945    Technique: Using multidetector thin collimation helical acquisition  technique, axial, coronal and sagittal CT images from the skull base  to the vertex were obtained without intravenous contrast.   (topogram) image(s) also obtained and reviewed.    Findings: Slightly increased size of the heterogeneous subdural fluid  collection overlying the right cerebral convexity measuring up to 1.6  cm compared to 1.3 cm when measured in a similar location on the prior  study. There is new subtle subfalcine herniation. The right to left  midline shift has slightly increased to 8 mm at the level of the  septum pellucidum. The right uncus is medially deviated but there is  no evidence for matt uncal herniation. Gray/white matter  differentiation in both cerebral hemispheres is preserved. Ventricles  are proportionate to the cerebral sulci. The basal cisterns are clear.    The bony calvaria and the bones of the skull base are normal. The  visualized portions of the paranasal sinuses and mastoid air cells are  clear.      Impression    Impression:    1. Slightly increased size of the heterogeneous subdural fluid  collection overlying the right cerebral convexity with increased mass  effect causing new subtle subfalcine herniation and slightly increased  right to  left midline shift.  2. Medial deviation the right uncus without matt uncal herniation.     MARISSA BOO MD         SYSTEM ID:  [id number]   Cervical spine CT w/o contrast    Narrative    CT CERVICAL SPINE W/O CONTRAST 6/16/2022 6:51 PM    Provided History: neck pain    Comparison: None.    Technique: Using multidetector thin collimation helical acquisition  technique, axial, coronal and sagittal CT images through the cervical  spine were obtained without intravenous contrast.     Findings:  There is trace degenerative anterolisthesis of C2 on C3 related to  bulky facet hypertrophy. Trace anterolisthesis of C3 on C4 and C4 on  C5 is also seen, again related to bulky facet hypertrophy. The lateral  masses of C1 are well aligned on C2. Straightening of physiologic  cervical lordosis. No acute fracture or subluxation. No prevertebral  edema. There is moderate disc height narrowing at C6-7.     There is extensive multilevel facet hypertrophy with ankylosis on the  right at C2-3 and on the left at C3-4 and C4-5. Uncovertebral spurring  at C5-6 and C6-7. No high-grade spinal canal narrowing at any level.  Mild to moderate right neural foraminal narrowing at C5-6. Mild  bilateral neural foraminal narrowing at C6-7.     No abnormality of the paraspinous soft tissues. Biapical scarring.  Moderate atherosclerotic calcifications at the carotid bifurcation.      Impression    Impression: No acute fracture or traumatic subluxation.    I have personally reviewed the examination and initial interpretation  and I agree with the findings.    LUIS MIGUEL SCHWARTZ MD         SYSTEM ID:  [id number]

## 2022-06-17 NOTE — ANESTHESIA CARE TRANSFER NOTE
Patient: Lilly Babcock    Procedure: Procedure(s):  RIGHT SIDED CREATION CRANIAL LASHON HOLE, WITH SUBDURAL HEMATOMA       Diagnosis: * No pre-op diagnosis entered *  Diagnosis Additional Information: No value filed.    Anesthesia Type:   General     Note:    Oropharynx: oropharynx clear of all foreign objects  Level of Consciousness: awake  Oxygen Supplementation: face mask    Independent Airway: airway patency satisfactory and stable  Dentition: dentition unchanged  Vital Signs Stable: post-procedure vital signs reviewed and stable  Report to RN Given: handoff report given  Patient transferred to: PACU    Handoff Report: Identifed the Patient, Identified the Reponsible Provider, Reviewed the pertinent medical history, Discussed the surgical course, Reviewed Intra-OP anesthesia mangement and issues during anesthesia, Set expectations for post-procedure period and Allowed opportunity for questions and acknowledgement of understanding      Vitals:  Vitals Value Taken Time   BP     Temp     Pulse     Resp     SpO2 100 % 06/16/22 2141   Vitals shown include unvalidated device data.    Electronically Signed By: LING Bonilla CRNA  June 16, 2022  9:42 PM

## 2022-06-17 NOTE — PHARMACY-ADMISSION MEDICATION HISTORY
Admission Medication History Completed by Pharmacy    See [facility] Admission Navigator for allergy information, preferred outpatient pharmacy, prior to admission medications and immunization status.     Medication History Sources:     Medication history completed 6/6/22, MAR from [facility]    Changes made to PTA medication list (reason):    Added: None    Deleted: None    Changed: ipratropium nasal and cetirizine from daily to PRN (not requiring while at TCU)    Additional Information:    Per heme malignancy note from 6/7/22: discontinue lenalidomide as she is in complete response.  We can continue rituximab 375 mg meter squared every month for 3 more cycles.      Prior to Admission medications    Medication Sig Last Dose Taking? Auth Provider Long Term End Date   acetaminophen (TYLENOL) 325 MG tablet Take 2 tablets (650 mg) by mouth every 4 hours as needed for other (For optimal non-opioid multimodal pain management to improve pain control.)   Herberth Blanton MD     acyclovir (ZOVIRAX) 400 MG tablet Take 1 tablet by mouth as needed (kandace day to prevent cold sores)   Reported, Patient Yes    albuterol (PROAIR HFA/PROVENTIL HFA/VENTOLIN HFA) 108 (90 Base) MCG/ACT inhaler Inhale 2 puffs into the lungs every 4 hours as needed for shortness of breath / dyspnea or wheezing   Reported, Patient     atorvastatin (LIPITOR) 40 MG tablet Take 1 tablet (40 mg) by mouth every evening   Nasrin Reese MD Yes    cetirizine (ZYRTEC) 10 MG tablet Take 10 mg by mouth daily as needed   Unknown, Entered By History     Cholecalciferol (VITAMIN D) 1000 UNIT capsule Take 2 capsules by mouth daily    Reported, Patient     clobetasol (TEMOVATE) 0.05 % ointment Apply topically 2 times daily as needed (eczema)    Reported, Patient     ipratropium (ATROVENT) 0.06 % spray Spray 2 sprays in nostril daily as needed   Reported, Patient     ketoconazole (NIZORAL) 2 % shampoo Apply topically daily as needed (eczema)    Reported, Patient      levETIRAcetam (KEPPRA) 750 MG tablet Take 1 tablet (750 mg) by mouth 2 times daily   Terence Caro MD Yes    methylPREDNISolone (MEDROL) 32 MG tablet Take one tablet (32mg) by mouth 12 hours prior to scheduled CT scan.  Repeat above dose 2 hours prior to CT scan  Patient taking differently: Take one tablet (32mg) by mouth 12 hours prior to scheduled CT scan.  Repeat above dose 2 hours prior to CT scan   Colette Flores PA-C     mometasone (ELOCON) 0.1 % ointment Apply topically daily as needed (eczema)    Reported, Patient     predniSONE (DELTASONE) 1 MG tablet Take 7 mg by mouth daily    Cornell Malagon MD     VIIBRYD 20 MG TABS tablet Take 20 mg by mouth daily    Reported, Patient Yes    LENalidomide (REVLIMID) 20 MG CAPS capsule Take 1 capsule (20 mg) by mouth daily for 21 days , Days 1 through 21, then off for 7 days.   Rafa Gutierrez MD Yes 6/16/22     Date completed: 06/17/22    Medication history completed by: Trinity Fulton [facility]

## 2022-06-18 NOTE — PROGRESS NOTES
06/18/22 1400   Quick Adds   Type of Visit Initial PT Evaluation   Living Environment   People in Home alone   Current Living Arrangements house   Home Accessibility stairs to enter home;stairs within home   Living Environment Comments Paddy lives independent in her house. The current plan per her sister is for her to move in with her in [place]- she has a 1 level home. There are some renovations planned for Joyce's home here and her son is around to assist as well. Currently she will move with her sister though.   Self-Care   Usual Activity Tolerance good   Current Activity Tolerance good   Regular Exercise Yes   Activity/Exercise Type   (pilates)   Equipment Currently Used at Home none   Fall history within last six months yes   Number of times patient has fallen within last six months 2   Activity/Exercise/Self-Care Comment Patient currently has been home with her sister and son helping- she is consistently running into furniture on the L side. Patient's sister reporting she has fallen since previous hospitalization at least 1x when trying to feed the dog and bending over.   General Information   Onset of Illness/Injury or Date of Surgery 06/16/22   Referring Physician Marlin Mayfield   Patient/Family Therapy Goals Statement (PT) Get back home, walk my dog   Pertinent History of Current Problem (include personal factors and/or comorbidities that impact the POC) 72 year old female with a history of right convexity subdural hematoma initially diagnosed on 6/4, with found to have enlarged SDH, worsened midline shift and symptoms related to this change (confusion, decreased attention, left neglect). She underwent right sided oly holes for SDH evacuation on 6/16, admitted to the ICU postoperatively   Existing Precautions/Restrictions fall  (cranial)   General Observations Minor L neglect noted with activity- tends to drift to the L throughout.   Cognition   Affect/Mental Status (Cognition) confused   Follows  Commands (Cognition) follows one-step commands;75-90% accuracy   Safety Deficit (Cognition) insight into deficits/self-awareness;moderate deficit;safety precautions awareness;safety precautions follow-through/compliance   Cognitive Status Comments Patient tangential throughout session with very poor insight into deficits. Minor memory deficits. Picks at lines and tubes throughout and highly distractable   Pain Assessment   Patient Currently in Pain Yes, see Vital Sign flowsheet   Integumentary/Edema   Integumentary/Edema Comments Staples intact- tends to pick at them- stops with cues   Range of Motion (ROM)   ROM Comment WNL   Strength Comprehensive (MMT)   Comment, General Manual Muscle Testing (MMT) Assessment At least 3/5- no side to side deficits noted   Bed Mobility   Comment, (Bed Mobility) Independent supine to sit   Transfers   Comment, (Transfers) Sit to stand with CGA and cues for sequencing   Gait/Stairs (Locomotion)   Comment, (Gait/Stairs) Ambulates with unilateral support on IV pole- with CGA due to L sided drifting- occasionally runs into things on the L and needs to be redirected back to pathway.   Balance   Balance Comments Good static standing balance EO and EC. Drifts L with all dynamic mobility   Sensory Examination   Sensory Perception Comments No deficits per patient report   Clinical Impression   Criteria for Skilled Therapeutic Intervention Yes, treatment indicated   PT Diagnosis (PT) Impaired functional mobility   Influenced by the following impairments L side neglect, impaired balance   Functional limitations due to impairments transfers, gait and stairs   Clinical Presentation (PT Evaluation Complexity) Stable/Uncomplicated   Clinical Presentation Rationale clinical judgment   Clinical Decision Making (Complexity) low complexity   Planned Therapy Interventions (PT) balance training;gait training;neuromuscular re-education;stair training;strengthening;transfer training;progressive  activity/exercise;risk factor education;home program guidelines   Risk & Benefits of therapy have been explained evaluation/treatment results reviewed;care plan/treatment goals reviewed;risks/benefits reviewed;current/potential barriers reviewed;participants voiced agreement with care plan;participants included;patient;sibling   Clinical Impression Comments Patient presents with cognitive deficits, minor L sided neglect and higher level balance deficits. Very high risk for falls currently. Recommend DC to ARU pending LOS may progress to home with Home safety evaluation and  HHPT to address falls risk.   PT Discharge Planning   PT Discharge Recommendation (DC Rec) Acute Rehab Center-Motivated patient will benefit from intensive, interdisciplinary therapy.  Anticipate will be able to tolerate 3 hours of therapy per day   PT Rationale for DC Rec Patient presents with cognitive deficits, minor L sided neglect and higher level balance deficits. Very high risk for falls currently. Recommend DC to ARU pending LOS may progress to home with Home safety evaluation and  HHPT to address falls risk. Patient is highly motivated with excellent family support   PT Brief overview of current status Ax1 with gait belt, Poor L sided awareness- runs into items   Plan of Care Review   Plan of Care Reviewed With patient;sibling   Total Evaluation Time   Total Evaluation Time (Minutes) 5   Physical Therapy Goals   PT Frequency 6x/week   PT Predicted Duration/Target Date for Goal Attainment 06/25/22   PT Goals Bed Mobility;Transfers;Gait;Stairs   PT: Bed Mobility Independent;Within precautions;Supine to/from sit   PT: Transfers Independent;Bed to/from chair;Within precautions   PT: Gait Independent;Greater than 200 feet   PT: Stairs Independent;Greater than 10 stairs   PT: Goal 1 Patient will score >22/30 on the FGA to reduce falls risk and promote safety at home.

## 2022-06-18 NOTE — PROGRESS NOTES
Neurocritical Care Progress Note    Reason for critical care admission: Subdural hematoma   Admitting Team: GUERRERO  Date of Service:  06/18/2022  Date of Admission:  6/16/2022  Hospital Day: 3    Assessment/Plan  Lilly Babcock is a 72 year old female with a past medical history significant for follicular non-Hodgkins lymphoma, polymyalgia rheumatica, and more recently a right-sided traumatic acute subdural hematoma (6/4/2022) admitted on 6/16/2022 with increase in thickness with midline shift of her previous right subdural hematoma now s/p right-sided loy holes for evacuation of subdural hematoma on 6/16.     24 hour events:  HFNC weaned off. Low urine output overnight, 1L plasma lyte bolus given x 1. Feldman removed.    Neuro  #Increase in thickness w/midline shift of her previous right-sided subdural hematoma  #POD2 for right-sided loy holes for evacuation of subdural hematoma  #Right-sided traumatic acute subdural hematoma (6/4/2022)  -->No surgical interventions were done at that time  -Neurochecks every 2 hrs  -Keppra 750 mg BID  -HOB > 30   -SBP goal < 140 mmHg  -PT/OT     #Analgesics & sedation  -Scheduled Tylenol  -Dilaudid PRN  -Oxycodone PRN     CV  -Cardiac monitoring  -SBP goal < 140 mmHg  -PRN Labetalol and Hydralazine     #HLD  -Atorvastatin 40 mg every evening      Resp  Oxygen/vent: 2L/NC  -Continuous pulse ox  -Maintain O2 saturations greater than 92%     GI  Diet: Advance as tolerated  Last BM: 6/15  GI prophylaxis: Not indicated  -Bowel regimen: Scheduled Senna-docusate and Miralax     Renal/  -Daily BMP  -IV fluids: None  -Electrolyte replacement protocol    #Urinary retention  -Bladder scan q8h and as needed  -Straight cath if bladder scan is greater than 700 mL     Endo  -6/6 Hgb A1c; 5.5  -Monitor glucose levels     Heme  -Daily CBC  -Hgb goal >7, plt goal >50k  -Transfuse to meet Hgb and plt goals     Rheum  #Polymyalgia rheumatica  -Continue Prednisone 7 mg daily      ID  #Afebrile    -Daily CBC  -Monitor temperature curve     ICU Check List  Lines/tubes/drains: PIV x2  FEN: Advance as tolerated   PPX: DVT - SCDs; GI - Not indicated.  Code: Full  Dispo: ICU - ready for floor    TIME SPENT ON THIS ENCOUNTER  I spent 25 minutes of my time on the unit/floor managing the care of Lilly Babcock. Upon evaluation, this patient had a right subdural hematoma with brain compression and midline shift s/p right sided loy holes for SDH evacuation, which required my direct attention, intervention, and personal management. Greater than 50% of my time was spent at the bedside counseling the patient and/or coordinating care regarding services listed in this note.     The patient was seen and discussed with the NCC attending, Dr. Flores.     Bren DUBON CNP  NeuroCritical Care Nurse Practitioner  Pager: [phone]  Ascom: *[phone] available M- 0700 to 1700    24 Hour Vital Signs Summary:  Temp: 97.1  F (36.2  C) Temp  Min: 97.1  F (36.2  C)  Max: 98.3  F (36.8  C)  Resp: 13 Resp  Min: 12  Max: 19  SpO2: 99 % SpO2  Min: 86 %  Max: 100 %  Pulse: 71 Pulse  Min: 66  Max: 84   BP: 115/58 Systolic (24hrs), Av , Min:94 , Max:136   Diastolic (24hrs), Av, Min:46, Max:85      Respiratory monitoring:   FiO2 (%): 40 %  Resp: 13      I/O last 3 completed shifts:  In: 1834.17 [P.O.:500; I.V.:1334.17]  Out: 2181 [Urine:2175; Drains:6]    Current Medications:    atorvastatin  40 mg Oral QPM     levETIRAcetam  750 mg Oral BID     magnesium sulfate  2 g Intravenous Once     polyethylene glycol  17 g Oral Daily     predniSONE  7 mg Oral Daily     senna-docusate  1 tablet Oral BID       PRN Medications:  acetaminophen, [START ON 2022] acetaminophen, albuterol, bisacodyl, hydrALAZINE, labetalol, lidocaine 4%, lidocaine (buffered or not buffered), magnesium hydroxide, naloxone **OR** naloxone **OR** naloxone **OR** naloxone, ondansetron **OR** ondansetron, oxyCODONE **OR** oxyCODONE, oxyCODONE,  prochlorperazine **OR** prochlorperazine, sodium chloride (PF), sodium chloride (PF)    Infusions:    Plasma-Lyte A 50 mL/hr (06/18/22 0700)       Allergies   Allergen Reactions     Diagnostic X-Ray Materials Hives     ct     Diatrizoate Rash     Dye [Contrast Dye] Rash and Hives     Ioxaglate Hives and Rash     Vortioxetine Nausea and Vomiting     Revlimid [Lenalidomide] Rash       Physical Examination:  Vitals: /58   Pulse 71   Temp 97.1  F (36.2  C) (Axillary)   Resp 13   Wt 52.8 kg (116 lb 6.5 oz)   SpO2 99%   BMI 18.23 kg/m    General: Adult female patient, lying in bed, NAD  HEENT: Normocephalic, atraumatic, no icterus, oral cavity/oropharynx pink and moist  Cardiac: RRR, s1/s2 auscultated without murmur, S3/S4  Chest: CTAB, unlabored, expansion symmetric, no retractions or use of accessory muscles  Abdomen: Soft, non-distended, bowel sounds present  Extremities: Warm, no edema, distal pulses +2, well perfused  Skin: No rash or lesion  Psych: Calm   Neuro:  Mental status: Awake, oriented to self, time, place, and circumstance. Language is fluent and coherent with intact comprehension of complex commands, naming and repetition.  Cranial nerves: PERRL, conjugate gaze, EOMI, pupils +3 round and reactive, facial sensation intact, left-sided facial droop, shoulder shrug strong, tongue midline, no dysarthria.   Motor: Normal bulk and tone. No abnormal movements. 4/5 strength in 4/4 extremities.   Sensory: Intact to light touch x 4 extremities  Coordination: FNF without ataxia or dysmetria.   Gait: CATHY, deferred.    Labs:  Recent Labs   Lab 06/18/22  0608 06/17/22  1030 06/17/22  0431 06/16/22  0633 06/13/22  0737     --  136 137 137   POTASSIUM 5.0 4.2 3.3* 3.4 4.1   CHLORIDE 108  --  106 104 103   CO2 25  --  21 27 30   BUN 9  --  13 10 11   CR 0.50*  --  0.59 0.62 0.66   FLORENCIA 8.9  --  8.9 9.4 9.3       Recent Labs   Lab 06/18/22  0608 06/17/22  0431 06/16/22  0633 06/13/22  0737   WBC 7.5 7.9 5.1  5.2   HGB 12.1 11.4* 11.5* 12.3    255 295 287       Imagin. CT Head w/o contrast on 2022 at 0608  Impression:  1. Decreased mixed density subdural hematoma/collection overlying the  right cerebral convexity with decreased leftward midline shift.

## 2022-06-18 NOTE — PROGRESS NOTES
[facility], [place]   06/18/2022  Neurosurgery Progress Note:    Assessment:  Lilly Babcock is a 72 year old female with a history of right convexity subdural hematoma initially diagnosed on 6/4, with found to have enlarged SDH, worsened midline shift and symptoms related to this change (confusion, decreased attention, left neglect). She underwent right sided loy holes for SDH evacuation on 6/16, admitted to the ICU postoperatively.     Other diagnoses include:  #Brain compression    Plan:  - Serial neuro exams  - Pain control  - Advance diet as tolerated  - Bowel regimen  - PRN antiemetics  - IVF until taking adequate PO  - PT/OT  - SCDs for DVT proph    -----------------------------------  Aziza Lawrence MD  Neurosurgery PGY2    Please contact neurosurgery resident on call with questions.    Dial * * [phone], enter [phone] when prompted.  -----------------------------------    Interval History: JUANITO drain removed yesterday, dressing removed. Head CT this am with improved SDH and decreased midline shift    Objective:   Temp:  [97.1  F (36.2  C)-98.3  F (36.8  C)] 98.1  F (36.7  C)  Pulse:  [66-84] 71  Resp:  [11-19] 11  BP: ()/(46-85) 122/61  FiO2 (%):  [40 %-100 %] 40 %  SpO2:  [86 %-100 %] 99 %  I/O last 3 completed shifts:  In: 1834.17 [P.O.:500; I.V.:1334.17]  Out: 2181 [Urine:2175; Drains:6]    Gen: Appears comfortable, NAD  Wound: clean, dry, intact  Neurologic:  - Alert & Oriented to person and place  - Follows commands, innatentive  - Limited spontaneous speech. Able to name, repeat. No dysarthria.  - No gaze preference.   - PERRL, EOMI  - Face symmetric with sensation intact to light touch  - Tongue protrudes midline  - No pronator drift  - 4+ throughout all extremities   Reflexes 2+ throughout    Sensation intact and symmetric to light touch throughout    LABS:  Recent Labs   Lab 06/18/22  0608 06/17/22  2354 06/17/22  1030 06/17/22  0431 06/16/22  1926 06/16/22  0633      --   --  136  --  137   POTASSIUM 5.0  --  4.2 3.3*  --  3.4   CHLORIDE 108  --   --  106  --  104   CO2 25  --   --  21  --  27   ANIONGAP 5  --   --  9  --  6   GLC 90 117*  --  81   < > 96   BUN 9  --   --  13  --  10   CR 0.50*  --   --  0.59  --  0.62   FLORENCIA 8.9  --   --  8.9  --  9.4    < > = values in this interval not displayed.       Recent Labs   Lab 06/18/22  0608   WBC 7.5   RBC 3.82   HGB 12.1   HCT 36.1   MCV 95   MCH 31.7   MCHC 33.5   RDW 14.5          IMAGING:  Recent Results (from the past 24 hour(s))   CT Head w/o Contrast    Narrative    Head CT without contrast 6/18/2022 5:56 AM    History: s/p JUANITO pull; s/p JUANITO pull     Comparison: CT of the head dated 6/16/2022    Technique: Using multidetector thin collimation helical acquisition  technique, axial, coronal and sagittal CT images from the skull base  to the vertex were obtained without intravenous contrast.   (topogram) image(s) also obtained and reviewed.    Findings: Decreased mixed density subdural fluid collection overlying  the right cerebellar hemisphere measuring up to 1.2 cm, previously 1.6  cm.  Decreased leftward midline shift measuring up to 4 mm, previously  up to 8 mm.  No new intra-axial or extra-axial hemorrhage. No new loss  of gray-white matter differentiation. Ventricles are proportionate to  the cerebral sulci. The basal cisterns are clear.    Right frontal and parietal loy holes. The visualized portions of the  paranasal sinuses and mastoid air cells are clear.      Impression    Impression:  Decreased mixed density subdural hematoma/collection overlying the  right cerebral convexity with decreased leftward midline shift.    I have personally reviewed the examination and initial interpretation  and I agree with the findings.    AMANDO ASHLEY MD         SYSTEM ID:  [id number]     I have seen this patient with the resident and formulated a plan and agree with this note.  AMP

## 2022-06-18 NOTE — PLAN OF CARE
Major Shift Events:  Pt VSS, afebrile and A&Ox3. Pt has a slight L side facial droop, and slightly neglects the L side when ambulating. All other neuro signs intact. Monitoring pt neuros q4hrs. Pt has no complaints of pain. Pt is voiding appropriately. Pt tolerating regular diet well.     Plan: Continue to monitor neuros and tx to 6A when bed is available.     For vital signs and complete assessments, please see documentation flowsheets.

## 2022-06-18 NOTE — PLAN OF CARE
Goal Outcome Evaluation:  Outcome Evaluation: Q2 hour neuro checks. Given 1L of plasmalyte for oliguria. Straight cath'd for 675 mL after reilly was removed. Oxy and acetaminophen given for pain.   Plan: Team to consider to switching to q 4 neuros and  Floor status.   For vital signs and complete assessments, please see documentation flowsheets.

## 2022-06-18 NOTE — PROGRESS NOTES
Goal Outcome Evaluation:    Plan of Care Reviewed With: patient, sibling, son, daughter     Overall Patient Progress: improving    Outcome Evaluation: Improving, Q 2 neuro checks initiated        ICU End of Shift Summary.   AO2-4, needing frequent repeated requests to perform neuro checks but cooperating. Slow to respond. Hemodynamically stable this shift SBP <140. HFNC 100% 25 L X 24 hrs (ending at 11 pm per order). Regular diet. No stool this shift. F/C in place for strict I+O. HOB flat for 24 hrs post op. Sisters and children at bedside this shift, made aware of plan of care, neuro Sx rounded with sister @1800.      Changes this shift:   JUANITO drain to R. Side of head removed by NeuroSx at 1800,  Neuro checks Q 2.    Plan:   Head CT tomorrow in AM.         See flowsheets for vital signs and detailed assessment.

## 2022-06-19 NOTE — PROGRESS NOTES
[facility], [place]   06/19/2022  Neurosurgery Progress Note:    Assessment:  Lilly Babcock is a 72 year old female with a history of right convexity subdural hematoma initially diagnosed on 6/4, with found to have enlarged SDH, worsened midline shift and symptoms related to this change (confusion, decreased attention, left neglect). She underwent right sided loy holes for SDH evacuation on 6/16, admitted to the ICU postoperatively.     Other diagnoses include:  #Brain compression    Plan:  - Floor status, okay for every 4-hour neurochecks  - Serial neuro exams  - Pain control  - Advance diet as tolerated  - Bowel regimen  - PRN antiemetics  - IVF until taking adequate PO  - PT/OT  - SCDs for DVT proph, will discuss potentially starting subcutaneous heparin today    -----------------------------------  Tran Isaac MD  Neurosurgery Resident, PGY-2      Please contact neurosurgery resident on call with questions.    Dial * * [phone], enter [phone] when prompted.  -----------------------------------    Interval History: CT scan yesterday showing decreased midline shift and decreased size of collection on right side.  Exam remains slightly improved with    Objective:   Temp:  [97.6  F (36.4  C)-99.5  F (37.5  C)] 98.1  F (36.7  C)  Pulse:  [68-98] 74  Resp:  [11-21] 16  BP: (101-126)/(50-75) 125/70  SpO2:  [96 %-100 %] 97 %  I/O last 3 completed shifts:  In: 2574.17 [P.O.:600; I.V.:1974.17]  Out: 2620 [Urine:2620]    Gen: Appears comfortable, NAD  Wound: clean, dry, intact  Neurologic:  - Alert & Oriented to person and place and time he  - Follows commands, innatentive  - Limited spontaneous speech. Able to name, repeat. No dysarthria.  - No gaze preference.   - PERRL, EOMI  - Face symmetric with sensation intact to light touch  - Tongue protrudes midline  - Mild left pronator drift  - 4+ throughout all extremities  - Mild left facial droop    Reflexes 2+ throughout    Sensation intact and  symmetric to light touch throughout    LABS:  Recent Labs   Lab 06/18/22  2359 06/18/22  0608 06/17/22  2354 06/17/22  1030 06/17/22  0431 06/16/22  1926 06/16/22  0633   NA  --  138  --   --  136  --  137   POTASSIUM  --  5.0  --  4.2 3.3*  --  3.4   CHLORIDE  --  108  --   --  106  --  104   CO2  --  25  --   --  21  --  27   ANIONGAP  --  5  --   --  9  --  6   * 90 117*  --  81   < > 96   BUN  --  9  --   --  13  --  10   CR  --  0.50*  --   --  0.59  --  0.62   FLORENCIA  --  8.9  --   --  8.9  --  9.4    < > = values in this interval not displayed.       Recent Labs   Lab 06/18/22  0608   WBC 7.5   RBC 3.82   HGB 12.1   HCT 36.1   MCV 95   MCH 31.7   MCHC 33.5   RDW 14.5          IMAGING:  Recent Results (from the past 24 hour(s))   CT Head w/o Contrast    Narrative    Head CT without contrast 6/18/2022 5:56 AM    History: s/p JUANITO pull; s/p JUANITO pull     Comparison: CT of the head dated 6/16/2022    Technique: Using multidetector thin collimation helical acquisition  technique, axial, coronal and sagittal CT images from the skull base  to the vertex were obtained without intravenous contrast.   (topogram) image(s) also obtained and reviewed.    Findings: Decreased mixed density subdural fluid collection overlying  the right cerebellar hemisphere measuring up to 1.2 cm, previously 1.6  cm.  Decreased leftward midline shift measuring up to 4 mm, previously  up to 8 mm.  No new intra-axial or extra-axial hemorrhage. No new loss  of gray-white matter differentiation. Ventricles are proportionate to  the cerebral sulci. The basal cisterns are clear.    Right frontal and parietal loy holes. The visualized portions of the  paranasal sinuses and mastoid air cells are clear.      Impression    Impression:  Decreased mixed density subdural hematoma/collection overlying the  right cerebral convexity with decreased leftward midline shift.    I have personally reviewed the examination and initial  interpretation  and I agree with the findings.    AMANDO ASHLEY MD         SYSTEM ID:  [id number]     I have seen this patient with the resident and formulated a plan and agree with this note.  AMP

## 2022-06-19 NOTE — PROGRESS NOTES
Neurocritical Care Progress Note    Reason for critical care admission: Subdural hematoma   Admitting Team: GUERRERO  Date of Service:  06/19/2022  Date of Admission:  6/16/2022  Hospital Day: 4    Assessment/Plan  Lilly Babcock is a 72 year old female with a past medical history significant for follicular non-Hodgkins lymphoma, polymyalgia rheumatica, and more recently a right-sided traumatic acute subdural hematoma (6/4/2022) admitted on 6/16/2022 with increase in thickness with midline shift of her previous right subdural hematoma now s/p right-sided loy holes for evacuation of subdural hematoma on 6/16.     24 hour events:  Awaiting bed on 6A.    Neuro  #Increase in thickness w/midline shift of her previous right-sided subdural hematoma  #POD3 for right-sided loy holes for evacuation of subdural hematoma  #Right-sided traumatic acute subdural hematoma (6/4/2022)  -->No surgical interventions were done at that time  -Neurochecks every 2 hrs  -Keppra 500 mg BID (ends on 6/23)  -HOB > 30   -SBP goal < 140 mmHg  -PT/OT     #Analgesics & sedation  -Tylenol PRN  -Oxycodone PRN    PSYCH  -Vilazodone 20 mg daily (patient refusing)       CV  -Cardiac monitoring  -SBP goal < 140 mmHg  -PRN Labetalol and Hydralazine     #HLD  -Atorvastatin 40 mg every evening      Resp  Oxygen/vent: Room air  -Continuous pulse ox  -Maintain O2 saturations greater than 92%     GI  Diet: Advance as tolerated  Last BM: 6/15  GI prophylaxis: Not indicated  -Bowel regimen: Scheduled Senna-docusate and Miralax     Renal/  -Daily BMP  -IV fluids: Stop Plasma Lyte @ 50 ml/hr  -Electrolyte replacement protocol     Endo  -6/6 Hgb A1c; 5.5  -Monitor glucose levels     Heme  #Normocyctic anemia; 11.4  -Daily CBC  -Hgb goal >7, plt goal >50k  -Transfuse to meet Hgb and plt goals     Rheum  #Polymyalgia rheumatica  -Hold Prednisone 7 mg daily x 14 days for wound healing per NSGY     ID  #Afebrile   -Daily CBC  -Monitor temperature curve     ICU Check  List  Lines/tubes/drains: PIV x2  FEN: Advance as tolerated   PPX: DVT - SCDs, start HSQ; GI - Not indicated.  Code: Full  Dispo: Floor     TIME SPENT ON THIS ENCOUNTER  I spent 36 minutes of my time on the unit/floor managing the care of Lilly Babcock. Upon evaluation, this patient had a right subdural hematoma with brain compression and midline shift s/p right sided loy holes for SDH evacuation, which required my direct attention, intervention, and personal management. Greater than 50% of my time was spent at the bedside counseling the patient and/or coordinating care regarding services listed in this note.     The patient was seen and discussed with the NCC attending, Dr. Flores.     Bren DUBON CNP  NeuroCritical Care Nurse Practitioner  Pager: [phone]  Ascom: *[phone] available M- 0700 to 1900    24 Hour Vital Signs Summary:  Temp: 97.8  F (36.6  C) Temp  Min: 97.6  F (36.4  C)  Max: 99.5  F (37.5  C)  Resp: 16 Resp  Min: 11  Max: 21  SpO2: 98 % SpO2  Min: 96 %  Max: 99 %  Pulse: 74 Pulse  Min: 71  Max: 98  BP: 131/67 Systolic (24hrs), Av , Min:101 , Max:137   Diastolic (24hrs), Av, Min:50, Max:75      Respiratory monitoring:   Resp: 16  RA    I/O last 3 completed shifts:  In: 1800 [P.O.:600; I.V.:1200]  Out: 2600 [Urine:2600]    Current Medications:    atorvastatin  40 mg Oral QPM     heparin ANTICOAGULANT  5,000 Units Subcutaneous Q8H     levETIRAcetam  750 mg Oral BID     polyethylene glycol  17 g Oral Daily     predniSONE  7 mg Oral Daily     senna-docusate  1 tablet Oral BID     vilazodone  20 mg Oral Daily with supper       PRN Medications:  acetaminophen, albuterol, bisacodyl, hydrALAZINE, labetalol, lidocaine 4%, lidocaine (buffered or not buffered), magnesium hydroxide, melatonin, naloxone **OR** naloxone **OR** naloxone **OR** naloxone, ondansetron **OR** ondansetron, oxyCODONE, prochlorperazine **OR** prochlorperazine, sodium chloride (PF), sodium chloride  (PF)    Infusions:    Plasma-Lyte A 50 mL/hr (06/19/22 0800)       Allergies   Allergen Reactions     Diagnostic X-Ray Materials Hives     ct     Diatrizoate Rash     Dye [Contrast Dye] Rash and Hives     Ioxaglate Hives and Rash     Vortioxetine Nausea and Vomiting     Revlimid [Lenalidomide] Rash       Physical Examination:  Vitals: /67 (BP Location: Left arm)   Pulse 74   Temp 97.8  F (36.6  C) (Oral)   Resp 16   Wt 52.8 kg (116 lb 6.5 oz)   SpO2 98%   BMI 18.23 kg/m    General: Adult female patient, sitting up in chair, NAD   HEENT: Normocephalic, atraumatic, no icterus, oral cavity/oropharynx pink and moist  Cardiac: RRR, s1/s2 auscultated without murmur, S3/S4  Chest: CTAB, unlabored, expansion symmetric, no retractions or use of accessory muscles  Abdomen: Soft, non-distended, bowel sounds present  Extremities: Warm, no edema, distal pulses +2, well perfused  Skin: No rash or lesion  Psych: Calm and cooperative  Neuro:  Mental status: Awake, alert, disoriented to time only. Language is fluent and coherent with intact comprehension of complex commands, naming and repetition.  Cranial nerves: VFF, PERRL, conjugate gaze, EOMI, pupils +3 round and reactive, facial sensation intact, L facial droop, shoulder shrug strong, tongue midline, no dysarthria.   Motor: Normal bulk and tone. No abnormal movements. 5/5 strength in 4/4 extremities.   Sensory: Intact to light touch x 4 extremities  Coordination: FNF without ataxia or dysmetria.    Gait: CATHY, deferred.      Labs:  Recent Labs   Lab 06/19/22  0551 06/19/22  0433 06/18/22  0608 06/17/22  1030 06/17/22  0431 06/16/22  0633   NA  --  137 138  --  136 137   POTASSIUM 3.7 3.8 5.0 4.2 3.3* 3.4   CHLORIDE  --  104 108  --  106 104   CO2  --  29 25  --  21 27   BUN  --  7 9  --  13 10   CR  --  0.52 0.50*  --  0.59 0.62   FLORENCIA  --  9.0 8.9  --  8.9 9.4       Recent Labs   Lab 06/19/22  0433 06/18/22  0608 06/17/22  0431 06/16/22  0633   WBC 8.6 7.5 7.9 5.1    HGB 11.4* 12.1 11.4* 11.5*    177 255 295       Imaging:    All relevant imaging and laboratory values personally reviewed.

## 2022-06-19 NOTE — PLAN OF CARE
Goal Outcome Evaluation:    Plan of Care Reviewed With: patient     Overall Patient Progress: improving    Outcome Evaluation: AOx3-4, slow to respond but able to state where she was and why. Pleasant and cooperative, follows commands. No changes in neuro status, tylenol given x 1 for incision pain, pt stated it was effective. VSS on RA. Up to BSC x 1 with adequate UOP. K+ 3.8, replaced per protocol. Transfer to  when bed is available.

## 2022-06-19 NOTE — PLAN OF CARE
Goal Outcome Evaluation:  Outcome Evaluation: Q4 hour Neuro checks. [place] through shift pt because anxious saying she wanted to go home. Pt redirected. Writer helped pt call pt's sister to hear a familir voice and reassure her she is in a safe place. Made a plan to ask team for sleeping medications. PRN actaminophen given and 5mg melatonin given. Neuro exam unchanged. Plan to transfer to floor when bed is available.

## 2022-06-19 NOTE — PLAN OF CARE
Major Shift Events:  Pt VSS, afebrile and A&Ox3. Pt has a slight L side facial droop, and slightly neglects the L side when ambulating. All other neuro signs intact. Monitoring pt neuros q4hrs. Pt has no complaints of pain. Pt is voiding appropriately. Pt tolerating regular diet well. Family would like pt to go to ARU instead of TCU. If ARU is not possible, family would like to take pt straight home instead.    Plan: Continue to monitor neuros and tx to 6A when bed is available.     For vital signs and complete assessments, please see documentation flowsheets.

## 2022-06-19 NOTE — PROGRESS NOTES
06/19/22 1100   Quick Adds   Type of Visit Initial Occupational Therapy Evaluation   Living Environment   People in Home child(obdulio), adult  (per pt's sister, son is not always reliable.)   Current Living Arrangements house   Home Accessibility stairs to enter home;stairs within home   Number of Stairs, Main Entrance 5   Stair Railings, Main Entrance none   Number of Stairs, Within Home, Primary greater than 10 stairs   Transportation Anticipated family or friend will provide   Living Environment Comments Pt is not an accurate historian, states her son lives with her. Per pt's sister, son does live with pt but he is not reliable & is not home all the time. Lives in a 2-story home with basement.   Self-Care   Usual Activity Tolerance good   Current Activity Tolerance good   Equipment Currently Used at Home none   Fall history within last six months yes   Number of times patient has fallen within last six months 2   Activity/Exercise/Self-Care Comment Prior to hospitialization in early June, pt was IND with ADLs. Since then, pt requires supervision due to decreased cognition and L-side neglect.   Instrumental Activities of Daily Living (IADL)   IADL Comments Prior to hospitalization in early June, pt was IND with IADLs. Enjoyed walking her dog & attending pilates class. Pt is a retired internal medicine MD.   General Information   Onset of Illness/Injury or Date of Surgery 06/16/22   Referring Physician Jarrod Cruz MD   Patient/Family Therapy Goal Statement (OT) go home   Additional Occupational Profile Info/Pertinent History of Current Problem Per H&P: 72 year old female with a past medical history significant for follicular non-Hodgkins lymphoma, polymyalgia rheumatica, and more recently a right-sided traumatic acute subdural hematoma (6/4/2022) admitted on 6/16/2022 with increase in thickness with midline shift of her previous right subdural hematoma now s/p right-sided loy holes for evacuation of subdural  hematoma on 6/16.   Existing Precautions/Restrictions fall  (crani)   Limitations/Impairments safety/cognitive   Left Upper Extremity (Weight-bearing Status)   (no lifting >#10)   Right Upper Extremity (Weight-bearing Status)   (no lifting >#10)   Left Lower Extremity (Weight-bearing Status) full weight-bearing (FWB)   Right Lower Extremity (Weight-bearing Status) full weight-bearing (FWB)   Heart Disease Risk Factors Medical history   General Observations and Info Pt presents with overall generalized weakness & deconditioning   Cognitive Status Examination   Cognitive Status Comments Pt is oriention to time & place, requires increased time processing questions and to problem solve.   Integumentary/Edema   Integumentary/Edema no deficits were identifed   Range of Motion Comprehensive   General Range of Motion no range of motion deficits identified   Strength Comprehensive (MMT)   Comment, General Manual Muscle Testing (MMT) Assessment dinah UE: 3/5, L slightly weaker   Bed Mobility   Comment (Bed Mobility) supine>sit: CGA   Transfers   Transfer Comments SBA-CGA   Activities of Daily Living   BADL Assessment/Intervention   (socks: Felice)   Clinical Impression   Criteria for Skilled Therapeutic Interventions Met (OT) Yes, treatment indicated   OT Diagnosis Impaired I/ADLs, functional cognition, and functional transfers   OT Problem List-Impairments impacting ADL problems related to;activity tolerance impaired;cognition;mobility;strength;sensory feedback   Assessment of Occupational Performance 5 or more Performance Deficits   Identified Performance Deficits g/h, L-side neglect, functional transfers, strength, cognition   Planned Therapy Interventions (OT) ADL retraining;IADL retraining;cognition;fine motor coordination training;neuromuscular re-education;strengthening;transfer training;visual perception;home program guidelines   Clinical Decision Making Complexity (OT) low complexity   Anticipated Equipment Needs Upon  Discharge (OT)   (TBD)   Risk & Benefits of therapy have been explained evaluation/treatment results reviewed   Clinical Impression Comments Pt is appropriate for skilled OT services to address deficits in cognition, functional tasks & mobility   OT Discharge Planning   OT Discharge Recommendation (DC Rec) [facility]-Motivated patient will benefit from intensive, interdisciplinary therapy.  Anticipate will be able to tolerate 3 hours of therapy per day   OT Rationale for DC Rec Pt is below baseline with functional tasks & mobility. Recommending ARU to address deficits with L-side neglect & decreased cognition to improve safety with I/ADLs   Total Evaluation Time (Minutes)   Total Evaluation Time (Minutes) 10   OT Goals   Therapy Frequency (OT) 6 times/wk   OT Predicted Duration/Target Date for Goal Attainment 07/05/22   OT: Hygiene/Grooming modified independent;while standing   OT: Upper Body Dressing Modified independent   OT: Lower Body Dressing Modified independent   OT: Bed Mobility Modified independent   OT: Transfer Modified independent   OT: Cognitive Patient/caregiver will verbalize understanding of cognitive assessment results/recommendations as needed for safe discharge planning

## 2022-06-20 NOTE — PROGRESS NOTES
Neurocritical Care Multi-Disciplinary Note    Reason for critical care admission: Status post right sided [place] holes for evacuation of SDH  Admitting Team: Neurosurgery   Date of Service:  06/20/2022  Date of Admission:  6/16/2022  Hospital Day: 5    Patient condition reviewed and discussed while on multidisciplinary rounds today.   Please note these minor interventions that were initiated:  1. None    The Neurocritical Care service will continue to follow peripherally while the patient is within the ICU. We are readily available should issues arise. Please feel free to contact us for critical care issues with which we may be of assistance. For all other concerns, please contact primary service first.     Please contact NCC team phone at *[phone].    LING Llamas, CNP  Neurocritical Care *[phone]

## 2022-06-20 NOTE — CONSULTS
Care Management Initial Consult    General Information  Assessment completed with: Family,  (Sisters, Vanessa and Prerna)  Type of CM/SW Visit: Initial Assessment    Primary Care Provider verified and updated as needed:     Readmission within the last 30 days: other (see comments)         Advance Care Planning:            Communication Assessment  Patient's communication style: spoken language (English or Bilingual)    Hearing Difficulty or Deaf: no   Wear Glasses or Blind: yes    Cognitive  Cognitive/Neuro/Behavioral: .WDL except  Level of Consciousness: confused  Arousal Level: opens eyes spontaneously  Orientation: disoriented to, place  Mood/Behavior: calm, cooperative  Best Language: 0 - No aphasia  Speech: clear, spontaneous    Living Environment:   People in home: alone     Current living Arrangements: house      Able to return to prior arrangements: other (see comments)       Family/Social Support:  Care provided by: self, other (see comments) (siblings are very involved in Pt's cares.)  Provides care for: no one  Marital Status: Single  Sibling(s)          Description of Support System: Supportive, Involved    Support Assessment: Adequate family and caregiver support, Adequate social supports    Current Resources:   Patient receiving home care services: No     Community Resources:    Equipment currently used at home: none  Supplies currently used at home:      Employment/Financial:  Employment Status: retired        Financial Concerns: No concerns identified   Referral to Financial Worker: No       Lifestyle & Psychosocial Needs:  Social Determinants of Health     Tobacco Use: Low Risk      Smoking Tobacco Use: Never Smoker     Smokeless Tobacco Use: Never Used   Alcohol Use: Not on file   Financial Resource Strain: Not on file   Food Insecurity: Not on file   Transportation Needs: Not on file   Physical Activity: Not on file   Stress: Not on file   Social Connections: Not on file   Intimate Partner Violence:  Not At Risk     Fear of Current or Ex-Partner: No     Emotionally Abused: No     Physically Abused: No     Sexually Abused: No   Depression: Not on file   Housing Stability: Not on file       Functional Status:  Prior to admission patient needed assistance:              Mental Health Status:  Mental Health Status: No Current Concerns       Chemical Dependency Status:  Chemical Dependency Status: No Current Concerns             Values/Beliefs:  Spiritual, Cultural Beliefs, Sabianism Practices, Values that affect care:                 Additional Information:  SW received call from bedside RN stating that Pt's family at bedside would like to talk with the SW. SW went to see family and Pt at bedside. Sister's present and stated that they would like Pt to go to an ARU vs a TCU. Sister stated that other SW did not take the request of family. SW stated that referrals are not determined by SW. SW stated that recs for Pt were for a ARU and that SW would make a referral to ARU even if Pt was transferring up to another floor.     SW called [facility] to make a referral for Pt. [facility] stated that Pt did not qualify for ARU last admission. [facility] would review Pt and see if Pt is appropriate for ARU. Pt must qualify and family may need some education on admission process if Pt does not qualify for ARU again.     SW will f/u with Pt and Pt family PRN.    ADDENDUM: Pt does not qualify for ARU d/t to cog impairment and level of assistance with ADL's. SW updated other SW.      Penelope Blancas, BSW, LSW  ICU   PH#: [phone]  Pager#: [phone]  [facility]    For weekend social work needs:  4A, 4C, 4E, 5A, 5B    pager [phone]  6A, 6B, 6C                 pager [phone]  7A, 7B, 7C, 7D, 5C    pager [phone]  ED/OBS                     pager [phone]

## 2022-06-20 NOTE — PLAN OF CARE
Arrived from: 4A  Belongings/meds: with patient  2 RN Skin Assessment Completed by: Writer and Courtney DOZIER  Non-intact findings documented (yes/no/NA): Scabs and bruising throughout, R crani incision with rose

## 2022-06-20 NOTE — PLAN OF CARE
Shift Summary: No acute events.    Assessment:  Neuro: A/Ox 2-4. Slow to respond. Following commands in all extremities with equal strength. PERRLA. Pt denies numbness or tingling.  Resp: RA. Pt denies SOB.  Cardiac: NSR. BP WDL.  GI: Regular diet. No BM  : Voids spontaneously, up to commode with SBA.  Skin: No new skin concerns.  Line: PIV x2    For full assessment and vitals, please see flowsheets.    Plan:  Continue to monitor neurological and hemodynamic status, update team with any changes.    Trinity Arndt RN

## 2022-06-20 NOTE — PROGRESS NOTES
[facility], [place]   06/20/2022  Neurosurgery Progress Note:    Assessment:  Lilly Babcock is a 72 year old female with a history of right convexity subdural hematoma initially diagnosed on 6/4, with found to have enlarged SDH, worsened midline shift and symptoms related to this change (confusion, decreased attention, left neglect). She underwent right sided loy holes for SDH evacuation on 6/16, admitted to the ICU postoperatively.     Other diagnoses include:  #Brain compression    Plan:  - Floor status, okay for every 4-hour neurochecks  - Pain control  - Regular diet  - Bowel regimen  - PRN antiemetics  - PT/OT  - SCDs and subcutaneous heparin for DVT proph   - Patient is medically ready for discharge    -----------------------------------  Aziza Lawrence MD  Neurosurgery PGY2    Please contact neurosurgery resident on call with questions.    Dial * * [phone], enter [phone] when prompted.  -----------------------------------    Interval History: No acute events overnight. Family refusing subcutaneous heparin for the patient. Encouraging ambulation. Patient is medically ready for discharge    Objective:   Temp:  [97.8  F (36.6  C)-98.6  F (37  C)] 98.6  F (37  C)  Pulse:  [72-92] 78  Resp:  [12-16] 16  BP: (101-137)/(59-81) 120/63  SpO2:  [96 %-100 %] 96 %  I/O last 3 completed shifts:  In: 1635.83 [P.O.:1000; I.V.:635.83]  Out: 2375 [Urine:2375]    Gen: Appears comfortable, NAD  Wound: clean, dry, intact  Neurologic:  - Alert & Oriented to person, place (hospital) and time  - Follows commands, innatentive  - Limited spontaneous speech. Able to name, repeat. No dysarthria.  - No gaze preference.   - PERRL, EOMI  - Face symmetric with sensation intact to light touch  - Tongue protrudes midline  - No pronator drift  - 4+ throughout all extremities    Reflexes 2+ throughout    Sensation intact and symmetric to light touch throughout    LABS:  Recent Labs   Lab 06/19/22  6474  06/19/22  0433 06/19/22  0406 06/18/22  2359 06/18/22  0608 06/17/22  1030 06/17/22  0431   NA  --  137  --   --  138  --  136   POTASSIUM 3.7 3.8  --   --  5.0   < > 3.3*   CHLORIDE  --  104  --   --  108  --  106   CO2  --  29  --   --  25  --  21   ANIONGAP  --  4  --   --  5  --  9   GLC  --  93 104* 108* 90   < > 81   BUN  --  7  --   --  9  --  13   CR  --  0.52  --   --  0.50*  --  0.59   FLORENCIA  --  9.0  --   --  8.9  --  8.9    < > = values in this interval not displayed.       Recent Labs   Lab 06/19/22 0433   WBC 8.6   RBC 3.63*   HGB 11.4*   HCT 34.9*   MCV 96   MCH 31.4   MCHC 32.7   RDW 14.3          IMAGING:  No results found for this or any previous visit (from the past 24 hour(s)).       Medical History:   Diagnosis Date    Anticoagulant long-term use     Atrial fibrillation (HCC)     chronic    GERD (gastroesophageal reflux disease)     HCD (hypertensive cardiovascular disease)     Hypercholesterolemia     Hypertension      Past Surgical History:   Procedure Laterality Date    BLADDER REPAIR      COMPLICATED HEMORRHAGE     BREAST SURGERY      bilateral    CARDIAC CATHETERIZATION  2001     SECTION      X 2     CHOLECYSTECTOMY      HYSTERECTOMY      NECK SURGERY      TUBAL LIGATION       History reviewed. No pertinent family history. Social History   Substance Use Topics    Smoking status: Never Smoker    Smokeless tobacco: Never Used    Alcohol use Yes      Current Outpatient Prescriptions   Medication Sig Dispense Refill    ALPRAZolam (XANAX) 1 MG tablet Take 2 tablets at night .  budesonide-formoterol (SYMBICORT) 160-4.5 MCG/ACT AERO Inhale 2 puffs into the lungs as needed      warfarin (COUMADIN) 5 MG tablet Take as directed      warfarin (COUMADIN) 7.5 MG tablet Take as directed      Multiple Vitamins-Minerals (VITAMIN D3 COMPLETE PO) Take 1 tablet by mouth daily       raloxifene (EVISTA) 60 MG tablet Take 60 mg by mouth daily      venlafaxine (EFFEXOR) 75 MG tablet Take 75 mg by mouth nightly      venlafaxine (EFFEXOR XR) 150 MG extended release capsule Take 150 mg by mouth daily      famotidine (PEPCID) 20 MG tablet Take 20 mg by mouth 2 times daily      levothyroxine (SYNTHROID) 88 MCG tablet Take 88 mcg by mouth Daily      carvedilol (COREG) 6.25 MG tablet Take 1 tablet by mouth 2 times daily (with meals). 70 tablet 4    ferrous sulfate 325 (65 FE) MG tablet Take 325 mg by mouth daily       lisinopril (PRINIVIL;ZESTRIL) 10 MG tablet Take 10 mg by mouth 2 times daily.  rosuvastatin (CRESTOR) 10 MG tablet Take 10 mg by mouth daily.  FISH OIL Take 1 g by mouth daily.  aspirin 81 MG EC tablet Take 81 mg by mouth daily. No current facility-administered medications for this visit. Allergies: Betadine [povidone iodine]; Clindamycin/lincomycin; Codeine; Epinephrine; Iodides; and Pcn [penicillins]    Review of Systems  Review of Systems   Constitutional: Negative for chills, fever and malaise/fatigue. HENT: Positive for nosebleeds (controllable). Negative for hearing loss. C/o tonsillitis   Eyes: Negative for blurred vision. Respiratory: Negative for cough and shortness of breath. Cardiovascular: Negative for chest pain, palpitations, orthopnea, leg swelling and PND. Gastrointestinal: Negative for blood in stool, heartburn, melena, nausea and vomiting. Musculoskeletal: Negative for falls and myalgias. Skin: Negative for rash. Neurological: Negative for dizziness, sensory change, speech change and focal weakness. Endo/Heme/Allergies: Does not bruise/bleed easily. Psychiatric/Behavioral: Negative for depression. The patient is not nervous/anxious. Objective  Vital Signs - BP (!) 96/58   Pulse 80   Ht 5' 1\" (1.549 m)   Wt 136 lb (61.7 kg)   BMI 25.70 kg/m²   Physical Exam   Constitutional: She is oriented to person, place, and time. She appears well-developed and well-nourished. No distress. HENT:   Head: Normocephalic and atraumatic. Eyes: Pupils are equal, round, and reactive to light. Right eye exhibits no discharge. Left eye exhibits no discharge. Neck: No JVD present. No tracheal deviation present. Cardiovascular: Normal rate, normal heart sounds and intact distal pulses. Exam reveals no gallop and no friction rub. No murmur heard. No carotid bruit  Irregularly irregular rhythm   Pulmonary/Chest: Effort normal and breath sounds normal. No respiratory distress. She has no wheezes. She has no rales. Abdominal: Soft. There is no tenderness. Musculoskeletal: She exhibits no edema.    Normal gait and station   Neurological: She is alert and oriented to person, place, and time. No cranial nerve deficit. Skin: Skin is warm and dry. No rash noted. Psychiatric: She has a normal mood and affect. Her behavior is normal. Judgment normal.   Nursing note and vitals reviewed. Assessment:     Diagnosis Orders   1. Chronic atrial fibrillation (HCC)  POCT INR   2. Essential hypertension       EKG shows atrial fibrillation at rate 80  INR 2.5 today    We had a long discussion today about alternatives to Coumadin. Patient may try Eliquis, Pradaxa, or Xarelto. She will check with her insurance company for coverage and let us know she would like to make a change. Advantages include no INR testing, which she currently does at home, no dietary restrictions, and no antibiotic interactions. Stable cardiovascular status. No evidence of overt heart failure, angina or dysrhythmia. Plan    Orders Placed This Encounter   Procedures    POCT INR     Return in about 6 months (around 3/25/2019) for LILLI. Continue same coumadin dosage and recheck as per home monitoring guidelines  Consider Eliquis, Pradaxa, Xarelto in place of Coumadin- call your drug plan to check coverage and call the office if you would like to switch    Call with any questions or concerns  Follow up with Mary Guzman MD for non cardiac problems  Report any new problems  Cardiovascular Fitness-Exercise as tolerated. Strive for 15 minutes of exercise most days of the week. Cardiac / Healthy Diet  Continue current medications as directed  Continue plan of treatment  It is always recommended that you bring your medications bottles with you to each visit - this is for your safety!        LILLI Brownlee

## 2022-06-20 NOTE — PROGRESS NOTES
"SPIRITUAL HEALTH SERVICES  SPIRITUAL ASSESSMENT Progress Note  [facility] ([place]) 4A     REFERRAL SOURCE: FAINA    Pt was receptive to  visit.  She shared that she is still experiencing pain, and is hoping that it will subside.  She talked about her family (two children, a son and daughter) who are supportive, as well as the lizzy she receives from her dog and cat.  Pt said she \"really misses [her] dog\" and described how important her animals are to her.  Pt said that she does not belong to a particular jamie tradition.   provided support and exploration of feelings/coping and will continue to be available.    PLAN: [facility] will remain available for ongoing needs     Dayan Lechuga  Pager: [phone]    "

## 2022-06-20 NOTE — PLAN OF CARE
Major shift events: Up to chair, worked w/ therapy. Good PO intake. Tylenol x1 for incisional pain. Able to have a BM. Disoriented to place. Sisters in room.     For complete assessments/vital signs please see flowsheets    Plan: Transfer to

## 2022-06-21 NOTE — PROGRESS NOTES
"SPIRITUAL HEALTH SERVICES  SPIRITUAL ASSESSMENT Progress Note  [facility] ([place]) 6A     Referral Source: Follow-up visit     PRIMARY FOCUS:     Emotional/spiritual/Zoroastrianism distress    Support for coping    Reviewed documentation. Reflective conversation shared with Joyce and her sisters which integrated elements of illness and family narratives. Provided education on spiritual health services and how to request further  support as needed/desired. Joyce and her sisters were receptive to  support. They expressed no needs at this time. Her sister's shared that it had been a difficult night and they were \"trying to let Joyce rest\". They also noted that \"Joyce's friends are praying for her\".    Plan: Spiritual health services remains available for any follow-up or requests.     [facility] remains available 24/7 for emergent requests/referrals, either by having the switchboard page the on-call  or by entering an ASAP/STAT consult in Epic (this will also page the on-call ).      __    Rabbi Job Mendieta  Chaplain Resident  Pager [phone]    "

## 2022-06-21 NOTE — INTERIM SUMMARY
Significant event:      Patient found down by staff members at about 9:45 pm. She had the SCDs connected and appears to have had a mechanical fall while getting out of bed to the bathroom. When found by the nurses and sat up in bed, she appeared diffusely weak and had a gaze preference to the right. This quickly resolved, by the time stroke resident and myself examined her, she was at her neurological baseline and did not appear post-ictal. She did endorse falling on her buttock and having low back pain. She denies numbness or tingling in her legs. She denies headache.     Exam:  Eyes open spontaneously, oriented x4, following commands  Delayed but appropriate responses to questions, with known decreased attention.   PEERL, EOMI, face symmetrical  Left pronator drift   Full strength x4  Sensation intact to light touch  Mild midline tenderness to palpation in low back. No obvious areas of ecchymosis or superficial hematomas.     Assessment:   Although fall is very likely a mechanical fall due to SCDs and patient getting out of bed without assistance, a syncopal fall is in the differential, we will get orthostatic vital signs. Seizure is unlikely given lack of post-ictal state. CT head obtained with larger chronic right SDH, mostly chronic although there might be a small area of hyperdensity in the frontal convexity, possibly acute. We will get a repeat head CT in 6 hours (4 am on 6/21). Additionally, her L-spine CT shows an acute L1 compression fracture, with <50% height loss, no angulation or retropulsion. We will keep her in bedrest and plan for a TLSO fitting tomorrow.     Plan:  Bedrest  Will discuss with 6A charge the safety plans to prevent further falls - bed alarm, no SCDs, possible sitter  Orthostatic vital signs  Repeat CT head 4 am  TLSO    Aziza Lawrence MD  Neurosurgery PGY2    Please contact neurosurgery resident on call with questions.    Dial * * [phone], enter [phone] when prompted.

## 2022-06-21 NOTE — PROGRESS NOTES
" S: Pt seen at [facility] room 6217 with nurse present for fitting and instructions. O: I see the EPIC order for the TLSO due to T-1 comp Fx. A: Patient was fit with an [employer] TLSO 464 and instructions were given and seemed to be understood at the time. The written instructions from the  were given as well. The brace was set at its second to the smallest size in circumference and 3\" below the sternal notch. P: FU PRN. G: The goal is to reduce vertical load on the lower spine and to restrict motion in the whole spine.  Electronically signed Martin Ellington , LPO.  "

## 2022-06-21 NOTE — PROGRESS NOTES
[facility], [place]   06/21/2022  Neurosurgery Progress Note:    Assessment:  Lilly Babcock is a 72 year old female with a history of right convexity subdural hematoma initially diagnosed on 6/4, with found to have enlarged SDH, worsened midline shift and symptoms related to this change (confusion, decreased attention, left neglect). She underwent right sided loy holes for SDH evacuation on 6/16, admitted to the ICU postoperatively. Transferred to the floor on 6/20. Mechanical fall in hospital room on 6/20 at night, with CT head demonstrating increased size of right SDH and CT L-spine demonstrating L1 compression fracture, at her neurological baseline.     Other diagnoses include:  #Brain compression  #High fall risk    Plan:  - Q4h neuro checks  - Repeat head CT today (pending read)  - Orthosis consult for TLSO  - Bedrest w/ HOB <30 until TLSO fitted  - UXR with TLSO in place  - Orthostatics when able to stand  - Pain control  - Regular diet  - Bowel regimen  - PRN antiemetics  - PT/OT  - SCDs for DVT proph - remove SCDs while unaccompanied in room   - Bed alarm at all times  - Patient is medically ready for discharge    -----------------------------------  Aziza Lawrence MD  Neurosurgery PGY2    Please contact neurosurgery resident on call with questions.    Dial * * [phone], enter [phone] when prompted.  -----------------------------------    Interval History: Mechanical fall yesterday at 9:45 pm, thought to be related to feet tangling in SCD connectors. Appeared to have syncopal symptoms when placed upright by nursing immediately after a fall, however. CT head and L-spine obtained showing worsened SDH and L1 compression fracture. Endorsing back pain and right buttock pain. Denies new headaches.     Objective:   Temp:  [97.7  F (36.5  C)-98.4  F (36.9  C)] 98  F (36.7  C)  Pulse:  [] 71  Resp:  [14-18] 18  BP: ()/(49-86) 123/57  SpO2:  [95 %-99 %] 98 %  I/O last 3  completed shifts:  In: 1090 [P.O.:1090]  Out: 1100 [Urine:1100]    Gen: Appears comfortable, NAD  Wound: clean, dry, intact  Neurologic:  - Alert & Oriented to person, place (hospital) and time  - Follows commands, innatentive  - Limited spontaneous speech. Able to name, repeat. No dysarthria.  - No gaze preference.   - PERRL, EOMI  - Face symmetric with sensation intact to light touch  - Tongue protrudes midline  - No pronator drift  - 4+ throughout all extremities    Reflexes 2+ throughout    Sensation intact and symmetric to light touch throughout  Intermittent left sided sensory extinction    LABS:  Recent Labs   Lab 06/20/22  0444 06/19/22  0551 06/19/22  0433 06/19/22  0406 06/18/22  2359 06/18/22  0608     --  137  --   --  138   POTASSIUM 3.8 3.7 3.8  --   --  5.0   CHLORIDE 105  --  104  --   --  108   CO2 30  --  29  --   --  25   ANIONGAP 5  --  4  --   --  5   GLC 94  --  93 104*   < > 90   BUN 7  --  7  --   --  9   CR 0.53  --  0.52  --   --  0.50*   FLORENCIA 9.3  --  9.0  --   --  8.9    < > = values in this interval not displayed.       Recent Labs   Lab 06/20/22  0444   WBC 7.2   RBC 3.63*   HGB 11.4*   HCT 34.7*   MCV 96   MCH 31.4   MCHC 32.9   RDW 14.4          IMAGING:  Recent Results (from the past 24 hour(s))   CT Head w/o Contrast    Impression    RESIDENT PRELIMINARY INTERPRETATION  Impression:    1. Increase in size of right convexity mixed subdural fluid  collection.  2. Increased mass effect on the right cerebral hemisphere with sulcal  effacement and increased right to left midline shift measuring 5 mm.     [Urgent Result: increased size of right subdural]    Finding was identified on 6/20/2022 10:55 PM.     Dr Mike Lawrence was contacted by Dr. Corral at 6/20/2022 11:05 PM and  verbalized understanding of the urgent finding.      CT Lumbar Spine w/o Contrast    Impression    RESIDENT PRELIMINARY INTERPRETATION  Impression:     1. Acute/subacute compression deformity of the L1  vertebral body with  approximately 40% height loss.  2. Mild degenerative changes of the lumbar spine as detailed above.       [Urgent Result: L1 compression fracture]    Finding was identified on 6/20/2022 11:06 PM.     Dr Mike Lawrence was contacted by Dr. Dinh Corral at 6/20/2022 11:09 PM  and verbalized understanding of the urgent finding.

## 2022-06-21 NOTE — PLAN OF CARE
Status: s/p right sided loy holes for SDH evac on 6/16.  Vitals: soft BP, OVSS on RA.  Neuros: Alert, requires choices for orientation. Slow to respond. Mild L droop, L neglect. Strength 5/5 t/o.   IV: PIV SL  Labs/Electrolytes: WNL  Resp/trach: WNL  Diet: Regular diet, fair intake  Bowel status: BS+, LBM 6/20 per pt  : voiding spontaneously   Skin: R head incision stapled, LAUREN. Scabbing/bruising throughout.  Pain: HA, prn tylenol effective  Activity: SBA with walker  Social: sisters at bedside this shift, very involved in care.  Plan: SW following for discharge, ARU vs TCU. Continue POC.     2145: pt found down by staff members, head and lumbar CT scans completed. Awaiting results.

## 2022-06-21 NOTE — PLAN OF CARE
Status: Admitted 6/16 for SDH evacuation. Hospital fall on 6/20 resulting in L1 compression fracture.  Vitals: VSS on RA  Neuros: Waxes & wanes. A&O x3-4. Forgetful. Slow to respond. 4/5 throughout. Word finding difficulty.  IV: PIV SL  Labs/Electrolytes: WNL  Resp/trach: WNL  Diet: Regular diet.  Bowel status: LBM 6/20  : Voiding spontaneously  Skin: R head incision w/ staples LAUREN. Scabbing and bruising throughout  Pain: Lower back pain. PRNTylenol and oxycodone x2  Activity: A1, GB. TLSO brace when HOB > 30 or OOB. Bed and chair alarm on at all times. Pt fell on 6/20.  Social: Sisters Vanessa and Prerna at bedside.  Plan: Upright lumbar xray's this afternoon.  Updates this shift: Orthotics fit TLSO brace. Orthostatic BP's completed.

## 2022-06-21 NOTE — PLAN OF CARE
VSS.  Back pain minimally controlled with PRN Tylenol and Aqua K pad.  Aqua K pad changed to Lidocaine patches with slight improvement in pain.  Disoriented to exact date, forgetful, STR, slow speech, slight L droop, 4/5 strength t/o.  No L neglect or cut noted this shift.  R head incision intact with staples, no drainage, LAUREN.  New R PIV SL.  On a regular diet; denied nausea.  Pt placed on strict bedrest with HOB<30 degrees after fall last evening.  Pt unable to void on bedpan or with Purewick.  Pt SC'ed at 0515 for 750ml.  Last BM on 6/20.  Repositioned or weight shifted q1-2hours.  Repeat head CT completed this AM.  Orthotics consult placed for TLSO.  Orthostatic BP to be completed after TLSO arrives.  Continue with POC.     Goal Outcome Evaluation:    Plan of Care Reviewed With: patient     Overall Patient Progress: declining

## 2022-06-22 NOTE — PLAN OF CARE
Vitals: VSS  Neuros: W/W. A&O x3, d/o time. Forgetful. Slow to respond. 4/5 throughout. Word finding difficulty. Left side neglect. Hard to track left (NP aware and came to bedside).   IV: PIV SL  Labs/Electrolytes: Mg replaced  Resp/trach: WNL  Diet: Regular diet - Fair po   Bowel status: LBM 6/20, senna given   : Voided x 2   Skin: R head incision w/ staples LAUREN. Scabbing and bruising throughout  Pain: Lower back pain controlled with tylenol, oxycodone and flexeril.   Activity: Up with assist of 1 and GB. TLSO when HOB > 30 or OOB. Sat in chair most of day. Walked halls x 1 with RN. Needs a lot of queing for left side awareness when up. Hits bed/wall on left side when walking.  Social: Sister and friend at bedside  Plan: Work with PT/OT this afternoon. IR MD to come speak with pt and family regarding possible embolization.

## 2022-06-22 NOTE — PROGRESS NOTES
NeuroIR consult Note     Ms Babcock is 73 yo woman history of right convexity subdural hematoma initially diagnosed on 6/4, with found to have enlarged SDH, worsened midline shift and symptoms related to this change. NeuorIR was consulted for discussion with patient's sister Dr Vanessa Babcock regarding right MMA embolization procedure.Regarding history of Joyce's admission: Post SDH she had sxs including confusion, decreased attention, left neglect. On 6/16 She underwent right sided loy holes for SDH; unfortunately she had mechanical fall on 6/20 which demonstrated ncreased size of right SDH and CT L-spine demonstrating L1 compression fracture.    Today I had a long conversation with Dr Babcock regarding MMA embolizations. She already had read quite a bit about it and spoken to [place] [facility] We discussed the mechanics of the procedure including hypotheses regarding why arterial embolization may help in SDH pathophysiology. I did stress that this is not treatment for acute subdurals rather chronic subdurals. Currently there are no published randomized control trials and no clear inclusion or exclusion criteria only that it has been shown in case series to be effective for cSDHs. Today she feels like her sister is clinically better, she was upset prior as she felt her sister was having stepwise decline daily. I did also stress that this is [mma embo] option that can be revisited any time and can always be on the table. We reviewed the risks of procedure, including pain, risk of stroke or vision loss or facial paralysis given the ECIC anastomoses.   After this discussion, dr Babcock stated she would preferred to have her sister discharged and that she was told her sister can fly and she wanted to bring her sister back to [place] where she works and lives to continue care there.    I told Dr Babcock she has my cell, she can call me any time for any further discussion or if she wants to revisit this. I  updated the nsgy team.

## 2022-06-22 NOTE — OP NOTE
Procedure Date: 06/16/2022    PREOPERATIVE DIAGNOSIS:  Right-sided chronic subdural hematoma.    POSTOPERATIVE DIAGNOSIS:  Subdural hematoma.    PROCEDURE PERFORMED:  Right-sided loy hole craniotomy for subdural hematoma evacuation.  ATTENDING SURGEON:  Jarrod Cruz MD    RESIDENT SURGEON:  Tran Isaac MD     IMPLANTS:  None.    EXPLANTS:  None.      ANESTHESIA:  General.     ESTIMATED BLOOD LOSS:  5 mL.    FINDINGS:  Dark, chronic appearing blood came out under pressure.  A subgaleal drain placed overlying the posterior loy hole.  Prior to incision, questionable ST elevations noticed on telemetry by Anesthesia.  By the end of the case, these elevations had returned to baseline.    COMPLICATIONS:  None.    INDICATIONS FOR PROCEDURE:  Lilly Babcock is a 72-year-old female with history of a right traumatic subdural hematoma that was initially diagnosed on 06/04/2022.  She was monitored closely with repeat serial imaging, while initially showed stability, eventually showed an enlarging subdural hematoma with worsening midline shift and symptoms related to this including confusion, decreased attention and left-sided neglect.  Due to the newly symptomatic nature of her subdural hematoma, decision was made to take the patient for a right-sided bur hole evacuation for this bleed.  Risks and benefits were extensively discussed with the patient and her sister.  All questions were answered to patient's satisfaction.    PROCEDURE:  After consent was obtained. the patient was brought to the operating room, she underwent general endotracheal anesthesia by our anesthesia colleagues.  The patient was placed on the OR table with the head turned to the left on the head crystal.  She was prepped and draped in the usual sterile fashion.  Two linear incisions were made several centimeters above the ear.  A timeout was performed confirming the patient's site of the surgery, the patient's name, as well as all relevant identifiers.   A #10 blade was used to perform head incision down the pericranium on both sides.  Cerebellar retractor was used to open both incisions and retract.  Monopolar cautery was used to expose the underlying bony layer.  Gentle traction was used to pull the pericranium to reveal the underlying bone.  This was done for both incisions in a sequential fashion.  Subsequently, a  was used to open the bone in the anterior loy hole first, the posterior one subsequently. Meticulous hemostasis was maintained using bipolar cautery.  Both sites were irrigated copiously.  Bone wax was used to control bone bleeding in both openings.  Bipolar cautery was used to cauterize any dural vessels in both incisions.  A #10 blade was used to make an incision through the dura and first the posterior incision.  After incision to the dura, old, chronic appearing blood came out under high pressure.  This was well irrigated.  Subsequently, a second incision was made in the dura of the anterior incision.  Old, chronic blood did appear to come out but did not appear to be under pressure.  This was irrigated meticulously down into the bur hole.  After satisfactory irrigation, a 10-Ivorian JUANITO drain was tunneled over the posterior loy hole overlying it.  A 3-0 nylon was used to create a purse stitch around the drain to secure it to the skin.  It was at this time we irrigated copiously before closing.  We first paid our attention to the posterior incision where we closed using 3-0 Vicryls for the galea.  We then turned our attention next to the anterior incision where the galea was also closed with 3-0 Vicryls.  The remaining skin was closed with staples.  At the end of the case, all counts were correct.  The drapes were then taken down.  Prior to drapes coming down, a wet gauze was used to clean the incision.  Dry was subsequently placed.  ChloraPrep was used on top of this.  After being dried, bacitracin was then applied to the top of the  incision.  Drapes were subsequently taken down.  The patient was turned back to Anesthesia.  She was easily extubated and brought to the postanesthesia care unit for ongoing recovery.  Dr. Cruz was immediately available for the entire procedure.      Jarrod Cruz MD    As Dictated by KYREE CANELA MD        D: 2022   T: 2022   MT: rajni    Name:     RUBY ANG  MRN:      [id number]        Account:        [id number]   :      1950           Procedure Date: 2022     Document: [id number]

## 2022-06-22 NOTE — PROGRESS NOTES
06/22/22 1600   Quick Adds   Type of Visit PT Re-evaluation   Living Environment   Living Environment Comments See initial Evaluation for PLOF and living situation information on 6/18/2022, pt had most recently been home prior to this hospitalization with supervision due to cognition and neglect   General Information   Onset of Illness/Injury or Date of Surgery 06/16/22  (fall night of 6/20 with new compression fx)   Referring Physician Tran Isaac   Patient/Family Therapy Goals Statement (PT) Pt unable to state any, sister goal to get her home   Pertinent History of Current Problem (include personal factors and/or comorbidities that impact the POC) 72 year old female with a history of right convexity subdural hematoma initially diagnosed on 6/4, with found to have enlarged SDH, worsened midline shift and symptoms related to this change (confusion, decreased attention, left neglect). She underwent right sided loy holes for SDH evacuation on 6/16, admitted to the ICU postoperatively. Transferred to the floor on 6/20. Mechanical fall in hospital room on 6/20 at night, with CT head demonstrating increased size of right SDH and CT L-spine demonstrating L1 compression fracture, TLSO fitted and upright XR obtained, demonstrating stable alignment.   Existing Precautions/Restrictions fall;spinal  (TLSO with HOB >30 degreees)   Cognition   Affect/Mental Status (Cognition) confused;other (see comments)  (easily distracted)   Orientation Status (Cognition) person;place   Follows Commands (Cognition) follows one-step commands;delayed response/completion;increased processing time needed;initiation impaired;physical/tactile prompts required;repetition of directions required;verbal cues/prompting required   Behavioral Issues overwhelmed easily   Safety Deficit (Cognition) severe deficit;at risk behavior observed;ability to follow commands;awareness of need for assistance;insight into deficits/self-awareness;safety  precautions follow-through/compliance;safety precautions awareness;judgment;problem-solving   Cognitive Status Comments Pt required multiple choice questions in order to answer orientation questions, unable to accurately state birth date, season, or time of day(day vs night) despite being provided with multiple choice options to questions   Posture    Posture Forward head position;Protracted shoulders   Range of Motion (ROM)   Range of Motion ROM is WFL   Strength (Manual Muscle Testing)   Strength Comments Generalized weakness in BUE/BLE   Bed Mobility   Comment, (Bed Mobility) Supine > sit min-mod A   Transfers   Comment, (Transfers) Sit <> stand min A   Gait/Stairs (Locomotion)   [place] Level (Gait) verbal cues;minimum assist (75% patient effort)   Distance in Feet (Required for LE Total Joints) 10   Pattern (Gait) step-to   Deviations/Abnormal Patterns (Gait) left sided deviations;base of support, narrow;[place] decreased;gait speed decreased;stride length decreased;weight shifting decreased   Comment, (Gait/Stairs) Pt with L out-toeing, L deviations, tendency to run into things on left side, min A with small steps and minimal foot clearance without walker   Balance   Balance Comments good static sitting, impaired static standing and dynamic standing   Coordination   Coordination Comments Coordination deficits noted on left side, cross over stepping, poorly coordinated gait   Clinical Impression   Criteria for Skilled Therapeutic Intervention Yes, treatment indicated   PT Diagnosis (PT) impaired functional mobility   Influenced by the following impairments L neglect, activity tolerance, balance, coordination, Spinal precautions, TLSO, cognition, balance   Functional limitations due to impairments bed mobility, transfers, gait, stairs   Clinical Presentation (PT Evaluation Complexity) Evolving/Changing   Clinical Presentation Rationale clinical reasoning, increasing/changing deficits   Clinical Decision  Making (Complexity) moderate complexity   Planned Therapy Interventions (PT) balance training;bed mobility training;gait training;home exercise program;motor coordination training;neuromuscular re-education;patient/family education;stair training;strengthening;stretching;transfer training;progressive activity/exercise;risk factor education;home program guidelines   Risk & Benefits of therapy have been explained evaluation/treatment results reviewed;care plan/treatment goals reviewed;risks/benefits reviewed;patient;sibling   PT Discharge Planning   PT Discharge Recommendation (DC Rec) Transitional Care Facility   PT Rationale for DC Rec Pt below baseline with high falls risk and increased deficits, significantly impaired safety, functional mobility. Per sister, plan to take pt home on 6pm flight tomorrow. If pt is discharging to home, recommend home PT/OT, 24/7 physical assist with hands on gait belt when pt mobilizing, FWW, pt would benefit from w/c due to significantly impaired safety with gait and functional mobility, if able to acquire would also benefit from chair/bed alarms   PT Brief overview of current status Ax1 with FWW and gait belt, hands on gait belt, needs assist to manuever walker   Plan of Care Review   Plan of Care Reviewed With patient;sibling   Total Evaluation Time   Total Evaluation Time (Minutes) 10   Physical Therapy Goals   PT Frequency Daily   PT Predicted Duration/Target Date for Goal Attainment 06/25/22   PT: Bed Mobility Minimal assist;Supine to/from sit;Within precautions   PT: Transfers Minimal assist;Sit to/from stand;Within precautions   PT: Gait Minimal assist;Rolling walker;Within precautions;150 feet   Psychosocial Support   Trust Relationship/Rapport care explained;reassurance provided;thoughts/feelings acknowledged

## 2022-06-22 NOTE — PROGRESS NOTES
[facility], [place]   06/22/2022  Neurosurgery Progress Note:    Assessment:  Lilly Babcock is a 72 year old female with a history of right convexity subdural hematoma initially diagnosed on 6/4, with found to have enlarged SDH, worsened midline shift and symptoms related to this change (confusion, decreased attention, left neglect). She underwent right sided loy holes for SDH evacuation on 6/16, admitted to the ICU postoperatively. Transferred to the floor on 6/20. Mechanical fall in hospital room on 6/20 at night, with CT head demonstrating increased size of right SDH and CT L-spine demonstrating L1 compression fracture, TLSO fitted and upright XR obtained, demonstrating stable alignment.     Other diagnoses include:  #Brain compression  #High fall risk  #Underweight     Plan:  - Q4h neuro checks  - TLSO when HOB >30 or when OOB  - Orthostatic vital signs when able to stand  - Pending MMA embolization consult w/ neuroIR today  - Pain control  - Regular diet  - Bowel regimen  - PRN antiemetics  - PT/OT  - SCDs for DVT proph - remove SCDs while unaccompanied in room   - Bed alarm at all times    -----------------------------------  Aziza Lawrence MD  Neurosurgery PGY2    Please contact neurosurgery resident on call with questions.    Dial * * [phone], enter [phone] when prompted.  -----------------------------------    Interval History: No acute events overnight. Patient continues to be innatentive, decreased spontaneous speech and requiring frequent prompting. Awaiting MMA embolization discussion.     Objective:   Temp:  [97.5  F (36.4  C)-98.9  F (37.2  C)] 97.6  F (36.4  C)  Pulse:  [84-94] 89  Resp:  [16] 16  BP: (112-121)/(58-65) 112/63  SpO2:  [95 %-99 %] 95 %  I/O last 3 completed shifts:  In: 220 [P.O.:220]  Out: 1700 [Urine:1700]    Gen: Appears comfortable, NAD  Wound: clean, dry, intact  Neurologic:  - Opens eyes to voice & Oriented to person, place (hospital)    - Follows  commands, innatentive  - Limited spontaneous speech. Able to name, repeat. No dysarthria.  - No gaze preference.   - PERRL, EOMI  - Face symmetric with sensation intact to light touch  - Tongue protrudes midline  - No pronator drift  - 4+ throughout all extremities    Reflexes 2+ throughout    Sensation intact and symmetric to light touch throughout  Intermittent left sided sensory extinction    LABS:  Recent Labs   Lab 06/21/22  0624 06/20/22  0444 06/19/22  0551 06/19/22  0433    140  --  137   POTASSIUM 4.2 3.8 3.7 3.8   CHLORIDE 102 105  --  104   CO2 27 30  --  29   ANIONGAP 6 5  --  4   GLC 95 94  --  93   BUN 7 7  --  7   CR 0.49* 0.53  --  0.52   FLORENCIA 9.3 9.3  --  9.0       Recent Labs   Lab 06/21/22  0624   WBC 8.1   RBC 3.60*   HGB 11.3*   HCT 34.4*   MCV 96   MCH 31.4   MCHC 32.8   RDW 14.2          IMAGING:  Recent Results (from the past 24 hour(s))   XR Lumbar Spine 2/3 Views    Narrative    Exam: 5 views of the lumbar spine dated 6/21/2022.    COMPARISON: CT dated 6/20/2022.    CLINICAL HISTORY: Compression fracture of the L1 vertebral body.    FINDINGS: AP and lateral views of the lumbar spine were obtained.  There are 5 lumbar type vertebral bodies for the purposes of this  dictation. Moderate grade compression fracture at the L1 vertebral  body again noted, not significantly changed. Vascular calcifications  are noted.      Impression    IMPRESSION: Compression fracture of the L1 vertebral body, not  significantly changed since the comparison CT, allowing for  differences in technique.    DAYANA ALSTON MD         SYSTEM ID:  [id number]

## 2022-06-22 NOTE — PLAN OF CARE
Goal Outcome Evaluation:  Vitals: VSS on RA  Neuros: A&O 1-2. Forgetful. Word finding difficulty, slow to respond and 4/5 all throughout.  IV: PIV SL.  Resp: WDL  Diet: Regular diet, tray setup. Minimal intake  Bowel status: LBM 6/20  : Voiding Spontaneously.  Skin: Bruised, R head incision w/staple.   Pain: Head & lower back pain. PRN Tylenol not effective, & Oxy given this shift.  Activity: Bedfast, TLSO brace when HOB >30 or OOB. Bed and chair alarm on @ all times.  Social: Sister and friend at bedside @ 1800. Involved in care.  Plan: Neurology IR consulted for embolization.  Update this shift:Upright X-ray completed this shift.

## 2022-06-22 NOTE — PLAN OF CARE
Assumed care from 2300 to 0730  Reason for Admission: SDH evacuation and fell while admitted causing L1 compression fx. Hx of Asthma, DJD, depression, Eczema, Lymphoma, nasal congestion and seasonal allergies.    Neuro:  a&ox4 with intermittent confusion. Strength 4/5 throughout. Difficulty finding words. Bed alarm on.   Respiratory: Lungs clear. Sats>90% RA.    Cardiac: Apical pulse regular.   GI/: Bowel sounds present x4. LBM 6/20. Voiding spontaneously.   Skin/Drains/Incisions: R head stapled. Profuse bruising.   Lines: RPIV SL  Pain: Rated pain 0/10 with no s/sx of pain/discomfort.   Diet: Regular.   Labs: No abnormal labs this shift.   Electrolytes Replacement: n/a.   Activity Level: Strict bedrest d/t previous day fall and TLSO if  HOB>30.  Plan: Neurology IR consulted for embolization. Notify family if they want to be at bedside during procedure per MD.

## 2022-06-22 NOTE — CARE PLAN
6A - Per discussion with patient and sister this afternoon during PT session. Sister has a 6pm flight tomorrow(Thursday 6/23) and is planning to take sister back to [place] to transfer care. At this time, no family/care giver training has been completed regarding TLSO or safety with walking/gaurding patient. Will need to complete extensive education if patient is discharging tomorrow.

## 2022-06-23 NOTE — DISCHARGE SUMMARY
[facility] Discharge Summary and Instructions    Lilly Babcock MRN# [id number]   Age: 72 year old YOB: 1950     Date of Admission:  6/16/2022  Date of Discharge::  6/23/2022  Admitting Physician:  Jarrod Cruz MD  Discharge Physician:  Jarrod Cruz MD          Admission Diagnoses:   Subdural hematoma (H) [S06.5X9A]          Discharge Diagnosis:     Subdural hematoma (H) [S06.5X9A]     Clinically Significant Risk Factors Present on Admission    # Follicular non-Hodgkin's lymphoma                  Procedures:   Right sided loy holes for SDH evacuation on 6/16/2022           Brief History of Illness:   Lilly Babcock is a 72 year old female with a history of right convexity subdural hematoma initially diagnosed on 6/4/2022, with found to have enlarged SDH, worsened midline shift and symptoms related to this change (confusion, decreased attention, left neglect). Patient has elected to undergo above-mentioned procedure.           Hospital Course:   Patient underwent above-mentioned procedure on 6/16/2022. The operation was uncomplicated and she was admitted to the surgical ICU for routine post operative cares. On post operative day 1, she was doing well and transferred to the floor. On post operative day 2, she was ambulating, voiding without a reilly, eating a regular diet, pain was well controlled and therefore he/she was discharged to home. Her postoperatively course was complicated by mechanical fall in hospital room on 6/20/2022 at night,  Repeat head CT revealed increased size of right SDH and CT spine revealed L1 compression fracture.TLSO brace was fitted and upright XR obtained, demonstrating stable alignment. TLSO brace to be wore while patient OOB, Patient's sister was proved education on placing TLSO brace on patient correctly.  PT/OT are recommending TCU placement for rehab. Patient will discharge from the hospital with her sister today and follow-up in [place]. PT/OT  recommendations are below.  Discharged prescriptions: dexamethasone, keppra, oxycodone, and flexeril filled prior to discharge. All images provided to patient and sister on CD. Patient will need to follow-up with new PCP and neurosurgery in 1-2 weeks.  Head CT obtained prior to discharge was unchanged and stable.           Discharge Medications:     Current Discharge Medication List      START taking these medications    Details   cyclobenzaprine (FLEXERIL) 5 MG tablet Take 1 tablet (5 mg) by mouth every 8 hours as needed for muscle spasms  Qty: 12 tablet, Refills: 1    Associated Diagnoses: Compression fracture of L1 lumbar vertebra, closed, initial encounter (H)      oxyCODONE (ROXICODONE) 5 MG tablet Take 0.5 tablets (2.5 mg) by mouth every 6 hours as needed for moderate to severe pain  Qty: 30 tablet, Refills: 0    Associated Diagnoses: Compression fracture of L1 lumbar vertebra, closed, initial encounter (H)         CONTINUE these medications which have CHANGED    Details   levETIRAcetam (KEPPRA) 500 MG tablet 1 tablet (500 mg) by Oral or Feeding Tube route 2 times daily  Qty: 30 tablet, Refills: 0    Associated Diagnoses: Subdural hematoma (H)      predniSONE (DELTASONE) 1 MG tablet Take 7 tablets (7 mg) by mouth daily  Qty: 675 tablet, Refills: 0    Associated Diagnoses: Subdural hematoma (H)         CONTINUE these medications which have NOT CHANGED    Details   acetaminophen (TYLENOL) 325 MG tablet Take 2 tablets (650 mg) by mouth every 4 hours as needed for other (For optimal non-opioid multimodal pain management to improve pain control.)  Qty: 60 tablet, Refills: 0    Associated Diagnoses: Subdural hematoma (H)      acyclovir (ZOVIRAX) 400 MG tablet Take 1 tablet by mouth as needed (kandace day to prevent cold sores)      albuterol (PROAIR HFA/PROVENTIL HFA/VENTOLIN HFA) 108 (90 Base) MCG/ACT inhaler Inhale 2 puffs into the lungs every 4 hours as needed for shortness of breath / dyspnea or wheezing       atorvastatin (LIPITOR) 40 MG tablet Take 1 tablet (40 mg) by mouth every evening  Qty: 90 tablet, Refills: 1    Associated Diagnoses: Cerebrovascular accident (CVA), unspecified mechanism (H)      cetirizine (ZYRTEC) 10 MG tablet Take 10 mg by mouth daily as needed      Cholecalciferol (VITAMIN D) 1000 UNIT capsule Take 2 capsules by mouth daily     Associated Diagnoses: 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  Repeat above dose 2 hours prior to CT scan  Qty: 2 tablet, Refills: 1    Associated Diagnoses: Contrast media allergy; Nodular lymphoma of extranodal and/or solid organ site (H)      mometasone (ELOCON) 0.1 % ointment Apply topically daily as needed (eczema)       VIIBRYD 20 MG TABS tablet Take 20 mg by mouth daily                      Discharge Instructions and Follow-Up:     Discharge diet: Regular   Discharge activity: You may advance activity as tolerated. No strenuous exercise or heay lifting greater than 10 lbs for 4 weeks or until seen and cleared in clinic.   Discharge follow-up: Follow-up with Neurosurgery in 1-2 weeks for wound check/post-hospital evaluation    Follow-up with new PCP for management of other health concerns   Physical Therapy:  Recommend home PT/OT, 24/7 physical assist with hands on gait belt when pt mobilizing, FWW, pt would benefit from w/c due to significantly impaired safety with gait and functional mobility, if able to acquire would also benefit from chair/bed alarms  You  underwent surgery  to remove a blood clot around your brain Jarrod Farmer    - If you have sutures or staples in when you leave the hospital, these should be removed 2 weeks after your surgery.     - You will have follow up scheduled with the physician assistants and/or nurse practitioners in our clinic 2 weeks after your surgery.  If you live far away, you may see your primary care doctor for a wound check at 2 weeks.     - We will have you follow up in the neurosurgery clinic in 1 month after surgery with a repeat head CT.     - You are taking Keppra (levtiracitam) for prophylaxis.  Please continue to take your anti-seizure medications as prescribed. You will be given a schedule of how to slowly decrease these medications as well which you should follow. If this is not clear to you, please call our clinic for assistance. .      After discharge, your activity restrictions are:   -We encourage short frequent walks, increasing as tolerated.  - No driving until you are seen in clinic and cleared by your neurosurgeon.  If you have had a seizure, you may not drive for at least 3 months according to [place] law.    - No strenuous activity.  - No lifting more than 10 pounds until you are seen in clinic (a gallon of milk weighs approximately 8 pounds)    Wound cares after surgery  - You are ok to shower, but do not soak your incisions. Pat them dry if they get damp.   - Avoid coloring your hair or permanent styling until cleared by your surgeon  - No baths, hot tubs or pools for 4-6 weeks after surgery.       Call if you have any of the followin. Temperature greater than 101.5 F.   2. Any redness, swelling or discharge from the wound.   3. Any new weakness, numbness or altered mental status.  4. Worsening pain that is not improving with the pain medications you were prescribed.     Call [phone] or after 5:00 pm or on weekends call [phone] and ask for the neurosurgery resident on call. Thank You.                     Discharge Disposition:     Discharged to home      LING Oreilly, CNP  Neurosurgery  Pager [phone]

## 2022-06-23 NOTE — PROGRESS NOTES
[facility] Oral Chemotherapy Monitoring Program    Thank you for the opportunity to be a part in the care of this patient's oral chemotherapy. The oncology pharmacy will no longer be following this patient for oral chemotherapy. If there are any questions or the plan changes, feel free to contact us.    ORAL CHEMOTHERAPY 3/17/2022 3/29/2022 4/13/2022 4/22/2022 5/7/2022 5/20/2022 6/23/2022   Assessment Type - Refill Monthly Follow up Refill Chart Review Refill Discontinuation   Stop Date - - - - - - 6/23/2022   Diagnosis Code Non-Hodgkin Lymphoma (NHL) Non-Hodgkin Lymphoma (NHL) Non-Hodgkin Lymphoma (NHL) Non-Hodgkin Lymphoma (NHL) Non-Hodgkin Lymphoma (NHL) Non-Hodgkin Lymphoma (NHL) Non-Hodgkin Lymphoma (NHL)   Providers Dr Brenda Gutierrez   [facility]   Drug Name Revlimid (lenalidomide) Revlimid (lenalidomide) Revlimid (lenalidomide) Revlimid (lenalidomide) Revlimid (lenalidomide) Revlimid (lenalidomide) Revlimid (lenalidomide)   Dose 20 mg 20 mg 20 mg 20 mg 20 mg 20 mg -   Current Schedule Daily Daily Daily Daily Daily Daily -   Cycle Details 3 weeks on, 1 week off 3 weeks on, 1 week off 3 weeks on, 1 week off 3 weeks on, 1 week off 3 weeks on, 1 week off 3 weeks on, 1 week off -   Start Date of Last Cycle 3/8/2022 - 4/6/2022 - - - -   Planned next cycle start date - 4/5/2022 5/4/2022 5/4/2022 - 6/1/2022 -   Doses missed in last 2 weeks - - 0 - - - -   Adherence Assessment - - - - - - -   Adverse Effects Rash - Fatigue - - - -   Rash Grade 1 - - - - - -   Pharmacist Intervention(Rash) Yes - - - - - -   Intervention(s) Patient education;OTC recommendation - - - - - -   Fatigue - - Grade 1 - - - -   Pharmacist Intervention(fatigue) - - No - - - -   Any new drug interactions? - - No - - - -   Is the dose as ordered appropriate for the patient? - - Yes - - - -        Laura Arevalo, PharmD  Hematology/Oncology Clinical Pharmacist  Oral Chemotherapy Monitoring Program  [facility]  [phone]

## 2022-06-23 NOTE — PROGRESS NOTES
Care Management Follow Up    Length of Stay (days): 7    Expected Discharge Date: 06/23/2022     Concerns to be Addressed:  Discharge planning     Patient plan of care discussed at interdisciplinary rounds: Yes    Anticipated Discharge Disposition:  Discharge with her sister to her home in [place].      Additional Information:  In 6A Discharge Martín Manzo NP, reported pt is discharging at 3pm with her sister to [place]. They have a 6pm flight. TCU was recommended. Sister is a doctor and plans to have her followed by Neurosurgery in [place].   PT & OT recommending TCU vs home PT & OT.   This afternoon spoke with nurse, Shannan. She reported family, including pt's children are here. Shannan reported pt's sister has made medical appts and arranged for home care in [place]. Sister is an OB/GYN doctor. Plan is discharge at 3pm with family to the airport for 6pm flight.         Trudi Jean RN Care Coordinator  Unit 6A, [facility]

## 2022-06-23 NOTE — PLAN OF CARE
Status: S/p [facility] evacuation 6/16. Fell 6/20, sustained L1 compression fx  Vitals: VSS on RA  Neuros: DTA. Pt lethargic, arouses to voice. Minimally participatory for neuro exam, MD aware. Stated name, but CATHY birthday, place, situation, date. RLE 3/5, all others 4. Denies N/T. Slow to respond. L neglect.   IV: PIV SL  Diet: Regular  Bowel status: BS+, LBM 6/20  : voiding spontaneously, needed prompting overnight  Pain: denied overnight  Activity: A1-2 with TLSO, GB, walker. Pivot to commode overnight.   Plan: per care coordinator note, pt to leave with sister at 6pm for flight to [place]   Updates this shift: CT completed this shift

## 2022-06-23 NOTE — PLAN OF CARE
Discharge time/date: 6/23 @ 1500  Walked or Wheelchair: Wheelchair with TLSO brace  PIV removed: Yes  Reviewed AVS with patient: Yes, patient and sister  Medication due times added to AVS in EPIC: Yes  Verbalized understanding of discharge with teachback: Yes, sister did   Medications retrieved from pharmacy: Yes  Supplies sent home: Yes  Belongings from security with patient: NA    TCU recommended for patient, however sister (OBCYNDIEN from [place]) is flying her there for resumed care per her request. She states she has follow up appointments lined up, home care lined up, and has a flight for 1800. Mds aware and discharge orders discussed with patient and family. PT and OT came to see patient and sister to go over brace instructions and any other questions family had. All belongings with patient, and son is coming to take them to airport.

## 2022-06-23 NOTE — PLAN OF CARE
Status: S/p SDH evacuation 6/16. Fell 6/20, sustained L1 compression fx  Vitals: VSS on RA  Neuros: Lethargic most of this shift. Disoriented to time, situation, slow to respond, L neglect, intermittently following commands  IV: PIV SL  Diet: Regular, poor PO  Bowel status: LBM 6/20  : Voiding  Skin: No new defecits  Pain: Headache and back pain controlled with tylenol, flexeril, oxy  Activity: Up with 1, GB, walker. TLSO when OOB  Updates/plan: Sister Vanessa at bedside this afternoon. Planning to fly to [place] tomorrow at 6 pm with patient, requesting head CT to ensure patient is safe to travel. CT scan ordered for 0400 6/23. Continue POC

## 2022-06-23 NOTE — PLAN OF CARE
Occupational Therapy Discharge Summary    Reason for therapy discharge:    Discharged to home.    Progress towards therapy goal(s). See goals on Care Plan in [facility] electronic health record for goal details.  Goals partially met.  Barriers to achieving goals:   discharge from facility.    Therapy recommendation(s):    Continued therapy is recommended.  Rationale/Recommendations:  Pt would benefit from HH OT to increase independence with ADL.

## 2022-06-24 NOTE — PLAN OF CARE
Physical Therapy Discharge Summary    Reason for therapy discharge:    Discharged to home.    Progress towards therapy goal(s). See goals on Care Plan in [facility] electronic health record for goal details.  Goals partially met.  Barriers to achieving goals:   discharge from facility.    Therapy recommendation(s):    Continued therapy is recommended.  Rationale/Recommendations: Recommend HHPT to progress safety and indep with functional mobility.

## 2022-06-24 NOTE — PROGRESS NOTES
Clinic Care Coordination Contact  [facility]/Voicemail    Clinical Data: Care Coordinator Outreach  Outreach attempted x 1. Left message on patient's voicemail with call back information and requested return call.     Plan:Care Coordinator will try to reach patient again in 1-2 business days.    YAYO Ortiz  [phone]  [facility]

## 2022-06-25 NOTE — PROGRESS NOTES
Clinic Care Coordination Contact  [facility]/Voicemail       Clinical Data: Care Coordinator Outreach  Outreach attempted x 2.  Left message on patient's voicemail with call back information and requested return call.  Plan:  Care Coordinator will try to reach patient again in 1-2 business days.    Poonam Shukla, [facility]  [phone]  [facility]

## 2022-06-27 NOTE — PROGRESS NOTES
[facility]: Cancer Care                                                                                          Per chart review and discussion with Dr. Gutierrez, patient has [place] out of [place] and will not be continuing active care with [facility]. Patient will keep her follow-up appointment virtually with Dr. Gutierrez on 7/14.    Signature:  Marlene More RN

## 2022-07-14 NOTE — TELEPHONE ENCOUNTER
Pt states she is currently in [place] and will not be back until Sept. 26 2022. Pt also has questions regarding her oral chemo and her recent head injury. Pt will like a call from the clinic scheduling team to assist with rescheduling video visit.

## 2023-01-31 ENCOUNTER — PATIENT OUTREACH (OUTPATIENT)
Dept: ONCOLOGY | Facility: CLINIC | Age: 73
End: 2023-01-31
Payer: MEDICARE

## 2023-01-31 NOTE — TELEPHONE ENCOUNTER
[facility]: Cancer Care                                                                                          Obituary found on-line passed away 9/26/2022    Signature:  Halle Nguyen LPN

## 2023-02-20 NOTE — PLAN OF CARE
Pt alert and oriented. Denies pain.  No s/s of SOB and chest pain noted. Independent bed mobility. Neuro checks WNL. Call light within reach. Bed alarm on for safety. Seizure precaution maintained. Will continue POC.        Patient's most recent vital signs are:     Vital signs:  BP: 115/50  Temp: 96.5  HR: 89  RR: 18  SpO2: 98 %     Patient does not have new respiratory symptoms.  Patient does not have new sore throat.  Patient does not have a fever greater than 99.5.          ADL retraining/balance training/fine motor coordination training/neuromuscular re-education/parent/caregiver training.../ROM/strengthening/transfer training Albendazole Pregnancy And Lactation Text: This medication is Pregnancy Category C and it isn't known if it is safe during pregnancy. It is also excreted in breast milk.

## (undated) DEVICE — SPONGE COTTONOID 1/2X1/2" 20-04S

## (undated) DEVICE — LINEN TOWEL PACK X6 WHITE 5487

## (undated) DEVICE — CATH TRAY FOLEY SURESTEP 16FR W/URNE MTR STLK LATEX A303316A

## (undated) DEVICE — SUCTION MANIFOLD NEPTUNE 2 SYS 4 PORT 0702-020-000

## (undated) DEVICE — PERFORATOR CRANIAL DGR-O SURGICAL 11-14MM PEDS 200-271

## (undated) DEVICE — DRSG ADAPTIC 3X16"  6114

## (undated) DEVICE — SU ETHILON 3-0 PS-1 18" 1663H

## (undated) DEVICE — SU VICRYL 2-0 CT-2 CR 8X18" J726D

## (undated) DEVICE — ESU CORD BIPOLAR GREEN 10-4000

## (undated) DEVICE — PAD CHUX UNDERPAD 23X24" 7136

## (undated) DEVICE — SYR BULB IRRIG DOVER 60 ML LATEX FREE 67000

## (undated) DEVICE — BUR STRK CARBIDE ROUND 5.0MM 5820-110-050C

## (undated) DEVICE — DRAIN JACKSON PRATT 10MM FLAT 4/4 PERF SU130-1311

## (undated) DEVICE — LINEN TOWEL PACK X5 5464

## (undated) DEVICE — DRAIN JACKSON PRATT RESERVOIR 100ML SU130-1305

## (undated) DEVICE — SOL NACL 0.9% 10ML VIAL 0409-4888-02

## (undated) DEVICE — CATH FOLEY 14FR 5ML SILVER COAT LATEX 0165SI14

## (undated) DEVICE — ADH SKIN CLOSURE PREMIERPRO EXOFIN 1.0ML 3470

## (undated) DEVICE — SYR 03ML LL W/O NDL 309657

## (undated) DEVICE — DRAPE POUCH INSTRUMENT 1018

## (undated) DEVICE — NDL 21GA 1.5"

## (undated) DEVICE — ESU GROUND PAD ADULT W/CORD E7507

## (undated) DEVICE — DRAPE CRANIOTOMY W/POUCH 9450

## (undated) DEVICE — PREP CHLORAPREP CLEAR 3ML 930400

## (undated) DEVICE — PACK CRANIOTOMY

## (undated) DEVICE — SPONGE SURGIFOAM 100 1974

## (undated) DEVICE — ESU PENCIL SMOKE EVAC W/ROCKER SWITCH 0703-047-000

## (undated) RX ORDER — CEFAZOLIN SODIUM/WATER 2 G/20 ML
SYRINGE (ML) INTRAVENOUS
Status: DISPENSED
Start: 2022-01-01

## (undated) RX ORDER — LIDOCAINE HYDROCHLORIDE AND EPINEPHRINE 10; 10 MG/ML; UG/ML
INJECTION, SOLUTION INFILTRATION; PERINEURAL
Status: DISPENSED
Start: 2022-01-01

## (undated) RX ORDER — FENTANYL CITRATE 50 UG/ML
INJECTION, SOLUTION INTRAMUSCULAR; INTRAVENOUS
Status: DISPENSED
Start: 2022-01-01